# Patient Record
Sex: FEMALE | Race: WHITE | Employment: FULL TIME | URBAN - METROPOLITAN AREA
[De-identification: names, ages, dates, MRNs, and addresses within clinical notes are randomized per-mention and may not be internally consistent; named-entity substitution may affect disease eponyms.]

---

## 2017-12-05 ENCOUNTER — HOSPITAL ENCOUNTER (EMERGENCY)
Facility: HOSPITAL | Age: 42
Discharge: HOME | End: 2017-12-05
Admitting: EMERGENCY MEDICINE
Payer: COMMERCIAL

## 2017-12-05 ENCOUNTER — APPOINTMENT (EMERGENCY)
Dept: RADIOLOGY | Facility: HOSPITAL | Age: 42
End: 2017-12-05
Payer: COMMERCIAL

## 2017-12-05 VITALS
TEMPERATURE: 98.8 F | WEIGHT: 196 LBS | HEART RATE: 90 BPM | RESPIRATION RATE: 16 BRPM | HEIGHT: 68 IN | OXYGEN SATURATION: 96 % | BODY MASS INDEX: 29.7 KG/M2 | DIASTOLIC BLOOD PRESSURE: 88 MMHG | SYSTOLIC BLOOD PRESSURE: 136 MMHG

## 2017-12-05 DIAGNOSIS — S62.629A CLOSED AVULSION FRACTURE OF MIDDLE PHALANX OF FINGER, INITIAL ENCOUNTER: Primary | ICD-10-CM

## 2017-12-05 PROCEDURE — 73140 X-RAY EXAM OF FINGER(S): CPT

## 2017-12-05 PROCEDURE — 99283 EMERGENCY DEPT VISIT LOW MDM: CPT

## 2017-12-05 RX ORDER — NAPROXEN 500 MG/1
500 TABLET ORAL 2 TIMES DAILY WITH MEALS
Qty: 20 TABLET | Refills: 0 | Status: ON HOLD | OUTPATIENT
Start: 2017-12-05 | End: 2018-05-01 | Stop reason: ALTCHOICE

## 2017-12-05 RX ORDER — ESCITALOPRAM OXALATE 10 MG/1
10 TABLET ORAL DAILY
Status: ON HOLD | COMMUNITY
End: 2018-05-01 | Stop reason: ALTCHOICE

## 2017-12-05 NOTE — DISCHARGE INSTRUCTIONS
Finger Fracture   WHAT YOU NEED TO KNOW:   A finger fracture is a break in 1 or more of the bones in your finger  DISCHARGE INSTRUCTIONS:   Return to the emergency department if:   · Your cast or splint gets wet, damaged, or comes off  · Your splint or cast feels too tight  · You have severe pain  · Your injured finger is numb, cold, or pale  Contact your healthcare provider or hand specialist if:   · Your pain or swelling gets worse, even after treatment  · You have questions or concerns about your condition or care  Medicines:   · NSAIDs , such as ibuprofen, help decrease swelling, pain, and fever  This medicine is available with or without a doctor's order  NSAIDs can cause stomach bleeding or kidney problems in certain people  If you take blood thinner medicine, always ask your healthcare provider if NSAIDs are safe for you  Always read the medicine label and follow directions  · Acetaminophen  decreases pain and fever  It is available without a doctor's order  Ask how much to take and how often to take it  Follow directions  Acetaminophen can cause liver damage if not taken correctly  · Prescription pain medicine  may be given  Ask your healthcare provider how to take this medicine safely  Some prescription pain medicines contain acetaminophen  Do not take other medicines that contain acetaminophen without talking to your healthcare provider  Too much acetaminophen may cause liver damage  Prescription pain medicine may cause constipation  Ask your healthcare provider how to prevent or treat constipation  · Take your medicine as directed  Contact your healthcare provider if you think your medicine is not helping or if you have side effects  Tell him or her if you are allergic to any medicine  Keep a list of the medicines, vitamins, and herbs you take  Include the amounts, and when and why you take them  Bring the list or the pill bottles to follow-up visits   Carry your medicine list with you in case of an emergency  Self-care:   · Wear your splint as directed  Do not remove your splint until you follow up with your healthcare provider or hand specialist      · Apply ice  on your finger for 15 to 20 minutes every hour or as directed  Use an ice pack, or put crushed ice in a plastic bag  Cover it with a towel before you apply it to your skin  Ice helps prevent tissue damage and decreases swelling and pain  · Elevate  your finger above the level of your heart as often as you can  This will help decrease swelling and pain  Prop your hand on pillows or blankets to keep it elevated comfortably  · Go to physical therapy as directed  A physical therapist teaches you exercises to help improve movement and strength, and to decrease pain  Follow up with your healthcare provider or hand specialist within 2 days:  Write down your questions so you remember to ask them during your visits  © 2017 [address]  Information is for End User's use only and may not be sold, redistributed or otherwise used for commercial purposes  All illustrations and images included in CareNotes® are the copyrighted property of [employer]  or [address]  The above information is an  only  It is not intended as medical advice for individual conditions or treatments  Talk to your doctor, nurse or pharmacist before following any medical regimen to see if it is safe and effective for you

## 2017-12-05 NOTE — ED PROVIDER NOTES
History  Chief Complaint   Patient presents with    Finger Injury     pt reports injuring R 5th finger yesterday - c/o pain, bruise and swelling     79-year-old female presenting today with a right 5th pinky pain x1 day  Relays that pain began after she was lifting heavy bags and then through one, subsequently causing pain  Pain is 5/10 nonradiating  Notes bruising and swelling to the finger  Has been able to move the finger however with some pain  Has not taken anything for the pain  Denies numbness, paresthesias, weakness, wrist or hand pain  Prior to Admission Medications   Prescriptions Last Dose Informant Patient Reported? Taking?   escitalopram (LEXAPRO) 10 mg tablet   Yes Yes   Sig: Take 10 mg by mouth daily   hydrOXYzine HCL (ATARAX) 25 mg tablet   No No   Sig: Take 1 tablet by mouth 3 (three) times a day as needed for itching for up to 10 days      Facility-Administered Medications: None       Past Medical History:   Diagnosis Date    Psychiatric disorder     depression, anxiety       Past Surgical History:   Procedure Laterality Date    REDUCTION MAMMAPLASTY         No family history on file  I have reviewed and agree with the history as documented  Social History   Substance Use Topics    Smoking status: Current Every Day Smoker     Packs/day: 1 00     Types: Cigarettes    Smokeless tobacco: Never Used    Alcohol use Yes        Review of Systems   Constitutional: Negative  Negative for activity change  HENT: Negative  Eyes: Negative  Respiratory: Negative  Cardiovascular: Negative  Gastrointestinal: Negative  Genitourinary: Negative  Musculoskeletal: Positive for joint swelling  Negative for arthralgias, back pain, gait problem, myalgias and neck pain  Skin: Positive for color change  Negative for pallor, rash and wound  Neurological: Negative  Negative for tremors, weakness and numbness  All other systems reviewed and are negative        Physical Exam  ED Triage Vitals [12/05/17 1233]   Temperature Pulse Respirations Blood Pressure SpO2   98 8 °F (37 1 °C) 90 16 136/88 96 %      Temp Source Heart Rate Source Patient Position - Orthostatic VS BP Location FiO2 (%)   Oral Monitor Sitting Right arm --      Pain Score       6           Orthostatic Vital Signs  Vitals:    12/05/17 1233   BP: 136/88   Pulse: 90   Patient Position - Orthostatic VS: Sitting       Physical Exam   Constitutional: She is oriented to person, place, and time  She appears well-developed and well-nourished  HENT:   Head: Normocephalic and atraumatic  Eyes: EOM are normal  Pupils are equal, round, and reactive to light  Cardiovascular: Normal rate, regular rhythm, normal heart sounds and intact distal pulses  Pulmonary/Chest: Effort normal and breath sounds normal    spo2 is 96% indicating adequate oxygenation  Neurological: She is alert and oriented to person, place, and time  Skin: Skin is warm and dry  Capillary refill takes less than 2 seconds  Nursing note and vitals reviewed  ED Medications  Medications - No data to display    Diagnostic Studies  Results Reviewed     None                 XR finger fifth digit - pinkie RIGHT   Final Result by Lazaro Montano MD (12/05 1347)      Faint bony density dorsal to the PIP joint of the pinky finger may represent a fracture of uncertain age  There is adjacent soft tissue swelling  Workstation performed: [id number]                    Procedures  Procedures       Phone Contacts  ED Phone Contact    ED Course  ED Course                                MDM  Number of Diagnoses or Management Options  Closed avulsion fracture of middle phalanx of finger, initial encounter:   Diagnosis management comments: Discussed with patient results of the x-ray  Patient is placed in a finger splint assessed by me with good neurovascular exam before and after    Will have patient follow up with Orthopedics for re-evaluation for any potential underlying tendon/ligament injuries  Patient verbalizes understanding and agrees with the above assessment and plan  Amount and/or Complexity of Data Reviewed  Tests in the radiology section of CPT®: ordered and reviewed  Review and summarize past medical records: yes  Independent visualization of images, tracings, or specimens: yes      CritCare Time    Disposition  Final diagnoses:   Closed avulsion fracture of middle phalanx of finger, initial encounter     Time reflects when diagnosis was documented in both MDM as applicable and the Disposition within this note     Time User Action Codes Description Comment    12/5/2017  1:38 PM Paulo Viveros Add [T42 597I] Closed avulsion fracture of middle phalanx of finger, initial encounter       ED Disposition     ED Disposition Condition Comment    Discharge  Hemal Media discharge to home/self care      Condition at discharge: Good        Follow-up Information     Follow up With Specialties Details Why Contact Info Additional [address] Emergency Department Emergency Medicine Go to If symptoms worsen [address] [place] [address]  [phone] [facility], [place], [place], [address] , DO Orthopedic Surgery Schedule an appointment as soon as possible for a visit  [address] [place] [address]  [phone]           Discharge Medication List as of 12/5/2017  1:39 PM      START taking these medications    Details   naproxen (NAPROSYN) 500 mg tablet Take 1 tablet by mouth 2 (two) times a day with meals for 10 days, Starting Tue 12/5/2017, Until Fri 12/15/2017, Print         CONTINUE these medications which have NOT CHANGED    Details   escitalopram (LEXAPRO) 10 mg tablet Take 10 mg by mouth daily, Historical Med      hydrOXYzine HCL (ATARAX) 25 mg tablet Take 1 tablet by mouth 3 (three) times a day as needed for itching for up to 10 days, Starting 11/3/2016, Until Sun 11/13/16, Print           No discharge procedures on file      ED Provider  Electronically Signed by           Song Babin PA-C  12/05/17 5947

## 2018-01-18 ENCOUNTER — GENERIC CONVERSION - ENCOUNTER (OUTPATIENT)
Dept: OTHER | Facility: OTHER | Age: 43
End: 2018-01-18

## 2018-01-21 ENCOUNTER — APPOINTMENT (EMERGENCY)
Dept: RADIOLOGY | Facility: HOSPITAL | Age: 43
End: 2018-01-21
Payer: COMMERCIAL

## 2018-01-21 ENCOUNTER — HOSPITAL ENCOUNTER (EMERGENCY)
Facility: HOSPITAL | Age: 43
Discharge: HOME | End: 2018-01-21
Attending: EMERGENCY MEDICINE | Admitting: EMERGENCY MEDICINE
Payer: COMMERCIAL

## 2018-01-21 VITALS
SYSTOLIC BLOOD PRESSURE: 141 MMHG | WEIGHT: 196 LBS | RESPIRATION RATE: 20 BRPM | TEMPERATURE: 97.5 F | DIASTOLIC BLOOD PRESSURE: 102 MMHG | OXYGEN SATURATION: 97 % | HEART RATE: 83 BPM | BODY MASS INDEX: 29.8 KG/M2

## 2018-01-21 DIAGNOSIS — R07.89 ATYPICAL CHEST PAIN: Primary | ICD-10-CM

## 2018-01-21 LAB
ALBUMIN SERPL BCP-MCNC: 3.8 G/DL (ref 3.5–5)
ALP SERPL-CCNC: 58 U/L (ref 46–116)
ALT SERPL W P-5'-P-CCNC: 16 U/L (ref 12–78)
ANION GAP SERPL CALCULATED.3IONS-SCNC: 9 MMOL/L (ref 4–13)
AST SERPL W P-5'-P-CCNC: 14 U/L (ref 5–45)
BASOPHILS # BLD AUTO: 0 THOUSANDS/ΜL (ref 0–0.1)
BASOPHILS NFR BLD AUTO: 1 % (ref 0–1)
BILIRUB SERPL-MCNC: 0.3 MG/DL (ref 0.2–1)
BUN SERPL-MCNC: 17 MG/DL (ref 5–25)
CALCIUM SERPL-MCNC: 8.7 MG/DL (ref 8.3–10.1)
CHLORIDE SERPL-SCNC: 103 MMOL/L (ref 100–108)
CO2 SERPL-SCNC: 28 MMOL/L (ref 21–32)
CREAT SERPL-MCNC: 0.83 MG/DL (ref 0.6–1.3)
EOSINOPHIL # BLD AUTO: 0.1 THOUSAND/ΜL (ref 0–0.61)
EOSINOPHIL NFR BLD AUTO: 2 % (ref 0–6)
ERYTHROCYTE [DISTWIDTH] IN BLOOD BY AUTOMATED COUNT: 13.5 % (ref 11.6–15.1)
GFR SERPL CREATININE-BSD FRML MDRD: 88 ML/MIN/1.73SQ M
GLUCOSE SERPL-MCNC: 90 MG/DL (ref 65–140)
HCT VFR BLD AUTO: 36.7 % (ref 37–47)
HGB BLD-MCNC: 12.2 G/DL (ref 12–16)
LYMPHOCYTES # BLD AUTO: 2.5 THOUSANDS/ΜL (ref 0.6–4.47)
LYMPHOCYTES NFR BLD AUTO: 38 % (ref 14–44)
MCH RBC QN AUTO: 29.9 PG (ref 27–31)
MCHC RBC AUTO-ENTMCNC: 33.2 G/DL (ref 31.4–37.4)
MCV RBC AUTO: 90 FL (ref 82–98)
MONOCYTES # BLD AUTO: 0.7 THOUSAND/ΜL (ref 0.17–1.22)
MONOCYTES NFR BLD AUTO: 10 % (ref 4–12)
NEUTROPHILS # BLD AUTO: 3.3 THOUSANDS/ΜL (ref 1.85–7.62)
NEUTS SEG NFR BLD AUTO: 50 % (ref 43–75)
NRBC BLD AUTO-RTO: 0 /100 WBCS
PLATELET # BLD AUTO: 229 THOUSANDS/UL (ref 130–400)
PMV BLD AUTO: 8.2 FL (ref 8.9–12.7)
POTASSIUM SERPL-SCNC: 3.9 MMOL/L (ref 3.5–5.3)
PROT SERPL-MCNC: 7 G/DL (ref 6.4–8.2)
RBC # BLD AUTO: 4.07 MILLION/UL (ref 4.2–5.4)
SODIUM SERPL-SCNC: 140 MMOL/L (ref 136–145)
TROPONIN I SERPL-MCNC: <0.02 NG/ML
WBC # BLD AUTO: 6.7 THOUSAND/UL (ref 4.8–10.8)

## 2018-01-21 PROCEDURE — 85025 COMPLETE CBC W/AUTO DIFF WBC: CPT

## 2018-01-21 PROCEDURE — 99285 EMERGENCY DEPT VISIT HI MDM: CPT

## 2018-01-21 PROCEDURE — 36415 COLL VENOUS BLD VENIPUNCTURE: CPT

## 2018-01-21 PROCEDURE — 93005 ELECTROCARDIOGRAM TRACING: CPT

## 2018-01-21 PROCEDURE — 80053 COMPREHEN METABOLIC PANEL: CPT

## 2018-01-21 PROCEDURE — 84484 ASSAY OF TROPONIN QUANT: CPT

## 2018-01-21 PROCEDURE — 71045 X-RAY EXAM CHEST 1 VIEW: CPT

## 2018-01-21 RX ORDER — ASPIRIN 81 MG/1
324 TABLET, CHEWABLE ORAL ONCE
Status: COMPLETED | OUTPATIENT
Start: 2018-01-21 | End: 2018-01-21

## 2018-01-21 RX ADMIN — ASPIRIN 81 MG 324 MG: 81 TABLET ORAL at 23:20

## 2018-01-22 LAB
ATRIAL RATE: 80 BPM
P AXIS: 35 DEGREES
PR INTERVAL: 182 MS
QRS AXIS: 26 DEGREES
QRSD INTERVAL: 86 MS
QT INTERVAL: 392 MS
QTC INTERVAL: 452 MS
T WAVE AXIS: 35 DEGREES
VENTRICULAR RATE: 80 BPM

## 2018-01-22 NOTE — ED PROVIDER NOTES
History  Chief Complaint   Patient presents with    Chest Pain     started 1/2 hour ago  Pain started in left arm and then radiated across chest   Now feels tight    Shortness of Breath     started with chest pain, now relieved    Headache     sharp head pain preceeded chest pain, now relieved     38 yowf c/o sudden onset headache shortly followed by tightening in left arm which radiated into left chest   Lasted a few seconds and resolved  Got a little sob and felt warm with it  Did not break out in a sweat  No nausea or vomiting  No recent fever or cough  + smoker  No h/o HTN/chol/DM  No FH of heart problems  + FH cancer, copd  Prior to Admission Medications   Prescriptions Last Dose Informant Patient Reported? Taking?   escitalopram (LEXAPRO) 10 mg tablet 1/21/2018 at Unknown time  Yes Yes   Sig: Take 10 mg by mouth daily   hydrOXYzine HCL (ATARAX) 25 mg tablet   No No   Sig: Take 1 tablet by mouth 3 (three) times a day as needed for itching for up to 10 days   naproxen (NAPROSYN) 500 mg tablet   No No   Sig: Take 1 tablet by mouth 2 (two) times a day with meals for 10 days      Facility-Administered Medications: None       Past Medical History:   Diagnosis Date    Psychiatric disorder     depression, anxiety       Past Surgical History:   Procedure Laterality Date    REDUCTION MAMMAPLASTY         History reviewed  No pertinent family history  I have reviewed and agree with the history as documented  Social History   Substance Use Topics    Smoking status: Current Every Day Smoker     Packs/day: 1 00     Types: Cigarettes    Smokeless tobacco: Never Used    Alcohol use Yes      Comment: social        Review of Systems   Constitutional: Negative  Negative for fever  HENT: Negative  Eyes: Negative  Respiratory: Negative  Negative for cough and shortness of breath  Cardiovascular: Positive for chest pain  Gastrointestinal: Negative    Negative for abdominal pain, diarrhea, nausea and vomiting  Genitourinary: Negative  Negative for dysuria and flank pain  Musculoskeletal: Negative  Negative for back pain and myalgias  Skin: Negative  Negative for rash and wound  Neurological: Negative  Negative for dizziness and headaches  Hematological: Does not bruise/bleed easily  Psychiatric/Behavioral: Negative  All other systems reviewed and are negative  Physical Exam  ED Triage Vitals [01/21/18 2253]   Temperature Pulse Respirations Blood Pressure SpO2   97 5 °F (36 4 °C) 83 20 (!) 141/102 97 %      Temp Source Heart Rate Source Patient Position - Orthostatic VS BP Location FiO2 (%)   Tympanic Monitor Lying Left arm --      Pain Score       2           Orthostatic Vital Signs  Vitals:    01/21/18 2253   BP: (!) 141/102   Pulse: 83   Patient Position - Orthostatic VS: Lying       Physical Exam   Constitutional: She is oriented to person, place, and time  She appears well-developed and well-nourished  No distress  BP recheck 112/75   HENT:   Head: Normocephalic and atraumatic  Eyes: Conjunctivae are normal  Pupils are equal, round, and reactive to light  Neck: Normal range of motion  Neck supple  Cardiovascular: Normal rate, regular rhythm and normal heart sounds  No murmur heard  Pulmonary/Chest: Effort normal and breath sounds normal  No respiratory distress  Abdominal: Soft  Bowel sounds are normal  She exhibits no distension  There is no tenderness  Musculoskeletal: Normal range of motion  She exhibits no edema, tenderness or deformity  No calf tenderness   Neurological: She is alert and oriented to person, place, and time  No cranial nerve deficit  She exhibits normal muscle tone  Skin: Skin is warm and dry  No rash noted  She is not diaphoretic  No pallor  Psychiatric: She has a normal mood and affect  Her behavior is normal    Nursing note and vitals reviewed        ED Medications  Medications   aspirin chewable tablet 324 mg (324 mg Oral Given 1/21/18 2320)       Diagnostic Studies  Results Reviewed     Procedure Component Value Units Date/Time    Troponin I [[id number]]  (Normal) Collected:  01/21/18 2302    Lab Status:  Final result Specimen:  Blood from Arm, Right Updated:  01/21/18 2329     Troponin I <0 02 ng/mL     Narrative:         [employer] Chemistry analyzer 99% cutoff is > 0 04 ng/mL in network labs    o cTnI 99% cutoff is useful only when applied to patients in the clinical setting of myocardial ischemia  o cTnI 99% cutoff should be interpreted in the context of clinical history, ECG findings and possibly cardiac imaging to establish correct diagnosis  o cTnI 99% cutoff may be suggestive but clearly not indicative of a coronary event without the clinical setting of myocardial ischemia  Comprehensive metabolic panel [[id number]] Collected:  01/21/18 2302    Lab Status:  Final result Specimen:  Blood from Arm, Right Updated:  01/21/18 2324     Sodium 140 mmol/L      Potassium 3 9 mmol/L      Chloride 103 mmol/L      CO2 28 mmol/L      Anion Gap 9 mmol/L      BUN 17 mg/dL      Creatinine 0 83 mg/dL      Glucose 90 mg/dL      Calcium 8 7 mg/dL      AST 14 U/L      ALT 16 U/L      Alkaline Phosphatase 58 U/L      Total Protein 7 0 g/dL      Albumin 3 8 g/dL      Total Bilirubin 0 30 mg/dL      eGFR 88 ml/min/1 73sq m     Narrative:         National Kidney Disease Education Program recommendations are as follows:  GFR calculation is accurate only with a steady state creatinine  Chronic Kidney disease less than 60 ml/min/1 73 sq  meters  Kidney failure less than 15 ml/min/1 73 sq  meters      CBC and differential [[id number]]  (Abnormal) Collected:  01/21/18 2302    Lab Status:  Final result Specimen:  Blood from Arm, Right Updated:  01/21/18 2308     WBC 6 70 Thousand/uL      RBC 4 07 (L) Million/uL      Hemoglobin 12 2 g/dL      Hematocrit 36 7 (L) %      MCV 90 fL      MCH 29 9 pg      MCHC 33 2 g/dL      RDW 13 5 %      MPV 8 2 (L) fL Platelets 239 Thousands/uL      nRBC 0 /100 WBCs      Neutrophils Relative 50 %      Lymphocytes Relative 38 %      Monocytes Relative 10 %      Eosinophils Relative 2 %      Basophils Relative 1 %      Neutrophils Absolute 3 30 Thousands/µL      Lymphocytes Absolute 2 50 Thousands/µL      Monocytes Absolute 0 70 Thousand/µL      Eosinophils Absolute 0 10 Thousand/µL      Basophils Absolute 0 00 Thousands/µL                  X-ray chest 1 view portable    (Results Pending)              Procedures  ECG 12 Lead Documentation  Date/Time: 1/21/2018 10:59 PM  Performed by: Sabi Canela  Authorized by: Sabi Canela     Indications / Diagnosis:  Chest pain  ECG reviewed by me, the ED Provider: yes    Patient location:  ED  Previous ECG:     Previous ECG:  Unavailable  Interpretation:     Interpretation: normal    Rate:     ECG rate:  80    ECG rate assessment: normal    Rhythm:     Rhythm: sinus rhythm    Ectopy:     Ectopy: none    QRS:     QRS axis:  Normal  Conduction:     Conduction: normal    ST segments:     ST segments:  Normal  T waves:     T waves: normal             Phone Contacts  ED Phone Contact    ED Course  ED Course          HEART Risk Score    Flowsheet Row Most Recent Value   History  0 Filed at: 01/21/2018 2331   ECG  0 Filed at: 01/21/2018 2331   Age  0 Filed at: 01/21/2018 2331   Risk Factors  1 Filed at: 01/21/2018 2331   Troponin  0 Filed at: 01/21/2018 2331   Heart Score Risk Calculator   History  0 Filed at: 01/21/2018 2331   ECG  0 Filed at: 01/21/2018 2331   Age  0 Filed at: 01/21/2018 2331   Risk Factors  1 Filed at: 01/21/2018 2331   Troponin  0 Filed at: 01/21/2018 2331   HEART Score  1 Filed at: 01/21/2018 2331   HEART Score  1 Filed at: 01/21/2018 2331                            MDM  Number of Diagnoses or Management Options  Diagnosis management comments: Evaluation benign, history very atypical and heart score 1  Will discharge for outpt  Follow up and referral for outpt  Stress  CritCare Time    Disposition  Final diagnoses:   Atypical chest pain     Time reflects when diagnosis was documented in both MDM as applicable and the Disposition within this note     Time User Action Codes Description Comment    9/69/8382 34:20 PM Goldy Pang [M79 95] Atypical chest pain       ED Disposition     ED Disposition Condition Comment    Discharge  Ewelina Huddleston discharge to home/self care  Condition at discharge: Stable        Follow-up Information     Follow up With Specialties Details Why Contact William Alvarez,  Family Medicine Schedule an appointment as soon as possible for a visit  [place] [phone]          Patient's Medications   Discharge Prescriptions    No medications on file     No discharge procedures on file      ED Provider  Electronically Signed by           Mary Jane Beltran MD  72/66/73 7715

## 2018-01-22 NOTE — DISCHARGE INSTRUCTIONS
Chest Pain, Ambulatory Care - your tests are normal at this time  Rest at home  Make a follow up appointment with [facility] for possible referral for a screening stress test   Try to quit smoking  GENERAL INFORMATION:   Chest pain  can be caused by a range of conditions, from not serious to life-threatening  It may be caused by a heart attack or a blood clot in your lungs  Sometimes chest pain or pressure is caused by poor blood flow to your heart (angina)  Infection, inflammation, nerve irritation or a fracture in the bones or cartilage in your chest can cause pain or discomfort  Chest pain can also be a symptom of a digestive problem, such as acid reflux or a stomach ulcer  Sometimes you need further testing beyond what we can do in the ER  Common symptoms include the following:   · Fever or sweating     · Nausea or vomiting     · Shortness of breath     · Discomfort or pressure that spreads from your chest to your back, jaw, or arm     · A racing or slow heartbeat     · Feeling weak, tired, or faint  Seek immediate care for the following symptoms:   · Any of the following signs of a heart attack:      ¨ Squeezing, pressure, or pain in your chest that lasts longer than 5 minutes or returns    ¨ Discomfort or pain in your back, neck, jaw, stomach, or arm     ¨ Trouble breathing     ¨ Nausea or vomiting    ¨ Lightheadedness or a sudden cold sweat, especially with trouble breathing         · Chest discomfort that gets worse, even with medicine    · Coughing or vomiting blood    · Black or bloody bowel movements     · Vomiting that does not stop, or pain when you swallow  Treatment for chest pain  may include medicine to treat your symptoms while he determines the cause of your chest pain  You may also need any of the following:  · Antiplatelets , such as aspirin, help prevent blood clots  Take your antiplatelet medicine exactly as directed  These medicines make it more likely for you to bleed or bruise   If you are told to take aspirin, do not take acetaminophen or ibuprofen instead  · Prescription pain medicine  may be given  Ask how to take this medicine safely  Do not smoke: If you smoke, it is never too late to quit  Smoking increases your risk for a heart attack and other heart and lung conditions  Ask your healthcare provider for information about how to stop smoking if you need help  Follow up with your healthcare provider as directed: You may need more tests  You may be referred to a specialist, such as a cardiologist or gastroenterologist  Write down your questions so you remember to ask them during your visits  CARE AGREEMENT:   You have the right to help plan your care  Learn about your health condition and how it may be treated  Discuss treatment options with your caregivers to decide what care you want to receive  You always have the right to refuse treatment  The above information is an  only  It is not intended as medical advice for individual conditions or treatments  Talk to your doctor, nurse or pharmacist before following any medical regimen to see if it is safe and effective for you  © 2014 [address] is for End User's use only and may not be sold, redistributed or otherwise used for commercial purposes  All illustrations and images included in CareNotes® are the copyrighted property of [employer]  or Michael Jara

## 2018-01-24 VITALS
OXYGEN SATURATION: 96 % | WEIGHT: 193 LBS | TEMPERATURE: 96.6 F | BODY MASS INDEX: 29.25 KG/M2 | HEIGHT: 68 IN | DIASTOLIC BLOOD PRESSURE: 80 MMHG | SYSTOLIC BLOOD PRESSURE: 118 MMHG | HEART RATE: 92 BPM | RESPIRATION RATE: 18 BRPM

## 2018-05-01 ENCOUNTER — APPOINTMENT (EMERGENCY)
Dept: RADIOLOGY | Facility: HOSPITAL | Age: 43
End: 2018-05-01
Payer: COMMERCIAL

## 2018-05-01 ENCOUNTER — HOSPITAL ENCOUNTER (OUTPATIENT)
Facility: HOSPITAL | Age: 43
Setting detail: OBSERVATION
Discharge: HOME | End: 2018-05-02
Attending: EMERGENCY MEDICINE | Admitting: FAMILY MEDICINE
Payer: COMMERCIAL

## 2018-05-01 DIAGNOSIS — F17.200 TOBACCO DEPENDENCE: ICD-10-CM

## 2018-05-01 DIAGNOSIS — S82.852A TRIMALLEOLAR FRACTURE OF ANKLE, CLOSED, LEFT, INITIAL ENCOUNTER: Primary | ICD-10-CM

## 2018-05-01 DIAGNOSIS — S82.892A CLOSED FRACTURE OF LEFT ANKLE, INITIAL ENCOUNTER: ICD-10-CM

## 2018-05-01 DIAGNOSIS — T50.905A DRUG REACTION: ICD-10-CM

## 2018-05-01 PROBLEM — E66.9 OBESITY: Status: ACTIVE | Noted: 2018-01-18

## 2018-05-01 PROBLEM — F32.A ANXIETY AND DEPRESSION: Status: ACTIVE | Noted: 2018-01-18

## 2018-05-01 PROBLEM — F41.9 ANXIETY AND DEPRESSION: Status: ACTIVE | Noted: 2018-01-18

## 2018-05-01 PROCEDURE — 99233 SBSQ HOSP IP/OBS HIGH 50: CPT | Performed by: FAMILY MEDICINE

## 2018-05-01 PROCEDURE — 73610 X-RAY EXAM OF ANKLE: CPT

## 2018-05-01 PROCEDURE — 99284 EMERGENCY DEPT VISIT MOD MDM: CPT

## 2018-05-01 PROCEDURE — 87081 CULTURE SCREEN ONLY: CPT | Performed by: STUDENT IN AN ORGANIZED HEALTH CARE EDUCATION/TRAINING PROGRAM

## 2018-05-01 PROCEDURE — 96374 THER/PROPH/DIAG INJ IV PUSH: CPT

## 2018-05-01 RX ORDER — KETOROLAC TROMETHAMINE 30 MG/ML
30 INJECTION, SOLUTION INTRAMUSCULAR; INTRAVENOUS EVERY 6 HOURS PRN
Status: DISCONTINUED | OUTPATIENT
Start: 2018-05-01 | End: 2018-05-01

## 2018-05-01 RX ORDER — MORPHINE SULFATE 4 MG/ML
4 INJECTION, SOLUTION INTRAMUSCULAR; INTRAVENOUS ONCE
Status: COMPLETED | OUTPATIENT
Start: 2018-05-01 | End: 2018-05-01

## 2018-05-01 RX ORDER — ACETAMINOPHEN 325 MG/1
650 TABLET ORAL EVERY 6 HOURS PRN
Status: DISCONTINUED | OUTPATIENT
Start: 2018-05-01 | End: 2018-05-01

## 2018-05-01 RX ORDER — NICOTINE 21 MG/24HR
21 PATCH, TRANSDERMAL 24 HOURS TRANSDERMAL DAILY
Status: DISCONTINUED | OUTPATIENT
Start: 2018-05-02 | End: 2018-05-01

## 2018-05-01 RX ORDER — OXYCODONE HYDROCHLORIDE AND ACETAMINOPHEN 5; 325 MG/1; MG/1
1 TABLET ORAL EVERY 4 HOURS PRN
Status: DISCONTINUED | OUTPATIENT
Start: 2018-05-01 | End: 2018-05-01

## 2018-05-01 RX ORDER — NICOTINE 21 MG/24HR
21 PATCH, TRANSDERMAL 24 HOURS TRANSDERMAL DAILY
Status: DISCONTINUED | OUTPATIENT
Start: 2018-05-01 | End: 2018-05-02 | Stop reason: HOSPADM

## 2018-05-01 RX ORDER — OXYCODONE HYDROCHLORIDE AND ACETAMINOPHEN 5; 325 MG/1; MG/1
1 TABLET ORAL EVERY 6 HOURS PRN
Status: DISCONTINUED | OUTPATIENT
Start: 2018-05-01 | End: 2018-05-02 | Stop reason: HOSPADM

## 2018-05-01 RX ORDER — NICOTINE 21 MG/24HR
1 PATCH, TRANSDERMAL 24 HOURS TRANSDERMAL DAILY
Status: DISCONTINUED | OUTPATIENT
Start: 2018-05-02 | End: 2018-05-01 | Stop reason: SDUPTHER

## 2018-05-01 RX ORDER — ONDANSETRON 2 MG/ML
4 INJECTION INTRAMUSCULAR; INTRAVENOUS EVERY 6 HOURS PRN
Status: DISCONTINUED | OUTPATIENT
Start: 2018-05-01 | End: 2018-05-02 | Stop reason: HOSPADM

## 2018-05-01 RX ADMIN — MORPHINE SULFATE 4 MG: 4 INJECTION INTRAVENOUS at 17:09

## 2018-05-01 RX ADMIN — HYDROMORPHONE HYDROCHLORIDE 1 MG: 1 INJECTION, SOLUTION INTRAMUSCULAR; INTRAVENOUS; SUBCUTANEOUS at 21:39

## 2018-05-01 RX ADMIN — MORPHINE SULFATE 4 MG: 4 INJECTION INTRAVENOUS at 15:59

## 2018-05-01 RX ADMIN — ONDANSETRON 4 MG: 2 INJECTION INTRAMUSCULAR; INTRAVENOUS at 23:32

## 2018-05-01 RX ADMIN — OXYCODONE HYDROCHLORIDE AND ACETAMINOPHEN 1 TABLET: 5; 325 TABLET ORAL at 20:06

## 2018-05-01 RX ADMIN — NICOTINE 21 MG: 21 PATCH, EXTENDED RELEASE TRANSDERMAL at 20:05

## 2018-05-01 NOTE — ED PROVIDER NOTES
History  Chief Complaint   Patient presents with    Ankle Pain     pt presents to the ed with left ankle injury  pt states that tripped down stairs and "felt a crack"      43-year-old female states she fell down some stairs caught herself but felt a crack and a pop and pain in her left ankle  Does have quite a bit of swelling around the ankle joint  Nonambulatory at this point  Patient is wake and alert no other complaints  History provided by:  Patient and spouse   used: No        Prior to Admission Medications   Prescriptions Last Dose Informant Patient Reported? Taking?   escitalopram (LEXAPRO) 10 mg tablet   Yes No   Sig: Take 10 mg by mouth daily   hydrOXYzine HCL (ATARAX) 25 mg tablet   No No   Sig: Take 1 tablet by mouth 3 (three) times a day as needed for itching for up to 10 days   naproxen (NAPROSYN) 500 mg tablet   No No   Sig: Take 1 tablet by mouth 2 (two) times a day with meals for 10 days   risperiDONE (RISPERDAL) 2 mg tablet   Yes No   Sig: Take 1 tablet by mouth      Facility-Administered Medications: None       Past Medical History:   Diagnosis Date    Psychiatric disorder     depression, anxiety       Past Surgical History:   Procedure Laterality Date    REDUCTION MAMMAPLASTY         History reviewed  No pertinent family history  I have reviewed and agree with the history as documented  Social History   Substance Use Topics    Smoking status: Current Every Day Smoker     Packs/day: 1 00     Types: Cigarettes    Smokeless tobacco: Never Used    Alcohol use Yes      Comment: social        Review of Systems   Constitutional: Negative for activity change, chills, diaphoresis and fever  HENT: Negative for congestion, ear pain, nosebleeds, sore throat, trouble swallowing and voice change  Eyes: Negative for pain, discharge and redness  Respiratory: Negative for apnea, cough, choking, shortness of breath, wheezing and stridor      Cardiovascular: Negative for chest pain and palpitations  Gastrointestinal: Negative for abdominal distention, abdominal pain, constipation, diarrhea, nausea and vomiting  Endocrine: Negative for polydipsia  Genitourinary: Negative for difficulty urinating, dysuria, flank pain, frequency, hematuria and urgency  Musculoskeletal: Positive for arthralgias, gait problem and joint swelling  Negative for back pain, myalgias, neck pain and neck stiffness  Skin: Negative for pallor and rash  Neurological: Negative for dizziness, tremors, syncope, speech difficulty, weakness, numbness and headaches  Hematological: Negative for adenopathy  Psychiatric/Behavioral: Negative for confusion, hallucinations, self-injury and suicidal ideas  The patient is not nervous/anxious  Physical Exam  ED Triage Vitals   Temperature Pulse Respirations Blood Pressure SpO2   05/01/18 1520 05/01/18 1520 05/01/18 1520 05/01/18 1520 05/01/18 1520   98 6 °F (37 °C) 96 18 131/90 97 %      Temp Source Heart Rate Source Patient Position - Orthostatic VS BP Location FiO2 (%)   05/01/18 1520 05/01/18 1520 05/01/18 1520 05/01/18 1520 --   Tympanic Monitor Lying Right arm       Pain Score       05/01/18 1559       Worst Possible Pain           Orthostatic Vital Signs  Vitals:    05/01/18 1520   BP: 131/90   Pulse: 96   Patient Position - Orthostatic VS: Lying       Physical Exam   Constitutional: She is oriented to person, place, and time  Vital signs are normal  She appears well-developed and well-nourished  HENT:   Head: Normocephalic and atraumatic  Nose: Nose normal    Mouth/Throat: Oropharynx is clear and moist    Eyes: EOM are normal  Pupils are equal, round, and reactive to light  Neck: Normal range of motion  Cardiovascular: Normal rate, regular rhythm, normal heart sounds and intact distal pulses  Pulmonary/Chest: Effort normal and breath sounds normal    Abdominal: Soft   Bowel sounds are normal    Musculoskeletal: She exhibits edema, tenderness and deformity  Neurological: She is alert and oriented to person, place, and time  Skin: Skin is warm  Psychiatric: She has a normal mood and affect  Nursing note and vitals reviewed  ED Medications  Medications   morphine (PF) 4 mg/mL injection 4 mg (4 mg Intravenous Given 5/1/18 5514)       Diagnostic Studies  Results Reviewed     None                 XR ankle 3+ views LEFT   Final Result by Tiny Edward MD (05/01 2012)      Distal fibular fracture approximately 6 cm above the ankle joint space  Medial and posterior malleolar fractures  Widening of the medial ankle mortise  Workstation performed: [id number]                    Procedures  Procedures       Phone Contacts  ED Phone Contact    ED Course                               MDM  CritCare Time    Disposition  Final diagnoses:   Closed fracture of left ankle, initial encounter     Time reflects when diagnosis was documented in both MDM as applicable and the Disposition within this note     Time User Action Codes Description Comment    5/1/2018  4:51 PM Andreina Nicholas Add [[id number] [id number]] Closed fracture of left ankle, initial encounter       ED Disposition     ED Disposition Condition Comment    Admit  Case was discussed with Dr Franko Hill and the patient's admission status was agreed to be Admission Status: observation status to the service of Dr Franko Hill   Follow-up Information    None       Patient's Medications   Discharge Prescriptions    No medications on file     No discharge procedures on file      ED Provider  Electronically Signed by           Tex Rhodes DO  05/01/18 8852

## 2018-05-01 NOTE — H&P
H&P Exam - Philip Aztec 38 y o  female MRN: [id number]    Unit/Bed#: ED 13 Encounter: [id number]    Assessment:  Deana Barrientos is a 38year old female with PMH of anxiety and depression, and obesity who presents after a fall with a distal fibular, medial & posterior malleolar fractures  She is being admitted as observation status under the service of Dr Carter Schmid with an expected duration of stay less than 2 midnights and anticipated disposition at discharge to home/self care  Plan:    1  Closed Trimalleolar Fracture of the Distal LLE  · Xray L Ankle shows closed distal fibular fracture, medial & posterior malleolar fractures of the left ankle  · Orthopedic surgery consulted  Recommendations appreciated  · Admitted for observation & pain control until can be seen by ortho  · Pain control with toradol 30mg IV q6h PRN moderate to severe pain, dilaudid 1mg IV q4h PRN breakthrough pain  2  Anxiety & Depression  · Patient has two medications (lexapro 10 mg PO daily, risperdal 2 mg PO daily) listed in her chart, but states she is currently not taking any medication for her psychiatric conditions  · She is currently stable with no complaints  · Continue to monitor  · Pt noted to have has "a couple" beers earlier this afternoon  May be self-medicating  Ask CAGE questions  3  Obesity  · Counseled on weight loss  4  Nicotine Dependence  · Nicotine patch ordered  · Smoking cessation materials provided  5  Global  · Regular Diet  · Replete electrolytes as indicated  · VTE prophylaxis not currently indicated  · Heplock  History of Present Illness   Kassie Yip is a 72-year-old female with a past medical history of anxiety and depression, obesity  She presents today after falling down her basement stairs  Her  is also at the bedside  She states that the power was out in her basement, and she was trying to reach the [employer] in the dark guided by her  with a flashlight    She states that stairs are very old and narrow, and she simply slipped and fell  She admits to drinking a couple of beers, however denies being frankly intoxicated  She denies any dizziness, lightheadedness, syncope or near syncope preceding the event  She states that she merely slipped, tried to catch herself on the railing and twisted her ankle at which point her and her  both heard a loud crack  She then proceeded to fall down approximately 3 or 4 stairs before landing at the bottom of the flight  She denies any trauma to the head or any other part of her body  Her primary complaint is of left ankle pain which was 10/10 in intensity prior to receiving morphine in the ER  Her only other complaint is of some mild left-sided dull back strain, likely related to her twisting while trying to catch herself  Other than her ankle pain, she denies any other symptoms in her lower extremity, including numbness, tingling/paresthesias, or weakness  She otherwise feels well and denies CP, SOB, HA, N/V/D, palpitations, lightheadedness, dizziness, fevers, chills, abdominal pain, or /GI symptoms  She denies having any medical conditions except for intermittent anxiety and states that she takes no medications regularly  She does have to psychiatric medications listed in her medical record, Lexapro and Risperdal, but states that she does not take these  She is being admitted as observation status overnight so that she may be evaluated by the orthopedic surgery service in the morning  ED: Morphine 4 mg IV x2    Review of Systems   Constitutional: Negative for chills, fatigue and fever  HENT: Negative  Eyes: Negative  Respiratory: Negative for cough, choking, chest tightness, shortness of breath and wheezing  Cardiovascular: Negative for chest pain, palpitations and leg swelling  Gastrointestinal: Negative  Genitourinary: Negative      Musculoskeletal: Positive for arthralgias, back pain, gait problem and joint swelling  Negative for myalgias, neck pain and neck stiffness  Skin: Negative for color change, pallor, rash and wound  Neurological: Negative for dizziness, tremors, seizures, weakness, light-headedness, numbness and headaches  Psychiatric/Behavioral: Negative for agitation, behavioral problems, confusion and decreased concentration  The patient is not nervous/anxious  Historical Information   Past Medical History:   Diagnosis Date    Psychiatric disorder     depression, anxiety     Past Surgical History:   Procedure Laterality Date    REDUCTION MAMMAPLASTY       Social History   History   Alcohol Use    Yes     Comment: social     History   Drug Use No     History   Smoking Status    Current Every Day Smoker    Packs/day: 1 00    Types: Cigarettes   Smokeless Tobacco    Never Used     Family History: History reviewed  No pertinent family history  Meds/Allergies   PTA meds:   Prior to Admission Medications   Prescriptions Last Dose Informant Patient Reported?  Taking?   escitalopram (LEXAPRO) 10 mg tablet   Yes No   Sig: Take 10 mg by mouth daily   hydrOXYzine HCL (ATARAX) 25 mg tablet   No No   Sig: Take 1 tablet by mouth 3 (three) times a day as needed for itching for up to 10 days   naproxen (NAPROSYN) 500 mg tablet   No No   Sig: Take 1 tablet by mouth 2 (two) times a day with meals for 10 days   risperiDONE (RISPERDAL) 2 mg tablet   Yes No   Sig: Take 1 tablet by mouth      Facility-Administered Medications: None     Allergies   Allergen Reactions    Ultram [Tramadol] Rash       Objective   First Vitals:   Blood Pressure: 131/90 (05/01/18 1520)  Pulse: 96 (05/01/18 1520)  Temperature: 98 6 °F (37 °C) (05/01/18 1520)  Temp Source: Tympanic (05/01/18 1520)  Respirations: 18 (05/01/18 1520)  Weight - Scale: 88 9 kg (196 lb) (05/01/18 1521)  SpO2: 97 % (05/01/18 1520)    Current Vitals:   Blood Pressure: 131/90 (05/01/18 1520)  Pulse: 96 (05/01/18 1520)  Temperature: 98 6 °F (37 °C) (05/01/18 1520)  Temp Source: Tympanic (05/01/18 1520)  Respirations: 18 (05/01/18 1520)  Weight - Scale: 88 9 kg (196 lb) (05/01/18 1521)  SpO2: 97 % (05/01/18 1520)    No intake or output data in the 24 hours ending 05/01/18 1701    Invasive Devices     Peripheral Intravenous Line            Peripheral IV 05/01/18 Left Antecubital less than 1 day                Physical Exam   Constitutional: She is oriented to person, place, and time  She appears well-developed and well-nourished  No distress  HENT:   Head: Normocephalic and atraumatic  Eyes: Right eye exhibits no discharge  Left eye exhibits no discharge  No scleral icterus  Neck: Neck supple  No tracheal deviation present  No thyromegaly present  Cardiovascular: Normal rate, regular rhythm, normal heart sounds and intact distal pulses  Exam reveals no gallop and no friction rub  No murmur heard  Pulmonary/Chest: Effort normal and breath sounds normal  No respiratory distress  She has no wheezes  She exhibits no tenderness  Abdominal: Soft  Bowel sounds are normal  She exhibits no distension  There is no tenderness  Musculoskeletal: She exhibits edema, tenderness and deformity  Right ankle: Normal         Left ankle: She exhibits decreased range of motion, swelling and deformity  She exhibits normal pulse  Tenderness  Right lower leg: Normal         Left lower leg: She exhibits tenderness, swelling and deformity  Right foot: Normal         Left foot: There is tenderness and swelling  Musculoskeletal exam slightly limited as the patient has already been splinted and has the extremity wrapped in clean, dry, intact bandages  Neurological: She is alert and oriented to person, place, and time  No sensory deficit  She exhibits normal muscle tone  Skin: Skin is warm and dry  No rash noted  She is not diaphoretic  No erythema  Psychiatric: She has a normal mood and affect   Her behavior is normal        Lab Results: n/a    Imaging:     XR ankle 3+ views LEFT   Final Result      Distal fibular fracture approximately 6 cm above the ankle joint space  Medial and posterior malleolar fractures  Widening of the medial ankle mortise  Workstation performed: [id number]             EKG, Pathology, and Other Studies: N/A    Code Status: Full    Counseling / Coordination of Care: Total floor / unit time spent today 45 minutes       Ifeanyi Phelps DO  05/01/18  5:09 PM

## 2018-05-02 VITALS
WEIGHT: 208.56 LBS | OXYGEN SATURATION: 96 % | BODY MASS INDEX: 31.61 KG/M2 | DIASTOLIC BLOOD PRESSURE: 73 MMHG | RESPIRATION RATE: 18 BRPM | HEIGHT: 68 IN | SYSTOLIC BLOOD PRESSURE: 109 MMHG | HEART RATE: 80 BPM | TEMPERATURE: 98.2 F

## 2018-05-02 DIAGNOSIS — S82.91XD: Primary | ICD-10-CM

## 2018-05-02 PROCEDURE — 27816 TREATMENT OF ANKLE FRACTURE: CPT | Performed by: PHYSICIAN ASSISTANT

## 2018-05-02 PROCEDURE — 99244 OFF/OP CNSLTJ NEW/EST MOD 40: CPT | Performed by: PHYSICIAN ASSISTANT

## 2018-05-02 PROCEDURE — 99217 PR OBSERVATION CARE DISCHARGE MANAGEMENT: CPT | Performed by: FAMILY MEDICINE

## 2018-05-02 RX ORDER — HYDROXYZINE HYDROCHLORIDE 25 MG/1
25 TABLET, FILM COATED ORAL EVERY 6 HOURS PRN
Qty: 30 TABLET | Refills: 0 | Status: SHIPPED | OUTPATIENT
Start: 2018-05-02 | End: 2018-09-18 | Stop reason: HOSPADM

## 2018-05-02 RX ORDER — HYDROXYZINE HYDROCHLORIDE 25 MG/1
25 TABLET, FILM COATED ORAL EVERY 6 HOURS PRN
Status: DISCONTINUED | OUTPATIENT
Start: 2018-05-02 | End: 2018-05-02 | Stop reason: HOSPADM

## 2018-05-02 RX ORDER — NICOTINE 21 MG/24HR
1 PATCH, TRANSDERMAL 24 HOURS TRANSDERMAL DAILY
Qty: 28 PATCH | Refills: 0 | Status: SHIPPED | OUTPATIENT
Start: 2018-05-03 | End: 2018-09-18 | Stop reason: SDUPTHER

## 2018-05-02 RX ORDER — OXYCODONE AND ACETAMINOPHEN 10; 325 MG/1; MG/1
1 TABLET ORAL EVERY 6 HOURS PRN
Qty: 20 TABLET | Refills: 0 | Status: SHIPPED | OUTPATIENT
Start: 2018-05-02 | End: 2018-05-10 | Stop reason: ALTCHOICE

## 2018-05-02 RX ORDER — ONDANSETRON 4 MG/1
4 TABLET, FILM COATED ORAL EVERY 8 HOURS PRN
Qty: 20 TABLET | Refills: 0 | Status: SHIPPED | OUTPATIENT
Start: 2018-05-02 | End: 2018-09-18 | Stop reason: ALTCHOICE

## 2018-05-02 RX ADMIN — NICOTINE 21 MG: 21 PATCH, EXTENDED RELEASE TRANSDERMAL at 08:17

## 2018-05-02 RX ADMIN — HYDROMORPHONE HYDROCHLORIDE 1 MG: 1 INJECTION, SOLUTION INTRAMUSCULAR; INTRAVENOUS; SUBCUTANEOUS at 01:52

## 2018-05-02 RX ADMIN — HYDROXYZINE HYDROCHLORIDE 25 MG: 25 TABLET, FILM COATED ORAL at 06:48

## 2018-05-02 RX ADMIN — OXYCODONE HYDROCHLORIDE AND ACETAMINOPHEN 1 TABLET: 5; 325 TABLET ORAL at 05:46

## 2018-05-02 RX ADMIN — HYDROMORPHONE HYDROCHLORIDE 1 MG: 1 INJECTION, SOLUTION INTRAMUSCULAR; INTRAVENOUS; SUBCUTANEOUS at 08:16

## 2018-05-02 RX ADMIN — ONDANSETRON 4 MG: 2 INJECTION INTRAMUSCULAR; INTRAVENOUS at 08:24

## 2018-05-02 RX ADMIN — HYDROMORPHONE HYDROCHLORIDE 1 MG: 1 INJECTION, SOLUTION INTRAMUSCULAR; INTRAVENOUS; SUBCUTANEOUS at 13:17

## 2018-05-02 NOTE — SOCIAL WORK
DASH discussion completed  Discussed goals of making sure pt's  needs are met upon discharge, pt's preferences are taken into account, pt understands health condition, medications and symptoms to watch for after returning home and pt is aware of any follow up appointments recommended by hospital physician  PT LIVES INDEPENDENTLY, NO HHC NEEDS, PT USES [facility] FOR RX NEEDS, PT WAS GIVEN CRUTCHES BY THERAPY,  DCP IS TO HOME WHEN STABLE

## 2018-05-02 NOTE — DISCHARGE SUMMARY
[facility] 26 Discharge Summary - Medical Liberty Nazario 38 y o  female MRN: [id number]    [address] / Bed: [address] Encounter: [id number]    BRIEF OVERVIEW  Admitting Provider: Bharathi Sainz MD  Discharge Provider: Bharathi Sainz MD    Discharge To: Home / Self Care    Outpatient Follow-Up: Follow up appointment at [facility] on 5/10 at 2:15 PM, Follow up with orthopedic surgery (Dr Prabhakar John) in 1 week  Things to address at first follow up visit: Management of Fracture, surgical options vs conservative tx  Alcohol use  Follow up for history of symptomatic anxiety while not currently on meds  Labs and results pending at discharge: n/a    Admission Date: 5/1/2018     Discharge Date: 5/2/2018    Primary Discharge Diagnosis  Principal Problem:    Trimalleolar fracture of ankle, closed, left, initial encounter  Active Problems:    Anxiety and depression    Obesity  Resolved Problems:    * No resolved hospital problems  *        Consulting Providers     Orthopedic Surgery - Arnulfo GAUTAM / Vazquez Staley MD        [address] [place]    Procedures Performed/Pertinent Test results    XR Left Ankle 3 View:    Distal fibular fracture approximately 6 cm above the ankle joint space  Medial and posterior malleolar fractures      Widening of the medial ankle mortise  HPI  As per admission H&P:    Keya Martinez is a 42-year-old female with a past medical history of anxiety and depression, obesity  She presents today after falling down her basement stairs  Her  is also at the bedside       She states that the power was out in her basement, and she was trying to reach the [facility] in the dark guided by her  with a flashlight  She states that stairs are very old and narrow, and she simply slipped and fell        She admits to drinking a couple of beers, however denies being frankly intoxicated    She denies any dizziness, lightheadedness, syncope or near syncope preceding the event  She states that she merely slipped, tried to catch herself on the railing and twisted her ankle at which point her and her  both heard a loud crack  She then proceeded to fall down approximately 3 or 4 stairs before landing at the bottom of the flight  She denies any trauma to the head or any other part of her body       Her primary complaint is of left ankle pain which was 10/10 in intensity prior to receiving morphine in the ER  Her only other complaint is of some mild left-sided dull back strain, likely related to her twisting while trying to catch herself  Other than her ankle pain, she denies any other symptoms in her lower extremity, including numbness, tingling/paresthesias, or weakness       She otherwise feels well and denies CP, SOB, HA, N/V/D, palpitations, lightheadedness, dizziness, fevers, chills, abdominal pain, or /GI symptoms      She denies having any medical conditions except for intermittent anxiety and states that she takes no medications regularly  She does have to psychiatric medications listed in her medical record, Lexapro and Risperdal, but states that she does not take these      She is being admitted as observation status overnight so that she may be evaluated by the orthopedic surgery service in the morning       ED: Morphine 4 mg IV x2    Hospital Course    1  Closed Trimalleolar Fracture of the Distal LLE  · Xray L Ankle shows closed distal fibular fracture, medial & posterior malleolar fractures of the left ankle  · Orthopedic surgery consulted  Recommend not pursuing surgery at this time, and outpatient followup within the next week to explore management options  · Pain control was originally with morphine as ordered by the ED, which caused the patient significant nausea  Toradol 30 mg IV q6h was ordered, however patient reported still having pain with this medication   This was eventually changed to percocet 5-325 mg q6h PRN moderate to severe pain, with dilaudid 1mg IV q4h PRN breakthrough pain  Patient is being discharged in stable condition  She is still experiencing pain, which is controlled on her medication regimen  She is being instructed to follow up at the orthopedics office for further management of her injury  Options may include surgery or more conservative treatment  She will be prescribed one week's worth of percocet tabs to manage her pain  All patient questions and concerns were addressed  The patient agrees with her treatment plan  Return precautions reviewed  Follow up as above       2  Anxiety & Depression  · Patient has two medications (lexapro 10 mg PO daily, risperdal 2 mg PO daily) listed in her chart, but states she is currently not taking any medication for her psychiatric conditions  · She is currently stable with no complaints  · Monitored for symptoms   · Pt noted to have has "a couple" beers prior to her fall  She may be self-medicating  CAGE questions were asked and patient denied all        3  Obesity  · Counseled on weight loss      4  Nicotine Dependence  · Nicotine patch ordered  · Smoking cessation materials provided  Physical Exam at Discharge  Please refer to AM progress note  Medications   Prior to Admission medications    Not on File       Allergies  Allergies   Allergen Reactions    Ultram [Tramadol] Rash       Diet restrictions: none  Activity restrictions: as tolerated  Code Status: Level 1 - Full Code    Discharge Condition: stable      Discharge  Statement   I spent 30 minutes discharging the patient  This time was spent on the day of discharge  I had direct contact with the patient on the day of discharge  Additional documentation is required if more than 30 minutes were spent on discharge       Valentin Ludwig DO  05/02/18  12:57 PM

## 2018-05-02 NOTE — PROGRESS NOTES
Progress Note - Joel Rowe 38 y o  female MRN: [id number]    Unit/Bed#: [address] [place] [address] Encounter: [id number]      Assessment & Plan:    1  Closed Trimalleolar Fracture of the Distal LLE  · Xray L Ankle shows closed distal fibular fracture, medial & posterior malleolar fractures of the left ankle  · Orthopedic surgery consulted  Recommendations appreciated  · Admitted for observation & pain control until can be seen by ortho  · Pain control with percocet 5-325 mg q6h PRN moderate to severe pain, dilaudid 1mg IV q4h PRN breakthrough pain      2  Anxiety & Depression  · Patient has two medications (lexapro 10 mg PO daily, risperdal 2 mg PO daily) listed in her chart, but states she is currently not taking any medication for her psychiatric conditions  · She is currently stable with no complaints  · Continue to monitor  · Pt noted to have has "a couple" beers earlier in the afternoon prior to the fall  May be self-medicating  Ask CAGE questions       3  Obesity  · Counseled on weight loss      4  Nicotine Dependence  · Nicotine patch ordered  · Smoking cessation materials provided      5  Global  · Regular Diet  · Replete electrolytes as indicated  · VTE prophylaxis not currently indicated  · Heplock  Subjective:   Patient was seen and examined at the bedside  She feels well today and states that her pain was more or less adequately controlled overnight  She initially stated her pain was well controlled on morphine which was received in the ED, but that made her feel nauseated  She was originally placed on toradol with dilaudid for breakthrough pain  She declined to take the dilaudid at first, and felt that the toradol was not sufficient for pain control, so overnight she was placed on percocet instead  She states the percocet does not adequately control the pain either, so she has been taking dilaudid for breakthrough pain regularly since approx 2130 last night   Other than her LLE pain, she feels well  We are only waiting on ortho to come and evaluate her  She denies CP, SOB, HA, N/V/D, palpitations, lightheadedness, dizziness, fevers, chills, abdominal pain, or /GI symptoms  Objective:     Vitals: Blood pressure 110/66, pulse 78, temperature 98 1 °F (36 7 °C), temperature source Tympanic, resp  rate 20, height 5' 8" (1 727 m), weight 94 6 kg (208 lb 8 9 oz), SpO2 96 %, not currently breastfeeding  ,Body mass index is 31 71 kg/m²  Intake/Output Summary (Last 24 hours) at 05/02/18 0615  Last data filed at 05/01/18 2345   Gross per 24 hour   Intake              750 ml   Output                0 ml   Net              750 ml       Physical Exam: General appearance: alert and oriented, in no acute distress  Lungs: clear to auscultation bilaterally  Heart: regular rate and rhythm, S1, S2 normal, no murmur, click, rub or gallop  Abdomen: soft, non-tender; bowel sounds normal; no masses,  no organomegaly  Extremities: RLE grossly normal  LLE splinted and wrapped in clean, dry, intact bandages  Sensory & motor innervation intact in LLE digits  Slight swelling visible  Pulses: 2+ and symmetric  Neurologic: Grossly normal     Invasive Devices     Peripheral Intravenous Line            Peripheral IV 05/01/18 Left Antecubital less than 1 day                Lab, Imaging and other studies: I have personally reviewed pertinent reports      VTE Pharmacologic Prophylaxis: Enoxaparin (Lovenox) - given 1 dose on admission, held for now pending ortho eval    VTE Mechanical Prophylaxis: sequential compression device     Lillian Chavez DO  05/02/18  8:44 AM

## 2018-05-02 NOTE — NURSING NOTE
Patient left the floor accompanied by significant other and nurse  AVS discharge paperwork reviewed with patient, prescriptions called in to pharmacy  Medicated for pain prior to d/c

## 2018-05-02 NOTE — DISCHARGE INSTRUCTIONS
Ankle Fracture   WHAT YOU NEED TO KNOW:   An ankle fracture is a break in 1 or more of the bones in your ankle  DISCHARGE INSTRUCTIONS:   Call 911 for any of the following:   · You feel lightheaded, short of breath, and have chest pain  · You cough up blood  Seek care immediately if:   · Your leg feels warm, tender, and painful  It may look swollen and red  · Blood soaks through your bandage  · You have severe pain in your ankle  · Your cast feels too tight  · Your foot or toes are cold or numb  · Your foot or toenails turn blue or gray  Contact your healthcare provider if:   · Your splint feels too tight  · Your swelling has increased or returned  · You have a fever  · Your pain does not go away, even after treatment  · You have questions or concerns about your condition or care  Medicines: You may need any of the following:  · Acetaminophen  decreases pain and fever  It is available without a doctor's order  Ask how much to take and how often to take it  Follow directions  Acetaminophen can cause liver damage if not taken correctly  · NSAIDs , such as ibuprofen, help decrease swelling, pain, and fever  This medicine is available with or without a doctor's order  NSAIDs can cause stomach bleeding or kidney problems in certain people  If you take blood thinner medicine, always ask your healthcare provider if NSAIDs are safe for you  Always read the medicine label and follow directions  · Prescription pain medicine  may be given  Ask your healthcare provider how to take this medicine safely  · Take your medicine as directed  Contact your healthcare provider if you think your medicine is not helping or if you have side effects  Tell him or her if you are allergic to any medicine  Keep a list of the medicines, vitamins, and herbs you take  Include the amounts, and when and why you take them  Bring the list or the pill bottles to follow-up visits   Carry your medicine list with you in case of an emergency  Follow up with your healthcare provider in 1 to 2 days: Your fracture may need to be reduced (bones pushed back into place) or you may need surgery  Write down your questions so you remember to ask them during your visits  Support devices: You will be given a brace, cast, or splint to limit your movement and protect your ankle  You may need to use crutches to protect your ankle and decrease your pain as you move around  Do not remove your device and do not put weight on your injured ankle  Splint and cast care:  Cover the splint or cast before you bathe so it does not get wet  Tape 2 plastic trash bags to your skin above the cast  Try to keep your ankle out of the water as much as possible  Rest:  Rest your ankle so that it can heal  Return to normal activities as directed  Ice:  Apply ice on your ankle for 15 to 20 minutes every hour or as directed  Use an ice pack, or put crushed ice in a plastic bag  Cover it with a towel  Ice helps prevent tissue damage and decreases swelling and pain  Elevate:  Elevate your ankle above the level of your heart as often as you can  This will help decrease swelling and pain  Prop your ankle on pillows or blankets to keep it elevated comfortably  © 2017 [address] [employer]  Information is for End User's use only and may not be sold, redistributed or otherwise used for commercial purposes  All illustrations and images included in CareNotes® are the copyrighted property of [employer]  or [employer]  The above information is an  only  It is not intended as medical advice for individual conditions or treatments  Talk to your doctor, nurse or pharmacist before following any medical regimen to see if it is safe and effective for you

## 2018-05-02 NOTE — CASE MANAGEMENT
Initial Clinical Review    Admission: Date/Time/Statement: 5/1/18 1652    Orders Placed This Encounter   Procedures    Place in Observation (expected length of stay for this patient is less than two midnights)     Standing Status:   Standing     Number of Occurrences:   1     Order Specific Question:   Admitting Physician     Answer:   Blanca Flaherty     Order Specific Question:   Level of Care     Answer:   Med Surg [16]         ED: Date/Time/Mode of Arrival:   ED Arrival Information     Expected Arrival Acuity Means of Arrival Escorted By Service Admission Type    - 5/1/2018 15:09 Less Urgent Walk-In Friend General Medicine Urgent    Arrival Complaint    injured ankle          Chief Complaint:   Chief Complaint   Patient presents with    Ankle Pain     pt presents to the ed with left ankle injury  pt states that tripped down stairs and "felt a crack"        History of Illness: Julia Botello is a 61-year-old female with a past medical history of anxiety and depression, obesity  She presents today after falling down her basement stairs  Her  is also at the bedside  She states that the power was out in her basement, and she was trying to reach the [employer] in the dark guided by her  with a flashlight  She states that stairs are very old and narrow, and she simply slipped and fell     She admits to drinking a couple of beers, however denies being frankly intoxicated  She denies any dizziness, lightheadedness, syncope or near syncope preceding the event  She states that she merely slipped, tried to catch herself on the railing and twisted her ankle at which point her and her  both heard a loud crack  She then proceeded to fall down approximately 3 or 4 stairs before landing at the bottom of the flight  She denies any trauma to the head or any other part of her body  Her primary complaint is of left ankle pain which was 10/10 in intensity prior to receiving morphine in the ER    Her only other complaint is of some mild left-sided dull back strain, likely related to her twisting while trying to catch herself  Other than her ankle pain, she denies any other symptoms in her lower extremity, including numbness, tingling/paresthesias, or weakness  She otherwise feels well and denies CP, SOB, HA, N/V/D, palpitations, lightheadedness, dizziness, fevers, chills, abdominal pain, or /GI symptoms  She denies having any medical conditions except for intermittent anxiety and states that she takes no medications regularly  She does have to psychiatric medications listed in her medical record, Lexapro and Risperdal, but states that she does not take these  She is being admitted as observation status overnight so that she may be evaluated by the orthopedic surgery service in the morning       ED: Morphine 4 mg IV x2    ED Vital Signs:   ED Triage Vitals   Temperature Pulse Respirations Blood Pressure SpO2   05/01/18 1520 05/01/18 1520 05/01/18 1520 05/01/18 1520 05/01/18 1520   98 6 °F (37 °C) 96 18 131/90 97 %      Temp Source Heart Rate Source Patient Position - Orthostatic VS BP Location FiO2 (%)   05/01/18 1520 05/01/18 1520 05/01/18 1520 05/01/18 1520 --   Tympanic Monitor Lying Right arm       Pain Score       05/01/18 1559       Worst Possible Pain        Wt Readings from Last 1 Encounters:   05/01/18 94 6 kg (208 lb 8 9 oz)       Vital Signs (abnormal): Abnormal Labs/Diagnostic Test Results:   XRAY ANKLE LEFT 3+VIEW  Distal fibular fracture approximately 6 cm above the ankle joint space  Medial and posterior malleolar fractures    Widening of the medial ankle mortise  ED Treatment:   Medication Administration from 05/01/2018 1509 to 05/01/2018 1751       Date/Time Order Dose Route Action Action by Comments     05/01/2018 1559 morphine (PF) 4 mg/mL injection 4 mg 4 mg Intravenous Given Pedro Luis Costello RN      05/01/2018 1709 morphine (PF) 4 mg/mL injection 4 mg 4 mg Intravenous Given Rosanna Peralta Jean Claude Marion RN           Past Medical/Surgical History: Active Ambulatory Problems     Diagnosis Date Noted    No Active Ambulatory Problems     Resolved Ambulatory Problems     Diagnosis Date Noted    No Resolved Ambulatory Problems     Past Medical History:   Diagnosis Date    Psychiatric disorder        Admitting Diagnosis: Ankle injury [V48 896T]  Closed fracture of left ankle, initial encounter [H88 176J]    Age/Sex: 38 y o  female      ATTENDING Assessment:  Anoop Lombardi is a 38year old female with PMH of anxiety and depression, and obesity who presents after a fall with a distal fibular, medial & posterior malleolar fractures  She is being admitted as observation status under the service of Dr Solomon Buchanan with an expected duration of stay less than 2 midnights and anticipated disposition at discharge to home/self care  Plan:  1  Closed Trimalleolar Fracture of the Distal LLE  · Xray L Ankle shows closed distal fibular fracture, medial & posterior malleolar fractures of the left ankle  · Orthopedic surgery consulted  Recommendations appreciated  · Admitted for observation & pain control until can be seen by ortho  · Pain control with toradol 30mg IV q6h PRN moderate to severe pain, dilaudid 1mg IV q4h PRN breakthrough pain  2  Anxiety & Depression  · Patient has two medications (lexapro 10 mg PO daily, risperdal 2 mg PO daily) listed in her chart, but states she is currently not taking any medication for her psychiatric conditions  · She is currently stable with no complaints  · Continue to monitor  · Pt noted to have has "a couple" beers earlier this afternoon  May be self-medicating  Ask [facility] questions  3  Obesity  · Counseled on weight loss  4  Nicotine Dependence  · Nicotine patch ordered  · Smoking cessation materials provided  5  Global  · Regular Diet  · Replete electrolytes as indicated  · VTE prophylaxis not currently indicated    · Heplock         Admission Orders:  OBSERVATION  CONSULT ORTHOPEDIC SURGEON  NPO  Scheduled Meds:   Current Facility-Administered Medications:  HYDROmorphone 1 mg Intravenous Q4H PRN Chetan Basurto MD   hydrOXYzine HCL 25 mg Oral Q6H PRN Chetan Basurto MD   nicotine 21 mg Transdermal Daily Chetan Basurto MD   ondansetron 4 mg Intravenous Q6H PRN Chetan Basurto MD   oxyCODONE-acetaminophen 1 tablet Oral Q6H PRN Chetan Basurto MD     Continuous Infusions:    PRN Meds:   HYDROmorphone    hydrOXYzine HCL    ondansetron    oxyCODONE-acetaminophen

## 2018-05-03 LAB — MRSA NOSE QL CULT: NORMAL

## 2018-05-08 DIAGNOSIS — S82.91XD: ICD-10-CM

## 2018-05-08 NOTE — TELEPHONE ENCOUNTER
Dr Gregory Espinoza team    Pt is in a lot of pain and out of percocet, has appt on 5/10 with dr Yasmeen Newton and 5/11 with ortho  She said the pain is severe  Called and left VM for patient  She needs to be seen tomorrow in office before we can continue to prescribe percocet  She needs to be evaluated for acute causes of pain separate than her splinted fracture  Will also see Ortho in 2 days per above  This medication is under regulation by the state as well  Pt has noted alcohol use as well to be cautious of     Lorne Cali

## 2018-05-09 RX ORDER — OXYCODONE AND ACETAMINOPHEN 10; 325 MG/1; MG/1
1 TABLET ORAL EVERY 6 HOURS PRN
Qty: 20 TABLET | Refills: 0 | OUTPATIENT
Start: 2018-05-09

## 2018-05-10 ENCOUNTER — TELEPHONE (OUTPATIENT)
Dept: FAMILY MEDICINE CLINIC | Facility: CLINIC | Age: 43
End: 2018-05-10

## 2018-05-10 ENCOUNTER — OFFICE VISIT (OUTPATIENT)
Dept: FAMILY MEDICINE CLINIC | Facility: CLINIC | Age: 43
End: 2018-05-10
Payer: COMMERCIAL

## 2018-05-10 VITALS
SYSTOLIC BLOOD PRESSURE: 96 MMHG | HEART RATE: 87 BPM | OXYGEN SATURATION: 96 % | DIASTOLIC BLOOD PRESSURE: 68 MMHG | TEMPERATURE: 98.2 F

## 2018-05-10 DIAGNOSIS — S82.852D CLOSED TRIMALLEOLAR FRACTURE OF LEFT ANKLE WITH ROUTINE HEALING, SUBSEQUENT ENCOUNTER: Primary | ICD-10-CM

## 2018-05-10 DIAGNOSIS — M79.89 MASS OF SOFT TISSUE OF RIGHT LOWER EXTREMITY: Chronic | ICD-10-CM

## 2018-05-10 PROCEDURE — 99213 OFFICE O/P EST LOW 20 MIN: CPT | Performed by: FAMILY MEDICINE

## 2018-05-10 PROCEDURE — 1111F DSCHRG MED/CURRENT MED MERGE: CPT | Performed by: FAMILY MEDICINE

## 2018-05-10 RX ORDER — KETOROLAC TROMETHAMINE 30 MG/ML
30 INJECTION, SOLUTION INTRAMUSCULAR; INTRAVENOUS ONCE
Status: COMPLETED | OUTPATIENT
Start: 2018-05-10 | End: 2018-05-10

## 2018-05-10 RX ORDER — KETOROLAC TROMETHAMINE 10 MG/1
10 TABLET, FILM COATED ORAL EVERY 6 HOURS PRN
Qty: 20 TABLET | Refills: 0 | Status: SHIPPED | OUTPATIENT
Start: 2018-05-10 | End: 2018-05-14 | Stop reason: HOSPADM

## 2018-05-10 RX ORDER — KETOROLAC TROMETHAMINE 30 MG/ML
INJECTION, SOLUTION INTRAMUSCULAR; INTRAVENOUS
Status: CANCELLED | OUTPATIENT
Start: 2018-05-10

## 2018-05-10 RX ADMIN — KETOROLAC TROMETHAMINE 30 MG: 30 INJECTION, SOLUTION INTRAMUSCULAR; INTRAVENOUS at 14:53

## 2018-05-10 NOTE — TELEPHONE ENCOUNTER
The patient is being evaluated by Dr Sarah Mitchell tomorrow  I am not sure what interventions he wants to take for this patient's fracture  The patient's time off work would be dependent on his evaluation  She can either request a note from him, or I can take a look at his note and possibly speak with him or his PA about it after his evaluation       Amber Maldonado DO  05/10/18  5:10 PM

## 2018-05-10 NOTE — PROGRESS NOTES
Subjective:     Patient ID: Ellen Rahman is a 38 y o  female  HPI  Coleman Tolentino is a 47-year-old female who presents after a short hospital stay for trimalleolar fracture  She was seen personally by me under our service during her stay and is here to follow-up  She was also seen by Wilmer GAUTAM for Dr Alexandre Medina during her time at [address] [place] [address]  Overall she feels well today but complains of persistent left lower extremity pain  The pain varies in intensity from 8/10 to 10/10, and is associated with a tingling sensation in the digits of the left foot  She also complains of muscle aches in the calf, but denies any residual swelling, warmth, throbbing sensations, open wounds, or other local symptoms  Symptoms have been persistent for the past week but mostly controlled on her original percocet prescription which was prescribed on discharge  She ran out of the percocet yesterday, and called the office for refills  She was told that because she has an appointment with us today as well as an appointment with Dr Alexandre Medina tomorrow, she will need to be seen and assessed prior to receiving any more pain medications  Her only other complaint is of an incidentally found soft tissue mass on her right calf  There is no associated tenderness, erythema, or skin changes  The mass is mobile and measures approximately 2-3 inches in diameter and circular in shape  Denies fever, chills chest pain, shortness of breath, dizziness, lightheadedness, N/V/D, abdominal pain or any other /GI complaints  The following portions of the patient's history were reviewed and updated as appropriate: allergies, current medications, past family history, past medical history, past social history, past surgical history and problem list     Review of Systems   Constitutional: Negative for chills, fatigue and fever  HENT: Negative  Respiratory: Negative for cough, shortness of breath and wheezing      Cardiovascular: Negative for chest pain, palpitations and leg swelling  Gastrointestinal: Negative  Genitourinary: Negative  Musculoskeletal: Positive for arthralgias and myalgias  Negative for joint swelling  Skin: Negative for color change, pallor, rash and wound  Neurological: Negative for dizziness, weakness, light-headedness and headaches  Hematological: Negative  Psychiatric/Behavioral: Negative  Objective:  BP 96/68   Pulse 87   Temp 98 2 °F (36 8 °C)   SpO2 96%     Physical Exam   Constitutional: She is oriented to person, place, and time  She appears well-developed and well-nourished  No distress  HENT:   Head: Normocephalic and atraumatic  Eyes: Right eye exhibits no discharge  Left eye exhibits no discharge  No scleral icterus  Neck: Neck supple  Pulmonary/Chest: Effort normal    Musculoskeletal: She exhibits tenderness and deformity  She exhibits no edema  Legs:  Neurological: She is alert and oriented to person, place, and time  She has normal strength  She displays no atrophy and normal reflexes  No sensory deficit  She exhibits normal muscle tone  Gait abnormal    LLE - No sensory deficits noted  Motor function of the digits is completely intact  Skin: Skin is warm and dry  No rash noted  She is not diaphoretic  No erythema  No pallor  Psychiatric: She has a normal mood and affect  Her behavior is normal        Assessment/Plan:    1  Closed Trimalleolar Fracture of LLE - Subsequent Encounter  · Patient states she is still in significant pain but ran out of percocet  · Patient received toradol 30 mg IM in the office  · Prescribed toradol 10 mg PO q6h x20 tabs total   · Patient will follow up with Dr Dmitri Marinelli tomorrow at her scheduled office visit  · Further management as per orthopedics  2  Subcutaneous Soft Tissue Mass of the RLE  · Mass appears benign and causes no discomfort  · Likely lipoma vs cyst   · Referral for general surgery provided   Recommendations rashmi Thacker DO  05/10/18  6:07 PM    Some portions of this record may have been generated with voice recognition software  There may be translation, syntax, or grammatical errors  Occasional wrong word or "sound-a-like" substitutions may have occurred due to the inherent limitations of the voice recognition software  Read the chart carefully and recognize, using context, where substations may have occurred   If you have any questions, please contact the dictating provider for clarification or correction, as needed

## 2018-05-10 NOTE — TELEPHONE ENCOUNTER
Pt would like a note stating she broke her ankle and is unable to work at this time  It's for her public assistance

## 2018-05-11 ENCOUNTER — TELEPHONE (OUTPATIENT)
Dept: OBGYN CLINIC | Facility: CLINIC | Age: 43
End: 2018-05-11

## 2018-05-11 ENCOUNTER — APPOINTMENT (OUTPATIENT)
Dept: RADIOLOGY | Facility: CLINIC | Age: 43
End: 2018-05-11
Payer: COMMERCIAL

## 2018-05-11 ENCOUNTER — OFFICE VISIT (OUTPATIENT)
Dept: OBGYN CLINIC | Facility: CLINIC | Age: 43
End: 2018-05-11
Payer: COMMERCIAL

## 2018-05-11 ENCOUNTER — TRANSCRIBE ORDERS (OUTPATIENT)
Dept: ADMINISTRATIVE | Facility: HOSPITAL | Age: 43
End: 2018-05-11

## 2018-05-11 ENCOUNTER — LAB (OUTPATIENT)
Dept: LAB | Facility: HOSPITAL | Age: 43
End: 2018-05-11
Payer: COMMERCIAL

## 2018-05-11 VITALS — DIASTOLIC BLOOD PRESSURE: 81 MMHG | SYSTOLIC BLOOD PRESSURE: 124 MMHG | HEART RATE: 92 BPM

## 2018-05-11 DIAGNOSIS — S82.892A CLOSED FRACTURE OF LEFT ANKLE, INITIAL ENCOUNTER: ICD-10-CM

## 2018-05-11 DIAGNOSIS — S82.892A CLOSED FRACTURE OF LEFT ANKLE, INITIAL ENCOUNTER: Primary | ICD-10-CM

## 2018-05-11 PROCEDURE — 93005 ELECTROCARDIOGRAM TRACING: CPT

## 2018-05-11 PROCEDURE — 99205 OFFICE O/P NEW HI 60 MIN: CPT | Performed by: ORTHOPAEDIC SURGERY

## 2018-05-11 PROCEDURE — 73610 X-RAY EXAM OF ANKLE: CPT

## 2018-05-11 NOTE — PROGRESS NOTES
Assessment/Plan:  1  Closed fracture of left ankle, initial encounter  XR ankle 3+ vw left     Devendra Olmstead has a fracture that requires surgical fixation  Dr Livier Baez will perform this on Monday  She was placed in a new splint today and will remain nonweightbearing until the time of her surgery  We advised strict elevation over the weekend  We discussed the procedure and risks at length, including but not limited to, infection, bleeding, wound issues, nerve injury, blood clot, stiffness, failure of procedure, and need for additional surgery  Subjective:   Edmundo Mccarty is a 38 y o  female who presents today for evaluation of her left ankle  10 days ago she had a fall down her basement steps injuring this ankle  X-rays in the emergency department showed medial  and posterior malleolus fractures as well as distal fibular shaft fracture  We are able to view these images today  She was splinted at that time and has been nonweightbearing  She notes moderate pain about the lower leg and ankle, which is worse with attempts at movement and better with immobilization  She notes swelling but denies any paresthesias of the lower extremity  She denies any calf pain or cramping  Review of Systems   Constitutional: Negative for chills, fever and unexpected weight change  HENT: Negative for hearing loss, nosebleeds and sore throat  Eyes: Negative for pain, redness and visual disturbance  Respiratory: Negative for cough, shortness of breath and wheezing  Cardiovascular: Negative for chest pain, palpitations and leg swelling  Gastrointestinal: Negative for abdominal pain, nausea and vomiting  Endocrine: Negative for polydipsia and polyuria  Genitourinary: Negative for dysuria and hematuria  Musculoskeletal:        See HPI   Skin: Negative for rash and wound  Neurological: Negative for dizziness, numbness and headaches  Psychiatric/Behavioral: Negative for decreased concentration and suicidal ideas  The patient is not nervous/anxious  Past Medical History:   Diagnosis Date    Psychiatric disorder     depression, anxiety       Past Surgical History:   Procedure Laterality Date    REDUCTION MAMMAPLASTY         No family history on file  Social History     Occupational History    Not on file  Social History Main Topics    Smoking status: Current Every Day Smoker     Packs/day: 1 00     Types: Cigarettes    Smokeless tobacco: Never Used    Alcohol use Yes      Comment: social    Drug use: No    Sexual activity: Yes     Partners: Male         Current Outpatient Prescriptions:     hydrOXYzine HCL (ATARAX) 25 mg tablet, Take 1 tablet (25 mg total) by mouth every 6 (six) hours as needed for itching (rash), Disp: 30 tablet, Rfl: 0    ketorolac (TORADOL) 10 mg tablet, Take 1 tablet (10 mg total) by mouth every 6 (six) hours as needed for moderate pain, Disp: 20 tablet, Rfl: 0    nicotine (NICODERM CQ) 21 mg/24 hr TD 24 hr patch, Place 1 patch on the skin daily, Disp: 28 patch, Rfl: 0    ondansetron (ZOFRAN) 4 mg tablet, Take 1 tablet (4 mg total) by mouth every 8 (eight) hours as needed for nausea or vomiting, Disp: 20 tablet, Rfl: 0  No current facility-administered medications for this visit  Allergies   Allergen Reactions    Ultram [Tramadol] Rash       Objective:  Vitals:    05/11/18 0950   BP: 124/81   Pulse: 92       Ortho Exam    Physical Exam   Constitutional: She is oriented to person, place, and time  She appears well-developed and well-nourished  No distress  HENT:   Head: Normocephalic and atraumatic  Eyes: Conjunctivae and EOM are normal  No scleral icterus  Neck: No JVD present  Cardiovascular: Normal rate, normal heart sounds and intact distal pulses  Pulmonary/Chest: Effort normal and breath sounds normal  No respiratory distress  Abdominal: She exhibits no distension  Neurological: She is alert and oriented to person, place, and time   Coordination normal  Skin: Skin is warm  Psychiatric: She has a normal mood and affect  Left ankle:  Diffuse swelling ankle and foot  Mild ecchymosis into foot  No erythema  Skin intact  Range of motion strength testing of ankle deferred  Patient able to move toes freely  No calf tenderness  2+ DP pulse  Sensation intact saphenous, sural, deep and superficial peroneal nerve distributions  I have personally reviewed pertinent films in PACS and my interpretation is as follows:  X-rays left ankle:  Medial and posterior malleolus fractures  Distal fibular shaft fracture  Widening of mortise  Stable compared to x-rays 10 days ago

## 2018-05-12 LAB
APTT PPP: 28 SEC (ref 24–33)
BASOPHILS # BLD AUTO: 0.1 X10E3/UL (ref 0–0.2)
BASOPHILS NFR BLD AUTO: 1 %
BUN SERPL-MCNC: 15 MG/DL (ref 6–24)
BUN/CREAT SERPL: 19 (ref 9–23)
CALCIUM SERPL-MCNC: 9.3 MG/DL (ref 8.7–10.2)
CHLORIDE SERPL-SCNC: 102 MMOL/L (ref 96–106)
CO2 SERPL-SCNC: 21 MMOL/L (ref 18–29)
CREAT SERPL-MCNC: 0.81 MG/DL (ref 0.57–1)
EOSINOPHIL # BLD AUTO: 0.3 X10E3/UL (ref 0–0.4)
EOSINOPHIL NFR BLD AUTO: 3 %
ERYTHROCYTE [DISTWIDTH] IN BLOOD BY AUTOMATED COUNT: 13.6 % (ref 12.3–15.4)
GLUCOSE SERPL-MCNC: 87 MG/DL (ref 65–99)
HCT VFR BLD AUTO: 36.4 % (ref 34–46.6)
HGB BLD-MCNC: 12 G/DL (ref 11.1–15.9)
IMM GRANULOCYTES # BLD: 0 X10E3/UL (ref 0–0.1)
IMM GRANULOCYTES NFR BLD: 0 %
INR PPP: 1 (ref 0.8–1.2)
LYMPHOCYTES # BLD AUTO: 2 X10E3/UL (ref 0.7–3.1)
LYMPHOCYTES NFR BLD AUTO: 23 %
MCH RBC QN AUTO: 29.4 PG (ref 26.6–33)
MCHC RBC AUTO-ENTMCNC: 33 G/DL (ref 31.5–35.7)
MCV RBC AUTO: 89 FL (ref 79–97)
MONOCYTES # BLD AUTO: 0.5 X10E3/UL (ref 0.1–0.9)
MONOCYTES NFR BLD AUTO: 6 %
NEUTROPHILS # BLD AUTO: 5.9 X10E3/UL (ref 1.4–7)
NEUTROPHILS NFR BLD AUTO: 67 %
PLATELET # BLD AUTO: 328 X10E3/UL (ref 150–379)
POTASSIUM SERPL-SCNC: 4.3 MMOL/L (ref 3.5–5.2)
PROTHROMBIN TIME: 10.1 SEC (ref 9.1–12)
RBC # BLD AUTO: 4.08 X10E6/UL (ref 3.77–5.28)
SL AMB EGFR AFRICAN AMERICAN: 105 ML/MIN/1.73
SL AMB EGFR NON AFRICAN AMERICAN: 91 ML/MIN/1.73
SODIUM SERPL-SCNC: 139 MMOL/L (ref 134–144)
WBC # BLD AUTO: 8.7 X10E3/UL (ref 3.4–10.8)

## 2018-05-13 ENCOUNTER — ANESTHESIA EVENT (OUTPATIENT)
Dept: PERIOP | Facility: HOSPITAL | Age: 43
End: 2018-05-13
Payer: COMMERCIAL

## 2018-05-13 NOTE — ANESTHESIA PREPROCEDURE EVALUATION
Review of Systems/Medical History  Patient summary reviewed  Chart reviewed      Cardiovascular   Pulmonary  Smoker cigarette smoker  ,        GI/Hepatic            Endo/Other    Obesity  morbid obesity   GYN       Hematology   Musculoskeletal       Neurology   Psychology           Physical Exam    Airway    Mallampati score: II  TM Distance: >3 FB  Neck ROM: full     Dental       Cardiovascular  Rhythm: regular, Rate: normal,     Pulmonary  Breath sounds clear to auscultation,     Other Findings        Anesthesia Plan  ASA Score- 2     Anesthesia Type- general and regional with ASA Monitors  Additional Monitors:   Airway Plan:         Plan Factors-  Patient did not smoke on day of surgery  Induction- intravenous  Postoperative Plan- Plan for postoperative opioid use  Planned trial extubation    Informed Consent- Anesthetic plan and risks discussed with patient

## 2018-05-14 ENCOUNTER — HOSPITAL ENCOUNTER (OUTPATIENT)
Dept: RADIOLOGY | Facility: HOSPITAL | Age: 43
Setting detail: OUTPATIENT SURGERY
Discharge: HOME | End: 2018-05-14
Payer: COMMERCIAL

## 2018-05-14 ENCOUNTER — ANESTHESIA (OUTPATIENT)
Dept: PERIOP | Facility: HOSPITAL | Age: 43
End: 2018-05-14
Payer: COMMERCIAL

## 2018-05-14 ENCOUNTER — APPOINTMENT (OUTPATIENT)
Dept: RADIOLOGY | Facility: HOSPITAL | Age: 43
End: 2018-05-14
Payer: COMMERCIAL

## 2018-05-14 ENCOUNTER — HOSPITAL ENCOUNTER (OUTPATIENT)
Facility: HOSPITAL | Age: 43
Setting detail: OUTPATIENT SURGERY
Discharge: HOME | End: 2018-05-14
Attending: ORTHOPAEDIC SURGERY | Admitting: ORTHOPAEDIC SURGERY
Payer: COMMERCIAL

## 2018-05-14 VITALS
DIASTOLIC BLOOD PRESSURE: 86 MMHG | SYSTOLIC BLOOD PRESSURE: 131 MMHG | OXYGEN SATURATION: 95 % | HEART RATE: 95 BPM | TEMPERATURE: 97.8 F | RESPIRATION RATE: 18 BRPM

## 2018-05-14 DIAGNOSIS — Z98.890 STATUS POST ORIF OF FRACTURE OF ANKLE: Primary | ICD-10-CM

## 2018-05-14 DIAGNOSIS — Z87.81 STATUS POST ORIF OF FRACTURE OF ANKLE: Primary | ICD-10-CM

## 2018-05-14 DIAGNOSIS — R52 PAIN: ICD-10-CM

## 2018-05-14 LAB — EXT PREGNANCY TEST URINE: NEGATIVE

## 2018-05-14 PROCEDURE — C1713 ANCHOR/SCREW BN/BN,TIS/BN: HCPCS | Performed by: ORTHOPAEDIC SURGERY

## 2018-05-14 PROCEDURE — 27822 TREATMENT OF ANKLE FRACTURE: CPT | Performed by: PHYSICIAN ASSISTANT

## 2018-05-14 PROCEDURE — 73600 X-RAY EXAM OF ANKLE: CPT

## 2018-05-14 PROCEDURE — 73610 X-RAY EXAM OF ANKLE: CPT

## 2018-05-14 PROCEDURE — 81025 URINE PREGNANCY TEST: CPT | Performed by: ANESTHESIOLOGY

## 2018-05-14 PROCEDURE — 27822 TREATMENT OF ANKLE FRACTURE: CPT | Performed by: ORTHOPAEDIC SURGERY

## 2018-05-14 DEVICE — 4.0MM CANNULATED SCREW SHORT THREAD/44MM: Type: IMPLANTABLE DEVICE | Site: ANKLE | Status: FUNCTIONAL

## 2018-05-14 DEVICE — 2.7MM LOCKING SCREW SLF-TPNG WITH T8 STARDRIVE RECESS 14MM: Type: IMPLANTABLE DEVICE | Site: ANKLE | Status: FUNCTIONAL

## 2018-05-14 DEVICE — 2.7MM/3.5MM LCP LATERAL DISTAL FIBULA PLATE 9H/LEFT/151MM
Type: IMPLANTABLE DEVICE | Site: ANKLE | Status: FUNCTIONAL
Brand: LCP

## 2018-05-14 DEVICE — 3.5MM CORTEX SCREW SELF-TAPPING 14MM: Type: IMPLANTABLE DEVICE | Site: ANKLE | Status: FUNCTIONAL

## 2018-05-14 DEVICE — 2.7MM LOCKING SCREW SLF-TPNG WITH T8 STARDRIVE RECESS 12MM: Type: IMPLANTABLE DEVICE | Site: ANKLE | Status: FUNCTIONAL

## 2018-05-14 DEVICE — 3.5MM CORTEX SCREW SELF-TAPPING 16MM: Type: IMPLANTABLE DEVICE | Site: ANKLE | Status: FUNCTIONAL

## 2018-05-14 RX ORDER — PROPOFOL 10 MG/ML
INJECTION, EMULSION INTRAVENOUS AS NEEDED
Status: DISCONTINUED | OUTPATIENT
Start: 2018-05-14 | End: 2018-05-14 | Stop reason: SURG

## 2018-05-14 RX ORDER — OXYCODONE HYDROCHLORIDE 5 MG/1
TABLET ORAL
Qty: 30 TABLET | Refills: 0 | Status: SHIPPED | OUTPATIENT
Start: 2018-05-14 | End: 2018-05-17 | Stop reason: SDUPTHER

## 2018-05-14 RX ORDER — ONDANSETRON 2 MG/ML
4 INJECTION INTRAMUSCULAR; INTRAVENOUS ONCE AS NEEDED
Status: DISCONTINUED | OUTPATIENT
Start: 2018-05-14 | End: 2018-05-14 | Stop reason: HOSPADM

## 2018-05-14 RX ORDER — SODIUM CHLORIDE, SODIUM LACTATE, POTASSIUM CHLORIDE, CALCIUM CHLORIDE 600; 310; 30; 20 MG/100ML; MG/100ML; MG/100ML; MG/100ML
75 INJECTION, SOLUTION INTRAVENOUS CONTINUOUS
Status: DISCONTINUED | OUTPATIENT
Start: 2018-05-14 | End: 2018-05-14 | Stop reason: HOSPADM

## 2018-05-14 RX ORDER — OXYCODONE HYDROCHLORIDE AND ACETAMINOPHEN 5; 325 MG/1; MG/1
1 TABLET ORAL EVERY 4 HOURS PRN
Status: DISCONTINUED | OUTPATIENT
Start: 2018-05-14 | End: 2018-05-14 | Stop reason: HOSPADM

## 2018-05-14 RX ORDER — BUPIVACAINE HYDROCHLORIDE 5 MG/ML
INJECTION, SOLUTION EPIDURAL; INTRACAUDAL AS NEEDED
Status: DISCONTINUED | OUTPATIENT
Start: 2018-05-14 | End: 2018-05-14 | Stop reason: HOSPADM

## 2018-05-14 RX ORDER — HYDROMORPHONE HYDROCHLORIDE 2 MG/ML
INJECTION, SOLUTION INTRAMUSCULAR; INTRAVENOUS; SUBCUTANEOUS AS NEEDED
Status: DISCONTINUED | OUTPATIENT
Start: 2018-05-14 | End: 2018-05-14 | Stop reason: SURG

## 2018-05-14 RX ORDER — FENTANYL CITRATE/PF 50 MCG/ML
25 SYRINGE (ML) INJECTION
Status: DISCONTINUED | OUTPATIENT
Start: 2018-05-14 | End: 2018-05-14 | Stop reason: HOSPADM

## 2018-05-14 RX ORDER — ASPIRIN 325 MG
325 TABLET, DELAYED RELEASE (ENTERIC COATED) ORAL 2 TIMES DAILY
Qty: 56 TABLET | Refills: 0 | Status: SHIPPED | OUTPATIENT
Start: 2018-05-14 | End: 2018-09-18 | Stop reason: ALTCHOICE

## 2018-05-14 RX ORDER — MAGNESIUM HYDROXIDE 1200 MG/15ML
LIQUID ORAL AS NEEDED
Status: DISCONTINUED | OUTPATIENT
Start: 2018-05-14 | End: 2018-05-14 | Stop reason: HOSPADM

## 2018-05-14 RX ORDER — DEXAMETHASONE SODIUM PHOSPHATE 4 MG/ML
INJECTION, SOLUTION INTRA-ARTICULAR; INTRALESIONAL; INTRAMUSCULAR; INTRAVENOUS; SOFT TISSUE AS NEEDED
Status: DISCONTINUED | OUTPATIENT
Start: 2018-05-14 | End: 2018-05-14 | Stop reason: SURG

## 2018-05-14 RX ORDER — FENTANYL CITRATE 50 UG/ML
INJECTION, SOLUTION INTRAMUSCULAR; INTRAVENOUS AS NEEDED
Status: DISCONTINUED | OUTPATIENT
Start: 2018-05-14 | End: 2018-05-14 | Stop reason: SURG

## 2018-05-14 RX ORDER — METOCLOPRAMIDE HYDROCHLORIDE 5 MG/ML
INJECTION INTRAMUSCULAR; INTRAVENOUS AS NEEDED
Status: DISCONTINUED | OUTPATIENT
Start: 2018-05-14 | End: 2018-05-14 | Stop reason: SURG

## 2018-05-14 RX ORDER — ONDANSETRON 2 MG/ML
INJECTION INTRAMUSCULAR; INTRAVENOUS AS NEEDED
Status: DISCONTINUED | OUTPATIENT
Start: 2018-05-14 | End: 2018-05-14 | Stop reason: SURG

## 2018-05-14 RX ORDER — EPHEDRINE SULFATE 50 MG/ML
INJECTION, SOLUTION INTRAVENOUS AS NEEDED
Status: DISCONTINUED | OUTPATIENT
Start: 2018-05-14 | End: 2018-05-14 | Stop reason: SURG

## 2018-05-14 RX ADMIN — FENTANYL CITRATE 50 MCG: 50 INJECTION, SOLUTION INTRAMUSCULAR; INTRAVENOUS at 14:21

## 2018-05-14 RX ADMIN — EPHEDRINE SULFATE 10 MG: 50 INJECTION, SOLUTION INTRAMUSCULAR; INTRAVENOUS; SUBCUTANEOUS at 11:56

## 2018-05-14 RX ADMIN — PROPOFOL 200 MG: 10 INJECTION, EMULSION INTRAVENOUS at 11:48

## 2018-05-14 RX ADMIN — SODIUM CHLORIDE, SODIUM LACTATE, POTASSIUM CHLORIDE, AND CALCIUM CHLORIDE 75 ML/HR: .6; .31; .03; .02 INJECTION, SOLUTION INTRAVENOUS at 15:10

## 2018-05-14 RX ADMIN — SODIUM CHLORIDE, SODIUM LACTATE, POTASSIUM CHLORIDE, AND CALCIUM CHLORIDE: .6; .31; .03; .02 INJECTION, SOLUTION INTRAVENOUS at 12:24

## 2018-05-14 RX ADMIN — FENTANYL CITRATE 50 MCG: 50 INJECTION, SOLUTION INTRAMUSCULAR; INTRAVENOUS at 11:44

## 2018-05-14 RX ADMIN — OXYCODONE HYDROCHLORIDE AND ACETAMINOPHEN 1 TABLET: 5; 325 TABLET ORAL at 15:51

## 2018-05-14 RX ADMIN — HYDROMORPHONE HYDROCHLORIDE 0.5 MG: 2 INJECTION, SOLUTION INTRAMUSCULAR; INTRAVENOUS; SUBCUTANEOUS at 12:08

## 2018-05-14 RX ADMIN — FENTANYL CITRATE 25 MCG: 50 INJECTION, SOLUTION INTRAMUSCULAR; INTRAVENOUS at 14:48

## 2018-05-14 RX ADMIN — HYDROMORPHONE HYDROCHLORIDE 0.5 MG: 2 INJECTION, SOLUTION INTRAMUSCULAR; INTRAVENOUS; SUBCUTANEOUS at 12:13

## 2018-05-14 RX ADMIN — METOCLOPRAMIDE HYDROCHLORIDE 10 MG: 5 INJECTION INTRAMUSCULAR; INTRAVENOUS at 11:49

## 2018-05-14 RX ADMIN — CEFAZOLIN SODIUM 2000 MG: 1 SOLUTION INTRAVENOUS at 11:46

## 2018-05-14 RX ADMIN — SODIUM CHLORIDE, SODIUM LACTATE, POTASSIUM CHLORIDE, AND CALCIUM CHLORIDE: .6; .31; .03; .02 INJECTION, SOLUTION INTRAVENOUS at 11:41

## 2018-05-14 RX ADMIN — FENTANYL CITRATE 25 MCG: 50 INJECTION, SOLUTION INTRAMUSCULAR; INTRAVENOUS at 14:06

## 2018-05-14 RX ADMIN — FENTANYL CITRATE 50 MCG: 50 INJECTION, SOLUTION INTRAMUSCULAR; INTRAVENOUS at 11:48

## 2018-05-14 RX ADMIN — FENTANYL CITRATE 25 MCG: 50 INJECTION, SOLUTION INTRAMUSCULAR; INTRAVENOUS at 14:01

## 2018-05-14 RX ADMIN — ONDANSETRON 4 MG: 2 INJECTION INTRAMUSCULAR; INTRAVENOUS at 13:40

## 2018-05-14 RX ADMIN — DEXAMETHASONE SODIUM PHOSPHATE 8 MG: 4 INJECTION, SOLUTION INTRA-ARTICULAR; INTRALESIONAL; INTRAMUSCULAR; INTRAVENOUS; SOFT TISSUE at 11:55

## 2018-05-14 RX ADMIN — HYDROMORPHONE HYDROCHLORIDE 0.5 MG: 2 INJECTION, SOLUTION INTRAMUSCULAR; INTRAVENOUS; SUBCUTANEOUS at 11:48

## 2018-05-14 RX ADMIN — HYDROMORPHONE HYDROCHLORIDE 0.5 MG: 2 INJECTION, SOLUTION INTRAMUSCULAR; INTRAVENOUS; SUBCUTANEOUS at 11:56

## 2018-05-14 RX ADMIN — FENTANYL CITRATE 25 MCG: 50 INJECTION, SOLUTION INTRAMUSCULAR; INTRAVENOUS at 14:51

## 2018-05-14 NOTE — H&P (VIEW-ONLY)
Assessment/Plan:  1  Closed fracture of left ankle, initial encounter  XR ankle 3+ vw left     Roxann Thorne has a fracture that requires surgical fixation  Dr Azael Yap will perform this on Monday  She was placed in a new splint today and will remain nonweightbearing until the time of her surgery  We advised strict elevation over the weekend  We discussed the procedure and risks at length, including but not limited to, infection, bleeding, wound issues, nerve injury, blood clot, stiffness, failure of procedure, and need for additional surgery  Subjective:   Benito Engel is a 38 y o  female who presents today for evaluation of her left ankle  10 days ago she had a fall down her basement steps injuring this ankle  X-rays in the emergency department showed medial  and posterior malleolus fractures as well as distal fibular shaft fracture  We are able to view these images today  She was splinted at that time and has been nonweightbearing  She notes moderate pain about the lower leg and ankle, which is worse with attempts at movement and better with immobilization  She notes swelling but denies any paresthesias of the lower extremity  She denies any calf pain or cramping  Review of Systems   Constitutional: Negative for chills, fever and unexpected weight change  HENT: Negative for hearing loss, nosebleeds and sore throat  Eyes: Negative for pain, redness and visual disturbance  Respiratory: Negative for cough, shortness of breath and wheezing  Cardiovascular: Negative for chest pain, palpitations and leg swelling  Gastrointestinal: Negative for abdominal pain, nausea and vomiting  Endocrine: Negative for polydipsia and polyuria  Genitourinary: Negative for dysuria and hematuria  Musculoskeletal:        See HPI   Skin: Negative for rash and wound  Neurological: Negative for dizziness, numbness and headaches  Psychiatric/Behavioral: Negative for decreased concentration and suicidal ideas  The patient is not nervous/anxious  Past Medical History:   Diagnosis Date    Psychiatric disorder     depression, anxiety       Past Surgical History:   Procedure Laterality Date    REDUCTION MAMMAPLASTY         No family history on file  Social History     Occupational History    Not on file  Social History Main Topics    Smoking status: Current Every Day Smoker     Packs/day: 1 00     Types: Cigarettes    Smokeless tobacco: Never Used    Alcohol use Yes      Comment: social    Drug use: No    Sexual activity: Yes     Partners: Male         Current Outpatient Prescriptions:     hydrOXYzine HCL (ATARAX) 25 mg tablet, Take 1 tablet (25 mg total) by mouth every 6 (six) hours as needed for itching (rash), Disp: 30 tablet, Rfl: 0    ketorolac (TORADOL) 10 mg tablet, Take 1 tablet (10 mg total) by mouth every 6 (six) hours as needed for moderate pain, Disp: 20 tablet, Rfl: 0    nicotine (NICODERM CQ) 21 mg/24 hr TD 24 hr patch, Place 1 patch on the skin daily, Disp: 28 patch, Rfl: 0    ondansetron (ZOFRAN) 4 mg tablet, Take 1 tablet (4 mg total) by mouth every 8 (eight) hours as needed for nausea or vomiting, Disp: 20 tablet, Rfl: 0  No current facility-administered medications for this visit  Allergies   Allergen Reactions    Ultram [Tramadol] Rash       Objective:  Vitals:    05/11/18 0950   BP: 124/81   Pulse: 92       Ortho Exam    Physical Exam   Constitutional: She is oriented to person, place, and time  She appears well-developed and well-nourished  No distress  HENT:   Head: Normocephalic and atraumatic  Eyes: Conjunctivae and EOM are normal  No scleral icterus  Neck: No JVD present  Cardiovascular: Normal rate, normal heart sounds and intact distal pulses  Pulmonary/Chest: Effort normal and breath sounds normal  No respiratory distress  Abdominal: She exhibits no distension  Neurological: She is alert and oriented to person, place, and time   Coordination normal  Skin: Skin is warm  Psychiatric: She has a normal mood and affect  Left ankle:  Diffuse swelling ankle and foot  Mild ecchymosis into foot  No erythema  Skin intact  Range of motion strength testing of ankle deferred  Patient able to move toes freely  No calf tenderness  2+ DP pulse  Sensation intact saphenous, sural, deep and superficial peroneal nerve distributions  I have personally reviewed pertinent films in PACS and my interpretation is as follows:  X-rays left ankle:  Medial and posterior malleolus fractures  Distal fibular shaft fracture  Widening of mortise  Stable compared to x-rays 10 days ago

## 2018-05-14 NOTE — OP NOTE
OPERATIVE REPORT  PATIENT NAME: Belinda Thompson    :  1975  MRN: [id number]  Pt Location: [facility] OR ROOM 01    SURGERY DATE: 2018    Surgeon(s) and Role:     * Rocky Hale,  - Primary     * Odilia Morales PA-C - Assisting, no qualified resident was available an assistant was needed for soft tissue retraction to complete the case    Preop Diagnosis:  Closed trimalleolar fracture of left ankle    Post-Op Diagnosis Codes:     * Closed trimalleolar fracture of left ankle    Procedure(s) (LRB):  OPEN REDUCTION W/ INTERNAL FIXATION (ORIF) ANKLE (Left)    Specimen(s):  * No specimens in log *    Estimated Blood Loss:   Minimal    Drains:  None     Anesthesia Type:   General    Intravenous fluids:  1500 cc    Antibiotics:  Ancef 2 g    Urine output:  None    Tourniquet time:  120 minutes at 250 mm of mercury    Implant:  Synthes left 9 hole distal fibular locking plate with 6 3 5 mm cortical screws with 2 7 mm locking screws distally, 44 mm 4 0 short thread partially-threaded cannulated screw x2,     Operative Indications:  Closed fracture of left ankle, initial encounter [S82 892A]  Patient is a 51-year-old female that presents to the outpatient orthopedic practice approximately 10 days after sustaining a trip and fall down stairs at her home  She went to the emergency department was found to have a trimalleolar fracture of her left ankle with a medial malleolus fracture, posterior malleolus fracture, and a fibular diaphyseal fracture  Patient had significant swelling around the ankle and the patient was seen evaluated in the Orthopedic office 10 days after her injury at which point swelling has significantly reduced and overall the ankle was without significant deformity or fracture blister  It was recommended that due to the unstable nature of her injury she was recommended to undergo ORIF of her left ankle    The risks and benefits of performing this procedure were discussed at length in the office as well as the day of surgery  Consents were signed by the patient placed into the patient's chart  Operative Findings: There was a short oblique coronal distal diaphyseal fibular fracture with comminution, a transverse medial malleolus fracture and a minimally displaced small posterior malleolus fracture that was less than 5% of the articular surface  The comminution in the distal fibular shaft fracture was bridged using a distal fibular 9 hole plate  With 6 cortices above and below the fracture site with distal fixation using locking screws  The fracture was anatomically reduced visually and a small piece of devitalized comminution was removed as it was devoid of soft tissue  The transverse medial malleolus fracture was reduced anatomically and to partially short-threaded 44 mm 4 0 cannulated screws were placed into the medial malleolus up into the tibia to fix the medial malleolus  This remained anatomically reduced after placement of the screws  An intraoperative stress examination by external rotation stress test with fluoroscopy demonstrated that there were no syndesmotic injury present and a syndesmotic screw was not needed  The posterior malleolus fracture reduced anatomically after fixation of the medial malleolus and distal fibula were complete  Patient tolerated the procedure well  Patient was neurovascular intact in PACU  Complications:   None    Procedure and Technique:  Patient was seen identified in the preoperative holding area and the left lower extremity was identified marked by the orthopedic staff  Patient was taken back to the operating room where they were positioned supine on the operating room table  General anesthesia was administered by the anesthesia staff   2 g of IV Ancef were administered as a form perioperative antibiotics  A tourniquet was applied to the left thigh  The left lower extremity was prepped and draped in standard sterile fashion   A final time-out was performed and all members of the operating room staff were in agreement of the correct procedure on the correct patient  The fracture level in the distal fibula was palpated and the borders of the distal fibula were delineated using a marking pen  Along axial incision over the long axis of the distal fibula was drawn using marking pen  The leg was exsanguinated and the tourniquet was inflated to 250 mm of mercury  Using a 10 blade scalpel the skin was incised over the length of the delineated incision  The subcutaneous tissues were dissected bluntly down to the level the fibula  In the proximal portion of the incision the superficial peroneal nerve was visualized and mobilized to the anterior portion of the wound ensuring that remained without harm for the remainder of the case  The borders of the fibula were defined and the borders of the fracture site were delineated and mobilized using an elevator and sharp dissection  There was some fibrous overgrowth at an early temps of healing that was debrided from the fracture site and the edges of the fracture were roughened up using a curette to provide punctate bleeding in the bone  On 1st inspection this was a short oblique fracture in the coronal plane with significant comminution up the posterior aspect of the fibula and a small piece of bone that was approximately 2 mm in length that was devoid of soft tissue and needed to be removed from the fracture bed  The fracture edges were mobilized and reduced using reduction clamps and the reduction was confirmed on fluoroscopy demonstrating anatomic reduction with the fibula out to length  Next a distal fibular left-sided plate was sized over the distal fibula and the length needed to be approximately 9 holes to encompass the comminuted fracture site to allow for appropriate bridging and 3 holes of fixation proximal to the fracture site  This size plate would also provide fixation for the whole bone and avoid any stress risers  With the fracture well reduced and the plate held over the fracture with the clamps, the plate was provisionally fixed in place with K-wires and its position was confirmed to be appropriate using fluoroscopy  This position did provide a slightly posterior position proximally in the fibular shaft however it was the best fit distally over the lateral malleolus  A 3 5 mm compression screw was drilled measured and inserted just proximal and distal to the fracture site and comminution to provide fixation of the plate down to the bone  Fracture maintained anatomic reduction both visually as well as on fluoroscopy  The 2 7 mm locking screws were then addressed distally  They were drilled measured and inserted in sequence to fixate the distal portion of the plate to the bone  The remainder of the proximal screws were then drilled measured and filled with 3 5 mm cortical screws  One more 3 5 mm cortical screw was then added distal to fracture site  All of the 3 5 mm cortical screws demonstrated excellent fixation and purchase  X-rays confirmed that the fibular remained reduced with posterior comminution and out to appropriate length  The medial malleolus was addressed next  A curvilinear incision over the medial malleolus was delineated using a marking pen  Using a 15 blade the skin was incised and the subcutaneous tissues were bluntly dissected to avoid damage to any underlying neurovascular structures  The transverse fracture in the medial malleolus was visualized and debrided of periosteum and interposing soft tissue in the fracture site  The edges of the fracture were gently debrided using a curette to provide appropriate bleeding bone bed for healing  Using a pointed reduction clamp the medial malleolus was anatomically reduced  Two K-wires were then passed through the distal tip of the medial malleolus up into the distal tibia across the medial malleolus fracture site    The position was deemed to be appropriate via fluoroscopy  These wires did not penetrate the intra-articular space  They were measured and found to be that a 44 mm screw would be appropriate  The distal cortex of the medial malleolus was opened using a small drill and 2 44 mm 4 0 short thread partially-threaded cannulated screws were inserted into the medial mouth providing compression across the fracture site  Both of the screws demonstrated excellent purchase  The position was deemed to be appropriate using fluoroscopy on the AP and lateral views  A stress examination using external rotation of the foot and ankle to test the syndesmosis was performed next under direct visualization using fluoroscopy a a external rotation stress examination was performed and there is no evidence of syndesmosis or medial clear space widening  This was a negative exam and no syndesmotic screw was indicated  With the medial and lateral injuries fixed with internal fixation the posterior malleolus was evaluated and demonstrated near anatomic reduction posteriorly and involved approximately less than 5% of the joint space  The medial and lateral surgical sites were irrigated with normal saline solution and closure began  The medial surgical site was closed with undyed 2-0 Vicryl to reapproximate the subcutaneous tissues and the skin edges were reapproximated using staples after the area is infiltrated with 0 5% Marcaine plain  The lateral surgical site had the deep subcutaneous tissues reapproximated with undyed 0 Vicryl and the superficial subcutaneous tissues reapproximated using a undyed 2-0 Vicryl, the surgical site was infiltrated with 0 5% Marcaine plain and the skin edges were reapproximated using staples  The medial and lateral surgical sites were dressed with Xeroform, gauze, and ABDs and overwrapped with sterile Webril in preparation for application of a posterior U splint    The left lower extremity was well-padded using Webril and a posterior U splint was applied using Ortho Glass prefabricated splint  The tourniquet was then released after 120 minutes  The splint cured and the patient was awakened from general anesthesia without complication  The patient was taken back to PACU in stable condition  The patient's left foot was neurovascular intact on examination without pain to passive stretch of the toes     I was present for the entire procedure    Patient Disposition:  PACU     SIGNATURE: Jeramy Reyes DO  DATE: May 14, 2018  TIME: 2:33 PM

## 2018-05-14 NOTE — DISCHARGE INSTRUCTIONS
After your Ankle Surgery   DRESSING:   Keep the dressing dry  If you decide to shower, use a plastic bag over the entire dressing  Unless otherwise instructed, do not take the dressing off  Your first dressing chance will be done in the office  ELEVATION:   Keep your foot elevated as much as possible over the first 5 days to reduce swelling and pain  After that, make sure not to let the foot dangle (i e  if you are sitting in a chair, put your foot up on another chair)     WALKING:   Use crutches to walk  Do not put weight on your foot  MEDICATIONS:   Take your prescription pain medication as directed if you are experiencing pain  Unless otherwise instructed, take one 325 mg aspirin twice daily for the first 4 weeks following surgery  This is to help decrease the risk of blood clots  PAIN:   In most cases, a local anesthetic is used which will make your foot numb for up to 12 hours after surgery  When you get home after surgery, you should take one or two pain pills even if the foot is numb  Then, once you feel the local anesthetic beginning to wear off, take another one or two  FEVER:   It is common to have a slight fever (up to 101 degrees) after surgery for a day or two  Often this is coming from the lungs and means you need to take a deep breath or two every few minutes  If your fever reaches 101 5 degrees or you get shaking chills, call the office  DRAINAGE:   Very often, blood will soak through the dressing and you will see the stain on the ace bandage  This is normal and nothing to worry about  If the drainage continues, however, and the blood begins to drip or completely soak the dressing, call the office  POST -OP OFFICE VISIT:   You must call the office to make your first post-operative office visit  This should be 7 - 14 days after surgery  THE ABOVE INSTRUCTIONS ARE GENERAL GUIDELINES ONLY  IF QUESTIONS REMAIN PLEASE CALL THE OFFICE [phone]

## 2018-05-14 NOTE — INTERVAL H&P NOTE
I also saw the patient on the date of the documented H&P  I agree with the examination by Dr Alexandre Medina and his physician assistant  The risks and benefits of undergoing surgery were also discussed with me  Consents were signed today in preoperative holding and all questions were addressed  There has been no interval change since her last evaluation with me last week  Proceed to OR for ORIF left ankle  H&P reviewed  After examining the patient I find no changes in the patients condition since the H&P had been written  Talia JACK    Division of Adult Reconstruction  Department of [facility]

## 2018-05-14 NOTE — PERIOPERATIVE NURSING NOTE
Received from pacu, awake and alert  oob to br to void, uses crutches without difficulty, non-weight bearing on left foot

## 2018-05-17 ENCOUNTER — TELEPHONE (OUTPATIENT)
Dept: OBGYN CLINIC | Facility: CLINIC | Age: 43
End: 2018-05-17

## 2018-05-17 DIAGNOSIS — Z87.81 STATUS POST ORIF OF FRACTURE OF ANKLE: ICD-10-CM

## 2018-05-17 DIAGNOSIS — Z98.890 STATUS POST ORIF OF FRACTURE OF ANKLE: ICD-10-CM

## 2018-05-17 RX ORDER — OXYCODONE HYDROCHLORIDE 5 MG/1
TABLET ORAL
Qty: 40 TABLET | Refills: 0 | Status: SHIPPED | OUTPATIENT
Start: 2018-05-17 | End: 2018-09-18 | Stop reason: ALTCHOICE

## 2018-05-17 NOTE — TELEPHONE ENCOUNTER
Patient requesting refill of pain medication  Appt for tomorrow was r/s for Tuesday for a wound check

## 2018-05-18 LAB
ATRIAL RATE: 73 BPM
P AXIS: 48 DEGREES
PR INTERVAL: 200 MS
QRS AXIS: 45 DEGREES
QRSD INTERVAL: 86 MS
QT INTERVAL: 428 MS
QTC INTERVAL: 471 MS
T WAVE AXIS: 52 DEGREES
VENTRICULAR RATE: 73 BPM

## 2018-05-18 PROCEDURE — 93010 ELECTROCARDIOGRAM REPORT: CPT | Performed by: INTERNAL MEDICINE

## 2018-05-22 ENCOUNTER — OFFICE VISIT (OUTPATIENT)
Dept: OBGYN CLINIC | Facility: CLINIC | Age: 43
End: 2018-05-22

## 2018-05-22 VITALS
SYSTOLIC BLOOD PRESSURE: 123 MMHG | HEIGHT: 68 IN | DIASTOLIC BLOOD PRESSURE: 86 MMHG | BODY MASS INDEX: 30.34 KG/M2 | HEART RATE: 92 BPM | WEIGHT: 200.2 LBS

## 2018-05-22 DIAGNOSIS — S82.852A TRIMALLEOLAR FRACTURE OF ANKLE, CLOSED, LEFT, INITIAL ENCOUNTER: ICD-10-CM

## 2018-05-22 DIAGNOSIS — Z98.890 STATUS POST ORIF OF FRACTURE OF ANKLE: Primary | ICD-10-CM

## 2018-05-22 DIAGNOSIS — Z87.81 STATUS POST ORIF OF FRACTURE OF ANKLE: Primary | ICD-10-CM

## 2018-05-22 PROCEDURE — 99024 POSTOP FOLLOW-UP VISIT: CPT | Performed by: ORTHOPAEDIC SURGERY

## 2018-05-22 NOTE — PROGRESS NOTES
Assessment/Plan:  1  Status post ORIF of fracture of ankle     2  Trimalleolar fracture of ankle, closed, left, initial encounter       Patient is here 1 week status post ORIF of her left ankle for a trimalleolar fracture  She states the pain has been decreasing however still present  She has a area of decreased sensation over the anterior portion of her foot however does still feel touch and contact indicated that sensation is present but decreased  Discussed that this is likely from mobilizing her superficial peroneal nerve during the procedure however I reassured her that the nerve itself was only mobilized and not injured during the procedure  I advised her that these things may take time to resolve and this may take upwards of 6-7 weeks  We Her wounds are healing nicely with staples and there is no sign of erythema or drainage  The patient was educated on the benefits of continuing her smoking cessation efforts  She was replaced in a well-padded posterior U splint after her wounds were dressed with Xeroform gauze and ABD  She was having some stomach irritation from 2 aspirin a day however she denies any GI bleeding therefore I have advised her to decrease this does down to 325 mg once a day by mouth after breakfast   She demonstrates understanding of this  She will return 1 week for her 1st official postoperative check with x-rays  We will get x-rays of her left ankle out of the splint on her arrival   I encouraged her to remain nonweightbearing with persistent elevation of her left foot and ankle  All of her questions were addressed  The patient may call the office at anytime if any questions or concerns should arise  Subjective:  1 week status post ORIF left ankle, wound check    Patient ID: Marie Montoya is a 38 y o  female  HPI  Patient is here for wound check 1 week status post ORIF of her left ankle    Overall she states the pain is continuing to decrease and she has been compliant with splint wear, care, and nonweightbearing left lower extremity with crutches  She denies paresthesias however she does have an area of decreased sensation over her left foot in the dorsal aspect  She states that she feels her foot however it just feels blunted compared to the other areas of her foot  Review of Systems   Constitutional: Positive for activity change  HENT: Negative  Eyes: Negative  Respiratory: Negative  Cardiovascular: Negative  Gastrointestinal: Negative  Endocrine: Negative  Musculoskeletal: Positive for arthralgias  Skin: Negative  Neurological: Negative  Psychiatric/Behavioral: Negative  Past Medical History:   Diagnosis Date    Psychiatric disorder     depression, anxiety       Past Surgical History:   Procedure Laterality Date    NM OPEN TREATMENT BIMALLEOLAR ANKLE FRACTURE Left 5/14/2018    Procedure: OPEN REDUCTION W/ INTERNAL FIXATION (ORIF) ANKLE;  Surgeon: Oksana Rick DO;  Location: [facility];  Service: Orthopedics    REDUCTION MAMMAPLASTY         Family History   Problem Relation Age of Onset    No Known Problems Father        Social History     Occupational History    Not on file       Social History Main Topics    Smoking status: Current Every Day Smoker     Packs/day: 1 00     Types: Cigarettes    Smokeless tobacco: Never Used    Alcohol use Yes      Comment: social    Drug use: No    Sexual activity: Yes     Partners: Male         Current Outpatient Prescriptions:     aspirin (ECOTRIN) 325 mg EC tablet, Take 1 tablet (325 mg total) by mouth 2 (two) times a day, Disp: 56 tablet, Rfl: 0    hydrOXYzine HCL (ATARAX) 25 mg tablet, Take 1 tablet (25 mg total) by mouth every 6 (six) hours as needed for itching (rash), Disp: 30 tablet, Rfl: 0    nicotine (NICODERM CQ) 21 mg/24 hr TD 24 hr patch, Place 1 patch on the skin daily, Disp: 28 patch, Rfl: 0    ondansetron (ZOFRAN) 4 mg tablet, Take 1 tablet (4 mg total) by mouth every 8 (eight) hours as needed for nausea or vomiting, Disp: 20 tablet, Rfl: 0    oxyCODONE (ROXICODONE) 5 mg immediate release tablet, Earliest Fill Date: 5/17/18 1-2 tab by mouth every four to six hours as needed for pain, Disp: 40 tablet, Rfl: 0    Allergies   Allergen Reactions    Toradol [Ketorolac Tromethamine]      GI UPSET    Latex Rash    Ultram [Tramadol] Rash       Objective:  Vitals:    05/22/18 1141   BP: 123/86   Pulse: 92       Body mass index is 30 44 kg/m²  Left Ankle Exam   Swelling: mild    Tenderness   The patient is experiencing tenderness in the lateral malleolus and medial malleolus  Other   Erythema: absent  Scars: present  Sensation: normal  Pulse: present    Comments:  Mild swelling in nonspecific pattern around the ankle at surgical site  No fracture blisters present  Incisions clean dry and intact with staples without drainage  No pain to passive stretch of the toes  Calf without pain on compression  Area of the superficial peroneal nerve with sensation however slightly decreased subjectively to other areas of peripheral nerve sensation in the foot  Right Knee Exam     Other   Other tests: no effusion present          Observations   Left Ankle/Foot   Positive for adhesive scar  Right Knee   Negative for effusion  Physical Exam   Constitutional: She is oriented to person, place, and time  She appears well-developed  HENT:   Head: Atraumatic  Eyes: EOM are normal    Neck: Neck supple  Cardiovascular: Normal rate  Pulmonary/Chest: Effort normal    Musculoskeletal:        Right knee: She exhibits no effusion  See orthopedic exam   Neurological: She is alert and oriented to person, place, and time  Skin: Skin is warm and dry  Psychiatric: She has a normal mood and affect  Nursing note and vitals reviewed  Nikki JACK    Division of Adult Reconstruction  Department of [facility]

## 2018-05-29 ENCOUNTER — OFFICE VISIT (OUTPATIENT)
Dept: OBGYN CLINIC | Facility: CLINIC | Age: 43
End: 2018-05-29

## 2018-05-29 ENCOUNTER — APPOINTMENT (OUTPATIENT)
Dept: RADIOLOGY | Facility: CLINIC | Age: 43
End: 2018-05-29
Payer: COMMERCIAL

## 2018-05-29 VITALS
HEIGHT: 68 IN | HEART RATE: 101 BPM | BODY MASS INDEX: 30.28 KG/M2 | WEIGHT: 199.8 LBS | DIASTOLIC BLOOD PRESSURE: 81 MMHG | SYSTOLIC BLOOD PRESSURE: 155 MMHG

## 2018-05-29 DIAGNOSIS — S82.892D CLOSED FRACTURE OF LEFT ANKLE WITH ROUTINE HEALING, SUBSEQUENT ENCOUNTER: Primary | ICD-10-CM

## 2018-05-29 DIAGNOSIS — S82.892D CLOSED FRACTURE OF LEFT ANKLE WITH ROUTINE HEALING, SUBSEQUENT ENCOUNTER: ICD-10-CM

## 2018-05-29 DIAGNOSIS — Z98.890 STATUS POST ORIF OF FRACTURE OF ANKLE: ICD-10-CM

## 2018-05-29 DIAGNOSIS — Z87.81 STATUS POST ORIF OF FRACTURE OF ANKLE: ICD-10-CM

## 2018-05-29 PROCEDURE — 99024 POSTOP FOLLOW-UP VISIT: CPT | Performed by: ORTHOPAEDIC SURGERY

## 2018-05-29 PROCEDURE — 73610 X-RAY EXAM OF ANKLE: CPT

## 2018-05-29 NOTE — PROGRESS NOTES
Assessment/Plan:  1  Closed fracture of left ankle with routine healing, subsequent encounter  XR ankle 3+ vw left   2  Status post ORIF of fracture of ankle       Patient is here 2 weeks status post ORIF of her left ankle  Her pain continues to decline  He she has been tolerating the immobilization and nonweightbearing well  She admits that she is not as compliant as she should be with her aspirin once a day  I have asked her to continue her aspirin therapy for the 2 more weeks  It was discussed with her the risks of not taking her prophylaxis and she demonstrates understanding of this  The sensation over the dorsum of the foot is improving  Staples were removed today as her incisions were healing well  One week from today she may come out of her Cam boot and do active range-of-motion exercises as demonstrated here in the office today  She will remain nonweightbearing in a Cam boot for the next 4 weeks  We will see her back in 4 weeks time with repeat x-rays of her left ankle  Questions addressed  The patient may call the office at anytime if any questions or concerns should arise  Subjective:  2 weeks status post ORIF of her left ankle    Patient ID: Anila Ryan is a 38 y o  female  HPI  Patient is here 2 weeks status post ORIF of her left ankle  Overall he has been compliant with her weight-bearing instructions as well as the splint wear  She would miss that she is not completely compliant with her once a day aspirin use for DVT prophylaxis  Her pain continues to decline and sensation over the dorsum of the foot continues to improve  Review of Systems   Constitutional: Positive for activity change  HENT: Negative  Eyes: Negative  Respiratory: Negative  Cardiovascular: Negative  Gastrointestinal: Negative  Endocrine: Negative  Musculoskeletal: Positive for arthralgias  Skin: Negative  Neurological: Negative  Psychiatric/Behavioral: Negative            Past Medical History:   Diagnosis Date    Psychiatric disorder     depression, anxiety       Past Surgical History:   Procedure Laterality Date    [place] OPEN TREATMENT BIMALLEOLAR ANKLE FRACTURE Left 5/14/2018    Procedure: OPEN REDUCTION W/ INTERNAL FIXATION (ORIF) ANKLE;  Surgeon: Maricruz Camarillo DO;  Location: [facility];  Service: Orthopedics    REDUCTION MAMMAPLASTY         Family History   Problem Relation Age of Onset    No Known Problems Father        Social History     Occupational History    Not on file  Social History Main Topics    Smoking status: Current Every Day Smoker     Packs/day: 1 00     Types: Cigarettes    Smokeless tobacco: Never Used    Alcohol use Yes      Comment: social    Drug use: No    Sexual activity: Yes     Partners: Male         Current Outpatient Prescriptions:     aspirin (ECOTRIN) 325 mg EC tablet, Take 1 tablet (325 mg total) by mouth 2 (two) times a day, Disp: 56 tablet, Rfl: 0    hydrOXYzine HCL (ATARAX) 25 mg tablet, Take 1 tablet (25 mg total) by mouth every 6 (six) hours as needed for itching (rash), Disp: 30 tablet, Rfl: 0    nicotine (NICODERM CQ) 21 mg/24 hr TD 24 hr patch, Place 1 patch on the skin daily, Disp: 28 patch, Rfl: 0    ondansetron (ZOFRAN) 4 mg tablet, Take 1 tablet (4 mg total) by mouth every 8 (eight) hours as needed for nausea or vomiting, Disp: 20 tablet, Rfl: 0    oxyCODONE (ROXICODONE) 5 mg immediate release tablet, Earliest Fill Date: 5/17/18 1-2 tab by mouth every four to six hours as needed for pain, Disp: 40 tablet, Rfl: 0    Allergies   Allergen Reactions    Toradol [Ketorolac Tromethamine]      GI UPSET    Latex Rash    Ultram [Tramadol] Rash       Objective:  Vitals:    05/29/18 1426   BP: 155/81   Pulse: 101       Body mass index is 30 38 kg/m²  Right Ankle Exam   Swelling: mild    Tenderness   The patient is experiencing tenderness in the medial malleolus and lateral malleolus        Other   Erythema: absent  Scars: present  Sensation: normal  Pulse: present     Comments:  Medial and lateral ankle incision is healing well with staples in place  No erythema or increased tenderness palpation  Minimal swelling around the ankle  Sensation in the superficial peroneal nerve present but mildly dysesthetic  Neurovascularly intact otherwise, compartments soft      Right Knee Exam     Other   Other tests: no effusion present          Observations     Right Knee   Negative for effusion  Right Ankle/Foot   Positive for adhesive scar  Physical Exam   Constitutional: She is oriented to person, place, and time  She appears well-developed  HENT:   Head: Atraumatic  Eyes: EOM are normal    Neck: Neck supple  Cardiovascular: Normal rate  Pulmonary/Chest: Effort normal    Musculoskeletal:        Right knee: She exhibits no effusion  See orthopedic exam   Neurological: She is alert and oriented to person, place, and time  Skin: Skin is warm and dry  Psychiatric: She has a normal mood and affect  Nursing note and vitals reviewed  I have personally reviewed pertinent films in PACS  X-rays demonstrate stable fixation of her left ankle with plate screw construct laterally bridging the area of comminution  The medial malleolus remains anatomically fixed with 2 cannulated screws  Posterior malleoli fracture without significant displacement no change in position of the hardware or sign of failure ankle mortise concentric and reduced    Hilaria JACK    Division of Adult Reconstruction  Department of [facility]

## 2018-06-15 ENCOUNTER — TELEPHONE (OUTPATIENT)
Dept: OBGYN CLINIC | Facility: HOSPITAL | Age: 43
End: 2018-06-15

## 2018-06-15 NOTE — TELEPHONE ENCOUNTER
Caller: patient  Call back number: [phone]  Fax number: [phone] attention court admin  Patient's doctor: Dr Pura Laird    Patient called stating she has a court date on 6/21  She states she is still having issues getting around  Patient's surgery was 5/14  She is asking for a note that she is under your care and what for  She needs this for the court date to be rescheduled

## 2018-06-18 NOTE — TELEPHONE ENCOUNTER
Please provide her with a note stating that she is still limited because she is non-weight bearing and prolonged sitting will likely cause increased swelling and pain  Thank you!

## 2018-06-18 NOTE — TELEPHONE ENCOUNTER
Note written and faxed to the number provided  lmom letting the patient know this was faxed and we did get conformation that it went through

## 2018-06-26 ENCOUNTER — OFFICE VISIT (OUTPATIENT)
Dept: OBGYN CLINIC | Facility: CLINIC | Age: 43
End: 2018-06-26

## 2018-06-26 ENCOUNTER — APPOINTMENT (OUTPATIENT)
Dept: RADIOLOGY | Facility: CLINIC | Age: 43
End: 2018-06-26
Payer: COMMERCIAL

## 2018-06-26 VITALS — DIASTOLIC BLOOD PRESSURE: 98 MMHG | HEART RATE: 99 BPM | SYSTOLIC BLOOD PRESSURE: 143 MMHG

## 2018-06-26 DIAGNOSIS — Z87.81 STATUS POST ORIF OF FRACTURE OF ANKLE: ICD-10-CM

## 2018-06-26 DIAGNOSIS — Z98.890 STATUS POST ORIF OF FRACTURE OF ANKLE: ICD-10-CM

## 2018-06-26 DIAGNOSIS — S82.892D CLOSED FRACTURE OF LEFT ANKLE WITH ROUTINE HEALING, SUBSEQUENT ENCOUNTER: Primary | ICD-10-CM

## 2018-06-26 DIAGNOSIS — S82.892D CLOSED FRACTURE OF LEFT ANKLE WITH ROUTINE HEALING, SUBSEQUENT ENCOUNTER: ICD-10-CM

## 2018-06-26 PROCEDURE — 99024 POSTOP FOLLOW-UP VISIT: CPT | Performed by: ORTHOPAEDIC SURGERY

## 2018-06-26 PROCEDURE — 73610 X-RAY EXAM OF ANKLE: CPT

## 2018-06-26 NOTE — PROGRESS NOTES
Assessment/Plan:  1  Closed fracture of left ankle with routine healing, subsequent encounter  XR ankle 3+ vw left    Ambulatory referral to Physical Therapy   2  Status post ORIF of fracture of ankle  Ambulatory referral to Physical Therapy     Patient is here 6 weeks status post ORIF of her left ankle fracture  Overall her pain continues to decline and she is continuing to feel better  She has been compliant with postoperative protocol thus far  X-rays today demonstrate that her medial malleolus is healing with hardware that is intact without signs of failure  Her lateral high fibular fracture also shows signs of healing and hardware is intact  At this point I feel that she can start at a slow progressive return to weight-bearing starting in 2 weeks which would be 8 weeks postoperatively  She will start with 20% weight-bearing on left lower extremity with Cam boot and crutch assistance  She will progress as tolerated with physical therapy to eventually being weight-bearing as tolerated with a Cam boot  I instructed her to go slow and let pain be her guide  She will also be given a prescription for physical therapy today to work on active and passive range of motion of the ankle in addition to her slow progressive return to weight-bearing as indicated above  Like to see her back in 6 weeks time with repeat x-rays of her left ankle at that time  The benefits of smoking cessation were again discussed  The patient may call the office at anytime if any questions or concerns should arise  Subjective:  6 weeks status post ORIF left ankle    Patient ID: Hari Vega is a 38 y o  female  HPI  Patient is here 6 weeks status post ORIF of her left ankle  She states her pain continues to decline and she has been compliant with her exercise routine  She has been compliant with nonweightbearing as well  She denies any increase in paresthesias in her left foot or ankle   She states that it does become swollen at times if it is in a gravity dependent position too long  Review of Systems   Constitutional: Positive for activity change  HENT: Negative  Eyes: Negative  Respiratory: Negative  Cardiovascular: Negative  Gastrointestinal: Negative  Endocrine: Negative  Musculoskeletal: Positive for arthralgias and gait problem  Skin: Negative  Psychiatric/Behavioral: Negative  Past Medical History:   Diagnosis Date    Psychiatric disorder     depression, anxiety       Past Surgical History:   Procedure Laterality Date    [place] OPEN TREATMENT BIMALLEOLAR ANKLE FRACTURE Left 5/14/2018    Procedure: OPEN REDUCTION W/ INTERNAL FIXATION (ORIF) ANKLE;  Surgeon: Lizeth Prabhakar DO;  Location: [facility];  Service: Orthopedics    REDUCTION MAMMAPLASTY         Family History   Problem Relation Age of Onset    No Known Problems Father        Social History     Occupational History    Not on file       Social History Main Topics    Smoking status: Current Every Day Smoker     Packs/day: 1 00     Types: Cigarettes    Smokeless tobacco: Never Used    Alcohol use Yes      Comment: social    Drug use: No    Sexual activity: Yes     Partners: Male         Current Outpatient Prescriptions:     aspirin (ECOTRIN) 325 mg EC tablet, Take 1 tablet (325 mg total) by mouth 2 (two) times a day, Disp: 56 tablet, Rfl: 0    hydrOXYzine HCL (ATARAX) 25 mg tablet, Take 1 tablet (25 mg total) by mouth every 6 (six) hours as needed for itching (rash), Disp: 30 tablet, Rfl: 0    nicotine (NICODERM CQ) 21 mg/24 hr TD 24 hr patch, Place 1 patch on the skin daily, Disp: 28 patch, Rfl: 0    ondansetron (ZOFRAN) 4 mg tablet, Take 1 tablet (4 mg total) by mouth every 8 (eight) hours as needed for nausea or vomiting, Disp: 20 tablet, Rfl: 0    oxyCODONE (ROXICODONE) 5 mg immediate release tablet, Earliest Fill Date: 5/17/18 1-2 tab by mouth every four to six hours as needed for pain, Disp: 40 tablet, Rfl: 0    Allergies Allergen Reactions    Toradol [Ketorolac Tromethamine]      GI UPSET    Latex Rash    Ultram [Tramadol] Rash       Objective:  Vitals:    06/26/18 1354   BP: 143/98   Pulse: 99       There is no height or weight on file to calculate BMI  Right Ankle Exam   Swelling: mild    Tenderness   The patient is experiencing tenderness in the lateral malleolus and medial malleolus  Range of Motion   Dorsiflexion: abnormal   Plantar flexion: abnormal   Other   Erythema: absent  Scars: present  Sensation: normal (With only mild paresthesia over the medial ankle arch)  Pulse: present     Comments:  Medial lateral ankle incision is healing well  There is an area that has shown 1 mm worth of delayed closure distally in the lateral ankle incision however is benign in appearance and there is a scab present  There is no drainage or surrounding erythema  Medial ankle incision is healed well  There is some mild swelling around the distal medial lateral malleolus however this is benign in appearance  Gentle passive range of motion of the ankle was tolerated however range of motion was markedly reduced  Compartments soft and neurovascular intact with only mild subjective paresthesia over the medial foot  Right Knee Exam     Other   Other tests: no effusion present          Observations     Right Knee   Negative for effusion  Right Ankle/Foot   Positive for adhesive scar  Physical Exam   Constitutional: She is oriented to person, place, and time  She appears well-developed  HENT:   Head: Atraumatic  Eyes: EOM are normal    Neck: Neck supple  Cardiovascular: Normal rate  Pulmonary/Chest: Effort normal    Musculoskeletal:        Right knee: She exhibits no effusion  See orthopedic exam   Neurological: She is alert and oriented to person, place, and time  Skin: Skin is warm and dry  Psychiatric: She has a normal mood and affect  Nursing note and vitals reviewed        I have personally reviewed pertinent films in PACS  X-rays of the left ankle obtained here in the office today demonstrate stable fixation of a medial malleolus fracture and a high fibular fracture with plate screw construct  The medial malleolus fracture line is hardly visible at this point  There is evidence of a high fibular fracture that is slowly showing signs of interval healing  There is no sign of hardware failure medially or laterally ankle mortise remains reduced  Tim JACK    Division of Adult Reconstruction  Department of [facility]

## 2018-07-13 ENCOUNTER — APPOINTMENT (OUTPATIENT)
Dept: PHYSICAL THERAPY | Facility: CLINIC | Age: 43
End: 2018-07-13
Payer: COMMERCIAL

## 2018-07-16 ENCOUNTER — APPOINTMENT (OUTPATIENT)
Dept: PHYSICAL THERAPY | Facility: CLINIC | Age: 43
End: 2018-07-16
Payer: COMMERCIAL

## 2018-07-18 ENCOUNTER — EVALUATION (OUTPATIENT)
Dept: PHYSICAL THERAPY | Facility: CLINIC | Age: 43
End: 2018-07-18
Payer: COMMERCIAL

## 2018-07-18 DIAGNOSIS — Z87.81 STATUS POST ORIF OF FRACTURE OF ANKLE: Primary | ICD-10-CM

## 2018-07-18 DIAGNOSIS — Z98.890 STATUS POST ORIF OF FRACTURE OF ANKLE: Primary | ICD-10-CM

## 2018-07-18 DIAGNOSIS — S82.892D CLOSED FRACTURE OF LEFT ANKLE WITH ROUTINE HEALING, SUBSEQUENT ENCOUNTER: ICD-10-CM

## 2018-07-18 PROCEDURE — G8979 MOBILITY GOAL STATUS: HCPCS | Performed by: PHYSICAL THERAPIST

## 2018-07-18 PROCEDURE — G8978 MOBILITY CURRENT STATUS: HCPCS | Performed by: PHYSICAL THERAPIST

## 2018-07-18 PROCEDURE — 97161 PT EVAL LOW COMPLEX 20 MIN: CPT | Performed by: PHYSICAL THERAPIST

## 2018-07-18 NOTE — PROGRESS NOTES
PT Evaluation     Today's date: 2018  Patient name: Lea Sandoval  : 1975  MRN: [id number]  Referring provider: Valeria Valdez DO  Dx:   Encounter Diagnosis     ICD-10-CM    1  Status post ORIF of fracture of ankle Z96 7     Z87 81    2  Closed fracture of left ankle with routine healing, subsequent encounter [id number]        Start Time: 1400  Stop Time: 1445  Total time in clinic (min): 45 minutes    Assessment  Impairments: abnormal gait, abnormal or restricted ROM, abnormal movement, activity intolerance, impaired balance, impaired physical strength, lacks appropriate home exercise program, pain with function, poor posture  and poor body mechanics    Assessment details: Lea Sandoval is a 38 y o  female who presents with pain, decreased strength, decreased ROM, decreased joint mobility, joint effusion and ambulatory dysfunction  Due to these impairments, patient has difficulty performing ADL's, recreational activities, ambulation, stair negotiation, lifting/carrying, transfers  Patient's clinical presentation is consistent with their referring diagnosis of closed L ankle fx s/p ORIF  Patient has been educated in home exercise program and plan of care   Patient would benefit from skilled physical therapy services to address their aforementioned functional limitations and progress towards prior level of function and independence with home exercise program      Understanding of Dx/Px/POC: good   Prognosis: good    Goals  Short Term Goals to be accomplished in 3 weeks:  STG1: Pt will be I with HEP  STG2: Pt will demo 50% inc in ankle AROM  STG3: Pt will demo 1/2 MMT strength in ankle   STG4: Pt will amb community distance with LRAD with appropriate WB prec per MD     Long Term Goals to be accomplished in 6 weeks:   LTG1: Pt will demo ankle strength WNL to return to PLOF  LTG2: Pt will demo ankle AROM WNL to minimize gait deviations  LTG3: Pt will return to household ADLs and household duties pain free as per PLOF      Plan  Patient would benefit from: PT eval and skilled physical therapy  Planned modality interventions: cryotherapy and thermotherapy: hydrocollator packs  Planned therapy interventions: manual therapy, neuromuscular re-education, self care, therapeutic exercise, therapeutic activities and home exercise program  Frequency: 2x week (2-3x)  Duration in weeks: 6  Treatment plan discussed with: patient  Plan details:  HEP development, stretching, strengthening, A/AA/PROM, joint mobilizations, posture education, STM/MI as needed to reduce muscle tension, muscle reeducation, PLOC discussed and agreed upon with patient  Subjective Evaluation    History of Present Illness  Mechanism of injury: L ankle fx 18 falling down basement stairs  Admitted to hospital following day  Delayed surgery due to swelling  Underwent ORIF 18  Pt finds her pain is at it's worst post shower with TTWB for balance and at night  Sleep disturbed (+) pain, pt experiences random shooting pains per description  Pt often elevating foot, not using ice  Pt takes advil occasionally  Pt does not work  Pt reports in her typical day, she had previously been walking for exercise several miles  Pt is using scooter for community shopping  Stair negotiation impaired, hopping with UE support, 2 stairs max  Pt does no drive  Pt describes general weakness and discomfort thorughout her body related to [facility] use as well as history of LBP with sciatica  Quality of life: good    Pain  Current pain ratin  At best pain ratin  At worst pain ratin    Patient Goals  Patient goals for therapy: decreased pain, increased motion, increased strength, independence with ADLs/IADLs and return to sport/leisure activities          Objective     Observations     Additional Observation Details  Incisions healing well on lateral and medial malleoli, nil bruising   Min Swelling throughout foot andankle    Neurological Testing Additional Neurological Details  Dec LT foot and ankle    Active Range of Motion   Left Ankle/Foot   Dorsiflexion (ke): -2 degrees   Plantar flexion: 20 degrees   Inversion: 5 degrees   Eversion: 5 degrees   Great toe extension: 30 degrees     Right Ankle/Foot   Normal active range of motion    Passive Range of Motion   Left Ankle/Foot    Dorsiflexion (ke): 0 degrees   Plantar flexion: 40 degrees   Inversion: 15 degrees   Eversion: 15 degrees     Right Ankle/Foot  Normal passive range of motion    Strength/Myotome Testing     Left Ankle/Foot   Dorsiflexion: 3+  Plantar flexion: 4-  Inversion: 3+  Eversion: 4-    Right Ankle/Foot   Normal strength    General Comments     Ankle/Foot Comments   Function:   Gait with B AC off loading L foot entirely at start of treatment  Avoiding stair management  Edu to WBAT gait with 3 pt gait, able to accomplish with fatigue, SOB and mild achiness  Unable to squat or kneel  Precautions: Standard  WBAT in Cam boot until MD clearance      Daily Treatment Diary     Manual  07/18                                                                                 Exercise Diary  1            Ankle AROM 4 way 10x ea            Gastroc str w SOS 5 x 10s            Great toe self str 10x                                                                                                                                                                                                               HEP Edu/initate            Time 15'                Modalities

## 2018-07-23 ENCOUNTER — OFFICE VISIT (OUTPATIENT)
Dept: PHYSICAL THERAPY | Facility: CLINIC | Age: 43
End: 2018-07-23
Payer: COMMERCIAL

## 2018-07-23 DIAGNOSIS — S82.892D CLOSED FRACTURE OF LEFT ANKLE WITH ROUTINE HEALING, SUBSEQUENT ENCOUNTER: ICD-10-CM

## 2018-07-23 DIAGNOSIS — Z98.890 STATUS POST ORIF OF FRACTURE OF ANKLE: Primary | ICD-10-CM

## 2018-07-23 DIAGNOSIS — Z87.81 STATUS POST ORIF OF FRACTURE OF ANKLE: Primary | ICD-10-CM

## 2018-07-23 PROCEDURE — 97110 THERAPEUTIC EXERCISES: CPT | Performed by: PHYSICAL THERAPIST

## 2018-07-23 PROCEDURE — 97140 MANUAL THERAPY 1/> REGIONS: CPT | Performed by: PHYSICAL THERAPIST

## 2018-07-23 NOTE — PROGRESS NOTES
Daily Note     Today's date: 2018  Patient name: Sailaja Carpenter  : 1975  MRN: [id number]  Referring provider: Rosina Gan DO  Dx:   Encounter Diagnosis     ICD-10-CM    1  Status post ORIF of fracture of ankle Z96 7     Z87 81    2  Closed fracture of left ankle with routine healing, subsequent encounter S87 [id number]        Start Time: 0200  Stop Time: 0245  Total time in clinic (min): 45 minutes    Subjective: "Pt acknowledges soreness with inc gait with dec UE support  Pt requesting to DC aspirin due to inc mobility, pt encouraged to defer to MD orders  Objective: See treatment diary below  Progress there ex to inc ROm and strength  Precautions: Standard  WBAT in Cam boot until MD clearance  Daily Treatment Diary     Manual             PROM  15'                                                                   Exercise Diary  1 2           Ankle AROM 4 way 10x ea [id number] ea           Gastroc str w SOS 5 x 10s 5x10s           Great toe self str 10x Manual           NuStep -- L3 1Lap           Rocker board -- Seated DF/PF 30x           Fitter board -- Seated 20x CW/CCW                                                                                                                                                                       HEP Edu/initate Rev/updated           Time 15' 30'               Modalities                                                           Assessment: Tolerated treatment well  Patient demo ongoing gait deviations and apprehension with inc WB in CAm however generally progressing toward ROM goals  HEP progressed well  Plan: Continue per plan of care

## 2018-07-25 ENCOUNTER — APPOINTMENT (OUTPATIENT)
Dept: PHYSICAL THERAPY | Facility: CLINIC | Age: 43
End: 2018-07-25
Payer: COMMERCIAL

## 2018-08-02 ENCOUNTER — APPOINTMENT (OUTPATIENT)
Dept: PHYSICAL THERAPY | Facility: CLINIC | Age: 43
End: 2018-08-02
Payer: COMMERCIAL

## 2018-08-06 ENCOUNTER — OFFICE VISIT (OUTPATIENT)
Dept: PHYSICAL THERAPY | Facility: CLINIC | Age: 43
End: 2018-08-06
Payer: COMMERCIAL

## 2018-08-06 DIAGNOSIS — Z98.890 STATUS POST ORIF OF FRACTURE OF ANKLE: Primary | ICD-10-CM

## 2018-08-06 DIAGNOSIS — Z87.81 STATUS POST ORIF OF FRACTURE OF ANKLE: Primary | ICD-10-CM

## 2018-08-06 DIAGNOSIS — S82.892D CLOSED FRACTURE OF LEFT ANKLE WITH ROUTINE HEALING, SUBSEQUENT ENCOUNTER: ICD-10-CM

## 2018-08-06 PROCEDURE — 97110 THERAPEUTIC EXERCISES: CPT

## 2018-08-06 PROCEDURE — 97140 MANUAL THERAPY 1/> REGIONS: CPT

## 2018-08-06 NOTE — PROGRESS NOTES
Daily Note     Today's date: 2018  Patient name: Brittney Bullock  : 1975  MRN: [id number]  Referring provider: Jessica Hanna DO  Dx:   Encounter Diagnosis     ICD-10-CM    1  Status post ORIF of fracture of ankle Z96 7     Z87 81    2  Closed fracture of left ankle with routine healing, subsequent encounter S82 [id number]        Start Time: 1100          Subjective: Pt reports 6/10 pain in L ankle  She reports her pain is mainly "achiness"  Objective: See treatment diary below    Precautions: Standard  WBAT in Cam boot until MD clearance  Daily Treatment Diary     Manual            PROM  15' 15'                                                                  Exercise Diary  1 2 3          Ankle AROM 4 way 10x ea [id number] ea Green  2x10          Gastroc str w SOS 5 x 10s 5x10s 5x10s            Great toe self str 10x Manual Manual          NuStep -- L3 1Lap L3  1 lap          Rocker board -- Seated DF/PF 30x Seated DF/PF 30x          Fitter board -- Seated 20x CW/CCW Seated 20x CW/CCW                                                                                                                                                                        HEP Edu/initate Rev/updated Rev          Time 13' 30' 30'              Modalities                                                               Assessment: Tolerated treatment well  Patient exhibited good technique with therapeutic exercises and would benefit from continued PT  Pt demonstrated improved ROM this session and experienced minimal pain  She experiences edema in lat side of ankle  Plan: Continue per plan of care  Progress treatment as tolerated

## 2018-08-07 ENCOUNTER — OFFICE VISIT (OUTPATIENT)
Dept: OBGYN CLINIC | Facility: CLINIC | Age: 43
End: 2018-08-07

## 2018-08-07 ENCOUNTER — APPOINTMENT (OUTPATIENT)
Dept: RADIOLOGY | Facility: CLINIC | Age: 43
End: 2018-08-07
Payer: COMMERCIAL

## 2018-08-07 VITALS — HEART RATE: 94 BPM | DIASTOLIC BLOOD PRESSURE: 95 MMHG | SYSTOLIC BLOOD PRESSURE: 153 MMHG

## 2018-08-07 DIAGNOSIS — T81.30XA EXTRUSION OF SUTURE, INITIAL ENCOUNTER: ICD-10-CM

## 2018-08-07 DIAGNOSIS — Z98.890 STATUS POST ORIF OF FRACTURE OF ANKLE: ICD-10-CM

## 2018-08-07 DIAGNOSIS — S82.892D CLOSED FRACTURE OF LEFT ANKLE WITH ROUTINE HEALING, SUBSEQUENT ENCOUNTER: Primary | ICD-10-CM

## 2018-08-07 DIAGNOSIS — S82.892D CLOSED FRACTURE OF LEFT ANKLE WITH ROUTINE HEALING, SUBSEQUENT ENCOUNTER: ICD-10-CM

## 2018-08-07 DIAGNOSIS — Z87.81 STATUS POST ORIF OF FRACTURE OF ANKLE: ICD-10-CM

## 2018-08-07 PROCEDURE — 99024 POSTOP FOLLOW-UP VISIT: CPT | Performed by: ORTHOPAEDIC SURGERY

## 2018-08-07 PROCEDURE — 73610 X-RAY EXAM OF ANKLE: CPT

## 2018-08-07 RX ORDER — CEPHALEXIN 500 MG/1
500 CAPSULE ORAL EVERY 6 HOURS SCHEDULED
Qty: 14 CAPSULE | Refills: 0 | Status: SHIPPED | OUTPATIENT
Start: 2018-08-07 | End: 2018-08-14

## 2018-08-07 NOTE — PROGRESS NOTES
Assessment/Plan:  1  Closed fracture of left ankle with routine healing, subsequent encounter  CANCELED: XR ankle 3+ vw right   2  Status post ORIF of fracture of ankle     3  Extrusion of suture, initial encounter  cephalexin (KEFLEX) 500 mg capsule     Patient is here for follow-up regarding her open reduction internal fixation of her left ankle trimalleolar fracture  She has been progressing her weight-bearing and she is currently weight-bearing as tolerated in a Cam boot walker without assistive devices  He does have an area over the lateral portion of her ankle at the distal aspect of her incision that was slightly uncomfortable did have some clear drainage and this was found to be in aseptic suture that was spitting from the into the wound  This was removed here in the office today  Steri-Strips were applied  She was given a prophylactic antibiotic dose to  at the pharmacy to control the micro environment as a small area continues to close now that the sutures removed  She may continue weight-bearing as tolerated and working with physical therapy in the Cam boot to ultimately weaning out to weightbear as tolerated in normal sneaker  I would like to see her back in approximately 4 weeks time  We do not need to get x-rays at that visit however this is more of a clinical follow-up of her progress with PT as well as to ensure the area of the spitting suture has completely closed without issue  The patient may call the office at anytime if any questions or concerns should arise  Subjective:  11 week Follow-up status post ORIF left ankle     Patient ID: King Lalita is a 41 y o  female  HPI  Patient is here for follow-up regarding the above listed complaint  Overall she is doing well but did have some discomfort over the lateral aspect of her ankle and stated there was in the region of the distal incision that was draining some small amounts of clear fluid yesterday    On examination today does appear to be a spitting suture in the distal aspect of her wound  She has been weight-bearing as tolerated in a Cam boot states she is achy but it does appear to be getting better  She has no pain with gentle passive or active range of motion of her ankle  Range of motion is improving  She has been participating with physical therapy  Review of Systems   Constitutional: Positive for activity change  HENT: Negative  Eyes: Negative  Respiratory: Negative  Cardiovascular: Negative  Gastrointestinal: Negative  Endocrine: Negative  Musculoskeletal: Positive for arthralgias  Skin: Negative  Neurological: Negative  Psychiatric/Behavioral: Negative  Past Medical History:   Diagnosis Date    Psychiatric disorder     depression, anxiety       Past Surgical History:   Procedure Laterality Date    [place] OPEN TREATMENT BIMALLEOLAR ANKLE FRACTURE Left 5/14/2018    Procedure: OPEN REDUCTION W/ INTERNAL FIXATION (ORIF) ANKLE;  Surgeon: Hafsa Bal DO;  Location: [facility];  Service: Orthopedics    REDUCTION MAMMAPLASTY         Family History   Problem Relation Age of Onset    No Known Problems Father        Social History     Occupational History    Not on file       Social History Main Topics    Smoking status: Current Every Day Smoker     Packs/day: 1 00     Types: Cigarettes    Smokeless tobacco: Never Used    Alcohol use Yes      Comment: social    Drug use: No    Sexual activity: Yes     Partners: Male         Current Outpatient Prescriptions:     ibuprofen (MOTRIN) 100 mg/5 mL suspension, Take 200 mg by mouth every 4 (four) hours as needed for mild pain, Disp: , Rfl:     aspirin (ECOTRIN) 325 mg EC tablet, Take 1 tablet (325 mg total) by mouth 2 (two) times a day, Disp: 56 tablet, Rfl: 0    cephalexin (KEFLEX) 500 mg capsule, Take 1 capsule (500 mg total) by mouth every 6 (six) hours for 7 days, Disp: 14 capsule, Rfl: 0    hydrOXYzine HCL (ATARAX) 25 mg tablet, Take 1 tablet (25 mg total) by mouth every 6 (six) hours as needed for itching (rash), Disp: 30 tablet, Rfl: 0    nicotine (NICODERM CQ) 21 mg/24 hr TD 24 hr patch, Place 1 patch on the skin daily, Disp: 28 patch, Rfl: 0    ondansetron (ZOFRAN) 4 mg tablet, Take 1 tablet (4 mg total) by mouth every 8 (eight) hours as needed for nausea or vomiting, Disp: 20 tablet, Rfl: 0    oxyCODONE (ROXICODONE) 5 mg immediate release tablet, Earliest Fill Date: 5/17/18 1-2 tab by mouth every four to six hours as needed for pain, Disp: 40 tablet, Rfl: 0    Allergies   Allergen Reactions    Toradol [Ketorolac Tromethamine]      GI UPSET    Latex Rash    Ultram [Tramadol] Rash       Objective:  Vitals:    08/07/18 1423   BP: 153/95   Pulse: 94       There is no height or weight on file to calculate BMI  Right Ankle Exam   Swelling: none    Tenderness   The patient is experiencing tenderness in the medial malleolus and lateral malleolus  Range of Motion   Dorsiflexion: abnormal   Plantar flexion: abnormal     Muscle Strength   Dorsiflexion:  4/5  Plantar flexion:  5/5    Tests   Anterior drawer: negative  Other   Erythema: absent  Scars: present (Medial lateral ankle incisions healing well  There is a spitting Vicryl suture at the distal and of the lateral ankle incision  There is no erythema in this area  No foul odor  No drainage )  Sensation: normal  Pulse: present     Comments:  Neurovascular intact in the foot and ankle  Range of motion in dorsiflexion and plantar flexion improving of the ankle  Spitting Vicryl suture was removed without issue  Right Knee Exam     Other   Other tests: no effusion present          Observations     Right Knee   Negative for effusion  Right Ankle/Foot   Positive for adhesive scar (Medial lateral ankle incisions healing well  There is a spitting Vicryl suture at the distal and of the lateral ankle incision  There is no erythema in this area  No foul odor  No drainage  )  Strength/Myotome Testing     Right Ankle/Foot   Dorsiflexion: 4  Plantar flexion: 5      Physical Exam   Constitutional: She is oriented to person, place, and time  She appears well-developed  HENT:   Head: Atraumatic  Eyes: EOM are normal    Neck: Neck supple  Cardiovascular: Normal rate  Pulmonary/Chest: Effort normal    Musculoskeletal:        Right knee: She exhibits no effusion  See orthopedic exam   Neurological: She is alert and oriented to person, place, and time  Skin: Skin is warm and dry  Psychiatric: She has a normal mood and affect  Nursing note and vitals reviewed  I have personally reviewed pertinent films in PACS  X-rays of the left ankle obtained here in the office today demonstrate stable fixation a of her ankle fracture with plate screw construct laterally and medial screw construct  There has been no interval displacement  Medial malleolus fracture is no longer visualized  Proximal comminuted fibular fracture is still visualized however does show signs of continued healing  No sign of hardware failure  Marie JACK    Division of Adult Reconstruction  Department of [facility]

## 2018-08-09 ENCOUNTER — APPOINTMENT (OUTPATIENT)
Dept: PHYSICAL THERAPY | Facility: CLINIC | Age: 43
End: 2018-08-09
Payer: COMMERCIAL

## 2018-08-13 ENCOUNTER — OFFICE VISIT (OUTPATIENT)
Dept: PHYSICAL THERAPY | Facility: CLINIC | Age: 43
End: 2018-08-13
Payer: COMMERCIAL

## 2018-08-13 DIAGNOSIS — S82.892D CLOSED FRACTURE OF LEFT ANKLE WITH ROUTINE HEALING, SUBSEQUENT ENCOUNTER: ICD-10-CM

## 2018-08-13 DIAGNOSIS — Z87.81 STATUS POST ORIF OF FRACTURE OF ANKLE: Primary | ICD-10-CM

## 2018-08-13 DIAGNOSIS — Z98.890 STATUS POST ORIF OF FRACTURE OF ANKLE: Primary | ICD-10-CM

## 2018-08-13 PROCEDURE — 97110 THERAPEUTIC EXERCISES: CPT

## 2018-08-13 PROCEDURE — G8980 MOBILITY D/C STATUS: HCPCS | Performed by: PHYSICAL THERAPIST

## 2018-08-13 PROCEDURE — 97140 MANUAL THERAPY 1/> REGIONS: CPT

## 2018-08-13 PROCEDURE — G8979 MOBILITY GOAL STATUS: HCPCS | Performed by: PHYSICAL THERAPIST

## 2018-08-13 NOTE — PROGRESS NOTES
Daily Note     Today's date: 2018  Patient name: Patrick Wolfe  : 1975  MRN: [id number]  Referring provider: Maryla Gottron, DO  Dx:   Encounter Diagnosis     ICD-10-CM    1  Status post ORIF of fracture of ankle Z96 7     Z87 81    2  Closed fracture of left ankle with routine healing, subsequent encounter S82 [id number]        Start Time: 1445  Stop Time: 1530  Total time in clinic (min): 45 minutes    Subjective: Pt does not report any pain or discomfort prior to session  Objective: See treatment diary below    Precautions: Standard  WBAT in Cam boot until MD clearance  Daily Treatment Diary     Manual           PROM  15' 15' 15'                                                                 Exercise Diary  1 2 3 4         Ankle AROM 4 way 10x ea [id number] ea Green  2x10 Green  2x20         Gastroc str w SOS 5 x 10s 5x10s 5x10s   10s  10x         Great toe self str 10x Manual Manual Manual         NuStep -- L3 1Lap L3  1 lap L3  10'         Rocker board -- Seated DF/PF 30x Seated DF/PF 30x Seated  DF/PF  30x         Fitter board -- Seated 20x CW/CCC Seated 20x CW/CCC Seated 20x  CW/CC                                                                                                                                                                       HEP Edu/initate Rev/updated Rev Rev         Time 13' 30' 30' 30'             Modalities                                                           Assessment: Tolerated treatment well  Patient exhibited good technique with therapeutic exercises and would benefit from continued PT  Demonstrated improved ROM in L ankle during PROM with no pain  Will begin wt bearing exercises next session with sneaker but continue to wear Cam wt bearing as tolerated  Plan: Continue per plan of care  Progress treatment as tolerated

## 2018-08-14 ENCOUNTER — TELEPHONE (OUTPATIENT)
Dept: OBGYN CLINIC | Facility: HOSPITAL | Age: 43
End: 2018-08-14

## 2018-08-14 NOTE — TELEPHONE ENCOUNTER
Spoke with patient  She took a few days of the keflex before it began to bother her stomach  She does not have erythema, drainage, or warmth of the ankle  She does have mild soreness as expected  Therefore, she does not need another antibiotic prescription as it was simply for prophylaxis for a suture that protruded through the skin  She can simply monitor for signs of infection and continue with the treatment plan as outlined by Dr Cora Luciano  We will see her back as planned or sooner if she develops any signs of infection

## 2018-08-14 NOTE — TELEPHONE ENCOUNTER
Patient sees Dr Aundrea Johnson   [phone]    Patient is calling because the antibiotic Keflex 500mg is bothering her stomach  Patient would like to know if there is something that she could take that is not as strong  Patient started this med on 8/7/18  She only took it a few days because of the stomach issues she was having  Please send to [facility] in [place]  Please let the patient know when this is taken care of

## 2018-08-16 ENCOUNTER — APPOINTMENT (OUTPATIENT)
Dept: PHYSICAL THERAPY | Facility: CLINIC | Age: 43
End: 2018-08-16
Payer: COMMERCIAL

## 2018-08-16 ENCOUNTER — TELEPHONE (OUTPATIENT)
Dept: OBGYN CLINIC | Facility: CLINIC | Age: 43
End: 2018-08-16

## 2018-08-16 NOTE — TELEPHONE ENCOUNTER
Patient called states she is going away this weekend and would like to know if she is allowed to go swimming? Please advise

## 2018-08-16 NOTE — TELEPHONE ENCOUNTER
We would like to make sure that the wound has completely closed before allowing her to go swimming  She can come in for a quick wound check (no Xray) tomorrow morning and we can make a decision then

## 2018-08-17 ENCOUNTER — OFFICE VISIT (OUTPATIENT)
Dept: OBGYN CLINIC | Facility: CLINIC | Age: 43
End: 2018-08-17
Payer: COMMERCIAL

## 2018-08-17 VITALS — DIASTOLIC BLOOD PRESSURE: 88 MMHG | SYSTOLIC BLOOD PRESSURE: 121 MMHG | HEART RATE: 81 BPM

## 2018-08-17 DIAGNOSIS — Z87.81 STATUS POST ORIF OF FRACTURE OF ANKLE: Primary | ICD-10-CM

## 2018-08-17 DIAGNOSIS — Z98.890 STATUS POST ORIF OF FRACTURE OF ANKLE: Primary | ICD-10-CM

## 2018-08-17 PROCEDURE — 99213 OFFICE O/P EST LOW 20 MIN: CPT | Performed by: ORTHOPAEDIC SURGERY

## 2018-08-17 NOTE — PROGRESS NOTES
Assessment/Plan:  1  Status post ORIF of fracture of ankle       Patient is here approximately 13 weeks status post ORIF of her left ankle trimalleolar fracture  She has been healing well and she is here for a wound check after a Vicryl suture was removing the distal aspect of her wound on her last visit  This area has healed nicely and is scabbed over  There is no sign of localized infection or skin breakdown  At this point I feel that she is safe to resume her activities of enjoyment and proceeding into a swimming pool  She is going to physical therapy and weaning out of her boot and weight-bearing as tolerated fashion she is doing well with physical therapy and I expect her to be in normal shoe wear within 1-2 weeks in a weight-bearing as tolerated fashion  I will see her back in approximately 1 month time to follow-up on her weight-bearing progression  No new x-rays are needed that time unless there is an increase in her ankle pain  The patient may call the office at anytime if any questions or concerns should arise  Subjective:  13 weeks status post ORIF left ankle trimalleolar fracture    Patient ID: Joel Rowe is a 41 y o  female  HPI  She is here for wound check regarding a a Vicryl suture that was removed from the distal aspect of her lateral wound  She has completed her antibiotic course to her tolerance and been compliant with wound care and her area is been dry and healed over without any drainage  She denies any paresthesias into the left foot or ankle  She has been participating with physical therapy and weight-bearing as tolerated in Cam boot  Review of Systems   Constitutional: Positive for activity change  HENT: Negative  Eyes: Negative  Respiratory: Negative  Cardiovascular: Negative  Gastrointestinal: Negative  Endocrine: Negative  Musculoskeletal: Negative  Skin: Negative  Neurological: Negative  Psychiatric/Behavioral: Negative            Past Medical History:   Diagnosis Date    Psychiatric disorder     depression, anxiety       Past Surgical History:   Procedure Laterality Date    [place] OPEN TREATMENT BIMALLEOLAR ANKLE FRACTURE Left 5/14/2018    Procedure: OPEN REDUCTION W/ INTERNAL FIXATION (ORIF) ANKLE;  Surgeon: Stephanie Andres DO;  Location: [facility];  Service: Orthopedics    REDUCTION MAMMAPLASTY         Family History   Problem Relation Age of Onset    No Known Problems Father        Social History     Occupational History    Not on file  Social History Main Topics    Smoking status: Current Every Day Smoker     Packs/day: 1 00     Types: Cigarettes    Smokeless tobacco: Never Used    Alcohol use Yes      Comment: social    Drug use: No    Sexual activity: Yes     Partners: Male         Current Outpatient Prescriptions:     aspirin (ECOTRIN) 325 mg EC tablet, Take 1 tablet (325 mg total) by mouth 2 (two) times a day, Disp: 56 tablet, Rfl: 0    hydrOXYzine HCL (ATARAX) 25 mg tablet, Take 1 tablet (25 mg total) by mouth every 6 (six) hours as needed for itching (rash), Disp: 30 tablet, Rfl: 0    ibuprofen (MOTRIN) 100 mg/5 mL suspension, Take 200 mg by mouth every 4 (four) hours as needed for mild pain, Disp: , Rfl:     nicotine (NICODERM CQ) 21 mg/24 hr TD 24 hr patch, Place 1 patch on the skin daily, Disp: 28 patch, Rfl: 0    ondansetron (ZOFRAN) 4 mg tablet, Take 1 tablet (4 mg total) by mouth every 8 (eight) hours as needed for nausea or vomiting, Disp: 20 tablet, Rfl: 0    oxyCODONE (ROXICODONE) 5 mg immediate release tablet, Earliest Fill Date: 5/17/18 1-2 tab by mouth every four to six hours as needed for pain, Disp: 40 tablet, Rfl: 0    Allergies   Allergen Reactions    Toradol [Ketorolac Tromethamine]      GI UPSET    Latex Rash    Ultram [Tramadol] Rash       Objective:  Vitals:    08/17/18 1004   BP: 121/88   Pulse: 81       There is no height or weight on file to calculate BMI      Left Ankle Exam   Swelling: none    Tenderness   The patient is experiencing no tenderness  Tests   Anterior drawer: negative  Varus tilt: negative    Other   Erythema: absent  Scars: present  Sensation: normal  Pulse: present    Comments: The distal area of her lateral incision where a Vicryl suture was removed is healed over with a scab  It is benign in appearance  There is no drainage  There is no erythema  She tolerates active and passive range of motion of the ankle well  Her range of motion continues to improve  The remainder of her incisions are well healed  Neurovascularly intact left foot and ankle      Right Knee Exam     Other   Other tests: no effusion present          Observations   Left Ankle/Foot   Positive for adhesive scar  Right Knee   Negative for effusion  Physical Exam   Constitutional: She is oriented to person, place, and time  She appears well-developed  HENT:   Head: Atraumatic  Eyes: EOM are normal    Neck: Neck supple  Cardiovascular: Normal rate  Pulmonary/Chest: Effort normal    Musculoskeletal:        Right knee: She exhibits no effusion  See orthopedic exam   Neurological: She is alert and oriented to person, place, and time  Skin: Skin is warm and dry  Psychiatric: She has a normal mood and affect  Nursing note and vitals reviewed  Talia JACK    Division of Adult Reconstruction  Department of [facility]

## 2018-08-20 ENCOUNTER — APPOINTMENT (OUTPATIENT)
Dept: PHYSICAL THERAPY | Facility: CLINIC | Age: 43
End: 2018-08-20
Payer: COMMERCIAL

## 2018-08-23 ENCOUNTER — APPOINTMENT (OUTPATIENT)
Dept: PHYSICAL THERAPY | Facility: CLINIC | Age: 43
End: 2018-08-23
Payer: COMMERCIAL

## 2018-08-27 ENCOUNTER — APPOINTMENT (OUTPATIENT)
Dept: PHYSICAL THERAPY | Facility: CLINIC | Age: 43
End: 2018-08-27
Payer: COMMERCIAL

## 2018-08-28 ENCOUNTER — OFFICE VISIT (OUTPATIENT)
Dept: OBGYN CLINIC | Facility: CLINIC | Age: 43
End: 2018-08-28
Payer: COMMERCIAL

## 2018-08-28 VITALS
SYSTOLIC BLOOD PRESSURE: 133 MMHG | HEART RATE: 98 BPM | DIASTOLIC BLOOD PRESSURE: 86 MMHG | WEIGHT: 203.4 LBS | HEIGHT: 68 IN | BODY MASS INDEX: 30.83 KG/M2

## 2018-08-28 DIAGNOSIS — Z98.890 STATUS POST ORIF OF FRACTURE OF ANKLE: ICD-10-CM

## 2018-08-28 DIAGNOSIS — Z87.81 STATUS POST ORIF OF FRACTURE OF ANKLE: ICD-10-CM

## 2018-08-28 DIAGNOSIS — T81.89XD DELAYED SURGICAL WOUND HEALING, SUBSEQUENT ENCOUNTER: Primary | ICD-10-CM

## 2018-08-28 PROBLEM — T81.89XA DELAYED SURGICAL WOUND HEALING: Status: ACTIVE | Noted: 2018-08-28

## 2018-08-28 PROCEDURE — 99213 OFFICE O/P EST LOW 20 MIN: CPT | Performed by: ORTHOPAEDIC SURGERY

## 2018-08-28 NOTE — PROGRESS NOTES
Assessment/Plan:  1  Delayed surgical wound healing, subsequent encounter  Ambulatory referral to Wound Care   2  Status post ORIF of fracture of ankle  Ambulatory referral to Wound Care     Patient presents just over 12 months status post ORIF of her left ankle by malleolar fracture  She had a small area over the distal portion of her lateral incision that had benign-appearing suture abscess due to a Vicryl suture that was removed and she was provided local wound care in the office in addition to oral antibiotic to help this area to heal   It did present with a period of healing and resolution and scabbing over however she states that this area over the distal part of the ankle has still some clear drainage on occasion  For this delaying wound healing from a benign suture abscess I have asked her to seek consultation with wound care for evaluation and treatment of this area  It appears benign in nature and does not appear to have a localized infection with it  She does also report some ankle pain that is not localized in the ankle joint today but more associated with weight-bearing  I did reassure her that her hardware demonstrates a stable construct and her fractures are continuing to heal and I feel that she may be just overdoing it as she reports significant amounts of activity over the weekend  We discussed a slow progressive return to weight-bearing and associated activities  I would like to see her back in approximately 3 weeks time after she has been evaluated by wound care to follow-up on progress of her wound healing  The patient or wound care office may call me at any time with any questions or concerns if they should arise  Subjective:   Greater than 12 weeks status post ORIF left ankle by malleolar fracture  Follow-up healing suture abscess    Patient ID: Edmundo Mccarty is a 41 y o  female      HPI  Patient is here for above could stated complaints and follow-up regarding her distal wound healing  She had a benign-appearing suture abscess over the lateral aspect of her distal incision which appeared to be healing in a benign fashion however today she presents with a history of clear drainage over the last few days but today it appears dry  This is an small area and represents a pinhole  She states she does have some lateral ankle pain however she also reports that she has been overdoing it with her activity as she is eager to get back to life weight-bearing as tolerated in normal sneakers  Review of Systems   Constitutional: Positive for activity change  HENT: Negative  Eyes: Negative  Respiratory: Negative  Cardiovascular: Negative  Gastrointestinal: Negative  Endocrine: Negative  Musculoskeletal: Positive for arthralgias and gait problem  Skin: Negative  Psychiatric/Behavioral: Negative  Past Medical History:   Diagnosis Date    Psychiatric disorder     depression, anxiety       Past Surgical History:   Procedure Laterality Date    [place] OPEN TREATMENT BIMALLEOLAR ANKLE FRACTURE Left 5/14/2018    Procedure: OPEN REDUCTION W/ INTERNAL FIXATION (ORIF) ANKLE;  Surgeon: Hafsa Bal DO;  Location: [facility];  Service: Orthopedics    REDUCTION MAMMAPLASTY         Family History   Problem Relation Age of Onset    No Known Problems Father        Social History     Occupational History    Not on file       Social History Main Topics    Smoking status: Current Every Day Smoker     Packs/day: 1 00     Types: Cigarettes    Smokeless tobacco: Never Used    Alcohol use Yes      Comment: social    Drug use: No    Sexual activity: Yes     Partners: Male         Current Outpatient Prescriptions:     aspirin (ECOTRIN) 325 mg EC tablet, Take 1 tablet (325 mg total) by mouth 2 (two) times a day, Disp: 56 tablet, Rfl: 0    hydrOXYzine HCL (ATARAX) 25 mg tablet, Take 1 tablet (25 mg total) by mouth every 6 (six) hours as needed for itching (rash), Disp: 30 tablet, Rfl: 0    ibuprofen (MOTRIN) 100 mg/5 mL suspension, Take 200 mg by mouth every 4 (four) hours as needed for mild pain, Disp: , Rfl:     nicotine (NICODERM CQ) 21 mg/24 hr TD 24 hr patch, Place 1 patch on the skin daily, Disp: 28 patch, Rfl: 0    ondansetron (ZOFRAN) 4 mg tablet, Take 1 tablet (4 mg total) by mouth every 8 (eight) hours as needed for nausea or vomiting, Disp: 20 tablet, Rfl: 0    oxyCODONE (ROXICODONE) 5 mg immediate release tablet, Earliest Fill Date: 5/17/18 1-2 tab by mouth every four to six hours as needed for pain, Disp: 40 tablet, Rfl: 0    Allergies   Allergen Reactions    Toradol [Ketorolac Tromethamine]      GI UPSET    Latex Rash    Ultram [Tramadol] Rash       Objective:  Vitals:    08/28/18 1736   BP: 133/86   Pulse: 98       Body mass index is 30 93 kg/m²  Left Ankle Exam   Swelling: none    Tenderness   The patient is experiencing tenderness in the lateral malleolus  Other   Erythema: absent  Scars: present  Sensation: normal  Pulse: present    Comments:  Medial ankle scar well healed  Lateral ankle scar proximally well-healed there is a small pinhole size area of delayed wound drainage with a small scab present  No drainage expressed today  No erythema or increased warmth in this area  This area appears benign in nature  The patient had no pain on active and passive range of motion of the tibiotalar joint  Foot neurovascular intact      Right Knee Exam     Other   Other tests: no effusion present          Observations   Left Ankle/Foot   Positive for adhesive scar  Right Knee   Negative for effusion  Physical Exam   Constitutional: She is oriented to person, place, and time  She appears well-developed  HENT:   Head: Atraumatic  Eyes: EOM are normal    Neck: Neck supple  Cardiovascular: Normal rate  Pulmonary/Chest: Effort normal    Musculoskeletal:        Right knee: She exhibits no effusion     See orthopedic exam   Neurological: She is alert and oriented to person, place, and time  Skin: Skin is warm and dry  Psychiatric: She has a normal mood and affect  Nursing note and vitals reviewed  Yaneli JACK    Division of Adult Reconstruction  Department of [facility]

## 2018-08-30 ENCOUNTER — APPOINTMENT (OUTPATIENT)
Dept: PHYSICAL THERAPY | Facility: CLINIC | Age: 43
End: 2018-08-30
Payer: COMMERCIAL

## 2018-08-30 ENCOUNTER — HOSPITAL ENCOUNTER (EMERGENCY)
Facility: HOSPITAL | Age: 43
Discharge: HOME | End: 2018-08-30
Attending: EMERGENCY MEDICINE | Admitting: EMERGENCY MEDICINE
Payer: COMMERCIAL

## 2018-08-30 VITALS
SYSTOLIC BLOOD PRESSURE: 122 MMHG | RESPIRATION RATE: 16 BRPM | OXYGEN SATURATION: 97 % | BODY MASS INDEX: 30.77 KG/M2 | TEMPERATURE: 99.4 F | WEIGHT: 203 LBS | HEART RATE: 100 BPM | HEIGHT: 68 IN | DIASTOLIC BLOOD PRESSURE: 85 MMHG

## 2018-08-30 DIAGNOSIS — L03.116 CELLULITIS OF FOOT WITHOUT TOES, LEFT: ICD-10-CM

## 2018-08-30 DIAGNOSIS — Z51.89 VISIT FOR WOUND CHECK: Primary | ICD-10-CM

## 2018-08-30 PROCEDURE — 99282 EMERGENCY DEPT VISIT SF MDM: CPT

## 2018-08-30 RX ORDER — NAPROXEN 500 MG/1
500 TABLET ORAL 2 TIMES DAILY WITH MEALS
Qty: 20 TABLET | Refills: 0 | Status: SHIPPED | OUTPATIENT
Start: 2018-08-30 | End: 2018-09-18 | Stop reason: HOSPADM

## 2018-08-30 RX ORDER — CEPHALEXIN 500 MG/1
500 CAPSULE ORAL 2 TIMES DAILY
Qty: 20 CAPSULE | Refills: 0 | Status: SHIPPED | OUTPATIENT
Start: 2018-08-30 | End: 2018-08-30

## 2018-08-30 RX ORDER — CEPHALEXIN 500 MG/1
500 CAPSULE ORAL 2 TIMES DAILY
Qty: 20 CAPSULE | Refills: 0 | Status: SHIPPED | OUTPATIENT
Start: 2018-08-30 | End: 2018-09-09

## 2018-08-30 RX ORDER — NAPROXEN 500 MG/1
500 TABLET ORAL 2 TIMES DAILY WITH MEALS
Qty: 20 TABLET | Refills: 0 | Status: SHIPPED | OUTPATIENT
Start: 2018-08-30 | End: 2018-08-30

## 2018-08-30 NOTE — DISCHARGE INSTRUCTIONS
Cellulitis, Ambulatory Care   GENERAL INFORMATION:   Cellulitis  is a skin infection caused by bacteria  Common symptoms include the following:   · Fever    · A red, warm, swollen area on your skin    · Pain when the area is touched    · Bumps or blisters (abscess) that may drain pus    · Bumpy, raised skin that feels like an orange peel  Seek immediate care for the following symptoms:   · An increase in pain, redness, warmth, and size    · Red streaks coming from the infected area    · A thin, gray-brown discharge coming from your infected skin area    · A crackling under your skin when you touch it    · Purple dots or bumps on your skin, or bleeding under your skin    · New swelling and pain in your legs    · Sudden trouble breathing or chest pain  Treatment for cellulitis  may include medicines to treat the bacterial infection or decrease pain  The infection may need to be cleaned out  Damaged, dead, or infected tissue may need to be cut away to help your wound heal   Manage your symptoms:   · Elevate your wound above the level of your heart  as often as you can  This will help decrease swelling and pain  Prop your wound on pillows or blankets to keep it elevated comfortably  · Clean your wound as directed  You may need to wash the wound with soap and water  Look for signs of infection  · Wear pressure stockings as directed  The stockings are tight and put pressure on your legs  This improves blood flow and decreases swelling  Prevent cellulitis:   · Wash your hands often  Use soap and water  Wash your hands after you use the bathroom, change a child's diapers, or sneeze  Wash your hands before you prepare or eat food  Use lotion to prevent dry, cracked skin  · Do not share personal items, such as towels, clothing, and razors  · Clean exercise equipment  with germ-killing  before and after you use it    Follow up with your healthcare provider as directed:  Write down your questions so you remember to ask them during your visits  CARE AGREEMENT:   You have the right to help plan your care  Learn about your health condition and how it may be treated  Discuss treatment options with your caregivers to decide what care you want to receive  You always have the right to refuse treatment  The above information is an  only  It is not intended as medical advice for individual conditions or treatments  Talk to your doctor, nurse or pharmacist before following any medical regimen to see if it is safe and effective for you  © 2014 [address] is for End User's use only and may not be sold, redistributed or otherwise used for commercial purposes  All illustrations and images included in CareNotes® are the copyrighted property of [employer]  or Michael Jara

## 2018-08-30 NOTE — ED PROVIDER NOTES
History  Chief Complaint   Patient presents with    Wound Check     patient had surgery on left ankle on 5/14, and in July notices a small open area distal incision, stalin Veliz on Tuesday was refered to wound care but has not yet got appointment c/o pain area is slightly red     40 y/o female presenting for a wound check to the left ankle  Relays she had hardware placement to the left ankle 5/14  Noticed some wound irritation and opening to the most distal aspect of the incision on the lateral ankle and seen by Orthopedics on 8/28  On no antibiotics, relays she was placed on antibiotics before in the past for the same wound however did not take these  Relays that she notes small amount of swelling however has not had any further drainage over the past few days  Initially had some clear drainage  Notes small amount of redness surrounding the wound  Was supposed to follow up with wound care however has not  Denies fevers, drainage, nausea vomiting, difficulty ambulating  Prior to Admission Medications   Prescriptions Last Dose Informant Patient Reported?  Taking?   aspirin (ECOTRIN) 325 mg EC tablet   No No   Sig: Take 1 tablet (325 mg total) by mouth 2 (two) times a day   hydrOXYzine HCL (ATARAX) 25 mg tablet   No No   Sig: Take 1 tablet (25 mg total) by mouth every 6 (six) hours as needed for itching (rash)   ibuprofen (MOTRIN) 100 mg/5 mL suspension  Self Yes No   Sig: Take 200 mg by mouth every 4 (four) hours as needed for mild pain   nicotine (NICODERM CQ) 21 mg/24 hr TD 24 hr patch   No No   Sig: Place 1 patch on the skin daily   ondansetron (ZOFRAN) 4 mg tablet   No No   Sig: Take 1 tablet (4 mg total) by mouth every 8 (eight) hours as needed for nausea or vomiting   oxyCODONE (ROXICODONE) 5 mg immediate release tablet   No No   Sig: Earliest Fill Date: 5/17/18 1-2 tab by mouth every four to six hours as needed for pain      Facility-Administered Medications: None       Past Medical History: Diagnosis Date    Psychiatric disorder     depression, anxiety       Past Surgical History:   Procedure Laterality Date    [place] OPEN TREATMENT BIMALLEOLAR ANKLE FRACTURE Left 5/14/2018    Procedure: OPEN REDUCTION W/ INTERNAL FIXATION (ORIF) ANKLE;  Surgeon: Oksana Rick DO;  Location: [facility];  Service: Orthopedics    REDUCTION MAMMAPLASTY         Family History   Problem Relation Age of Onset    No Known Problems Father      I have reviewed and agree with the history as documented  Social History   Substance Use Topics    Smoking status: Current Every Day Smoker     Packs/day: 1 00     Types: Cigarettes    Smokeless tobacco: Never Used    Alcohol use Yes      Comment: social        Review of Systems   Constitutional: Negative  HENT: Negative  Eyes: Negative  Respiratory: Negative  Cardiovascular: Negative  Gastrointestinal: Negative  Genitourinary: Negative  Musculoskeletal: Negative  Skin: Positive for wound  Negative for color change, pallor and rash  Neurological: Negative  All other systems reviewed and are negative  Physical Exam  Physical Exam   Constitutional: She is oriented to person, place, and time  She appears well-developed and well-nourished  HENT:   Head: Normocephalic and atraumatic  Eyes: Conjunctivae are normal    Cardiovascular: Normal rate  Pulmonary/Chest: Effort normal    sPO2 is 97% indicating adequate oxygenation  Musculoskeletal:        Feet:    Neurological: She is alert and oriented to person, place, and time  Skin: Skin is warm and dry  Capillary refill takes less than 2 seconds  There is erythema  Nursing note and vitals reviewed        Vital Signs  ED Triage Vitals [08/30/18 1206]   Temperature Pulse Respirations Blood Pressure SpO2   99 4 °F (37 4 °C) 100 16 (!) 148/101 97 %      Temp Source Heart Rate Source Patient Position - Orthostatic VS BP Location FiO2 (%)   Tympanic Monitor Lying Left arm --      Pain Score       8 Vitals:    08/30/18 1206 08/30/18 1257   BP: (!) 148/101 122/85   Pulse: 100    Patient Position - Orthostatic VS: Lying Sitting       Visual Acuity      ED Medications  Medications - No data to display    Diagnostic Studies  Results Reviewed     None                 No orders to display              Procedures  Procedures       Phone Contacts  ED Phone Contact    ED Course                               MDM  Number of Diagnoses or Management Options  Cellulitis of foot without toes, left:   Visit for wound check:   Diagnosis management comments: Will treat for cellulitis  She has not followed up with wound care  Encouraged proper adherence to medication  No abscess collection  Area was demarcated  Informed return for any worsening symptoms otherwise may follow up with wound care and her primary care doctor  Patient verbalizes understanding and agrees with the above assessment and plan  Amount and/or Complexity of Data Reviewed  Review and summarize past medical records: yes  Independent visualization of images, tracings, or specimens: yes      CritCare Time    Disposition  Final diagnoses:   Visit for wound check   Cellulitis of foot without toes, left     Time reflects when diagnosis was documented in both MDM as applicable and the Disposition within this note     Time User Action Codes Description Comment    8/30/2018 12:38 PM Latoya Pang [[id number]] Visit for wound check     8/30/2018 12:38 PM Latoya Pang [[id number]] Cellulitis of foot without toes, left       ED Disposition     ED Disposition Condition Comment    Discharge  Joel Rowe discharge to home/self care      Condition at discharge: Good        Follow-up Information     Follow up With Specialties Details Why [facility] Emergency Department Emergency Medicine Go to If symptoms worsen such as spreading redness, pus like drainage  [address] [facility], [place], [place], [place] [address], DO Family Medicine Schedule an appointment as soon as possible for a visit As needed [address]   [address] Schedule an appointment as soon as possible for a visit  [address] [facility], [place], [place], [address]          Discharge Medication List as of 8/30/2018 12:39 PM      START taking these medications    Details   cephalexin (KEFLEX) 500 mg capsule Take 1 capsule (500 mg total) by mouth 2 (two) times a day for 10 days, Starting Thu 8/30/2018, Until Sun 9/9/2018, Print      naproxen (NAPROSYN) 500 mg tablet Take 1 tablet (500 mg total) by mouth 2 (two) times a day with meals for 10 days, Starting Thu 8/30/2018, Until Sun 9/9/2018, Print         CONTINUE these medications which have NOT CHANGED    Details   aspirin (ECOTRIN) 325 mg EC tablet Take 1 tablet (325 mg total) by mouth 2 (two) times a day, Starting Mon 5/14/2018, Print      hydrOXYzine HCL (ATARAX) 25 mg tablet Take 1 tablet (25 mg total) by mouth every 6 (six) hours as needed for itching (rash), Starting Wed 5/2/2018, Normal      ibuprofen (MOTRIN) 100 mg/5 mL suspension Take 200 mg by mouth every 4 (four) hours as needed for mild pain, Historical Med      nicotine (NICODERM CQ) 21 mg/24 hr TD 24 hr patch Place 1 patch on the skin daily, Starting Thu 5/3/2018, Normal      ondansetron (ZOFRAN) 4 mg tablet Take 1 tablet (4 mg total) by mouth every 8 (eight) hours as needed for nausea or vomiting, Starting Wed 5/2/2018, Normal      oxyCODONE (ROXICODONE) 5 mg immediate release tablet Earliest Fill Date: 5/17/18 1-2 tab by mouth every four to six hours as needed for pain, Print           No discharge procedures on file      ED Provider  Electronically Signed by           Adriana Park PA-C  08/30/18 6645

## 2018-08-31 ENCOUNTER — VBI (OUTPATIENT)
Dept: FAMILY MEDICINE CLINIC | Facility: CLINIC | Age: 43
End: 2018-08-31

## 2018-08-31 NOTE — TELEPHONE ENCOUNTER
Pt was seen in [address] on 8/30/18  CC: Wound check  DX: Wound check: cellulitis of foot without toes left  Left message, informed pt of  on call, office hours and phone number

## 2018-09-12 ENCOUNTER — APPOINTMENT (OUTPATIENT)
Dept: WOUND CARE | Facility: HOSPITAL | Age: 43
End: 2018-09-12
Payer: COMMERCIAL

## 2018-09-12 PROCEDURE — 99213 OFFICE O/P EST LOW 20 MIN: CPT

## 2018-09-14 ENCOUNTER — APPOINTMENT (OUTPATIENT)
Dept: WOUND CARE | Facility: HOSPITAL | Age: 43
End: 2018-09-14
Payer: COMMERCIAL

## 2018-09-14 DIAGNOSIS — S91.002A OPEN WOUND OF LEFT ANKLE, INITIAL ENCOUNTER: Primary | ICD-10-CM

## 2018-09-14 PROCEDURE — 87070 CULTURE OTHR SPECIMN AEROBIC: CPT

## 2018-09-14 PROCEDURE — 87205 SMEAR GRAM STAIN: CPT

## 2018-09-17 LAB
BACTERIA WND AEROBE CULT: NO GROWTH
GRAM STN SPEC: NORMAL
GRAM STN SPEC: NORMAL

## 2018-09-18 ENCOUNTER — OFFICE VISIT (OUTPATIENT)
Dept: OBGYN CLINIC | Facility: CLINIC | Age: 43
End: 2018-09-18

## 2018-09-18 ENCOUNTER — ANNUAL EXAM (OUTPATIENT)
Dept: FAMILY MEDICINE CLINIC | Facility: CLINIC | Age: 43
End: 2018-09-18
Payer: COMMERCIAL

## 2018-09-18 VITALS
WEIGHT: 199 LBS | BODY MASS INDEX: 30.16 KG/M2 | DIASTOLIC BLOOD PRESSURE: 76 MMHG | HEIGHT: 68 IN | HEART RATE: 82 BPM | TEMPERATURE: 99.1 F | OXYGEN SATURATION: 98 % | RESPIRATION RATE: 19 BRPM | SYSTOLIC BLOOD PRESSURE: 118 MMHG

## 2018-09-18 VITALS
HEIGHT: 67 IN | WEIGHT: 199.2 LBS | DIASTOLIC BLOOD PRESSURE: 82 MMHG | SYSTOLIC BLOOD PRESSURE: 123 MMHG | BODY MASS INDEX: 31.27 KG/M2 | HEART RATE: 82 BPM

## 2018-09-18 DIAGNOSIS — T81.89XD DELAYED SURGICAL WOUND HEALING, SUBSEQUENT ENCOUNTER: Primary | ICD-10-CM

## 2018-09-18 DIAGNOSIS — N90.89 CLITORAL IRRITATION: ICD-10-CM

## 2018-09-18 DIAGNOSIS — Z01.419 ENCOUNTER FOR ANNUAL ROUTINE GYNECOLOGICAL EXAMINATION: Primary | ICD-10-CM

## 2018-09-18 DIAGNOSIS — N89.8 VAGINAL DISCHARGE: ICD-10-CM

## 2018-09-18 DIAGNOSIS — Z98.890 STATUS POST ORIF OF FRACTURE OF ANKLE: ICD-10-CM

## 2018-09-18 DIAGNOSIS — N92.0 MENORRHAGIA WITH REGULAR CYCLE: ICD-10-CM

## 2018-09-18 DIAGNOSIS — E66.9 OBESITY, UNSPECIFIED CLASSIFICATION, UNSPECIFIED OBESITY TYPE, UNSPECIFIED WHETHER SERIOUS COMORBIDITY PRESENT: ICD-10-CM

## 2018-09-18 DIAGNOSIS — Z12.4 CERVICAL CANCER SCREENING: ICD-10-CM

## 2018-09-18 DIAGNOSIS — Z71.6 ENCOUNTER FOR SMOKING CESSATION COUNSELING: ICD-10-CM

## 2018-09-18 DIAGNOSIS — Z87.81 STATUS POST ORIF OF FRACTURE OF ANKLE: ICD-10-CM

## 2018-09-18 DIAGNOSIS — Z12.39 BREAST CANCER SCREENING: ICD-10-CM

## 2018-09-18 PROCEDURE — 99396 PREV VISIT EST AGE 40-64: CPT | Performed by: OBSTETRICS & GYNECOLOGY

## 2018-09-18 PROCEDURE — 99213 OFFICE O/P EST LOW 20 MIN: CPT | Performed by: ORTHOPAEDIC SURGERY

## 2018-09-18 RX ORDER — ACETAMINOPHEN 325 MG/1
650 TABLET ORAL EVERY 6 HOURS PRN
COMMUNITY
End: 2018-11-22

## 2018-09-18 RX ORDER — IBUPROFEN 200 MG
TABLET ORAL EVERY 6 HOURS PRN
COMMUNITY
End: 2019-01-03 | Stop reason: ALTCHOICE

## 2018-09-18 RX ORDER — NICOTINE 21 MG/24HR
1 PATCH, TRANSDERMAL 24 HOURS TRANSDERMAL EVERY 24 HOURS
Qty: 28 PATCH | Refills: 3 | Status: SHIPPED | OUTPATIENT
Start: 2018-09-18 | End: 2018-10-30 | Stop reason: ALTCHOICE

## 2018-09-18 NOTE — PROGRESS NOTES
Assessment/Plan:  1  Delayed surgical wound healing, subsequent encounter     2  Status post ORIF of fracture of ankle  Ambulatory referral to Physical Therapy     Patient is here for follow-up regarding her left ankle and delayed surgical wound healing  She states that since her last visit with me she has had intermittent periods of clear drainage however her ankle and wound is completely benign today  There is a small pinhole over the lateral aspect of the ankle in the distal part of the incision that is without erythema drainage and appears dry and nearly completely healed  She states that she has been working with wound care and there continue to follow her  At this point she does not have any local or systemic signs of infection  She states that she has cut down her smoking but she is still smoking and recently initiated a patch to help her with her smoking cessation efforts  Her wound appears to be nearly healed and I will defer the remainder of the care to the wound care services  I have reviewed her wound care culture and it does demonstrate gram-positive cocci however this appears to be a culture taken from her wound and contaminant from skin must be considered  At this point I renewed her prescription for physical therapy and I would like to see her back in 6 weeks time  I advised her that if anything changes regarding her clinical picture of her ankle she is return to the office immediately for my evaluation  Subjective: 15 weeks status post ORIF left ankle, delayed wound healing    Patient ID: Jocelin Lucero is a 41 y o  female  HPI  Patient is here for follow-up regarding the above stated complaints  She states that since her last evaluation with me she has had intermittent periods of drainage from her distal ankle incision over the lateral ankle  She states is clear in nature  She states that she has been to wound care   At this point she states the wound has been dry for the last 2-3 days and without drainage  She has completed her antibiotics as administered by the emergency department prior to her evaluation by wound care  She denies any fever chills  She states her complaint is that she still gets weak and has some pain at the end of a long day of activity indicating that her and/or its is decreased  Review of Systems   Constitutional: Positive for activity change  HENT: Negative  Eyes: Negative  Respiratory: Negative  Cardiovascular: Negative  Gastrointestinal: Negative  Endocrine: Negative  Musculoskeletal: Positive for arthralgias  Skin: Negative  Neurological: Negative  Psychiatric/Behavioral: Negative  Past Medical History:   Diagnosis Date    Psychiatric disorder     depression, anxiety       Past Surgical History:   Procedure Laterality Date    [place] OPEN TREATMENT BIMALLEOLAR ANKLE FRACTURE Left 5/14/2018    Procedure: OPEN REDUCTION W/ INTERNAL FIXATION (ORIF) ANKLE;  Surgeon: Anya Hargrove DO;  [facility]: [facility];  Service: Orthopedics    REDUCTION MAMMAPLASTY         Family History   Problem Relation Age of Onset    No Known Problems Father        Social History     Occupational History    Not on file       Social History Main Topics    Smoking status: Current Every Day Smoker     Packs/day: 1 00     Types: Cigarettes    Smokeless tobacco: Never Used    Alcohol use Yes      Comment: social    Drug use: No    Sexual activity: Yes     Partners: Male         Current Outpatient Prescriptions:     acetaminophen (TYLENOL) 325 mg tablet, Take 650 mg by mouth every 6 (six) hours as needed for mild pain, Disp: , Rfl:     ibuprofen (MOTRIN) 200 mg tablet, Take by mouth every 6 (six) hours as needed for mild pain, Disp: , Rfl:     nicotine (NICODERM CQ) 21 mg/24 hr TD 24 hr patch, Place 1 patch on the skin every 24 hours, Disp: 28 patch, Rfl: 3    Allergies   Allergen Reactions    Toradol [Ketorolac Tromethamine]      GI UPSET    Latex Rash    Ultram [Tramadol] Rash       Objective:  Vitals:    09/18/18 1416   BP: 123/82   Pulse: 82       Body mass index is 31 2 kg/m²  Left Ankle Exam   Swelling: none    Tenderness   The patient is experiencing no tenderness  Other   Erythema: absent  Scars: present  Sensation: normal  Pulse: present    Comments:  Medial ankle incision well-healed, lateral ankle incision well healed proximally with a pinhole in the distal incision that is appeared to be healed over  There is no erythema in this area no drainage in this area in no increased warmth  There is is no pain in her ankle on passive or active range of motion  Ankle stable on examination  Sensation of Saphenous oral superficial peroneal and deep peroneal nerves intact to light touch  No fluctuance appreciated around lateral distal incision and no fluid could be expressed from the small pinhole  This area appears benign      Right Knee Exam     Other   Other tests: no effusion present          Observations   Left Ankle/Foot   Positive for adhesive scar  Right Knee   Negative for effusion  Physical Exam   Constitutional: She is oriented to person, place, and time  She appears well-developed  HENT:   Head: Atraumatic  Eyes: EOM are normal    Neck: Neck supple  Cardiovascular: Normal rate  Pulmonary/Chest: Effort normal    Musculoskeletal:        Right knee: She exhibits no effusion  See orthopedic exam   Neurological: She is alert and oriented to person, place, and time  Skin: Skin is warm and dry  Psychiatric: She has a normal mood and affect  Nursing note and vitals reviewed  Janie JACK    Division of Adult Reconstruction  Department of [facility]

## 2018-09-18 NOTE — PROGRESS NOTES
Walt Thompson is a 41 y o  female who presents for annual well woman exam      GYN:  · Denies vaginal discharge, labial erythema or lesions, dyspareunia  · Menarche at 8  · Menses are very heavy and painful since having kids, they are every 28-30 days, lasting 7 days  Pt states that she goes through a tampon or pad every 2 hours which is becoming more and more difficult to deal with  Associated with significant cramping  · Contraception: None  · Patient is sexually active with 1 partner  · Last pap smear was 6 years ago, Results were normal  · Denies gynecologic surgeries  · Pt also reports sharp pain in the clitoral region occasionally on sex, mostly when clitoris is stimulated  States she is unable to achieve an orgasm due to this and is worried  OB:  ·  female  · Pregnancies were  at term    :  · Denies dysuria, urinary frequency or urgency, hematuria, flank pain, incontinence  Breast:  · Denies breast mass, skin changes, dimpling, reddening, nipple retraction, breast discharge  · Never had a mammogram  · Patient does have a family history of breast in her mother at age 64  General:  · Diet: Eats fruits and vegetables,dairy, meat, drinking adequate amount  · Exercise: Walking every day for 10-20 min  · Work: No  · ETOH use: Socially  · Tobacco use: Smokes 1/2 pack/day  Would like to quit     · Recreational drug use: None        Review of Systems  Constitutional: negative for chills, fatigue, fevers, malaise, night sweats, sweats and weight loss  Respiratory: negative for asthma, cough, dyspnea on exertion, emphysema, pleurisy/chest pain and wheezing  Cardiovascular: negative for chest pain, dyspnea, exertional chest pressure/discomfort, fatigue, irregular heart beat, lower extremity edema and near-syncope  Gastrointestinal: negative for abdominal pain, constipation, diarrhea, dysphagia, jaundice, melena, nausea and vomiting  Genitourinary:negative except for decreased stream, dysuria, frequency, hematuria, hesitancy and urinary incontinence  Integument/breast: negative for breast lump, breast tenderness, nipple discharge, pruritus, rash, skin color change and skin lesion(s)  Neurological: negative for coordination problems, dizziness, gait problems, headaches, tremors and weakness      Objective      /76   Pulse 82   Temp 99 1 °F (37 3 °C)   Resp 19   Ht 5' 8" (1 727 m)   Wt 90 3 kg (199 lb)   LMP 08/23/2018   SpO2 98%   BMI 30 26 kg/m²     General:   alert and oriented, in no acute distress, alert, appears stated age and cooperative   Heart: regular rate and rhythm, S1, S2 normal, no murmur, click, rub or gallop   Lungs: clear to auscultation bilaterally   Abdomen: soft, non-tender, without masses or organomegaly   Vulva: normal   Vagina: normal mucosa   Cervix: no bleeding following Pap, no cervical motion tenderness and no lesions   Uterus: normal size   Adnexa: normal adnexa             Assessment/Plan    1) Routine gyn annual examination: Pap smear performed in office today  Pt given script to get mammogram done  Discussed close monitoring given family hx of breast cancer in pt's mother  Pt counseled on diet and exercise  Smoking cessation counseling given  2) Menorrhagia with regular cycle: Pt told to come back for follow up visit to discuss menorrhagia in detail  At this time we will wait for pap smear results and follow up at upcoming visit  3) Clitoral Pain on [place]: Unclear etiology  Per Dr Bello Brochure, will discuss this in more detail at upcoming visit

## 2018-09-19 ENCOUNTER — APPOINTMENT (OUTPATIENT)
Dept: WOUND CARE | Facility: HOSPITAL | Age: 43
End: 2018-09-19
Payer: COMMERCIAL

## 2018-09-19 PROCEDURE — 99212 OFFICE O/P EST SF 10 MIN: CPT

## 2018-09-21 LAB
BACTERIA GENITAL AEROBE CULT: NORMAL
CYTOLOGIST CVX/VAG CYTO: NORMAL
DX ICD CODE: NORMAL
Lab: NORMAL
OTHER STN SPEC: NORMAL
OTHER STN SPEC: NORMAL
PATH REPORT.FINAL DX SPEC: NORMAL
SL AMB NOTE:: NORMAL
SL AMB SPECIMEN ADEQUACY: NORMAL
SL AMB TEST METHODOLOGY: NORMAL

## 2018-10-03 ENCOUNTER — APPOINTMENT (OUTPATIENT)
Dept: WOUND CARE | Facility: HOSPITAL | Age: 43
End: 2018-10-03
Payer: COMMERCIAL

## 2018-10-03 LAB
ALBUMIN SERPL-MCNC: 4.7 G/DL (ref 3.5–5.5)
ALBUMIN/GLOB SERPL: 1.9 {RATIO} (ref 1.2–2.2)
ALP SERPL-CCNC: 69 IU/L (ref 39–117)
ALT SERPL-CCNC: 8 IU/L (ref 0–32)
AST SERPL-CCNC: 14 IU/L (ref 0–40)
BASOPHILS # BLD AUTO: 0 X10E3/UL (ref 0–0.2)
BASOPHILS NFR BLD AUTO: 0 %
BILIRUB SERPL-MCNC: 0.5 MG/DL (ref 0–1.2)
BUN SERPL-MCNC: 10 MG/DL (ref 6–24)
BUN/CREAT SERPL: 11 (ref 9–23)
CALCIUM SERPL-MCNC: 9.5 MG/DL (ref 8.7–10.2)
CHLORIDE SERPL-SCNC: 101 MMOL/L (ref 96–106)
CHOLEST SERPL-MCNC: 184 MG/DL (ref 100–199)
CO2 SERPL-SCNC: 23 MMOL/L (ref 20–29)
CREAT SERPL-MCNC: 0.89 MG/DL (ref 0.57–1)
EOSINOPHIL # BLD AUTO: 0.1 X10E3/UL (ref 0–0.4)
EOSINOPHIL NFR BLD AUTO: 3 %
ERYTHROCYTE [DISTWIDTH] IN BLOOD BY AUTOMATED COUNT: 14.2 % (ref 12.3–15.4)
GLOBULIN SER-MCNC: 2.5 G/DL (ref 1.5–4.5)
GLUCOSE SERPL-MCNC: 93 MG/DL (ref 65–99)
HCT VFR BLD AUTO: 36.3 % (ref 34–46.6)
HDLC SERPL-MCNC: 60 MG/DL
HGB BLD-MCNC: 12.4 G/DL (ref 11.1–15.9)
IMM GRANULOCYTES # BLD: 0 X10E3/UL (ref 0–0.1)
IMM GRANULOCYTES NFR BLD: 0 %
LDLC SERPL CALC-MCNC: 108 MG/DL (ref 0–99)
LDLC/HDLC SERPL: 1.8 RATIO (ref 0–3.2)
LYMPHOCYTES # BLD AUTO: 1.7 X10E3/UL (ref 0.7–3.1)
LYMPHOCYTES NFR BLD AUTO: 30 %
MCH RBC QN AUTO: 29.4 PG (ref 26.6–33)
MCHC RBC AUTO-ENTMCNC: 34.2 G/DL (ref 31.5–35.7)
MCV RBC AUTO: 86 FL (ref 79–97)
MONOCYTES # BLD AUTO: 0.4 X10E3/UL (ref 0.1–0.9)
MONOCYTES NFR BLD AUTO: 7 %
NEUTROPHILS # BLD AUTO: 3.3 X10E3/UL (ref 1.4–7)
NEUTROPHILS NFR BLD AUTO: 60 %
PLATELET # BLD AUTO: 259 X10E3/UL (ref 150–379)
POTASSIUM SERPL-SCNC: 4.1 MMOL/L (ref 3.5–5.2)
PROT SERPL-MCNC: 7.2 G/DL (ref 6–8.5)
RBC # BLD AUTO: 4.22 X10E6/UL (ref 3.77–5.28)
SL AMB EGFR AFRICAN AMERICAN: 93 ML/MIN/1.73
SL AMB EGFR NON AFRICAN AMERICAN: 81 ML/MIN/1.73
SL AMB VLDL CHOLESTEROL CALC: 16 MG/DL (ref 5–40)
SODIUM SERPL-SCNC: 139 MMOL/L (ref 134–144)
TRIGL SERPL-MCNC: 82 MG/DL (ref 0–149)
TSH SERPL DL<=0.005 MIU/L-ACNC: 1.97 UIU/ML (ref 0.45–4.5)
WBC # BLD AUTO: 5.5 X10E3/UL (ref 3.4–10.8)

## 2018-10-03 PROCEDURE — 99212 OFFICE O/P EST SF 10 MIN: CPT

## 2018-10-05 ENCOUNTER — OFFICE VISIT (OUTPATIENT)
Dept: FAMILY MEDICINE CLINIC | Facility: CLINIC | Age: 43
End: 2018-10-05
Payer: COMMERCIAL

## 2018-10-05 VITALS
RESPIRATION RATE: 18 BRPM | HEART RATE: 84 BPM | DIASTOLIC BLOOD PRESSURE: 80 MMHG | BODY MASS INDEX: 30.38 KG/M2 | TEMPERATURE: 97.8 F | SYSTOLIC BLOOD PRESSURE: 122 MMHG | OXYGEN SATURATION: 98 % | WEIGHT: 194 LBS

## 2018-10-05 DIAGNOSIS — D17.23 LIPOMA OF RIGHT LOWER EXTREMITY: Primary | ICD-10-CM

## 2018-10-05 PROCEDURE — 99213 OFFICE O/P EST LOW 20 MIN: CPT | Performed by: FAMILY MEDICINE

## 2018-10-05 NOTE — PATIENT INSTRUCTIONS
The patient's lab work in Pap results were reviewed with the patient  All her lab results and Pap were within normal limits  The patient has most likely a lipoma in the posterior right calf  She was referred to Dr Cedric Lu for evaluation of the lump/lipoma in her right calf

## 2018-10-05 NOTE — PROGRESS NOTES
Assessment/Plan:    No problem-specific Assessment & Plan notes found for this encounter  Diagnoses and all orders for this visit:    Lipoma of right lower extremity  -     Ambulatory referral to General Surgery; Future        Subjective:      Patient ID: Kai Torres is a 41 y o  female  This is a 61-year-old white female who was seen several weeks ago for well physical exam   At that time she had a Pap and blood work done  Patient is requesting results from these tests  The patient was given a referral for a lump in her right calf in May 2018  She did not keep the appointment with the surgeon and is requesting a new referral   She denies any other new complaints  The following portions of the patient's history were reviewed and updated as appropriate: allergies, current medications, past family history, past medical history, past social history, past surgical history and problem list     Review of Systems   HENT: Negative  Eyes: Negative  Respiratory: Negative  Cardiovascular: Negative  Gastrointestinal: Negative  Endocrine: Negative  Musculoskeletal: Negative  Skin:        Palpable lump in the posterior right calf  Allergic/Immunologic: Negative  Neurological: Negative  Hematological: Negative  Psychiatric/Behavioral: Negative  All other systems reviewed and are negative  Objective:      /80   Pulse 84   Temp 97 8 °F (36 6 °C)   Resp 18   Wt 88 kg (194 lb)   SpO2 98%   BMI 30 38 kg/m²          Physical Exam   Constitutional: She is oriented to person, place, and time  The patient is mildly obese with a BMI of 30 38   Cardiovascular: Normal rate and regular rhythm  Pulmonary/Chest: Breath sounds normal    Musculoskeletal: Normal range of motion  Neurological: She is alert and oriented to person, place, and time     Skin:   Patient has a feeding movable approximately 1 cm lump in her posterior right calf   Psychiatric: She has a normal mood and affect  Her behavior is normal  Judgment and thought content normal    Vitals reviewed

## 2018-10-08 ENCOUNTER — OFFICE VISIT (OUTPATIENT)
Dept: SURGERY | Facility: CLINIC | Age: 43
End: 2018-10-08
Payer: COMMERCIAL

## 2018-10-08 VITALS
HEIGHT: 67 IN | SYSTOLIC BLOOD PRESSURE: 122 MMHG | HEART RATE: 85 BPM | TEMPERATURE: 98.3 F | DIASTOLIC BLOOD PRESSURE: 87 MMHG | WEIGHT: 195 LBS | BODY MASS INDEX: 30.61 KG/M2

## 2018-10-08 DIAGNOSIS — D17.24 LIPOMA OF LEFT LOWER EXTREMITY: ICD-10-CM

## 2018-10-08 DIAGNOSIS — D17.23 LIPOMA OF RIGHT LOWER EXTREMITY: ICD-10-CM

## 2018-10-08 DIAGNOSIS — Z01.818 PRE-OPERATIVE CLEARANCE: Primary | ICD-10-CM

## 2018-10-08 PROCEDURE — 99205 OFFICE O/P NEW HI 60 MIN: CPT | Performed by: SURGERY

## 2018-10-08 RX ORDER — SODIUM CHLORIDE, SODIUM LACTATE, POTASSIUM CHLORIDE, CALCIUM CHLORIDE 600; 310; 30; 20 MG/100ML; MG/100ML; MG/100ML; MG/100ML
125 INJECTION, SOLUTION INTRAVENOUS CONTINUOUS
Status: CANCELLED | OUTPATIENT
Start: 2018-10-08

## 2018-10-08 NOTE — PROGRESS NOTES
[address] [place] [address] [facility] History and Physical Note:    Admitting Physician: Prince Marc Hamilton MD    Chief Complaint:  Subcutaneous lesion of left upper leg and right calf    HPI:  47-year-old female with history of lipomas in the past with 1 in the left upper leg which does not really bother her at this point, but that she feels upon deep palpation from time to time  The other is in her right calf and is a bit more sizable and gives her some dull to sharp pains whenever she ambulates for prolonged periods of time  The second 1 has been there for several years but has started to grow in the past year and has started to become uncomfortable  Patient denies any history of night sweats or weight loss that is unintentional     PMH:  Past Medical History:   Diagnosis Date    Psychiatric disorder     depression, anxiety       PSH:  Past Surgical History:   Procedure Laterality Date    NE OPEN TREATMENT BIMALLEOLAR ANKLE FRACTURE Left 5/14/2018    Procedure: OPEN REDUCTION W/ INTERNAL FIXATION (ORIF) ANKLE;  Surgeon: Chetan Madrigal DO;  Location: [address] [place] [address];  Service: Orthopedics    REDUCTION MAMMAPLASTY          Home Meds:  Current Outpatient Prescriptions on File Prior to Visit   Medication Sig Dispense Refill    nicotine (NICODERM CQ) 21 mg/24 hr TD 24 hr patch Place 1 patch on the skin every 24 hours 28 patch 3    acetaminophen (TYLENOL) 325 mg tablet Take 650 mg by mouth every 6 (six) hours as needed for mild pain      ibuprofen (MOTRIN) 200 mg tablet Take by mouth every 6 (six) hours as needed for mild pain       No current facility-administered medications on file prior to visit  Allergies:   Allergies   Allergen Reactions    Toradol [Ketorolac Tromethamine]      GI UPSET    Latex Rash    Ultram [Tramadol] Rash       Social Hx:  Social History     Social History    Marital status: Single     Spouse name: N/A    Number of children: N/A    Years of education: N/A     Occupational History    Not on file  Social History Main Topics    Smoking status: Current Every Day Smoker     Packs/day: 1 00     Types: Cigarettes    Smokeless tobacco: Never Used    Alcohol use Yes      Comment: social    Drug use: No    Sexual activity: Yes     Partners: Male     Other Topics Concern    Not on file     Social History Narrative    No narrative on file        Family Hx:    Family History   Problem Relation Age of Onset    No Known Problems Father      A  Bleeding diatheses:  Denies  B  Thrombophilias:  Denies    Review of Systems:  1  Constitutional: No fevers or chills  2  Eyes: No blurry vision  3  ENT: No rhinorrhea, sore throat  4  CV: No chest pain, No PENA  5  Respiratory: No shortness of breath  6  Gastrointestinal:  No abdominal issue  7  Genitourinary: No hematuria, no dysuria  8  MSK:  Otherwise as per HPI  9  Integumentary: No rashes  10  Neurological: No weakness or numbness  11  Psychiatric: No depression, no suicidal ideation  12  Endocrine: No polyuria  13  Hematologic/Lymphatic: No easy bruising, no self-reported lymphadenopathy  14  Immunologic: No recent infections    Physical Examination:  1  Constitutional:   Vitals:    10/08/18 1108   BP: 122/87   Pulse: 85   Temp: 98 3 °F (36 8 °C)     2  GEN: NAD  3  Eyes: PERRL  4  ENT: MMM  5  Neck: Supple  6  CV: RRR  7  Pulm: CTAB  8  Chest: No deformities  9  Abdomen/GI:  Soft, nondistended  10  MSK: FROM, left upper leg with a 1 x 1 cm small, well-circumscribed lesion that is mobile  Right lower leg posteriorly in the calf with a larger 4 cm x 4 cm, well-circumscribed lesion that is mobile  No punctum is seen for either of these lesions  11  Skin: Warm and dry  12  Neurologic: CN II-XI intact  13  Psychiatric: Appropriate    Pertinent labs reviewed    Pertinent images and available reads personally reviewed    Pertinent notes reviewed    Assessment and Plan:   We will plan for an excision of the subcutaneous lesions of the left upper leg and the right calf  Patient does not have any cardiac issues and therefore does not need any particular cardiac clearance  Patient has stopped smoking, this is day 1  She is on a nicotine patch  She is nervous about having the lesions removed this upcoming Friday, therefore she requested 2 weeks time to prepare mentally and emotionally, which I think is fine  We went over all the risks, benefits, and alternatives  I did express to her that there is about a 1% chance of these returning has a liposarcoma and in particular the 1 in the calf which has grown in the past year, if suspicious, I would place clips just in case the pathology would return as such  I did reassure her once again that there was only a 1% chance that this would occur, but that we will work with her should that happen  Portions of the record may have been created with voice recognition software  Occasional wrong word or "sound a like" substitutions may have occurred due to the inherent limitations of voice recognition software  Read the chart carefully and recognize, using context, where substitutions have occurred  [facility]  [phone]

## 2018-10-12 ENCOUNTER — APPOINTMENT (OUTPATIENT)
Dept: WOUND CARE | Facility: HOSPITAL | Age: 43
End: 2018-10-12
Payer: COMMERCIAL

## 2018-10-12 ENCOUNTER — HOSPITAL ENCOUNTER (OUTPATIENT)
Dept: RADIOLOGY | Facility: HOSPITAL | Age: 43
Discharge: HOME | End: 2018-10-12
Attending: SPECIALIST
Payer: COMMERCIAL

## 2018-10-12 ENCOUNTER — TRANSCRIBE ORDERS (OUTPATIENT)
Dept: ADMINISTRATIVE | Facility: HOSPITAL | Age: 43
End: 2018-10-12

## 2018-10-12 DIAGNOSIS — T81.31XA DEHISCENCE OF OPERATIVE WOUND, INITIAL ENCOUNTER: Primary | ICD-10-CM

## 2018-10-12 PROCEDURE — 73610 X-RAY EXAM OF ANKLE: CPT

## 2018-10-12 PROCEDURE — 99213 OFFICE O/P EST LOW 20 MIN: CPT

## 2018-10-13 ENCOUNTER — HOSPITAL ENCOUNTER (OUTPATIENT)
Dept: RADIOLOGY | Facility: HOSPITAL | Age: 43
Discharge: HOME | End: 2018-10-13
Payer: COMMERCIAL

## 2018-10-13 DIAGNOSIS — Z12.39 BREAST CANCER SCREENING: ICD-10-CM

## 2018-10-13 PROCEDURE — 77067 SCR MAMMO BI INCL CAD: CPT

## 2018-10-19 ENCOUNTER — TELEPHONE (OUTPATIENT)
Dept: SURGERY | Facility: CLINIC | Age: 43
End: 2018-10-19

## 2018-10-19 NOTE — TELEPHONE ENCOUNTER
Received a call from [facility] at [place] stating patient did not show for appointment this morning  Grzegorz Zambrano has made several attempts to contact patient and wanted to advise our office  I told her to let me know if patient calls her back  Patient needs PAT done before 10/23/18 for surgery on 10/24/18  I will follow-up with patient on 10/22/18 as well

## 2018-10-22 NOTE — TELEPHONE ENCOUNTER
Called home number no answer  Spoke to Silas who stated she will try patient again today        I will make Dr Martha Barber aware  Karrie Nissen

## 2018-10-24 ENCOUNTER — APPOINTMENT (OUTPATIENT)
Dept: WOUND CARE | Facility: HOSPITAL | Age: 43
End: 2018-10-24
Payer: COMMERCIAL

## 2018-10-24 PROCEDURE — 97597 DBRDMT OPN WND 1ST 20 CM/<: CPT

## 2018-10-30 ENCOUNTER — OFFICE VISIT (OUTPATIENT)
Dept: FAMILY MEDICINE CLINIC | Facility: CLINIC | Age: 43
End: 2018-10-30
Payer: COMMERCIAL

## 2018-10-30 ENCOUNTER — OFFICE VISIT (OUTPATIENT)
Dept: OBGYN CLINIC | Facility: CLINIC | Age: 43
End: 2018-10-30
Payer: COMMERCIAL

## 2018-10-30 VITALS
DIASTOLIC BLOOD PRESSURE: 82 MMHG | BODY MASS INDEX: 29.19 KG/M2 | SYSTOLIC BLOOD PRESSURE: 114 MMHG | HEIGHT: 67 IN | HEART RATE: 88 BPM | WEIGHT: 186 LBS

## 2018-10-30 VITALS
WEIGHT: 185 LBS | OXYGEN SATURATION: 97 % | HEART RATE: 88 BPM | DIASTOLIC BLOOD PRESSURE: 80 MMHG | TEMPERATURE: 98.4 F | BODY MASS INDEX: 29.41 KG/M2 | SYSTOLIC BLOOD PRESSURE: 130 MMHG | RESPIRATION RATE: 18 BRPM

## 2018-10-30 DIAGNOSIS — T81.89XD DELAYED SURGICAL WOUND HEALING, SUBSEQUENT ENCOUNTER: ICD-10-CM

## 2018-10-30 DIAGNOSIS — Z87.81 STATUS POST ORIF OF FRACTURE OF ANKLE: Primary | ICD-10-CM

## 2018-10-30 DIAGNOSIS — Z72.0 TOBACCO ABUSE DISORDER: ICD-10-CM

## 2018-10-30 DIAGNOSIS — Z23 NEED FOR TDAP VACCINATION: Primary | ICD-10-CM

## 2018-10-30 DIAGNOSIS — Z98.890 STATUS POST ORIF OF FRACTURE OF ANKLE: Primary | ICD-10-CM

## 2018-10-30 DIAGNOSIS — J40 BRONCHITIS: ICD-10-CM

## 2018-10-30 DIAGNOSIS — S82.852A TRIMALLEOLAR FRACTURE OF ANKLE, CLOSED, LEFT, INITIAL ENCOUNTER: ICD-10-CM

## 2018-10-30 PROCEDURE — 90715 TDAP VACCINE 7 YRS/> IM: CPT | Performed by: FAMILY MEDICINE

## 2018-10-30 PROCEDURE — 90471 IMMUNIZATION ADMIN: CPT | Performed by: FAMILY MEDICINE

## 2018-10-30 PROCEDURE — 99406 BEHAV CHNG SMOKING 3-10 MIN: CPT | Performed by: FAMILY MEDICINE

## 2018-10-30 PROCEDURE — 99213 OFFICE O/P EST LOW 20 MIN: CPT | Performed by: ORTHOPAEDIC SURGERY

## 2018-10-30 PROCEDURE — 99214 OFFICE O/P EST MOD 30 MIN: CPT | Performed by: FAMILY MEDICINE

## 2018-10-30 RX ORDER — ALBUTEROL SULFATE 90 UG/1
2 AEROSOL, METERED RESPIRATORY (INHALATION) EVERY 6 HOURS PRN
Qty: 8.5 G | Refills: 0 | Status: SHIPPED | OUTPATIENT
Start: 2018-10-30 | End: 2019-01-22

## 2018-10-30 RX ORDER — ASENAPINE MALEATE 5 MG/1
TABLET SUBLINGUAL
Refills: 0 | COMMUNITY
Start: 2018-10-24 | End: 2018-11-11

## 2018-10-30 RX ORDER — CLONAZEPAM 0.5 MG/1
0.5 TABLET ORAL 2 TIMES DAILY PRN
Refills: 0 | COMMUNITY
Start: 2018-10-20 | End: 2022-03-24

## 2018-10-30 NOTE — PROGRESS NOTES
I have identified this patient to be Marda Merlin,  -1975  Concerns: Patient c/o congested nose, sinuses and throat and malaise for 3 days  No fever, Chest pain, shortness of breath  No pus phlegm  No foreign travel or unusual exposures    Smokes 1/2 to 1 ppd  Patient Active Problem List   Diagnosis    Anxiety and depression    Obesity    Trimalleolar fracture of ankle, closed, left, initial encounter    Mass of soft tissue of right lower extremity    Closed fracture of left ankle    Status post ORIF of fracture of ankle    Delayed surgical wound healing    Lipoma of right lower extremity    Lipoma of left lower extremity         Exam:   Alert, No acute distress  /80   Pulse 88   Temp 98 4 °F (36 9 °C)   Resp 18   Wt 83 9 kg (185 lb)   SpO2 97%   BMI 29 41 kg/m²   Nose: congested  Throat: congested  Tympanic membranes: mild fluid  Neck supple, no concerning nodes  Lungs clear to auscultation  Cor: Regular rate   Skin: warm and dry    Impression:  Viral URI/bronchitis  Rest, fluids  Healthy plant based whole foods diet, avoid excess sugars & processed food  Natural support reviewed  Report high fever, severe malaise or any other concerning symptoms  Follow up as needed    Discussed smoking cessation at length  Start daily medication Headspace amna to develop better stress management skills  Will set a quit date later  1  Need for Tdap vaccination    - TDAP VACCINE GREATER THAN OR EQUAL TO 6YO IM    2  Bronchitis  Supportive care  - albuterol (PROAIR HFA) 90 mcg/act inhaler; Inhale 2 puffs every 6 (six) hours as needed for wheezing  Dispense: 8 5 g; Refill: 0    3  Tobacco abuse disorder  Cut down  Better stress mgmt  - Spirometry pre and post bronchodilator;  Future    F/u 1 month

## 2018-10-30 NOTE — PATIENT INSTRUCTIONS
NATURAL SUPPORT FOR  VIRAL RESPIRATORY INFECTIONS    Viral respiratory infections are common and usually run a benign, self-limited course  They often settle in the chest as bronchitis lasting about 3 weeks or in the sinuses as a sinusitis, lasting ~ 10 days or less  In non-smoking, basically health adults and children these are usually viral and do not respond to antibiotics  What you can do to help your body heal:   The most important self-care is to get plenty of rest: dont work if you are tired  Do your best to avoid excess stress and responsibilities  Eat a prudent diet with plenty of fresh vegetables  Garlic and onions have some anti-viral properties, though it is not clear that they help treat systemic viruses  Avoid eating lots of sugar or refined carbohydrates (starches) such as white flour products  Drink adequate but not excess  fluids (e g  water, herbal teas containing horehound, slippery elm or marshmallow such as Throat-Coat by [employer])  Ensure adequate Vitamin D3 nutrition  See Vitamin D handout for details  If you maintain a body-mind integration practice such as Yoga, Vicente Chi or Meditation, now is a good time to practice it as your energy level allows  Re-balancing emotional energy can sometimes help jeff off or shorten an infection  Flush out mucous with  nasal irrigation cup 3 - 5X/day  Possible supplements for adults (unless specifically indicated for children), (efficacy unproven for most - see handouts for more complete information):  Vitamin C:  500 mg two to four times a day  (for children age 3 to 15, give half this dose)  If diarrhea or stomach upset is a problem, try an enteric coated formulation or lower the dose  Added bioflavanoids may also be helpful  Garlic: Supplement with 776 mg of allicin may reduce colds  Zinc lozenges: Zinc acetate or gluconate Follow the directions on the bottle    Do not use longer than 3 to 5 days, since excess zinc can be toxic and interfere with other nutrient absorption  There have been a few reports of loss of sense of smell with zinc nasal formulations, but whether this was actually the cause is unknown  Zinc at the RDA can be used for several months to reduce colds (mixed results in studies)  [place] (Pelargonium sidoides) ([address]) has significant scientific support for reducing cough  One brand is EPs 7630® solution ([facility]): Adults: Thirty drops (4 5mL total over three doses) three times daily for 7-14 days  Children can take 20 drops 3 times daily for 7 days for colds  Avoid if allergic, h/o angioedema, blood disorders or on blood thinners (Um[place] contains coumarins)  Caution if asthma, liver or heart problems  Buckwheat honey can reduce cough  Trey Robins for children but do not use under 1 yr  old  Probiotics: Lactobacillus acidophilus alone or in combinatin with Bifidobacterium animalis taken by healthy children during winter appeared to reduce colds  [place] is a combo of Echinacea, propolis and Vit  C decreases number of colds, duration and days missed from school  [place] (Andrographis paniculata) 200 mg po twice a day  Umcka Coldcare (Pelargonium sidoides - geranium) extract 30 drops three times daily, alcohol root extract for 10 days     Frequently asked questions:   Q:  My phlegm is green-yellow sometimes  Doesnt this indicate I have a bacterial infection that would respond to an antibiotic? A:  No it doesnt necessarily  Yellow-green phlegm is simply a sign of inflammation, which can occur with both bacterial and viral infections  Q:  Wont an antibiotic at least help a little? Why cant I take an antibiotic to help prevent the illness from turning into a bacterial infection? A:  Antibiotics have zero effect on viruses    Prescribing antibiotics just in case to prevent the illness from turning into a bacterial infection is not routinely warranted and exposes the patient to possible side effects, increased expense and drives increased bacterial resistance to antibiotics  The Center for Disease Control strongly recommends against doing so  Q: My cold moved into my chest and now I am coughing a lot  Doesnt this mean I now have developed bronchitis & need antibiotics? A:  Viral respiratory infections often will lead to this pattern of later chest congestion & cough, and do not need antibiotics in the vast majority of healthy adults  Sometimes the cough will drag on for a few weeks (post-viral cough)  If you have a persistent fever, shortness of breath, you smoke or have underlying lung disease, have a suppressed immune system, have pus-laden phlegm or have been exposed to severe infectious agents or feel unusually sick, be sure to get re-evaluated by your physician  Q: Which over-the counter (OTC) medications are important to take for a cold? A: No cold remedy changes the natural course of viral respiratory infections, and there is only scant evidence that they even help suppress symptoms such as cough  Q: How do I treat cough? Are expectorants helpful? A:  There is no evidence that expectorants are effective, e g  Guaifenesin - (some brands are Mucinex, Robitussin Chest Congestion), so dont use them in general  Cough is a natural mechanism by which the lungs clean house, and therefore there is little reason to interfere with this process  If the cough is not doing its job of producing phlegm and is just secondary to irritation and preventing you from rest, then it may make sense to treat it  Children, especially those under 10years old, in general should avoid OTC cold preparations because of infrequent but possibly severe side effects  Overall, there is little effective medication to help cough   Some medications that might work (be sure to follow directions & precautions on bottle, get ok from [address] first if pregnant or breastfeeding):  Dextromethorphan might suppress cough in adults (various brands, e g  Delsym)  Ineffective in children, so should not be used in them  Various natural cough remedies with combinations of Menthol, Wild cherry, Slippery elm, Horehound, Dark Honey, especially buckwheat, etc may also soothe coughs  [place]  as listed above  See separate handout for details  Q  When is it necessary to see a doctor if one has a cold or bronchitis? A: Generally you can take care of this problem fine by yourself  However, if the symptoms are unusually severe, you believe you have the flu (usually much more severe than a cold), you have wheezing or paroxysms of cough attacks, you smoke, you have an underlying illness that makes you more susceptible to infections & their complications, or your symptoms take a turn for the worse (such as a sudden new fever several days into the illness) or they persist beyond 3 weeks, then be sure to see your doctor      Revised 2/12/16 Dylan Tirado MD, Tyrell Stark, [facility] Certified in [facility], [facility]

## 2018-10-30 NOTE — PROGRESS NOTES
Assessment/Plan:  1  Status post ORIF of fracture of ankle     2  Trimalleolar fracture of ankle, closed, left, initial encounter     3  Delayed surgical wound healing, subsequent encounter       The patient is now approximately 5 months status post ORIF of a trimalleolar fracture of her left ankle  She has been compliant with wound care instructions as she is attempting to get the pinhole over the distal aspect of her lateral incision to heal   There is fibrous tissue here and it appears benign in nature without any localized signs of infection  She has had scheduling issues with physical therapy and at this point I do not feel that she needs to continue to pursue this because her range of motion is painless and she is ambulating better and normal shoe wear  I will defer the remainder of her wound care strategy to the wound care team at [facility]   I will see her back in 2 months time to follow-up on wound healing  Subjective:   Patient is here 5 months status post ORIF of her left ankle trimalleolar fracture  Patient ID: Aj Bui is a 41 y o  female  HPI  Patient is here for follow-up regarding the above stated complaints  She states that she does not have significant pain in her left ankle  She states that she has had some scheduling issues with physical therapy and has not been able to be on a regular schedule with them however she does state that she does not have pain with ambulation in normal shoe wear and her gait is improving  She has been compliant with wound care strategies and goes weekly for evaluation and treatment  She denies paresthesias  Review of Systems   Constitutional: Positive for activity change  HENT: Negative  Eyes: Negative  Respiratory: Negative  Cardiovascular: Negative  Gastrointestinal: Negative  Endocrine: Negative  Musculoskeletal: Negative for arthralgias and joint swelling  Skin: Negative  Neurological: Negative  Psychiatric/Behavioral: Negative  Past Medical History:   Diagnosis Date    Psychiatric disorder     depression, anxiety       Past Surgical History:   Procedure Laterality Date    [place] OPEN TREATMENT BIMALLEOLAR ANKLE FRACTURE Left 5/14/2018    Procedure: OPEN REDUCTION W/ INTERNAL FIXATION (ORIF) ANKLE;  Surgeon: Ruy Cole DO;  Location: [facility];  Service: Orthopedics    REDUCTION MAMMAPLASTY         Family History   Problem Relation Age of Onset    No Known Problems Father        Social History     Occupational History    Not on file  Social History Main Topics    Smoking status: Current Every Day Smoker     Packs/day: 0 50     Types: Cigarettes    Smokeless tobacco: Never Used    Alcohol use Yes      Comment: social    Drug use: No    Sexual activity: Yes     Partners: Male         Current Outpatient Prescriptions:     acetaminophen (TYLENOL) 325 mg tablet, Take 650 mg by mouth every 6 (six) hours as needed for mild pain, Disp: , Rfl:     clonazePAM (KlonoPIN) 0 5 mg tablet, Take 0 5 mg by mouth daily at bedtime, Disp: , Rfl: 0    ibuprofen (MOTRIN) 200 mg tablet, Take by mouth every 6 (six) hours as needed for mild pain, Disp: , Rfl:     SAPHRIS SL tablet, take 1 tablet under the tongue at bedtime, Disp: , Rfl: 0    nicotine (NICODERM CQ) 21 mg/24 hr TD 24 hr patch, Place 1 patch on the skin every 24 hours (Patient not taking: Reported on 10/30/2018 ), Disp: 28 patch, Rfl: 3    Allergies   Allergen Reactions    Toradol [Ketorolac Tromethamine]      GI UPSET    Latex Rash    Ultram [Tramadol] Rash       Objective:  Vitals:    10/30/18 1220   BP: 114/82   Pulse: 88       Body mass index is 29 57 kg/m²  Left Ankle Exam   Swelling: none    Tenderness   The patient is experiencing no tenderness       Range of Motion   Dorsiflexion: 10   Plantar flexion: 30     Muscle Strength   Dorsiflexion:  5/5   Plantar flexion:  5/5   Anterior tibial:  5/5   Posterior tibial: 5/5  Gastrocsoleus:  5/5  Peroneal muscle:  5/5    Other   Erythema: absent  Scars: present  Sensation: normal  Pulse: present    Comments:  Medial ankle surgical scar well healed  Lateral surgical scar well healed with small area at distal extent representing a pinhole of delayed closure  No erythema or purulent drainage  Area of delayed closure Less than 1 mm in diameter  Foot neurovascularly intact ankle stable on exam      Right Knee Exam     Other   Other tests: no effusion present          Observations   Left Ankle/Foot   Positive for adhesive scar  Right Knee   Negative for effusion  Strength/Myotome Testing     Left Ankle/Foot   Dorsiflexion: 5  Plantar flexion: 5      Physical Exam   Constitutional: She is oriented to person, place, and time  She appears well-developed  HENT:   Head: Atraumatic  Eyes: EOM are normal    Neck: Neck supple  Cardiovascular: Normal rate  Pulmonary/Chest: Effort normal    Musculoskeletal:        Right knee: She exhibits no effusion  See orthopedic exam   Neurological: She is alert and oriented to person, place, and time  Skin: Skin is warm and dry  Psychiatric: She has a normal mood and affect  Nursing note and vitals reviewed  X-rays of the left ankle obtained on 10/12/2018 were reviewed in PACS  These demonstrate stable fixation of a healed trimalleolar left ankle fracture  There is a butterfly fragment in the posterior aspect of the fibula that is without union however major fracture component appears to be healed with bridging mature callus and cortices  No sign of hardware failure  Ankle mortise is symmetric    Ion JACK    Division of Adult Reconstruction  Department of [facility]

## 2018-10-31 ENCOUNTER — APPOINTMENT (OUTPATIENT)
Dept: WOUND CARE | Facility: HOSPITAL | Age: 43
End: 2018-10-31
Payer: COMMERCIAL

## 2018-10-31 PROCEDURE — 99213 OFFICE O/P EST LOW 20 MIN: CPT

## 2018-11-09 ENCOUNTER — APPOINTMENT (OUTPATIENT)
Dept: WOUND CARE | Facility: HOSPITAL | Age: 43
End: 2018-11-09
Payer: COMMERCIAL

## 2018-11-09 PROCEDURE — 97597 DBRDMT OPN WND 1ST 20 CM/<: CPT

## 2018-11-11 ENCOUNTER — HOSPITAL ENCOUNTER (EMERGENCY)
Facility: HOSPITAL | Age: 43
Discharge: HOME | End: 2018-11-11
Attending: EMERGENCY MEDICINE | Admitting: EMERGENCY MEDICINE
Payer: COMMERCIAL

## 2018-11-11 VITALS
DIASTOLIC BLOOD PRESSURE: 74 MMHG | WEIGHT: 185 LBS | TEMPERATURE: 99.6 F | HEART RATE: 79 BPM | RESPIRATION RATE: 18 BRPM | OXYGEN SATURATION: 99 % | BODY MASS INDEX: 29.41 KG/M2 | SYSTOLIC BLOOD PRESSURE: 115 MMHG

## 2018-11-11 DIAGNOSIS — L08.9 WOUND INFECTION: Primary | ICD-10-CM

## 2018-11-11 DIAGNOSIS — T14.8XXA WOUND INFECTION: Primary | ICD-10-CM

## 2018-11-11 LAB
BASOPHILS # BLD AUTO: 0.04 THOUSANDS/ΜL (ref 0–0.1)
BASOPHILS NFR BLD AUTO: 1 % (ref 0–1)
EOSINOPHIL # BLD AUTO: 0.12 THOUSAND/ΜL (ref 0–0.61)
EOSINOPHIL NFR BLD AUTO: 2 % (ref 0–6)
ERYTHROCYTE [DISTWIDTH] IN BLOOD BY AUTOMATED COUNT: 13.2 % (ref 11.6–15.1)
HCT VFR BLD AUTO: 35.6 % (ref 34.8–46.1)
HGB BLD-MCNC: 11.5 G/DL (ref 11.5–15.4)
IMM GRANULOCYTES # BLD AUTO: 0 THOUSAND/UL (ref 0–0.2)
IMM GRANULOCYTES NFR BLD AUTO: 0 % (ref 0–2)
LYMPHOCYTES # BLD AUTO: 1.8 THOUSANDS/ΜL (ref 0.6–4.47)
LYMPHOCYTES NFR BLD AUTO: 35 % (ref 14–44)
MCH RBC QN AUTO: 28.4 PG (ref 26.8–34.3)
MCHC RBC AUTO-ENTMCNC: 32.3 G/DL (ref 31.4–37.4)
MCV RBC AUTO: 88 FL (ref 82–98)
MONOCYTES # BLD AUTO: 0.49 THOUSAND/ΜL (ref 0.17–1.22)
MONOCYTES NFR BLD AUTO: 10 % (ref 4–12)
NEUTROPHILS # BLD AUTO: 2.72 THOUSANDS/ΜL (ref 1.85–7.62)
NEUTS SEG NFR BLD AUTO: 52 % (ref 43–75)
NRBC BLD AUTO-RTO: 0 /100 WBCS
PLATELET # BLD AUTO: 228 THOUSANDS/UL (ref 149–390)
PMV BLD AUTO: 10.6 FL (ref 8.9–12.7)
RBC # BLD AUTO: 4.05 MILLION/UL (ref 3.81–5.12)
WBC # BLD AUTO: 5.17 THOUSAND/UL (ref 4.31–10.16)

## 2018-11-11 PROCEDURE — 36415 COLL VENOUS BLD VENIPUNCTURE: CPT | Performed by: EMERGENCY MEDICINE

## 2018-11-11 PROCEDURE — 99283 EMERGENCY DEPT VISIT LOW MDM: CPT

## 2018-11-11 PROCEDURE — 85025 COMPLETE CBC W/AUTO DIFF WBC: CPT | Performed by: EMERGENCY MEDICINE

## 2018-11-11 RX ORDER — HYDROCODONE BITARTRATE AND ACETAMINOPHEN 5; 325 MG/1; MG/1
1 TABLET ORAL EVERY 8 HOURS PRN
Qty: 10 TABLET | Refills: 0 | Status: SHIPPED | OUTPATIENT
Start: 2018-11-11 | End: 2018-11-14

## 2018-11-11 RX ORDER — CLINDAMYCIN HYDROCHLORIDE 150 MG/1
300 CAPSULE ORAL ONCE
Status: COMPLETED | OUTPATIENT
Start: 2018-11-11 | End: 2018-11-11

## 2018-11-11 RX ORDER — HYDROCODONE BITARTRATE AND ACETAMINOPHEN 5; 325 MG/1; MG/1
1 TABLET ORAL ONCE
Status: COMPLETED | OUTPATIENT
Start: 2018-11-11 | End: 2018-11-11

## 2018-11-11 RX ORDER — CLINDAMYCIN HYDROCHLORIDE 300 MG/1
300 CAPSULE ORAL 3 TIMES DAILY
Qty: 21 CAPSULE | Refills: 0 | Status: SHIPPED | OUTPATIENT
Start: 2018-11-11 | End: 2018-11-18

## 2018-11-11 RX ADMIN — CLINDAMYCIN HYDROCHLORIDE 300 MG: 150 CAPSULE ORAL at 11:50

## 2018-11-11 RX ADMIN — HYDROCODONE BITARTRATE AND ACETAMINOPHEN 1 TABLET: 5; 325 TABLET ORAL at 11:51

## 2018-11-11 NOTE — DISCHARGE INSTRUCTIONS
Acute Wounds   WHAT YOU NEED TO KNOW:   An acute wound is an injury that causes a break in the skin  As your wound begins to heal, it is normal to have some swelling, pain, and redness  Your body's immune system is working to keep your wound from getting infected  Your wound may develop a scab  The scab protects your wound as it heals  DISCHARGE INSTRUCTIONS:   Call 911 for the following:   · You suddenly have trouble breathing or have chest pain  Return to the emergency department if:   · Blood soaks through your bandage  · You have pus or a foul odor coming from the wound  · Your stitches come apart or your wound reopens  Contact your healthcare provider if:   · You continue to have pain even after you have taken pain medicine  · You have muscle, joint, or body aches, sweating, or a fever  · You have increased swelling, redness, or bleeding in your wound  · Your skin is itchy, swollen, or you have a rash  · You have questions or concerns about your condition or care  Medicines: You may need any of the following:  · Antibiotics  may be given to prevent or treat an infection  · Prescription pain medicine  may be given  Ask your how to take this medicine safely  · NSAIDs , such as ibuprofen, help decrease swelling, pain, and fever  NSAIDs can cause stomach bleeding or kidney problems in certain people  If you take blood thinner medicine, always ask if NSAIDs are safe for you  Always read the medicine label and follow directions  Do not give these medicines to children under 10months of age without direction from your child's healthcare provider  · Take your medicine as directed  Contact your healthcare provider if you think your medicine is not helping or if you have side effects  Tell him or her if you are allergic to any medicine  Keep a list of the medicines, vitamins, and herbs you take  Include the amounts, and when and why you take them   Bring the list or the pill bottles to follow-up visits  Carry your medicine list with you in case of an emergency  Care for your wound as directed:  Acute wounds can be in different locations and caused by different injuries  Follow your healthcare provider's instructions on caring for your type of wound  The following care items are for most wounds:  · Keep your wound covered with a clean and dry bandage  Change your bandage if it becomes wet or dirty  This will decrease the risk for infection in your wound  Follow your healthcare provider's instructions for changing your dressing  · Do not soak in a tub or swim  until your healthcare provider says it is okay  Your wound may open if you get it too wet  Dirt from the water can also get into your wound and cause an infection  · Keep pets away from your wound  Pets carry germs that can cause a wound infection  · Do not pick or scratch scabs  Let scabs fall off on their own  You may damage new skin that is forming under the scab  You may have a worse scar after the damage  · Eat healthy foods and drink liquids as directed  Healthy foods give your body the nutrients it needs to heal your wound  Liquids prevent dehydration that can decrease the blood supply to your wound  Healthy foods include fruits, vegetables, grains (breads and cereals), dairy, and protein foods  Protein foods include meat, fish, nuts, and soy products  Protein, calories, vitamin C, and zinc help wounds heal  Ask your healthcare provider for more information about the foods you should eat to improve healing  Follow up with your healthcare provider as directed:  Write down your questions so you remember to ask them during your visits  © 2017 [employer] Information is for End User's use only and may not be sold, redistributed or otherwise used for commercial purposes   All illustrations and images included in CareNotes® are the copyrighted property of [employer] M [employer] Inc  or [employer]  The above information is an  only  It is not intended as medical advice for individual conditions or treatments  Talk to your doctor, nurse or pharmacist before following any medical regimen to see if it is safe and effective for you

## 2018-11-11 NOTE — ED PROVIDER NOTES
History  Chief Complaint   Patient presents with    Wound Infection     States she has been going to wound center for 6 weeks for a left ankle wound  Noted a change in drainage 2 days ago at wound center  42 y/o female presents with drainage from a chronic wound on her left ankle that has now since 2 days changed color and is foul smelling and greenish yellow  History of trimalleolar fracture s/p ORIF with delayed healing  Follows up with wound care center  Denies fevers,vomiting or other symptoms  Has more pain than normal          History provided by:  Patient   used: No        Prior to Admission Medications   Prescriptions Last Dose Informant Patient Reported? Taking?   acetaminophen (TYLENOL) 325 mg tablet More than a month at Unknown time Self Yes No   Sig: Take 650 mg by mouth every 6 (six) hours as needed for mild pain   albuterol (PROAIR HFA) 90 mcg/act inhaler Past Month at Unknown time  No Yes   Sig: Inhale 2 puffs every 6 (six) hours as needed for wheezing   clonazePAM (KlonoPIN) 0 5 mg tablet 11/10/2018 at Unknown time  Yes Yes   Sig: Take 0 5 mg by mouth daily at bedtime   ibuprofen (MOTRIN) 200 mg tablet More than a month at Unknown time Self Yes No   Sig: Take by mouth every 6 (six) hours as needed for mild pain      Facility-Administered Medications: None       Past Medical History:   Diagnosis Date    History of wound infection     left ankle    Psychiatric disorder     depression, anxiety       Past Surgical History:   Procedure Laterality Date    MS OPEN TREATMENT BIMALLEOLAR ANKLE FRACTURE Left 5/14/2018    Procedure: OPEN REDUCTION W/ INTERNAL FIXATION (ORIF) ANKLE;  Surgeon: My Rice DO;  [facility];  Service: Orthopedics    REDUCTION MAMMAPLASTY         Family History   Problem Relation Age of Onset    No Known Problems Father      I have reviewed and agree with the history as documented      Social History   Substance Use Topics    Smoking status: Current Every Day Smoker     Packs/day: 0 50     Types: Cigarettes    Smokeless tobacco: Never Used    Alcohol use Yes      Comment: social        Review of Systems   All other systems reviewed and are negative  Physical Exam  Physical Exam   Constitutional: She is oriented to person, place, and time  She appears well-developed and well-nourished  HENT:   Head: Normocephalic and atraumatic  Eyes: Pupils are equal, round, and reactive to light  EOM are normal    Neck: Normal range of motion  Neck supple  Cardiovascular: Normal rate and regular rhythm  Pulmonary/Chest: Effort normal and breath sounds normal    Abdominal: Soft  Bowel sounds are normal    Musculoskeletal: Normal range of motion  Left ankle: small opening, with minimal purulent drainage, no erythema, edema no fluctuance, abscess noted  Tenderness along the lateral malleolus   Neurological: She is alert and oriented to person, place, and time  Skin: Skin is warm and dry  Psychiatric: She has a normal mood and affect  Nursing note and vitals reviewed        Vital Signs  ED Triage Vitals [11/11/18 1111]   Temperature Pulse Respirations Blood Pressure SpO2   99 6 °F (37 6 °C) 79 18 115/74 99 %      Temp Source Heart Rate Source Patient Position - Orthostatic VS BP Location FiO2 (%)   Tympanic Monitor Lying Right arm --      Pain Score       7           Vitals:    11/11/18 1111   BP: 115/74   Pulse: 79   Patient Position - Orthostatic VS: Lying       Visual Acuity      ED Medications  Medications   clindamycin (CLEOCIN) capsule 300 mg (300 mg Oral Given 11/11/18 1150)   HYDROcodone-acetaminophen (NORCO) 5-325 mg per tablet 1 tablet (1 tablet Oral Given 11/11/18 1151)       Diagnostic Studies  Results Reviewed     Procedure Component Value Units Date/Time    CBC and differential [[id number]] Collected:  11/11/18 1142    Lab Status:  Final result Specimen:  Blood from Hand, Left Updated:  11/11/18 1149     WBC 5 17 Thousand/uL RBC 4 05 Million/uL      Hemoglobin 11 5 g/dL      Hematocrit 35 6 %      MCV 88 fL      MCH 28 4 pg      MCHC 32 3 g/dL      RDW 13 2 %      MPV 10 6 fL      Platelets 833 Thousands/uL      nRBC 0 /100 WBCs      Neutrophils Relative 52 %      Immat GRANS % 0 %      Lymphocytes Relative 35 %      Monocytes Relative 10 %      Eosinophils Relative 2 %      Basophils Relative 1 %      Neutrophils Absolute 2 72 Thousands/µL      Immature Grans Absolute 0 00 Thousand/uL      Lymphocytes Absolute 1 80 Thousands/µL      Monocytes Absolute 0 49 Thousand/µL      Eosinophils Absolute 0 12 Thousand/µL      Basophils Absolute 0 04 Thousands/µL                  No orders to display              Procedures  Procedures       Phone Contacts  ED Phone Contact    ED Course                               MDM  Number of Diagnoses or Management Options  Diagnosis management comments: Patient evaluated with labs and I reviewed the results and discussed with the patient  Given PO antibiotics, pain medication  Discharged with follow up with wound care, appropriate instructions and medications and patient verbalized understanding and had no further questions at the time of discharge  Patient well appearing and had stable vital signs at discharge  Amount and/or Complexity of Data Reviewed  Clinical lab tests: ordered and reviewed  Tests in the medicine section of CPT®: ordered and reviewed    Patient Progress  Patient progress: stable    CritCare Time    Disposition  Final diagnoses:   Wound infection     Time reflects when diagnosis was documented in both MDM as applicable and the Disposition within this note     Time User Action Codes Description Comment    11/11/2018 11:55 AM Ruslan Rodrigues Add [T14  8XXA,  L08 9] Wound infection       ED Disposition     ED Disposition Condition Comment    Discharge  Nuria Gambino discharge to home/self care      Condition at discharge: Stable        Follow-up Information     Follow up With Specialties Details Why Contact Info Additional Information    [address] Schedule an appointment as soon as possible for a visit  [address] [address] [phone] [facility], [place], [place], [address] [facility] Emergency Department Emergency Medicine  If symptoms worsen; continue with the regular wound care,  [address] [facility] ED, [place], [place], [address]          Patient's Medications   Discharge Prescriptions    CLINDAMYCIN (CLEOCIN) 300 MG CAPSULE    Take 1 capsule (300 mg total) by mouth 3 (three) times a day for 7 days       Start Date: 11/11/2018End Date: 11/18/2018       Order Dose: 300 mg       Quantity: 21 capsule    Refills: 0    HYDROCODONE-ACETAMINOPHEN (NORCO) 5-325 MG PER TABLET    Take 1 tablet by mouth every 8 (eight) hours as needed for pain for up to 3 days Max Daily Amount: 3 tablets       Start Date: 11/11/2018End Date: 11/14/2018       Order Dose: 1 tablet       Quantity: 10 tablet    Refills: 0     No discharge procedures on file      ED Provider  Electronically Signed by           Jesusita Ordoñez DO  11/11/18 2308

## 2018-11-14 ENCOUNTER — VBI (OUTPATIENT)
Dept: FAMILY MEDICINE CLINIC | Facility: CLINIC | Age: 43
End: 2018-11-14

## 2018-11-14 NOTE — TELEPHONE ENCOUNTER
Pt was seen in [address] on 11/11/18  CC: Wound Infection  DX: Wound infection   Left message, informed of dr on call, office hours and phone number

## 2018-11-18 ENCOUNTER — HOSPITAL ENCOUNTER (EMERGENCY)
Facility: HOSPITAL | Age: 43
Discharge: HOME | End: 2018-11-18
Attending: EMERGENCY MEDICINE | Admitting: EMERGENCY MEDICINE
Payer: COMMERCIAL

## 2018-11-18 VITALS
SYSTOLIC BLOOD PRESSURE: 112 MMHG | HEART RATE: 91 BPM | RESPIRATION RATE: 18 BRPM | TEMPERATURE: 98.1 F | DIASTOLIC BLOOD PRESSURE: 73 MMHG | OXYGEN SATURATION: 98 %

## 2018-11-18 DIAGNOSIS — N76.0 VAGINITIS: Primary | ICD-10-CM

## 2018-11-18 PROCEDURE — 99283 EMERGENCY DEPT VISIT LOW MDM: CPT

## 2018-11-18 RX ORDER — CLOTRIMAZOLE 1 %
1 CREAM WITH APPLICATOR VAGINAL
Qty: 45 G | Refills: 0 | Status: SHIPPED | OUTPATIENT
Start: 2018-11-18 | End: 2018-11-22

## 2018-11-18 RX ORDER — FLUCONAZOLE 150 MG/1
150 TABLET ORAL ONCE
Status: COMPLETED | OUTPATIENT
Start: 2018-11-18 | End: 2018-11-18

## 2018-11-18 RX ADMIN — FLUCONAZOLE 150 MG: 150 TABLET ORAL at 09:00

## 2018-11-18 NOTE — ED PROVIDER NOTES
History  Chief Complaint   Patient presents with    Vaginal Itching     Pt on antibiotics since last friday  Now has vaginal itching, discharge, and irritation     Patient presents for vaginal irritation and itching, no discharge or foul odor  Patient was started on abx last Friday for wound on left ankle  Has had yeast infeciton in the past and this feels similar  History provided by:  Patient   used: No    Vaginal Itching       Prior to Admission Medications   Prescriptions Last Dose Informant Patient Reported? Taking?   acetaminophen (TYLENOL) 325 mg tablet  Self Yes No   Sig: Take 650 mg by mouth every 6 (six) hours as needed for mild pain   albuterol (PROAIR HFA) 90 mcg/act inhaler   No No   Sig: Inhale 2 puffs every 6 (six) hours as needed for wheezing   clindamycin (CLEOCIN) 300 MG capsule   No No   Sig: Take 1 capsule (300 mg total) by mouth 3 (three) times a day for 7 days   clonazePAM (KlonoPIN) 0 5 mg tablet   Yes No   Sig: Take 0 5 mg by mouth daily at bedtime   ibuprofen (MOTRIN) 200 mg tablet  Self Yes No   Sig: Take by mouth every 6 (six) hours as needed for mild pain      Facility-Administered Medications: None       Past Medical History:   Diagnosis Date    History of wound infection     left ankle    Psychiatric disorder     depression, anxiety       Past Surgical History:   Procedure Laterality Date    NM OPEN TREATMENT BIMALLEOLAR ANKLE FRACTURE Left 5/14/2018    Procedure: OPEN REDUCTION W/ INTERNAL FIXATION (ORIF) ANKLE;  Surgeon: Marielena Wells DO;  [facility];  Service: Orthopedics    REDUCTION MAMMAPLASTY         Family History   Problem Relation Age of Onset    No Known Problems Father      I have reviewed and agree with the history as documented      Social History   Substance Use Topics    Smoking status: Current Every Day Smoker     Packs/day: 0 50     Types: Cigarettes    Smokeless tobacco: Never Used    Alcohol use Yes      Comment: social        Review of Systems   Genitourinary: Positive for vaginal pain  All other systems reviewed and are negative  Physical Exam  Physical Exam   Constitutional: No distress  Cardiovascular: Normal rate and regular rhythm  Pulmonary/Chest: Effort normal and breath sounds normal  No respiratory distress  Abdominal: Soft  There is no tenderness  Genitourinary:   Genitourinary Comments: deferred    Skin: Capillary refill takes less than 2 seconds  She is not diaphoretic  Nursing note and vitals reviewed        Vital Signs  ED Triage Vitals [11/18/18 0810]   Temperature Pulse Respirations Blood Pressure SpO2   98 1 °F (36 7 °C) 91 18 112/73 98 %      Temp src Heart Rate Source Patient Position - Orthostatic VS BP Location FiO2 (%)   -- Monitor Sitting Right arm --      Pain Score       No Pain           Vitals:    11/18/18 0810   BP: 112/73   Pulse: 91   Patient Position - Orthostatic VS: Sitting       Visual Acuity      ED Medications  Medications   fluconazole (DIFLUCAN) tablet 150 mg (150 mg Oral Given 11/18/18 0900)       Diagnostic Studies  Results Reviewed     None                 No orders to display              Procedures  Procedures       Phone Contacts  ED Phone Contact    ED Course                               MDM  Number of Diagnoses or Management Options  Vaginitis:   Diagnosis management comments: Pulse ox 98% on RA indicating adequate oxygenation       Amount and/or Complexity of Data Reviewed  Decide to obtain previous medical records or to obtain history from someone other than the patient: yes  Review and summarize past medical records: yes    Patient Progress  Patient progress: stable    CritCare Time    Disposition  Final diagnoses:   Vaginitis     Time reflects when diagnosis was documented in both MDM as applicable and the Disposition within this note     Time User Action Codes Description Comment    11/18/2018  8:33 AM Devi Olguin Add [N76 0] Vaginitis       ED Disposition     ED Disposition Condition Comment    Discharge  Jossy Mcneil discharge to home/self care  Condition at discharge: stable        Follow-up Information     Follow up With Specialties Details Why [address], [address], Internal Medicine In 1 week  [address]   [phone]            Discharge Medication List as of 11/18/2018  8:36 AM      START taking these medications    Details   clotrimazole (GYNE-LOTRIMIN) 1 % vaginal cream Insert 1 applicator into the vagina daily at bedtime for 7 days, Starting Sun 11/18/2018, Until Sun 11/25/2018, Normal         CONTINUE these medications which have NOT CHANGED    Details   acetaminophen (TYLENOL) 325 mg tablet Take 650 mg by mouth every 6 (six) hours as needed for mild pain, Historical Med      albuterol (PROAIR HFA) 90 mcg/act inhaler Inhale 2 puffs every 6 (six) hours as needed for wheezing, Starting Tue 10/30/2018, Normal      clindamycin (CLEOCIN) 300 MG capsule Take 1 capsule (300 mg total) by mouth 3 (three) times a day for 7 days, Starting Sun 11/11/2018, Until Sun 11/18/2018, Print      clonazePAM (KlonoPIN) 0 5 mg tablet Take 0 5 mg by mouth daily at bedtime, Starting Sat 10/20/2018, Historical Med      ibuprofen (MOTRIN) 200 mg tablet Take by mouth every 6 (six) hours as needed for mild pain, Historical Med           No discharge procedures on file      ED Provider  Electronically Signed by           Mario Cedeno,   11/18/18 9152

## 2018-11-18 NOTE — DISCHARGE INSTRUCTIONS
Vaginitis   WHAT YOU NEED TO KNOW:   What is vaginitis? Vaginitis is an inflammation or infection of the vagina  What causes vaginitis? Vaginitis is usually caused by bacteria, a virus, or a fungus  The chemicals in bubble baths, soaps, and perfumes can also cause vaginitis  A foreign body inside the vagina can also cause vaginitis  The infection may spread through sexual activity  It can also pass to a baby during birth  What increases my risk for vaginitis? · Diabetes mellitus that is not controlled    · Antibiotics, such as those used to treat fungal vaginitis     · Weakened immune system    · High estrogen levels, such as during pregnancy or from birth control pills    · Smoking    · New or multiple sex partners     · Sexual abuse    · Incorrect care of vagina, such as not wiping from front to back    · Use of spermicide or douche  What are the signs and symptoms of vaginitis? · Tenderness, itching, redness, and swelling     · Foul-smelling odor     · Thick, curd-like discharge     · Thin, gray-white discharge    · Small skin tears or chafing    · Painful sexual intercourse    · Pain when you urinate  How is vaginitis diagnosed? Your healthcare provider will ask about your signs and symptoms and examine you  A sample of discharge from your vagina will be tested for infection  How is vaginitis treated? · Antifungals  are used to treat a fungal infection  They may be given as a cream, gel, or tablet you insert into your vagina  · Antibiotics  are used to fight an infection caused by bacteria  What are the risks of vaginitis? Your infection may return  Left untreated, the bacteria or virus can spread  This can damage organs, such as your fallopian tubes  The infection can spread to your sexual partner if you do not have safe sex  The infection may lead to pregnancy complications, such as  birth or pelvic inflammatory disease  How can I manage my vaginitis?    · Wash your vagina  with mild soap and warm water each day  Gently dry the area after washing  · Do not douche  or insert other irritating products into your vagina  · Do not wear  tight-fitting clothes or undergarments  These can make your symptoms worse  · Do not have sex until your symptoms go away  When you have sex, always use a condom  Condoms can help protect you from contact with fluids from your partner that may be causing your vaginitis  How can I prevent vaginitis? · Wipe from front to back  after you urinate  · Do not use irritating products such as bubble baths or perfumed soaps  When should I contact my healthcare provider? · You have a fever  · You have abdominal pain  · Your symptoms get worse, even after treatment  · Your symptoms return  · You have questions or concerns about your condition or care  When should I seek immediate care? · You have unusual vaginal bleeding  · You have severe abdominal pain  CARE AGREEMENT:   You have the right to help plan your care  Learn about your health condition and how it may be treated  Discuss treatment options with your caregivers to decide what care you want to receive  You always have the right to refuse treatment  The above information is an  only  It is not intended as medical advice for individual conditions or treatments  Talk to your doctor, nurse or pharmacist before following any medical regimen to see if it is safe and effective for you  © 2017 [employer] Information is for End User's use only and may not be sold, redistributed or otherwise used for commercial purposes  All illustrations and images included in CareNotes® are the copyrighted property of A [employer] A M [employer]  or [employer]

## 2018-11-21 ENCOUNTER — VBI (OUTPATIENT)
Dept: FAMILY MEDICINE CLINIC | Facility: CLINIC | Age: 43
End: 2018-11-21

## 2018-11-21 ENCOUNTER — APPOINTMENT (OUTPATIENT)
Dept: WOUND CARE | Facility: HOSPITAL | Age: 43
End: 2018-11-21
Payer: COMMERCIAL

## 2018-11-21 PROCEDURE — 99212 OFFICE O/P EST SF 10 MIN: CPT

## 2018-11-22 ENCOUNTER — HOSPITAL ENCOUNTER (EMERGENCY)
Facility: HOSPITAL | Age: 43
Discharge: HOME | End: 2018-11-22
Attending: EMERGENCY MEDICINE
Payer: COMMERCIAL

## 2018-11-22 VITALS
SYSTOLIC BLOOD PRESSURE: 129 MMHG | BODY MASS INDEX: 29.19 KG/M2 | HEART RATE: 92 BPM | TEMPERATURE: 97.6 F | HEIGHT: 67 IN | RESPIRATION RATE: 18 BRPM | DIASTOLIC BLOOD PRESSURE: 78 MMHG | OXYGEN SATURATION: 100 % | WEIGHT: 186 LBS

## 2018-11-22 DIAGNOSIS — M54.30 SCIATICA: Primary | ICD-10-CM

## 2018-11-22 PROCEDURE — 99283 EMERGENCY DEPT VISIT LOW MDM: CPT

## 2018-11-22 RX ORDER — PREDNISONE 20 MG/1
40 TABLET ORAL DAILY
Qty: 8 TABLET | Refills: 0 | Status: SHIPPED | OUTPATIENT
Start: 2018-11-22 | End: 2018-11-26

## 2018-11-22 RX ORDER — ARIPIPRAZOLE 5 MG/1
10 TABLET ORAL DAILY
COMMUNITY
End: 2019-01-31 | Stop reason: SDUPTHER

## 2018-11-22 RX ORDER — PREDNISONE 20 MG/1
60 TABLET ORAL ONCE
Status: COMPLETED | OUTPATIENT
Start: 2018-11-22 | End: 2018-11-22

## 2018-11-22 RX ORDER — NAPROXEN 500 MG/1
500 TABLET ORAL 2 TIMES DAILY WITH MEALS
Qty: 20 TABLET | Refills: 0 | Status: SHIPPED | OUTPATIENT
Start: 2018-11-22 | End: 2019-01-03 | Stop reason: ALTCHOICE

## 2018-11-22 RX ORDER — CYCLOBENZAPRINE HCL 10 MG
10 TABLET ORAL ONCE
Status: COMPLETED | OUTPATIENT
Start: 2018-11-22 | End: 2018-11-22

## 2018-11-22 RX ORDER — CYCLOBENZAPRINE HCL 10 MG
10 TABLET ORAL 3 TIMES DAILY PRN
Qty: 9 TABLET | Refills: 0 | Status: SHIPPED | OUTPATIENT
Start: 2018-11-22 | End: 2019-01-31 | Stop reason: ALTCHOICE

## 2018-11-22 RX ADMIN — PREDNISONE 60 MG: 20 TABLET ORAL at 10:12

## 2018-11-22 RX ADMIN — CYCLOBENZAPRINE HYDROCHLORIDE 10 MG: 10 TABLET, FILM COATED ORAL at 10:12

## 2018-11-22 NOTE — DISCHARGE INSTRUCTIONS
Sciatica   WHAT YOU NEED TO KNOW:   Sciatica is a condition that causes pain along your sciatic nerve  The sciatic nerve runs from your spine through both sides of your buttocks  It then runs down the back of your thigh, into your lower leg and foot  Your sciatic nerve may be compressed, inflamed, irritated, or stretched  DISCHARGE INSTRUCTIONS:   Medicines:   · NSAIDs:  These medicines decrease swelling and pain  NSAIDs are available without a doctor's order  Ask your healthcare provider which medicine is right for you  Ask how much to take and when to take it  Take as directed  NSAIDs can cause stomach bleeding or kidney problems if not taken correctly  · Acetaminophen: This medicine decreases pain  Acetaminophen is available without a doctor's order  Ask how much to take and when to take it  Follow directions  Acetaminophen can cause liver damage if not taken correctly  · Muscle relaxers  help decrease pain and muscle spasms  · Take your medicine as directed  Contact your healthcare provider if you think your medicine is not helping or if you have side effects  Tell him of her if you are allergic to any medicine  Keep a list of the medicines, vitamins, and herbs you take  Include the amounts, and when and why you take them  Bring the list or the pill bottles to follow-up visits  Carry your medicine list with you in case of an emergency  Follow up with your healthcare provider as directed:  Write down your questions so you remember to ask them during your visits  Manage your symptoms:   · Activity:  Decrease your activity  Do not lift heavy objects or twist your back for at least 6 weeks  Slowly return to your usual activity  · Ice:  Ice helps decrease swelling and pain  Ice may also help prevent tissue damage  Use an ice pack, or put crushed ice in a plastic bag  Cover it with a towel and place it on your low back or leg for 15 to 20 minutes every hour or as directed      · Heat:  Heat helps decrease pain and muscle spasms  Apply heat on the area for 20 to 30 minutes every 2 hours for as many days as directed  · Physical therapy:  You may need to see physical therapist to teach you exercises to help improve movement and strength, and to decrease pain  An occupational therapist teaches you skills to help with your daily activities  · Use assistive devices if directed: You may need to wear back support, such as a back brace  You may need crutches, a cane, or a walker to decrease stress on your lower back and leg muscles  Ask your healthcare provider for more information about assistive devices and how to use them correctly  Self-care:   · Avoid pressure on your back and legs:  Do not  lift heavy objects, or stand or sit for long periods of time  · Lift objects safely:  Keep your back straight and bend your knees when you  an object  Do not bend or twist your back when you lift  · Maintain a healthy weight:  Ask your healthcare provider how much you should weigh  Ask him to help you create a weight loss plan if you are overweight  · Exercise:  Ask your healthcare provider about the best stretching, warmup, and exercise plan for you  Contact your healthcare provider if:   · You have pain in your lower back at night or when resting  · You have pain in your lower back with numbness below the knee  · You have weakness in one leg only  · You have questions or concerns about your condition or care  Return to the emergency department if:   · You have trouble holding back your urine or bowel movements  · You have weakness in both legs  · You have numbness in your groin or buttocks  © 2017 [employer] Information is for End User's use only and may not be sold, redistributed or otherwise used for commercial purposes  All illustrations and images included in CareNotes® are the copyrighted property of [employer]  or Michael J[employer]    The above information is an  only  It is not intended as medical advice for individual conditions or treatments  Talk to your doctor, nurse or pharmacist before following any medical regimen to see if it is safe and effective for you

## 2018-11-22 NOTE — ED PROVIDER NOTES
History  Chief Complaint   Patient presents with    Back Pain     patient c/o right lower back pain radiating down right leg  73-year-old female presenting today with right-sided lower back pain that radiates into the posterior right thigh since last evening  Patient has not had any injuries or falls  Has not been performing heavy lifting  States that this feels very similar to her prior sciatica attacks  Has been taking Motrin with minimal relief  Pain is 6/10  Denies numbness, paresthesias, saddle anesthesia, changes in urination, abdominal or groin pain  Prior to Admission Medications   Prescriptions Last Dose Informant Patient Reported? Taking? ARIPiprazole (ABILIFY) 5 mg tablet   Yes Yes   Sig: Take 5 mg by mouth daily   albuterol (PROAIR HFA) 90 mcg/act inhaler   No No   Sig: Inhale 2 puffs every 6 (six) hours as needed for wheezing   clonazePAM (KlonoPIN) 0 5 mg tablet   Yes Yes   Sig: Take 0 5 mg by mouth daily at bedtime   ibuprofen (MOTRIN) 200 mg tablet 11/22/2018 at 0600 Self Yes Yes   Sig: Take by mouth every 6 (six) hours as needed for mild pain      Facility-Administered Medications: None       Past Medical History:   Diagnosis Date    History of wound infection     left ankle    Psychiatric disorder     depression, anxiety       Past Surgical History:   Procedure Laterality Date    [place] OPEN TREATMENT BIMALLEOLAR ANKLE FRACTURE Left 5/14/2018    Procedure: OPEN REDUCTION W/ INTERNAL FIXATION (ORIF) ANKLE;  Surgeon: Lenny Kimbrough DO;  [facility]: [facility];  Service: Orthopedics    REDUCTION MAMMAPLASTY         Family History   Problem Relation Age of Onset    No Known Problems Father      I have reviewed and agree with the history as documented      Social History   Substance Use Topics    Smoking status: Current Every Day Smoker     Packs/day: 0 50     Types: Cigarettes    Smokeless tobacco: Never Used    Alcohol use Yes      Comment: social        Review of Systems Constitutional: Negative  Negative for chills, fever and unexpected weight change  Denies IV drug use     HENT: Negative  Eyes: Negative  Respiratory: Negative  Negative for cough, chest tightness, shortness of breath and wheezing  Cardiovascular: Negative  Negative for chest pain and palpitations  Gastrointestinal: Negative  Negative for abdominal pain, constipation, diarrhea, nausea and vomiting  Genitourinary: Negative  Negative for difficulty urinating, dysuria, flank pain, frequency, hematuria and urgency  Denies numbness, tingling in the groin  Musculoskeletal: Positive for back pain  Negative for arthralgias, gait problem, joint swelling, myalgias, neck pain and neck stiffness  Skin: Negative  Negative for color change  Neurological: Negative  Negative for dizziness, tremors, weakness, light-headedness, numbness and headaches  All other systems reviewed and are negative  Physical Exam  Physical Exam   Constitutional: She is oriented to person, place, and time  She appears well-developed and well-nourished  HENT:   Head: Normocephalic and atraumatic  Nose: Nose normal    Eyes: Conjunctivae are normal    Cardiovascular: Normal rate, regular rhythm, normal heart sounds and intact distal pulses  Exam reveals no gallop and no friction rub  No murmur heard  Pulmonary/Chest: Effort normal and breath sounds normal  No respiratory distress  She has no wheezes  She has no rales  She exhibits no tenderness  S PO2 is 100% indicating adequate oxygenation  Abdominal: Soft  Bowel sounds are normal    Musculoskeletal:        Arms:  Neurological: She is alert and oriented to person, place, and time  Skin: Skin is warm and dry  Capillary refill takes less than 2 seconds  Nursing note and vitals reviewed        Vital Signs  ED Triage Vitals [11/22/18 0934]   Temperature Pulse Respirations Blood Pressure SpO2   97 6 °F (36 4 °C) 92 18 129/78 100 %      Temp Source Heart Rate Source Patient Position - Orthostatic VS BP Location FiO2 (%)   Tympanic Monitor Sitting Right arm --      Pain Score       Worst Possible Pain           Vitals:    11/22/18 0934   BP: 129/78   Pulse: 92   Patient Position - Orthostatic VS: Sitting       Visual Acuity      ED Medications  Medications   predniSONE tablet 60 mg (not administered)   cyclobenzaprine (FLEXERIL) tablet 10 mg (not administered)       Diagnostic Studies  Results Reviewed     None                 No orders to display              Procedures  Procedures       Phone Contacts  ED Phone Contact    ED Course                               MDM  Number of Diagnoses or Management Options  Sciatica:   Diagnosis management comments: Discussed with patient multiple treatment options for sciatica  She is requesting narcotics at this time  I discussed with patient hospital policy  Agrees to anti-inflammatories and muscle relaxants  Informed not to drive or operate heavy machinery while taking  Patient is informed to return to the emergency department for worsening of symptoms and was given proper education regarding their diagnosis and symptoms  Otherwise the patient is informed to follow up with their primary care doctor for re-evaluation  The patient verbalizes understanding and agrees with above assessment and plan  All questions were answered  Please Note: Fluency Direct voice recognition software may have been used in the creation of this document  Wrong words or sound a like substitutions may have occurred due to the inherent limitations of the voice software             Amount and/or Complexity of Data Reviewed  Review and summarize past medical records: yes  Independent visualization of images, tracings, or specimens: yes      CritCare Time    Disposition  Final diagnoses:   Sciatica     Time reflects when diagnosis was documented in both MDM as applicable and the Disposition within this note     Time User Action Codes Description Comment    11/22/2018  9:49 AM Pauline Kawasaki Add [M54 30] Sciatica       ED Disposition     ED Disposition Condition Comment    Discharge  Quinn Grew discharge to home/self care  Condition at discharge: Good        Follow-up Information     Follow up With Specialties Details Why Contact Info Additional [address] Emergency Department Emergency Medicine Go to If symptoms worsen [address] [facility] ED, Kenya Manley, Peter suh, Camille [address], DO Family Medicine, Internal Medicine Schedule an appointment as soon as possible for a visit As needed One [address]  [phone]             Patient's Medications   Discharge Prescriptions    CYCLOBENZAPRINE (FLEXERIL) 10 MG TABLET    Take 1 tablet (10 mg total) by mouth 3 (three) times a day as needed for muscle spasms for up to 3 days       Start Date: 11/22/2018End Date: 11/25/2018       Order Dose: 10 mg       Quantity: 9 tablet    Refills: 0    NAPROXEN (NAPROSYN) 500 MG TABLET    Take 1 tablet (500 mg total) by mouth 2 (two) times a day with meals for 10 days       Start Date: 11/22/2018End Date: 12/2/2018       Order Dose: 500 mg       Quantity: 20 tablet    Refills: 0    PREDNISONE 20 MG TABLET    Take 2 tablets (40 mg total) by mouth daily for 4 days       Start Date: 11/22/2018End Date: 11/26/2018       Order Dose: 40 mg       Quantity: 8 tablet    Refills: 0     No discharge procedures on file      ED Provider  Electronically Signed by           Nahomy Patterson PA-C  11/22/18 0723

## 2018-11-28 ENCOUNTER — VBI (OUTPATIENT)
Dept: FAMILY MEDICINE CLINIC | Facility: CLINIC | Age: 43
End: 2018-11-28

## 2018-11-28 NOTE — TELEPHONE ENCOUNTER
Pt was seen In [facility] on 11/22/18  Back Pain  DX: Sciatica  Left message, informed of dr on call, office hours and phone number

## 2018-12-04 ENCOUNTER — OFFICE VISIT (OUTPATIENT)
Dept: OBGYN CLINIC | Facility: CLINIC | Age: 43
End: 2018-12-04
Payer: COMMERCIAL

## 2018-12-04 VITALS
WEIGHT: 185.6 LBS | SYSTOLIC BLOOD PRESSURE: 126 MMHG | DIASTOLIC BLOOD PRESSURE: 88 MMHG | HEIGHT: 67 IN | HEART RATE: 84 BPM | BODY MASS INDEX: 29.13 KG/M2

## 2018-12-04 DIAGNOSIS — S82.852A TRIMALLEOLAR FRACTURE OF ANKLE, CLOSED, LEFT, INITIAL ENCOUNTER: ICD-10-CM

## 2018-12-04 DIAGNOSIS — Z87.81 STATUS POST ORIF OF FRACTURE OF ANKLE: Primary | ICD-10-CM

## 2018-12-04 DIAGNOSIS — Z98.890 STATUS POST ORIF OF FRACTURE OF ANKLE: Primary | ICD-10-CM

## 2018-12-04 DIAGNOSIS — T81.89XD DELAYED SURGICAL WOUND HEALING, SUBSEQUENT ENCOUNTER: ICD-10-CM

## 2018-12-04 PROCEDURE — 99213 OFFICE O/P EST LOW 20 MIN: CPT | Performed by: ORTHOPAEDIC SURGERY

## 2018-12-04 RX ORDER — SERTRALINE HYDROCHLORIDE 25 MG/1
50 TABLET, FILM COATED ORAL DAILY
COMMUNITY
End: 2019-01-31 | Stop reason: ALTCHOICE

## 2018-12-04 NOTE — PROGRESS NOTES
Assessment/Plan:  1  Status post ORIF of fracture of ankle     2  Trimalleolar fracture of ankle, closed, left, initial encounter     3  Delayed surgical wound healing, subsequent encounter       Balbina Wang is here for follow-up regarding delayed wound healing of her lateral distal ankle incision from her ORIF of her left ankle that took place in May of 2018  She has been compliant with wound care  She states that there is some mild discomfort around the area of the delayed wound healing however her ankle range of motion does not significantly bother her  She states that she has been compliant with wound care instructions  Despite my a constant recommendations to discontinue her smoking and tobacco use she is still smoking half a pack a day of cigarettes  We discussed the relationship of wound healing and soft tissue health in relation to use of tobacco and cigarettes  At this point I do not appreciate any acute active infection around the area of delayed wound healing and the wound perimeter appears to be healthy  There is no hardware visualized in the area of delayed healing  At this point she should continue her wound care efforts with the wound care clinic at [facility] and return to my office in 6 weeks time  At that next appointment we will get an x-ray of her left ankle to determine fracture healing  We will do this as the hardware may need to be removed due to her delayed wound healing and at that point the area of delayed closure could also be addressed surgically  She is aware that another soft tissue insult and the presence of current heavy tobacco use could result in the same issue as she is dealing with now  I would like to see her back in 6 weeks time with new x-rays of her left ankle upon arrival     Subjective: Follow-up left ankle delayed surgical wound healing    Patient ID: Shiva Dodson is a 41 y o  female      HPI  Patient is here for follow-up regarding the above-stated complaint  She did undergo ORIF of her left ankle in May of 2018  She has been compliant with wound care and their recommendations  She admits to still smoking approximately half a pack a day of cigarettes  She states that she does have some pain around the area of delayed closure but weight-bearing and active range of motion of the ankle are not painful  Review of Systems   Constitutional: Positive for activity change  HENT: Negative  Eyes: Negative  Respiratory: Negative  Cardiovascular: Negative  Gastrointestinal: Negative  Endocrine: Negative  Musculoskeletal: Positive for arthralgias  Skin: Negative  Neurological: Negative  Psychiatric/Behavioral: Negative  Past Medical History:   Diagnosis Date    History of wound infection     left ankle    Psychiatric disorder     depression, anxiety       Past Surgical History:   Procedure Laterality Date    [place] OPEN TREATMENT BIMALLEOLAR ANKLE FRACTURE Left 5/14/2018    Procedure: OPEN REDUCTION W/ INTERNAL FIXATION (ORIF) ANKLE;  Surgeon: Irasema Martinez DO;  [facility]: [facility];  Service: Orthopedics    REDUCTION MAMMAPLASTY         Family History   Problem Relation Age of Onset    No Known Problems Father        Social History     Occupational History    Not on file       Social History Main Topics    Smoking status: Current Every Day Smoker     Packs/day: 0 50     Types: Cigarettes    Smokeless tobacco: Never Used    Alcohol use Yes      Comment: social    Drug use: No    Sexual activity: Yes     Partners: Male         Current Outpatient Prescriptions:     albuterol (PROAIR HFA) 90 mcg/act inhaler, Inhale 2 puffs every 6 (six) hours as needed for wheezing, Disp: 8 5 g, Rfl: 0    ARIPiprazole (ABILIFY) 5 mg tablet, Take 10 mg by mouth daily  , Disp: , Rfl:     clonazePAM (KlonoPIN) 0 5 mg tablet, Take 0 5 mg by mouth daily at bedtime, Disp: , Rfl: 0    ibuprofen (MOTRIN) 200 mg tablet, Take by mouth every 6 (six) hours as needed for mild pain, Disp: , Rfl:     sertraline (ZOLOFT) 25 mg tablet, Take 50 mg by mouth daily, Disp: , Rfl:     cyclobenzaprine (FLEXERIL) 10 mg tablet, Take 1 tablet (10 mg total) by mouth 3 (three) times a day as needed for muscle spasms for up to 3 days, Disp: 9 tablet, Rfl: 0    naproxen (NAPROSYN) 500 mg tablet, Take 1 tablet (500 mg total) by mouth 2 (two) times a day with meals for 10 days, Disp: 20 tablet, Rfl: 0    Allergies   Allergen Reactions    Toradol [Ketorolac Tromethamine]      GI UPSET    Latex Rash    Ultram [Tramadol] Rash       Objective:  Vitals:    12/04/18 1146   BP: 126/88   Pulse: 84       Body mass index is 29 51 kg/m²  Left Ankle Exam   Swelling: none    Tenderness   The patient is experiencing no tenderness  Range of Motion   Dorsiflexion: 10   Plantar flexion: 30     Muscle Strength   Dorsiflexion:  5/5   Plantar flexion:  5/5   Anterior tibial:  5/5   Posterior tibial:  5/5  Gastrocsoleus:  5/5  Peroneal muscle:  5/5    Other   Erythema: absent  Scars: present  Sensation: normal  Pulse: present    Comments:  Medial ankle surgical scar well healed  Lateral surgical scar well healed with small area at distal extent representing a pinhole of delayed closure  Fibrous tissue and wound bed No erythema or purulent drainage  Area of delayed closure Less than 1 mm in diameter  Foot neurovascularly intact ankle stable on exam   No pain with active or passive range of motion of the ankle mortise itself      Right Knee Exam     Other   Other tests: no effusion present          Observations   Left Ankle/Foot   Positive for adhesive scar  Right Knee   Negative for effusion  Strength/Myotome Testing     Left Ankle/Foot   Dorsiflexion: 5  Plantar flexion: 5      Physical Exam   Constitutional: She is oriented to person, place, and time  She appears well-developed  HENT:   Head: Atraumatic  Eyes: EOM are normal    Neck: Neck supple     Cardiovascular: Normal rate  Pulmonary/Chest: Effort normal    Musculoskeletal:        Right knee: She exhibits no effusion  See orthopedic exam   Neurological: She is alert and oriented to person, place, and time  Skin: Skin is warm and dry  Psychiatric: She has a normal mood and affect  Nursing note and vitals reviewed  Mey JACK    Division of Adult Reconstruction  Department of [facility]

## 2018-12-05 ENCOUNTER — OFFICE VISIT (OUTPATIENT)
Dept: FAMILY MEDICINE CLINIC | Facility: CLINIC | Age: 43
End: 2018-12-05
Payer: COMMERCIAL

## 2018-12-05 VITALS
OXYGEN SATURATION: 98 % | BODY MASS INDEX: 28.72 KG/M2 | DIASTOLIC BLOOD PRESSURE: 84 MMHG | HEART RATE: 89 BPM | RESPIRATION RATE: 18 BRPM | TEMPERATURE: 96.9 F | HEIGHT: 67 IN | SYSTOLIC BLOOD PRESSURE: 130 MMHG | WEIGHT: 183 LBS

## 2018-12-05 DIAGNOSIS — M54.2 NECK PAIN: ICD-10-CM

## 2018-12-05 DIAGNOSIS — M79.641 BILATERAL HAND PAIN: ICD-10-CM

## 2018-12-05 DIAGNOSIS — Z01.419 WELL WOMAN EXAM: Primary | ICD-10-CM

## 2018-12-05 DIAGNOSIS — M79.642 BILATERAL HAND PAIN: ICD-10-CM

## 2018-12-05 PROCEDURE — 99214 OFFICE O/P EST MOD 30 MIN: CPT | Performed by: FAMILY MEDICINE

## 2018-12-05 NOTE — PROGRESS NOTES
Assessment/Plan:     Problem List Items Addressed This Visit     None      Visit Diagnoses     Well woman health check   - hx of irregular menses, excessive cramping, occasionally passes clots    Relevant Orders    Ambulatory referral to Gynecology    Bilateral hand pain        Relevant Orders    Nerve conduction test    Ambulatory referral to Physical Therapy    · Instructed patient to take anti-inflammatory as needed for acute pain, provided bilateral cock-up wrist splints to be worn overnight  Spoke to patient about further nonsurgical interventions to be considered in the future including steroid injections  Neck pain        Relevant Orders    Ambulatory referral to Physical Therapy          Subjective:      Patient ID: Jossy Mcneil is a 41 y o  female  HPI    The following portions of the patient's history were reviewed and updated as appropriate: current medications, past medical history, past social history, past surgical history and problem list     46-year-old female past medical history of anxiety patient, tobacco abuse history, s/p left trimalleolar fracture with associated postsurgical wound, follows with surgeon and wound care presents today complaining of pain in bilateral hands  Onset of pain about 15 years ago, exacerbated within the past 3-4 years  Pain is excruciating at night and oftentimes wakes her up from sleep  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 and states she used her hands regularly for work  States pain pain is in first 3 digits bilaterally and radiates up arm  Associated numbness and tingling  Patient has not attempted nonsurgical treatments including overnight splinting, activity modification, steroid injections, anti-inflammatory medications  Smokes half pack per day, cut down from 1 pack per day, smoking since 17years old     Patient is aware smoking may contribute to delayed wound healing and will continue to try to cut down through her own efforts  Review of Systems   Constitutional: Negative for chills and fever  Respiratory: Negative for shortness of breath  Cardiovascular: Negative for chest pain  Gastrointestinal: Positive for abdominal pain ( occasionally)  Genitourinary: Positive for menstrual problem (Severe cramping, occasionally passes clots  Irregular periods) and pelvic pain  Musculoskeletal: Negative for myalgias  LLE, attributes to wound   Skin: Negative for rash  Objective:      LMP 11/21/2018 (Exact Date)          Physical Exam   Constitutional: She is oriented to person, place, and time  She appears well-developed and well-nourished  No distress  HENT:   Head: Normocephalic and atraumatic  Right Ear: External ear normal    Left Ear: External ear normal    Nose: Nose normal    Mouth/Throat: Oropharynx is clear and moist    Eyes: Pupils are equal, round, and reactive to light  Conjunctivae and EOM are normal    Neck: Normal range of motion  Neck supple  No thyromegaly present  Cardiovascular: Normal rate, regular rhythm, normal heart sounds and intact distal pulses  No murmur heard  Pulmonary/Chest: Effort normal and breath sounds normal    Abdominal: Soft  Bowel sounds are normal  There is no tenderness  Musculoskeletal: Normal range of motion  She exhibits edema  +Phalens, +Tinels bilaterally, - Spurling, full ROM neck and bilateral upper extremities  Neck tender points present  Lymphadenopathy:     She has no cervical adenopathy  Neurological: She is alert and oriented to person, place, and time  Skin: Skin is warm  No rash noted  No erythema  LLE wound miniml swelling, warm to touch   Psychiatric: She has a normal mood and affect   Her behavior is normal

## 2018-12-07 ENCOUNTER — TELEPHONE (OUTPATIENT)
Dept: FAMILY MEDICINE CLINIC | Facility: CLINIC | Age: 43
End: 2018-12-07

## 2018-12-07 ENCOUNTER — APPOINTMENT (OUTPATIENT)
Dept: WOUND CARE | Facility: HOSPITAL | Age: 43
End: 2018-12-07
Payer: COMMERCIAL

## 2018-12-07 PROCEDURE — 99212 OFFICE O/P EST SF 10 MIN: CPT

## 2018-12-07 NOTE — TELEPHONE ENCOUNTER
Patient need DME for right arm brace  She returned the universal she was given yesterday  Did not fit well    Thank you  ren

## 2018-12-14 ENCOUNTER — HOSPITAL ENCOUNTER (EMERGENCY)
Facility: HOSPITAL | Age: 43
Discharge: HOME | End: 2018-12-14
Attending: EMERGENCY MEDICINE | Admitting: EMERGENCY MEDICINE
Payer: COMMERCIAL

## 2018-12-14 ENCOUNTER — APPOINTMENT (EMERGENCY)
Dept: RADIOLOGY | Facility: HOSPITAL | Age: 43
End: 2018-12-14
Payer: COMMERCIAL

## 2018-12-14 VITALS
BODY MASS INDEX: 29.09 KG/M2 | RESPIRATION RATE: 18 BRPM | HEART RATE: 97 BPM | TEMPERATURE: 98.1 F | DIASTOLIC BLOOD PRESSURE: 90 MMHG | WEIGHT: 182.98 LBS | OXYGEN SATURATION: 97 % | SYSTOLIC BLOOD PRESSURE: 136 MMHG

## 2018-12-14 DIAGNOSIS — M25.579 ANKLE PAIN: Primary | ICD-10-CM

## 2018-12-14 PROCEDURE — 99283 EMERGENCY DEPT VISIT LOW MDM: CPT

## 2018-12-14 PROCEDURE — 73610 X-RAY EXAM OF ANKLE: CPT

## 2018-12-14 RX ORDER — HYDROCODONE BITARTRATE AND ACETAMINOPHEN 5; 325 MG/1; MG/1
2 TABLET ORAL ONCE
Status: COMPLETED | OUTPATIENT
Start: 2018-12-14 | End: 2018-12-14

## 2018-12-14 RX ADMIN — HYDROCODONE BITARTRATE AND ACETAMINOPHEN 2 TABLET: 5; 325 TABLET ORAL at 14:51

## 2018-12-14 NOTE — ED NOTES
Area is cleaned and dressing in applied to L ankle as per pt request       Chele Jimenez RN  12/14/18 4711

## 2018-12-14 NOTE — DISCHARGE INSTRUCTIONS
Arthralgia   WHAT YOU NEED TO KNOW:   Arthralgia is pain in one or more joints, with no inflammation  It may be short-term and get better within 6 to 8 weeks  Arthralgia can be an early sign of arthritis  Arthralgia may be caused by a medical condition, such as a hormone disorder or a tumor  It may also be caused by an infection or injury  DISCHARGE INSTRUCTIONS:   Medicines: The following medicines may  be ordered for you:  · Acetaminophen  decreases pain  Ask how much to take and how often to take it  Follow directions  Acetaminophen can cause liver damage if not taken correctly  · NSAIDs  decrease pain and prevent swelling  Ask your healthcare provider which medicine is right for you  Ask how much to take and when to take it  Take as directed  NSAIDs can cause stomach bleeding and kidney problems if not taken correctly  · Pain relief cream  decreases pain  Use this cream as directed  · Take your medicine as directed  Contact your healthcare provider if you think your medicine is not helping or if you have side effects  Tell him of her if you are allergic to any medicine  Keep a list of the medicines, vitamins, and herbs you take  Include the amounts, and when and why you take them  Bring the list or the pill bottles to follow-up visits  Carry your medicine list with you in case of an emergency  Follow up with your healthcare provider or specialist as directed:  Write down your questions so you remember to ask them during your visits  Self-care:   · Apply heat  to help decrease pain  Use a heating pad or heat wrap  Apply heat for 20 to 30 minutes every 2 hours for as many days as directed  · Rest  as much as possible  Avoid activities that cause joint pain  · Apply ice  to help decrease swelling and pain  Ice may also help prevent tissue damage  Use an ice pack, or put crushed ice in a plastic bag   Cover it with a towel and place it on your painful joint for 15 to 20 minutes every hour or as directed  · Support  the joint with a brace or elastic wrap as directed  · Elevate  your joint above the level of your heart as often as you can to help decrease swelling and pain  Prop your painful joint on pillows or blankets to keep it elevated comfortably  · Lose weight  if you are overweight  Extra weight can put pressure on your joints and cause more pain  Ask your healthcare provider how much you should weigh  Ask him to help you create a weight loss plan  · Exercise  regularly to help improve joint movement and to decrease pain  Ask about the best exercise plan for you  Low-impact exercises can help take the pressure off your joints  Examples are walking, swimming, and water aerobics  Physical therapy:  A physical therapist teaches you exercises to help improve movement and strength, and to decrease pain  Ask your healthcare provider if physical therapy is right for you  Contact your healthcare provider or specialist if:   · You have a fever  · You continue to have joint pain that cannot be relieved with heat, ice, or medicine  · You have pain and inflammation around your joint  · You have questions or concerns about your condition or care  Return to the emergency department if:   · You have sudden, severe pain when you move your joint  · You have a fever and shaking chills  · You cannot move your joint  · You lose feeling on the side of your body where you have the painful joint  © 2017 [employer]  Information is for End User's use only and may not be sold, redistributed or otherwise used for commercial purposes  All illustrations and images included in CareNotes® are the copyrighted property of [employer]  or [employer]  The above information is an  only  It is not intended as medical advice for individual conditions or treatments   Talk to your doctor, nurse or pharmacist before following any medical regimen to see if it is safe and effective for you

## 2018-12-14 NOTE — ED NOTES
Pt states that her fiance will come to pick her up  She doesn't drive        Savanna Hobbs RN  12/14/18 2029

## 2018-12-14 NOTE — ED PROVIDER NOTES
History  Chief Complaint   Patient presents with    Wound Check     pt presents to the ed with old post op wound to the left ankle, pt states she changes her bandage daily due to drainage  pt is requesting vicodin      Patient had an ORIF of a trimalleolar fracture of the left ankle in May of this year  She has had healing problems with a chronic wound, chronic pain in the ankle since  She has been seen by her surgeon, her physician and wound care clinic as well  Patient states she was here visiting someone in the hospital today so she decided to come down to get some pain medication for her leg and to check it out to make sure there is not infection in there  She has had no fever  Patient points to a tiny area overlying the lateral malleolus that is not healed  No erythema and no drainage            Prior to Admission Medications   Prescriptions Last Dose Informant Patient Reported? Taking?    ARIPiprazole (ABILIFY) 5 mg tablet   Yes No   Sig: Take 10 mg by mouth daily     albuterol (PROAIR HFA) 90 mcg/act inhaler   No No   Sig: Inhale 2 puffs every 6 (six) hours as needed for wheezing   clonazePAM (KlonoPIN) 0 5 mg tablet   Yes No   Sig: Take 0 5 mg by mouth 2 (two) times a day as needed     cyclobenzaprine (FLEXERIL) 10 mg tablet   No No   Sig: Take 1 tablet (10 mg total) by mouth 3 (three) times a day as needed for muscle spasms for up to 3 days   ibuprofen (MOTRIN) 200 mg tablet  Self Yes No   Sig: Take by mouth every 6 (six) hours as needed for mild pain   naproxen (NAPROSYN) 500 mg tablet   No No   Sig: Take 1 tablet (500 mg total) by mouth 2 (two) times a day with meals for 10 days   sertraline (ZOLOFT) 25 mg tablet  Self Yes No   Sig: Take 50 mg by mouth daily      Facility-Administered Medications: None       Past Medical History:   Diagnosis Date    History of wound infection     left ankle    Psychiatric disorder     depression, anxiety       Past Surgical History:   Procedure Laterality Date    [place] OPEN TREATMENT BIMALLEOLAR ANKLE FRACTURE Left 5/14/2018    Procedure: OPEN REDUCTION W/ INTERNAL FIXATION (ORIF) ANKLE;  Surgeon: Nidia Flores DO;  [facility];  Service: Orthopedics    REDUCTION MAMMAPLASTY         Family History   Problem Relation Age of Onset    No Known Problems Father      I have reviewed and agree with the history as documented  Social History   Substance Use Topics    Smoking status: Current Every Day Smoker     Packs/day: 0 50     Types: Cigarettes    Smokeless tobacco: Never Used    Alcohol use Yes      Comment: social        Review of Systems   Constitutional: Negative for chills and fever  HENT: Negative for congestion and sore throat  Respiratory: Negative for cough  Cardiovascular: Positive for leg swelling  Negative for chest pain and palpitations  Gastrointestinal: Negative for abdominal pain  Genitourinary: Negative for dysuria  Musculoskeletal: Positive for arthralgias and joint swelling  Negative for neck pain  Skin: Positive for wound  Neurological: Negative for light-headedness and headaches  Hematological: Does not bruise/bleed easily  Psychiatric/Behavioral: The patient is nervous/anxious  All other systems reviewed and are negative  Physical Exam  Physical Exam   Constitutional: She is oriented to person, place, and time  She appears well-developed and well-nourished  HENT:   Head: Normocephalic  Eyes: Conjunctivae are normal    Neck: Normal range of motion  Cardiovascular: Normal rate, regular rhythm and normal heart sounds  Pulmonary/Chest: Effort normal    Abdominal: Soft  There is no tenderness  Musculoskeletal: Normal range of motion  She exhibits edema and tenderness  Was a small sinus opening in the area overlying the lateral incision  He has mild non pitting edema with some tenderness in the area  There is no streaks   Neurological: She is alert and oriented to person, place, and time     Skin: Skin is warm and dry  Psychiatric: She has a normal mood and affect  Her behavior is normal    Nursing note and vitals reviewed  Vital Signs  ED Triage Vitals   Temperature Pulse Respirations Blood Pressure SpO2   12/14/18 1351 12/14/18 1351 12/14/18 1351 12/14/18 1351 12/14/18 1351   98 1 °F (36 7 °C) 97 18 136/90 97 %      Temp Source Heart Rate Source Patient Position - Orthostatic VS BP Location FiO2 (%)   12/14/18 1351 12/14/18 1351 -- -- --   Tympanic Monitor         Pain Score       12/14/18 1451       Worst Possible Pain           Vitals:    12/14/18 1351 12/14/18 1400   BP: 136/90 136/90   Pulse: 97        Visual Acuity      ED Medications  Medications   HYDROcodone-acetaminophen (NORCO) 5-325 mg per tablet 2 tablet (2 tablets Oral Given 12/14/18 1451)       Diagnostic Studies  Results Reviewed     None                 XR ankle 3+ views LEFT   Final Result by Geni Tinoco MD (12/14 1511)      No acute osseous abnormality  Postoperative changes  No areas of osseous destruction  Workstation performed: [id number]                    Procedures  Procedures       Phone Contacts  ED Phone Contact    ED Course                               MDM  Number of Diagnoses or Management Options  Diagnosis management comments: I told the patient I will medicate her here for pain since she is not driving however I cannot provide a scheduled narcotic for chronic illness or problems  Will x-ray for possible osteo    CritCare Time    Disposition  Final diagnoses: Ankle pain     Time reflects when diagnosis was documented in both MDM as applicable and the Disposition within this note     Time User Action Codes Description Comment    12/14/2018  3:45 PM Pierce Grey Add [M27 929] Ankle pain       ED Disposition     ED Disposition Condition Comment    Discharge  Pete Bess discharge to home/self care      Condition at discharge: Stable        Follow-up Information     Follow up With Specialties Details Why Contact William Joe,  Orthopedic Surgery Schedule an appointment as soon as possible for a visit in 1 day  [address] [place] [address]  [phone]            Discharge Medication List as of 12/14/2018  3:45 PM      CONTINUE these medications which have NOT CHANGED    Details   albuterol (PROAIR HFA) 90 mcg/act inhaler Inhale 2 puffs every 6 (six) hours as needed for wheezing, Starting Tue 10/30/2018, Normal      ARIPiprazole (ABILIFY) 5 mg tablet Take 10 mg by mouth daily  , Historical Med      clonazePAM (KlonoPIN) 0 5 mg tablet Take 0 5 mg by mouth 2 (two) times a day as needed  , Starting Sat 10/20/2018, Historical Med      cyclobenzaprine (FLEXERIL) 10 mg tablet Take 1 tablet (10 mg total) by mouth 3 (three) times a day as needed for muscle spasms for up to 3 days, Starting Thu 11/22/2018, Until Sun 11/25/2018, Print      ibuprofen (MOTRIN) 200 mg tablet Take by mouth every 6 (six) hours as needed for mild pain, Historical Med      naproxen (NAPROSYN) 500 mg tablet Take 1 tablet (500 mg total) by mouth 2 (two) times a day with meals for 10 days, Starting Thu 11/22/2018, Until Sun 12/2/2018, Print      sertraline (ZOLOFT) 25 mg tablet Take 50 mg by mouth daily, Historical Med           No discharge procedures on file      ED Provider  Electronically Signed by           Melvin Li MD  12/19/18 0916

## 2018-12-17 DIAGNOSIS — M25.531 RIGHT WRIST PAIN: Primary | ICD-10-CM

## 2018-12-17 NOTE — TELEPHONE ENCOUNTER
Printed script for deluxe right wrist brace  It is in the front office basket for pickup at patient's earliest convenience      Thank you,    Jayashree Magaña,   12/17/18  3:29 PM

## 2018-12-21 ENCOUNTER — APPOINTMENT (OUTPATIENT)
Dept: WOUND CARE | Facility: HOSPITAL | Age: 43
End: 2018-12-21
Payer: COMMERCIAL

## 2018-12-21 PROCEDURE — 99212 OFFICE O/P EST SF 10 MIN: CPT

## 2019-01-02 ENCOUNTER — APPOINTMENT (OUTPATIENT)
Dept: WOUND CARE | Facility: HOSPITAL | Age: 44
End: 2019-01-02
Payer: COMMERCIAL

## 2019-01-02 ENCOUNTER — LAB REQUISITION (OUTPATIENT)
Dept: LAB | Facility: HOSPITAL | Age: 44
End: 2019-01-02
Payer: COMMERCIAL

## 2019-01-02 ENCOUNTER — TELEPHONE (OUTPATIENT)
Dept: OBGYN CLINIC | Facility: CLINIC | Age: 44
End: 2019-01-02

## 2019-01-02 ENCOUNTER — OFFICE VISIT (OUTPATIENT)
Dept: OBGYN CLINIC | Facility: CLINIC | Age: 44
End: 2019-01-02
Payer: COMMERCIAL

## 2019-01-02 VITALS
HEART RATE: 94 BPM | DIASTOLIC BLOOD PRESSURE: 83 MMHG | HEIGHT: 67 IN | SYSTOLIC BLOOD PRESSURE: 118 MMHG | BODY MASS INDEX: 29.79 KG/M2 | WEIGHT: 189.8 LBS

## 2019-01-02 DIAGNOSIS — Z98.890 STATUS POST ORIF OF FRACTURE OF ANKLE: ICD-10-CM

## 2019-01-02 DIAGNOSIS — Z87.81 STATUS POST ORIF OF FRACTURE OF ANKLE: ICD-10-CM

## 2019-01-02 DIAGNOSIS — Z72.0 TOBACCO ABUSE DISORDER: ICD-10-CM

## 2019-01-02 DIAGNOSIS — T81.31XA DISRUPTION OF EXTERNAL OPERATION (SURGICAL) WOUND, NOT ELSEWHERE CLASSIFIED, INITIAL ENCOUNTER: ICD-10-CM

## 2019-01-02 DIAGNOSIS — T81.89XD DELAYED SURGICAL WOUND HEALING, SUBSEQUENT ENCOUNTER: Primary | ICD-10-CM

## 2019-01-02 PROCEDURE — 87147 CULTURE TYPE IMMUNOLOGIC: CPT | Performed by: SPECIALIST

## 2019-01-02 PROCEDURE — 87070 CULTURE OTHR SPECIMN AEROBIC: CPT | Performed by: SPECIALIST

## 2019-01-02 PROCEDURE — 99213 OFFICE O/P EST LOW 20 MIN: CPT | Performed by: ORTHOPAEDIC SURGERY

## 2019-01-02 PROCEDURE — 87205 SMEAR GRAM STAIN: CPT | Performed by: SPECIALIST

## 2019-01-02 PROCEDURE — 97597 DBRDMT OPN WND 1ST 20 CM/<: CPT

## 2019-01-02 PROCEDURE — 87186 SC STD MICRODIL/AGAR DIL: CPT | Performed by: SPECIALIST

## 2019-01-02 NOTE — TELEPHONE ENCOUNTER
Patient ref by Dr Edgar Watts for Status post ORIF of fracture of ankle  Tobacco abuse disorder  Patient scheduled on 01/03/19 and new patient paperwork given  Patient states xray was done and is in epic and she states never seen a pain specialist before

## 2019-01-02 NOTE — PROGRESS NOTES
Assessment/Plan:  1  Delayed surgical wound healing, subsequent encounter     2  Status post ORIF of fracture of ankle  Ambulatory referral to Pain Management    CANCELED: XR ankle 3+ vw left   3  Tobacco abuse disorder  Ambulatory referral to Pain Management     Patient is here for follow-up regarding a delayed wound healing over the lateral aspect of her left ankle after her ORIF of her left ankle in May of 2018  She has been under the care of wound care  She states that most days she does ambulate without pain but states her ankle does get painful at the end of the day  On examination today the small sinus tract at the inferior aspect of her wound is aseptic in nature without signs of localized infection and appears to be surrounded by pink healthy tissue  No hardware visualized  She is here with questions regarding having her screw and plate removed however I have advised against this at this point as she is still smoking and will only exposed herself to further wound healing problems creating a large incision over the lateral ankle again to remove her hardware  I stressed to her the importance of smoking cessation as it will help heal the small sinus tract with the assistance of wound care  She may continue weight-bearing as tolerated in the shoe  Last x-rays obtained on 12/14/2018 demonstrates no reactive bone changes or signs of osteomyelitis and a soft tissues appear normal   The fracture is well healed and the hardware stable  She should continue seeing the wound care professionals in attempts to get this small area of non healing to close  Advised her to seek smoking cessation aids with her PCP as it is imperative that she makes an attempt to stop smoking thus decreasing the associated risks therein  Patient does have a request today for a pain management consultation due to her pain at the end of the day that is likely posttraumatic in nature    A referral was made for pain management at her request here today  I will see her back in approximately 1 months time to follow-up on her smoking cessation efforts and wound healing    Subjective:  Delayed surgical wound healing status post ORIF left ankle May 2018    Patient ID: Fani Miles is a 41 y o  female  HPI  Patient is here for follow-up regarding her delayed wound healing status post ORIF of her left ankle in May of 2018  She has been under the care of wound care to help a small pinhole sinus continue to heal at the distal aspect of her lateral incision  She states that she still has significant pain in her ankle at the end of long days of activity and walking  She denies consistent activity related weight-bearing pain in the ankle and states some days are actually pain free  She ambulated into the office today without any pain and states that today is a good day  She states that she is still smoking and has not made efforts the stop  She is keeping up with her wound care management of the ankle  Review of Systems   Constitutional: Positive for activity change  HENT: Negative  Eyes: Negative  Respiratory: Negative  Cardiovascular: Negative  Gastrointestinal: Negative  Endocrine: Negative  Musculoskeletal: Positive for arthralgias  Skin: Negative  Neurological: Negative  Psychiatric/Behavioral: Negative  Past Medical History:   Diagnosis Date    History of wound infection     left ankle    Psychiatric disorder     depression, anxiety       Past Surgical History:   Procedure Laterality Date    [place] OPEN TREATMENT BIMALLEOLAR ANKLE FRACTURE Left 5/14/2018    Procedure: OPEN REDUCTION W/ INTERNAL FIXATION (ORIF) ANKLE;  Surgeon: Nidia Flores DO;  [facility]: [facility];  Service: Orthopedics    REDUCTION MAMMAPLASTY         Family History   Problem Relation Age of Onset    No Known Problems Father        Social History     Occupational History    Not on file       Social History Main Topics    Smoking status: Current Every Day Smoker     Packs/day: 0 50     Types: Cigarettes    Smokeless tobacco: Never Used    Alcohol use Yes      Comment: social    Drug use: No    Sexual activity: Yes     Partners: Male         Current Outpatient Prescriptions:     albuterol (PROAIR HFA) 90 mcg/act inhaler, Inhale 2 puffs every 6 (six) hours as needed for wheezing, Disp: 8 5 g, Rfl: 0    ARIPiprazole (ABILIFY) 5 mg tablet, Take 10 mg by mouth daily  , Disp: , Rfl:     clonazePAM (KlonoPIN) 0 5 mg tablet, Take 0 5 mg by mouth 2 (two) times a day as needed  , Disp: , Rfl: 0    cyclobenzaprine (FLEXERIL) 10 mg tablet, Take 1 tablet (10 mg total) by mouth 3 (three) times a day as needed for muscle spasms for up to 3 days, Disp: 9 tablet, Rfl: 0    Elastic Bandages & Supports (WRIST BRACE DELUXE/RIGHT S/M) MISC, by Does not apply route continuous, Disp: 1 each, Rfl: 0    ibuprofen (MOTRIN) 200 mg tablet, Take by mouth every 6 (six) hours as needed for mild pain, Disp: , Rfl:     naproxen (NAPROSYN) 500 mg tablet, Take 1 tablet (500 mg total) by mouth 2 (two) times a day with meals for 10 days, Disp: 20 tablet, Rfl: 0    sertraline (ZOLOFT) 25 mg tablet, Take 50 mg by mouth daily, Disp: , Rfl:     Allergies   Allergen Reactions    Toradol [Ketorolac Tromethamine]      GI UPSET    Latex Rash    Ultram [Tramadol] Rash       Objective:  Vitals:    01/02/19 0900   BP: 118/83   Pulse: 94       Body mass index is 30 18 kg/m²  Left Ankle Exam   Swelling: none    Tenderness   The patient is experiencing no tenderness       Range of Motion   Dorsiflexion: 10   Plantar flexion: 30     Muscle Strength   Dorsiflexion:  5/5   Plantar flexion:  5/5   Anterior tibial:  5/5   Posterior tibial:  5/5  Gastrocsoleus:  5/5  Peroneal muscle:  5/5    Other   Erythema: absent  Scars: present  Sensation: normal  Pulse: present    Comments:  Medial ankle surgical scar well healed  Lateral surgical scar well healed with small area at distal extent representing a pinhole of delayed closure  Fibrous tissue and wound bed No erythema or purulent drainage  Area of delayed closure Less than 1 mm in diameter  No sign of localized infection  Foot neurovascularly intact ankle stable on exam   No pain with active or passive range of motion of the ankle mortise itself      Right Knee Exam     Other   Other tests: no effusion present          Observations   Left Ankle/Foot   Positive for adhesive scar  Right Knee   Negative for effusion  Strength/Myotome Testing     Left Ankle/Foot   Dorsiflexion: 5  Plantar flexion: 5      Physical Exam   Constitutional: She is oriented to person, place, and time  She appears well-developed  HENT:   Head: Atraumatic  Eyes: EOM are normal    Neck: Neck supple  Cardiovascular: Normal rate  Pulmonary/Chest: Effort normal    Musculoskeletal:        Right knee: She exhibits no effusion  See orthopedic exam   Neurological: She is alert and oriented to person, place, and time  Skin: Skin is warm and dry  Psychiatric: She has a normal mood and affect  Nursing note and vitals reviewed  I have personally reviewed pertinent films in PACS  X-rays of the left ankle from 12/14/18 during an emergency department visit were reviewed by me  Fracture is well healed and the hardware is stable without signs of failure  There is no lytic or blastic lesion appreciated  No reactive bone formation present  Ankle mortise is stable groin  Poornima JACK    Division of Adult Reconstruction  Department of [facility]

## 2019-01-03 ENCOUNTER — OFFICE VISIT (OUTPATIENT)
Dept: PAIN MEDICINE | Facility: CLINIC | Age: 44
End: 2019-01-03
Payer: COMMERCIAL

## 2019-01-03 VITALS
HEART RATE: 82 BPM | RESPIRATION RATE: 18 BRPM | BODY MASS INDEX: 29.88 KG/M2 | TEMPERATURE: 98.1 F | HEIGHT: 67 IN | WEIGHT: 190.4 LBS | DIASTOLIC BLOOD PRESSURE: 70 MMHG | SYSTOLIC BLOOD PRESSURE: 102 MMHG

## 2019-01-03 DIAGNOSIS — Z87.81 STATUS POST ORIF OF FRACTURE OF ANKLE: ICD-10-CM

## 2019-01-03 DIAGNOSIS — G89.29 CHRONIC PAIN OF LEFT ANKLE: ICD-10-CM

## 2019-01-03 DIAGNOSIS — G89.4 CHRONIC PAIN SYNDROME: Primary | ICD-10-CM

## 2019-01-03 DIAGNOSIS — M25.572 CHRONIC PAIN OF LEFT ANKLE: ICD-10-CM

## 2019-01-03 DIAGNOSIS — Z98.890 STATUS POST ORIF OF FRACTURE OF ANKLE: ICD-10-CM

## 2019-01-03 DIAGNOSIS — T81.89XS DELAYED SURGICAL WOUND HEALING, SEQUELA: ICD-10-CM

## 2019-01-03 PROCEDURE — 99244 OFF/OP CNSLTJ NEW/EST MOD 40: CPT | Performed by: ANESTHESIOLOGY

## 2019-01-03 RX ORDER — MELOXICAM 7.5 MG/1
7.5 TABLET ORAL DAILY PRN
Qty: 30 TABLET | Refills: 0 | Status: SHIPPED | OUTPATIENT
Start: 2019-01-03 | End: 2019-02-08 | Stop reason: HOSPADM

## 2019-01-03 RX ORDER — GABAPENTIN 300 MG/1
300 CAPSULE ORAL 3 TIMES DAILY
Qty: 90 CAPSULE | Refills: 0 | Status: SHIPPED | OUTPATIENT
Start: 2019-01-03 | End: 2019-02-28 | Stop reason: SDUPTHER

## 2019-01-03 RX ORDER — SULFAMETHOXAZOLE AND TRIMETHOPRIM 800; 160 MG/1; MG/1
TABLET ORAL
Refills: 0 | COMMUNITY
Start: 2019-01-02 | End: 2019-01-31 | Stop reason: ALTCHOICE

## 2019-01-03 NOTE — LETTER
January 3, 2019     Ruby Dangelo DO  [id number]    Patient: Hortencia Dowd   YOB: 1975   Date of Visit: 1/3/2019       Dear Dr Martín Lake: Thank you for referring Ly Lauren to me for evaluation  Below are my notes for this consultation  If you have questions, please do not hesitate to call me  I look forward to following your patient along with you  Sincerely,        Irina Rizvi MD        CC: DO Irina Cassidy MD  1/3/2019  9:52 AM  Sign at close encounter  Assessment:  1  Chronic pain syndrome    2  Chronic pain of left ankle    3  Status post ORIF of fracture of ankle    4  Delayed surgical wound healing, sequela        Plan:  New Medications Ordered This Visit   Medications    sulfamethoxazole-trimethoprim (BACTRIM DS) 800-160 mg per tablet     Sig: take 1 tablet by mouth twice a day for 10 days     Refill:  0    gabapentin (NEURONTIN) 300 mg capsule     Sig: Take 1 capsule (300 mg total) by mouth 3 (three) times a day     Dispense:  90 capsule     Refill:  0    meloxicam (MOBIC) 7 5 mg tablet     Sig: Take 1 tablet (7 5 mg total) by mouth daily as needed (pain (with food)) for up to 30 days     Dispense:  30 tablet     Refill:  0     My impressions and treatment recommendations were discussed in detail with the patient, who verbalized understanding and had no further questions  The patient is complaining of chronic left foot pain after undergoing open reduction and internal fixation of the left ankle  She was requesting opioid medications, including oxycodone/acetaminophen and hydrocodone/acetaminophen at today's visit  I discussed with the patient that she is chronically maintained on clonazepam and is not currently an opioid candidate due to the FDA black box warning regarding opioid medications and benzodiazepines      As such, I felt a reasonable to offer the patient meloxicam 7 5 mg once daily as needed for pain, with food, as well as gabapentin on a titration schedule to a goal dosage of gabapentin 300 mg 3 times daily  Side effects were reviewed with the patient  I encouraged the patient to continue following up with the [address] due to her nonhealing wound as well as Dr Fady Rosa since she does continue to report that she would like the hardware removed  The patient verbalized understanding  Follow-up is planned in 4 weeks time or sooner as warranted  Discharge instructions were provided  I personally saw and examined the patient and I agree with the above discussed plan of care  History of Present Illness:    Duy Ward is a 41 y o  female who presents to [facility] for initial evaluation of the above stated pain complaints  The patient has a past medical and chronic pain history as outlined in the assessment section  She was referred by Dr Urban Precise  At today's office visit, the patient's pain score is 10/10 on the verbal numerical pain rating scale  The patient states that her pain is primarily in her left ankle at the site where she had her left ankle open reduction internal fixation performed 8 months ago  She also states that her pain radiates up her leg to some degree  She describes her pain is severe in 10/10 on the verbal numerical pain rating scale  Her pain is constant in nature and worse in the afternoon, evening, and night  She describes her pain as shooting, numbness, sharp, dull/aching, and throbbing    She reports weakness in her lower extremities  Lying down, sitting, and relaxation decreases her pain while standing, walking, and exercise increases her pain  She reports no significant relief from left ankle surgery  She does report moderate relief with physical therapy    She is currently using Tylenol over-the-counter as well as Aleve over-the-counter as needed for pain relief and she does get some pain relief with these medications  The patient is currently unemployed  Review of Systems:    Review of Systems   Constitutional: Negative for fever and unexpected weight change  HENT: Negative for trouble swallowing  Eyes: Negative for visual disturbance  Respiratory: Positive for shortness of breath and wheezing  Cardiovascular: Negative for chest pain and palpitations  Gastrointestinal: Negative for constipation, diarrhea, nausea and vomiting  Endocrine: Negative for cold intolerance, heat intolerance and polydipsia  Genitourinary: Negative for difficulty urinating and frequency  Musculoskeletal: Positive for gait problem (difficulty walking) and joint swelling (joint stiffness)  Negative for arthralgias and myalgias  Skin: Negative for rash  Neurological: Positive for weakness (muscle weakness)  Negative for dizziness, seizures, syncope and headaches  Hematological: Does not bruise/bleed easily  Psychiatric/Behavioral: Negative for dysphoric mood  All other systems reviewed and are negative          Patient Active Problem List   Diagnosis    Anxiety and depression    Obesity    Trimalleolar fracture of ankle, closed, left, initial encounter    Mass of soft tissue of right lower extremity    Closed fracture of left ankle    Status post ORIF of fracture of ankle    Delayed surgical wound healing    Lipoma of right lower extremity    Lipoma of left lower extremity    Tobacco abuse disorder    Chronic pain of left ankle    Chronic pain syndrome       Past Medical History:   Diagnosis Date    History of wound infection     left ankle    Psychiatric disorder     depression, anxiety       Past Surgical History:   Procedure Laterality Date    NE OPEN TREATMENT BIMALLEOLAR ANKLE FRACTURE Left 5/14/2018    Procedure: OPEN REDUCTION W/ INTERNAL FIXATION (ORIF) ANKLE;  Surgeon: Savannah Bal DO;  Location: [facility];  Service: Orthopedics    REDUCTION MAMMAPLASTY         Family History   Problem Relation Age of Onset    No Known Problems Father        Social History     Occupational History    Not on file       Social History Main Topics    Smoking status: Current Every Day Smoker     Packs/day: 0 50     Types: Cigarettes    Smokeless tobacco: Never Used    Alcohol use Yes      Comment: social    Drug use: No    Sexual activity: Yes     Partners: Male         Current Outpatient Prescriptions:     albuterol (PROAIR HFA) 90 mcg/act inhaler, Inhale 2 puffs every 6 (six) hours as needed for wheezing, Disp: 8 5 g, Rfl: 0    ARIPiprazole (ABILIFY) 5 mg tablet, Take 10 mg by mouth daily  , Disp: , Rfl:     clonazePAM (KlonoPIN) 0 5 mg tablet, Take 0 5 mg by mouth 2 (two) times a day as needed  , Disp: , Rfl: 0    Elastic Bandages & Supports (WRIST BRACE DELUXE/RIGHT S/M) MISC, by Does not apply route continuous, Disp: 1 each, Rfl: 0    sertraline (ZOLOFT) 25 mg tablet, Take 50 mg by mouth daily, Disp: , Rfl:     sulfamethoxazole-trimethoprim (BACTRIM DS) 800-160 mg per tablet, take 1 tablet by mouth twice a day for 10 days, Disp: , Rfl: 0    cyclobenzaprine (FLEXERIL) 10 mg tablet, Take 1 tablet (10 mg total) by mouth 3 (three) times a day as needed for muscle spasms for up to 3 days, Disp: 9 tablet, Rfl: 0    gabapentin (NEURONTIN) 300 mg capsule, Take 1 capsule (300 mg total) by mouth 3 (three) times a day, Disp: 90 capsule, Rfl: 0    meloxicam (MOBIC) 7 5 mg tablet, Take 1 tablet (7 5 mg total) by mouth daily as needed (pain (with food)) for up to 30 days, Disp: 30 tablet, Rfl: 0    Allergies   Allergen Reactions    Toradol [Ketorolac Tromethamine]      GI UPSET    Latex Rash    Ultram [Tramadol] Rash       Physical Exam:    /70 (BP Location: Right arm, Patient Position: Sitting, Cuff Size: Standard)   Pulse 82   Temp 98 1 °F (36 7 °C) (Oral)   Resp 18   Ht 5' 6 5" (1 689 m)   Wt 86 4 kg (190 lb 6 4 oz)   BMI 30 27 kg/m²      Constitutional: obese  Eyes: anicteric  HEENT: grossly intact  Neck: supple, symmetric, trachea midline and no masses   Pulmonary:even and unlabored  Cardiovascular:No edema or pitting edema present  Skin:Normal without rashes or lesions and well hydrated  Psychiatric:Mood and affect appropriate  Neurologic:Cranial Nerves II-XII grossly intact  Musculoskeletal:antalgic, bandages covering the left ankle wound  Sensation is intact in all major dermatomes of the lower extremities bilaterally  Muscle strength is 5/5 in all major muscle groups of the bilateral lower extremities  Imaging  No orders to display     Show images for XR ankle 3+ views LEFT   Order Report      Order Details   Order Questions     Question Answer Comment   Exam reason osteo    Note:  Enter reason for exam   Pregnant? No           Reason For Exam     osteo   Study Result     LEFT ANKLE     INDICATION:   osteo      COMPARISON:  10/12/2018     VIEWS:  XR ANKLE 3+ VW LEFT   Images: 3     FINDINGS:     There is a plate with multiple screws present in the distal fibula, there is a healed distal fibular fracture  There are 2 screws through the medial malleolus      Inferior and posterior calcaneal spurs are present      No lytic or blastic lesions seen  No areas of osseous destruction      Soft tissues are unremarkable      IMPRESSION:     No acute osseous abnormality      Postoperative changes    No areas of osseous destruction      Workstation performed: [id number]      Imaging     XR ankle 3+ views LEFT (Order #[id number]) on 12/14/2018 - Imaging Information   Result History     XR ankle 3+ views LEFT (Order #[id number]) on 12/14/2018 - Order Result History Report

## 2019-01-03 NOTE — PROGRESS NOTES
Assessment:  1  Chronic pain syndrome    2  Chronic pain of left ankle    3  Status post ORIF of fracture of ankle    4  Delayed surgical wound healing, sequela        Plan:  New Medications Ordered This Visit   Medications    sulfamethoxazole-trimethoprim (BACTRIM DS) 800-160 mg per tablet     Sig: take 1 tablet by mouth twice a day for 10 days     Refill:  0    gabapentin (NEURONTIN) 300 mg capsule     Sig: Take 1 capsule (300 mg total) by mouth 3 (three) times a day     Dispense:  90 capsule     Refill:  0    meloxicam (MOBIC) 7 5 mg tablet     Sig: Take 1 tablet (7 5 mg total) by mouth daily as needed (pain (with food)) for up to 30 days     Dispense:  30 tablet     Refill:  0     My impressions and treatment recommendations were discussed in detail with the patient, who verbalized understanding and had no further questions  The patient is complaining of chronic left foot pain after undergoing open reduction and internal fixation of the left ankle  She was requesting opioid medications, including oxycodone/acetaminophen and hydrocodone/acetaminophen at today's visit  I discussed with the patient that she is chronically maintained on clonazepam and is not currently an opioid candidate due to the FDA black box warning regarding opioid medications and benzodiazepines  As such, I felt a reasonable to offer the patient meloxicam 7 5 mg once daily as needed for pain, with food, as well as gabapentin on a titration schedule to a goal dosage of gabapentin 300 mg 3 times daily  Side effects were reviewed with the patient  I encouraged the patient to continue following up with the [address] [facility] due to her nonhealing wound as well as Dr Sunny Farris since she does continue to report that she would like the hardware removed  The patient verbalized understanding  Follow-up is planned in 4 weeks time or sooner as warranted  Discharge instructions were provided   I personally saw and examined the patient and I agree with the above discussed plan of care  History of Present Illness:    Costa Laughlin is a 41 y o  female who presents to [facility] for initial evaluation of the above stated pain complaints  The patient has a past medical and chronic pain history as outlined in the assessment section  She was referred by Dr Keon Olson  At today's office visit, the patient's pain score is 10/10 on the verbal numerical pain rating scale  The patient states that her pain is primarily in her left ankle at the site where she had her left ankle open reduction internal fixation performed 8 months ago  She also states that her pain radiates up her leg to some degree  She describes her pain is severe in 10/10 on the verbal numerical pain rating scale  Her pain is constant in nature and worse in the afternoon, evening, and night  She describes her pain as shooting, numbness, sharp, dull/aching, and throbbing    She reports weakness in her lower extremities  Lying down, sitting, and relaxation decreases her pain while standing, walking, and exercise increases her pain  She reports no significant relief from left ankle surgery  She does report moderate relief with physical therapy  She is currently using Tylenol over-the-counter as well as Aleve over-the-counter as needed for pain relief and she does get some pain relief with these medications  The patient is currently unemployed  Review of Systems:    Review of Systems   Constitutional: Negative for fever and unexpected weight change  HENT: Negative for trouble swallowing  Eyes: Negative for visual disturbance  Respiratory: Positive for shortness of breath and wheezing  Cardiovascular: Negative for chest pain and palpitations  Gastrointestinal: Negative for constipation, diarrhea, nausea and vomiting  Endocrine: Negative for cold intolerance, heat intolerance and polydipsia     Genitourinary: Negative for difficulty urinating and frequency  Musculoskeletal: Positive for gait problem (difficulty walking) and joint swelling (joint stiffness)  Negative for arthralgias and myalgias  Skin: Negative for rash  Neurological: Positive for weakness (muscle weakness)  Negative for dizziness, seizures, syncope and headaches  Hematological: Does not bruise/bleed easily  Psychiatric/Behavioral: Negative for dysphoric mood  All other systems reviewed and are negative  Patient Active Problem List   Diagnosis    Anxiety and depression    Obesity    Trimalleolar fracture of ankle, closed, left, initial encounter    Mass of soft tissue of right lower extremity    Closed fracture of left ankle    Status post ORIF of fracture of ankle    Delayed surgical wound healing    Lipoma of right lower extremity    Lipoma of left lower extremity    Tobacco abuse disorder    Chronic pain of left ankle    Chronic pain syndrome       Past Medical History:   Diagnosis Date    History of wound infection     left ankle    Psychiatric disorder     depression, anxiety       Past Surgical History:   Procedure Laterality Date    [place] OPEN TREATMENT BIMALLEOLAR ANKLE FRACTURE Left 5/14/2018    Procedure: OPEN REDUCTION W/ INTERNAL FIXATION (ORIF) ANKLE;  Surgeon: Savannah Bal DO;  Location: [facility] OR;  Service: Orthopedics    REDUCTION MAMMAPLASTY         Family History   Problem Relation Age of Onset    No Known Problems Father        Social History     Occupational History    Not on file       Social History Main Topics    Smoking status: Current Every Day Smoker     Packs/day: 0 50     Types: Cigarettes    Smokeless tobacco: Never Used    Alcohol use Yes      Comment: social    Drug use: No    Sexual activity: Yes     Partners: Male         Current Outpatient Prescriptions:     albuterol (PROAIR HFA) 90 mcg/act inhaler, Inhale 2 puffs every 6 (six) hours as needed for wheezing, Disp: 8 5 g, Rfl: 0    ARIPiprazole (ABILIFY) 5 mg tablet, Take 10 mg by mouth daily  , Disp: , Rfl:     clonazePAM (KlonoPIN) 0 5 mg tablet, Take 0 5 mg by mouth 2 (two) times a day as needed  , Disp: , Rfl: 0    Elastic Bandages & Supports (WRIST BRACE DELUXE/RIGHT S/M) MISC, by Does not apply route continuous, Disp: 1 each, Rfl: 0    sertraline (ZOLOFT) 25 mg tablet, Take 50 mg by mouth daily, Disp: , Rfl:     sulfamethoxazole-trimethoprim (BACTRIM DS) 800-160 mg per tablet, take 1 tablet by mouth twice a day for 10 days, Disp: , Rfl: 0    cyclobenzaprine (FLEXERIL) 10 mg tablet, Take 1 tablet (10 mg total) by mouth 3 (three) times a day as needed for muscle spasms for up to 3 days, Disp: 9 tablet, Rfl: 0    gabapentin (NEURONTIN) 300 mg capsule, Take 1 capsule (300 mg total) by mouth 3 (three) times a day, Disp: 90 capsule, Rfl: 0    meloxicam (MOBIC) 7 5 mg tablet, Take 1 tablet (7 5 mg total) by mouth daily as needed (pain (with food)) for up to 30 days, Disp: 30 tablet, Rfl: 0    Allergies   Allergen Reactions    Toradol [Ketorolac Tromethamine]      GI UPSET    Latex Rash    Ultram [Tramadol] Rash       Physical Exam:    /70 (BP Location: Right arm, Patient Position: Sitting, Cuff Size: Standard)   Pulse 82   Temp 98 1 °F (36 7 °C) (Oral)   Resp 18   Ht 5' 6 5" (1 689 m)   Wt 86 4 kg (190 lb 6 4 oz)   BMI 30 27 kg/m²     Constitutional: obese  Eyes: anicteric  HEENT: grossly intact  Neck: supple, symmetric, trachea midline and no masses   Pulmonary:even and unlabored  Cardiovascular:No edema or pitting edema present  Skin:Normal without rashes or lesions and well hydrated  Psychiatric:Mood and affect appropriate  Neurologic:Cranial Nerves II-XII grossly intact  Musculoskeletal:antalgic, bandages covering the left ankle wound  Sensation is intact in all major dermatomes of the lower extremities bilaterally  Muscle strength is 5/5 in all major muscle groups of the bilateral lower extremities      Imaging  No orders to display Show images for XR ankle 3+ views LEFT   Order Report      Order Details   Order Questions     Question Answer Comment   Exam reason osteo    Note:  Enter reason for exam   Pregnant? No           Reason For Exam     osteo   Study Result     LEFT ANKLE     INDICATION:   osteo      COMPARISON:  10/12/2018     VIEWS:  XR ANKLE 3+ VW LEFT   Images: 3     FINDINGS:     There is a plate with multiple screws present in the distal fibula, there is a healed distal fibular fracture  There are 2 screws through the medial malleolus      Inferior and posterior calcaneal spurs are present      No lytic or blastic lesions seen  No areas of osseous destruction      Soft tissues are unremarkable      IMPRESSION:     No acute osseous abnormality      Postoperative changes    No areas of osseous destruction      Workstation performed: [id number]      Imaging     XR ankle 3+ views LEFT (Order #[id number]) on 12/14/2018 - Imaging Information   Result History     XR ankle 3+ views LEFT (Order #[id number]) on 12/14/2018 - Order Result History Report

## 2019-01-04 LAB
BACTERIA WND AEROBE CULT: ABNORMAL
BACTERIA WND AEROBE CULT: ABNORMAL
GRAM STN SPEC: ABNORMAL

## 2019-01-08 DIAGNOSIS — J40 BRONCHITIS: ICD-10-CM

## 2019-01-22 ENCOUNTER — OFFICE VISIT (OUTPATIENT)
Dept: FAMILY MEDICINE CLINIC | Facility: CLINIC | Age: 44
End: 2019-01-22
Payer: COMMERCIAL

## 2019-01-22 VITALS
SYSTOLIC BLOOD PRESSURE: 110 MMHG | WEIGHT: 192.3 LBS | TEMPERATURE: 97.6 F | RESPIRATION RATE: 18 BRPM | BODY MASS INDEX: 30.57 KG/M2 | HEART RATE: 86 BPM | OXYGEN SATURATION: 99 % | DIASTOLIC BLOOD PRESSURE: 78 MMHG

## 2019-01-22 DIAGNOSIS — J01.40 ACUTE NON-RECURRENT PANSINUSITIS: Primary | ICD-10-CM

## 2019-01-22 DIAGNOSIS — J40 BRONCHITIS: ICD-10-CM

## 2019-01-22 DIAGNOSIS — R06.2 WHEEZING: ICD-10-CM

## 2019-01-22 PROCEDURE — 99213 OFFICE O/P EST LOW 20 MIN: CPT | Performed by: NURSE PRACTITIONER

## 2019-01-22 RX ORDER — ALBUTEROL SULFATE 90 UG/1
2 AEROSOL, METERED RESPIRATORY (INHALATION) EVERY 6 HOURS PRN
Qty: 1 INHALER | Refills: 0 | Status: SHIPPED | OUTPATIENT
Start: 2019-01-22 | End: 2021-10-21 | Stop reason: ALTCHOICE

## 2019-01-22 RX ORDER — FLUTICASONE PROPIONATE 50 MCG
2 SPRAY, SUSPENSION (ML) NASAL DAILY
Qty: 16 G | Refills: 0 | Status: SHIPPED | OUTPATIENT
Start: 2019-01-22 | End: 2019-03-22 | Stop reason: HOSPADM

## 2019-01-22 RX ORDER — ARIPIPRAZOLE 20 MG/1
15 TABLET ORAL DAILY
Refills: 0 | COMMUNITY
Start: 2019-01-06 | End: 2019-09-19 | Stop reason: ALTCHOICE

## 2019-01-22 RX ORDER — AMOXICILLIN AND CLAVULANATE POTASSIUM 875; 125 MG/1; MG/1
1 TABLET, FILM COATED ORAL EVERY 12 HOURS SCHEDULED
Qty: 14 TABLET | Refills: 0 | Status: SHIPPED | OUTPATIENT
Start: 2019-01-22 | End: 2019-01-29

## 2019-01-22 RX ORDER — BUPROPION HYDROCHLORIDE 150 MG/1
150 TABLET ORAL EVERY MORNING
Refills: 0 | COMMUNITY
Start: 2019-01-14 | End: 2019-03-22 | Stop reason: HOSPADM

## 2019-01-22 NOTE — PROGRESS NOTES
Assessment/Plan:  1  Take medication as prescribed  2  Follow-up condition changes or worsens       Diagnoses and all orders for this visit:    Acute non-recurrent pansinusitis  -     fluticasone (FLONASE) 50 mcg/act nasal spray; 2 sprays into each nostril daily  -     amoxicillin-clavulanate (AUGMENTIN) 875-125 mg per tablet; Take 1 tablet by mouth every 12 (twelve) hours for 7 days    Wheezing    Bronchitis  -     albuterol (VENTOLIN HFA) 90 mcg/act inhaler; Inhale 2 puffs every 6 (six) hours as needed for wheezing          Subjective:      Patient ID: Melisa Celis is a 41 y o  female  51-year-old female presents with cough for 3 weeks  Reports some wheezing  Reports she is due to be evaluated for asthma in the near future  Has used albuterol in the past however does not have any at this time  Denies fever  Reports phlegmy cough  Denies CP  Some sinus congestion/pressure  The following portions of the patient's history were reviewed and updated as appropriate: allergies and current medications  Review of Systems   Constitutional: Negative  HENT: Positive for congestion, sinus pain and sinus pressure  Respiratory: Positive for cough and wheezing  Cardiovascular: Negative  Objective:      /78 (BP Location: Left arm, Patient Position: Sitting, Cuff Size: Adult)   Pulse 86   Temp 97 6 °F (36 4 °C) (Tympanic)   Resp 18   Wt 87 2 kg (192 lb 4 8 oz)   SpO2 99%   BMI 30 57 kg/m²          Physical Exam   Constitutional: She appears well-developed and well-nourished  HENT:   Head: Normocephalic and atraumatic  Right Ear: External ear normal    Left Ear: External ear normal    Nose: Right sinus exhibits maxillary sinus tenderness and frontal sinus tenderness  Left sinus exhibits maxillary sinus tenderness and frontal sinus tenderness  Mouth/Throat: Oropharynx is clear and moist    Cardiovascular: Normal rate, regular rhythm and normal heart sounds  Pulmonary/Chest: She has wheezes

## 2019-01-23 ENCOUNTER — APPOINTMENT (OUTPATIENT)
Dept: WOUND CARE | Facility: HOSPITAL | Age: 44
End: 2019-01-23
Payer: COMMERCIAL

## 2019-01-23 PROCEDURE — 99213 OFFICE O/P EST LOW 20 MIN: CPT

## 2019-01-28 RX ORDER — ALBUTEROL SULFATE 90 UG/1
2 AEROSOL, METERED RESPIRATORY (INHALATION) EVERY 6 HOURS PRN
Qty: 8.5 G | Refills: 0 | OUTPATIENT
Start: 2019-01-28

## 2019-01-29 ENCOUNTER — APPOINTMENT (OUTPATIENT)
Dept: LAB | Facility: HOSPITAL | Age: 44
End: 2019-01-29
Attending: ORTHOPAEDIC SURGERY
Payer: COMMERCIAL

## 2019-01-29 ENCOUNTER — OFFICE VISIT (OUTPATIENT)
Dept: OBGYN CLINIC | Facility: CLINIC | Age: 44
End: 2019-01-29
Payer: COMMERCIAL

## 2019-01-29 ENCOUNTER — TRANSCRIBE ORDERS (OUTPATIENT)
Dept: ADMINISTRATIVE | Facility: HOSPITAL | Age: 44
End: 2019-01-29

## 2019-01-29 ENCOUNTER — HOSPITAL ENCOUNTER (OUTPATIENT)
Dept: RADIOLOGY | Facility: HOSPITAL | Age: 44
Discharge: HOME | End: 2019-01-29
Payer: COMMERCIAL

## 2019-01-29 ENCOUNTER — OFFICE VISIT (OUTPATIENT)
Dept: LAB | Facility: HOSPITAL | Age: 44
End: 2019-01-29
Payer: COMMERCIAL

## 2019-01-29 VITALS
WEIGHT: 193.6 LBS | DIASTOLIC BLOOD PRESSURE: 77 MMHG | SYSTOLIC BLOOD PRESSURE: 120 MMHG | HEART RATE: 87 BPM | BODY MASS INDEX: 30.39 KG/M2 | HEIGHT: 67 IN

## 2019-01-29 DIAGNOSIS — Z98.890 STATUS POST ORIF OF FRACTURE OF ANKLE: ICD-10-CM

## 2019-01-29 DIAGNOSIS — T81.89XS DELAYED SURGICAL WOUND HEALING, SEQUELA: ICD-10-CM

## 2019-01-29 DIAGNOSIS — Z87.81 STATUS POST ORIF OF FRACTURE OF ANKLE: ICD-10-CM

## 2019-01-29 DIAGNOSIS — Z01.818 PREOP EXAMINATION: Primary | ICD-10-CM

## 2019-01-29 DIAGNOSIS — T84.84XA PAINFUL ORTHOPAEDIC HARDWARE (HCC): ICD-10-CM

## 2019-01-29 DIAGNOSIS — T84.84XA PAINFUL ORTHOPAEDIC HARDWARE (HCC): Primary | ICD-10-CM

## 2019-01-29 LAB
ABO GROUP BLD: NORMAL
ANION GAP SERPL CALCULATED.3IONS-SCNC: 9 MMOL/L (ref 4–13)
APTT PPP: 28 SECONDS (ref 24–33)
BASOPHILS # BLD MANUAL: 0.09 THOUSAND/UL (ref 0–0.1)
BASOPHILS NFR MAR MANUAL: 2 % (ref 0–1)
BLD GP AB SCN SERPL QL: NEGATIVE
BUN SERPL-MCNC: 15 MG/DL (ref 5–25)
CALCIUM SERPL-MCNC: 9.5 MG/DL (ref 8.3–10.1)
CHLORIDE SERPL-SCNC: 100 MMOL/L (ref 100–108)
CO2 SERPL-SCNC: 27 MMOL/L (ref 21–32)
CREAT SERPL-MCNC: 1.01 MG/DL (ref 0.6–1.3)
EOSINOPHIL # BLD MANUAL: 0.13 THOUSAND/UL (ref 0–0.4)
EOSINOPHIL NFR BLD MANUAL: 3 % (ref 0–6)
ERYTHROCYTE [DISTWIDTH] IN BLOOD BY AUTOMATED COUNT: 14.9 % (ref 11.6–15.1)
GFR SERPL CREATININE-BSD FRML MDRD: 69 ML/MIN/1.73SQ M
GLUCOSE P FAST SERPL-MCNC: 82 MG/DL (ref 65–99)
HCT VFR BLD AUTO: 38.3 % (ref 34.8–46.1)
HGB BLD-MCNC: 12.1 G/DL (ref 11.5–15.4)
INR PPP: 0.95 (ref 0.86–1.16)
LYMPHOCYTES # BLD AUTO: 1.41 THOUSAND/UL (ref 0.6–4.47)
LYMPHOCYTES # BLD AUTO: 33 % (ref 14–44)
MCH RBC QN AUTO: 28 PG (ref 26.8–34.3)
MCHC RBC AUTO-ENTMCNC: 31.6 G/DL (ref 31.4–37.4)
MCV RBC AUTO: 89 FL (ref 82–98)
MONOCYTES # BLD AUTO: 0.3 THOUSAND/UL (ref 0–1.22)
MONOCYTES NFR BLD: 7 % (ref 4–12)
NEUTROPHILS # BLD MANUAL: 2.34 THOUSAND/UL (ref 1.85–7.62)
NEUTS BAND NFR BLD MANUAL: 1 % (ref 0–8)
NEUTS SEG NFR BLD AUTO: 54 % (ref 43–75)
NRBC BLD AUTO-RTO: 0 /100 WBCS
PLATELET # BLD AUTO: 235 THOUSANDS/UL (ref 149–390)
PLATELET BLD QL SMEAR: ADEQUATE
PMV BLD AUTO: 11 FL (ref 8.9–12.7)
POTASSIUM SERPL-SCNC: 4.1 MMOL/L (ref 3.5–5.3)
PROTHROMBIN TIME: 10 SECONDS (ref 9.4–11.7)
RBC # BLD AUTO: 4.32 MILLION/UL (ref 3.81–5.12)
RBC MORPH BLD: NORMAL
RH BLD: POSITIVE
SODIUM SERPL-SCNC: 136 MMOL/L (ref 136–145)
SPECIMEN EXPIRATION DATE: NORMAL
TOTAL CELLS COUNTED SPEC: 100
TOXIC GRANULES BLD QL SMEAR: PRESENT
WBC # BLD AUTO: 4.26 THOUSAND/UL (ref 4.31–10.16)

## 2019-01-29 PROCEDURE — 36415 COLL VENOUS BLD VENIPUNCTURE: CPT | Performed by: ORTHOPAEDIC SURGERY

## 2019-01-29 PROCEDURE — 86901 BLOOD TYPING SEROLOGIC RH(D): CPT | Performed by: ORTHOPAEDIC SURGERY

## 2019-01-29 PROCEDURE — 80048 BASIC METABOLIC PNL TOTAL CA: CPT | Performed by: ORTHOPAEDIC SURGERY

## 2019-01-29 PROCEDURE — 86850 RBC ANTIBODY SCREEN: CPT | Performed by: ORTHOPAEDIC SURGERY

## 2019-01-29 PROCEDURE — 86900 BLOOD TYPING SEROLOGIC ABO: CPT | Performed by: ORTHOPAEDIC SURGERY

## 2019-01-29 PROCEDURE — 87081 CULTURE SCREEN ONLY: CPT | Performed by: ORTHOPAEDIC SURGERY

## 2019-01-29 PROCEDURE — 85007 BL SMEAR W/DIFF WBC COUNT: CPT | Performed by: ORTHOPAEDIC SURGERY

## 2019-01-29 PROCEDURE — 85027 COMPLETE CBC AUTOMATED: CPT | Performed by: ORTHOPAEDIC SURGERY

## 2019-01-29 PROCEDURE — 93005 ELECTROCARDIOGRAM TRACING: CPT

## 2019-01-29 PROCEDURE — 85610 PROTHROMBIN TIME: CPT | Performed by: ORTHOPAEDIC SURGERY

## 2019-01-29 PROCEDURE — 85730 THROMBOPLASTIN TIME PARTIAL: CPT | Performed by: ORTHOPAEDIC SURGERY

## 2019-01-29 PROCEDURE — 71046 X-RAY EXAM CHEST 2 VIEWS: CPT

## 2019-01-29 PROCEDURE — 99214 OFFICE O/P EST MOD 30 MIN: CPT | Performed by: ORTHOPAEDIC SURGERY

## 2019-01-29 NOTE — PROGRESS NOTES
Assessment/Plan:  1  Painful orthopaedic hardware [facility])  Case request operating room: [facility]    Ambulatory referral to [facility]    Basic metabolic panel    CBC and differential    APTT    Protime-INR    EKG 12 lead    XR chest pa & lateral    POCT urine HCG    Type and screen    Basic metabolic panel    CBC and differential    APTT    Protime-INR    Type and screen    Case request operating room: REMOVAL HARDWARE ANKLE   2  Status post ORIF of fracture of ankle  Case request operating room: [facility]    Ambulatory referral to [facility]    Basic metabolic panel    CBC and differential    APTT    Protime-INR    EKG 12 lead    XR chest pa & lateral    POCT urine HCG    Type and screen    Basic metabolic panel    CBC and differential    APTT    Protime-INR    Type and screen    Case request operating room: REMOVAL HARDWARE ANKLE   3  Delayed surgical wound healing, sequela  Case request operating room: REMOVAL HARDWARE ANKLE    Ambulatory referral to [facility]    Basic metabolic panel    CBC and differential    APTT    Protime-INR    EKG 12 lead    XR chest pa & lateral    POCT urine HCG    Type and screen    Basic metabolic panel    CBC and differential    APTT    Protime-INR    Type and screen    Case request operating room: REMOVAL HARDWARE ANKLE     Patient is here for follow-up regarding her nonhealing left lateral ankle incision from her open reduction internal fixation of her left ankle in May of 2018  At this point her fractures have healed in the lateral aspect of her ankle and with the persistence of a small sinus tract that remains open with intermittent periods of drainage I recommended that she undergo removal of hardware and wound closure to eliminate the persistent draining sinus  At this point I do not suspect a deep infection is present as she would be having more clinical symptoms other than just this draining sinus    She was recently placed on Bactrim by the wound care clinic at [facility]  She should continue the use of this since she has already started this regiment  She is ambulating quite comfortably in normal shoe wear but would prefer to have the sinus track addressed as well as removal of the hardware as she feels there may be some adhesions to the underlying hardware on the lateral aspect of her ankle which I do not appreciate here today  She will undergo a removal of hardware and wound closure on 2/5/2019 with a short inpatient stay afterwards to provide IV antibiotics and depending upon what is found intraoperatively further consultation with other subspecialty services if needed  The risks and benefits of undergoing this procedure were discussed at length  She is aware that in addition to the baseline risks of neurovascular damage, bleeding, infection, wound healing problems, persistent pain and blood clot in the lower extremity and lungs she has had an increased risk for further wound healing complications and superficial infection and deep infection due to her persistent use of cigarettes  I have been encouraging her to quit since her initial surgery in May and she has failed to do so yet  She states that she is currently smoking half a pack of cigarettes a day and is on Wellbutrin and is trying to quit however she would prefer to have her ankle issues rectified as soon as possible despite being at a slightly increased risk for having the same issue with her wound healing occur again due continuation of her cigarette use  Consents were signed and placed into the chart here today  After the hardware is removed she will have a period of nonweightbearing on crutches until her soft tissues heal and then she may return to full weight-bearing  All of her questions were addressed  Look for to seeing her back postoperatively  Subjective: Follow-up left ankle ORIF, delayed surgical wound healing    Patient ID: Ewelina Hdudleston is a 41 y o  female  HPI  Patient is here for follow-up regarding the above stated complaints  She underwent ORIF of her left ankle in May of 2018  She has had difficulty healing her wound since her index procedure on the lateral aspect of her ankle  She has continued smoking despite constant recommendations to quit  She states here today she has decreased the amount of smoking she is doing and is now down to half a pack a day with assistance of Wellbutrin  She denies weight-bearing ankle pain and discomfort but has concerns about adhesions of the hardware underneath her skin which may not be allowing this area persistent drainage to close  She states the wound has had periods of drainage and no drainage and is currently producing more drainage  On the Band-Aid today there is some yellowish gray drainage from the area of concern  She was placed on Bactrim by the wound care clinic  Review of Systems   Constitutional: Positive for activity change  HENT: Negative  Eyes: Negative  Respiratory: Negative  Cardiovascular: Negative  Gastrointestinal: Negative  Endocrine: Negative  Musculoskeletal: Negative  Skin: Negative  Neurological: Negative  Psychiatric/Behavioral: Negative            Past Medical History:   Diagnosis Date    History of wound infection     left ankle    Psychiatric disorder     depression, anxiety       Past Surgical History:   Procedure Laterality Date    [place] OPEN TREATMENT BIMALLEOLAR ANKLE FRACTURE Left 5/14/2018    Procedure: OPEN REDUCTION W/ INTERNAL FIXATION (ORIF) ANKLE;  Surgeon: Lenny Kimbrough DO;  Location: [facility];  Service: Orthopedics    REDUCTION MAMMAPLASTY         Family History   Problem Relation Age of Onset    No Known Problems Father     Diabetes Mother     Cancer Mother     Asthma Mother     No Known Problems Sister     No Known Problems Brother     No Known Problems Maternal Aunt     No Known Problems Maternal Uncle     No Known Problems Paternal Aunt     No Known Problems Paternal Uncle     No Known Problems Maternal Grandmother     No Known Problems Maternal Grandfather     No Known Problems Paternal Grandmother     No Known Problems Paternal Grandfather     ADD / ADHD Neg Hx     Anesthesia problems Neg Hx     Clotting disorder Neg Hx     Collagen disease Neg Hx     Dislocations Neg Hx     Learning disabilities Neg Hx     Neurological problems Neg Hx     Osteoporosis Neg Hx     Rheumatologic disease Neg Hx     Scoliosis Neg Hx     Vascular Disease Neg Hx        Social History     Occupational History    Not on file       Social History Main Topics    Smoking status: Current Every Day Smoker     Packs/day: 0 50     Types: Cigarettes    Smokeless tobacco: Never Used    Alcohol use Yes      Comment: social    Drug use: No    Sexual activity: Yes     Partners: Male         Current Outpatient Prescriptions:     albuterol (VENTOLIN HFA) 90 mcg/act inhaler, Inhale 2 puffs every 6 (six) hours as needed for wheezing, Disp: 1 Inhaler, Rfl: 0    amoxicillin-clavulanate (AUGMENTIN) 875-125 mg per tablet, Take 1 tablet by mouth every 12 (twelve) hours for 7 days, Disp: 14 tablet, Rfl: 0    ARIPiprazole (ABILIFY) 20 MG tablet, , Disp: , Rfl: 0    ARIPiprazole (ABILIFY) 5 mg tablet, Take 10 mg by mouth daily  , Disp: , Rfl:     buPROPion (WELLBUTRIN XL) 150 mg 24 hr tablet, Take 150 mg by mouth daily, Disp: , Rfl: 0    clonazePAM (KlonoPIN) 0 5 mg tablet, Take 0 5 mg by mouth 2 (two) times a day as needed  , Disp: , Rfl: 0    Elastic Bandages & Supports (WRIST BRACE DELUXE/RIGHT S/M) MISC, by Does not apply route continuous, Disp: 1 each, Rfl: 0    fluticasone (FLONASE) 50 mcg/act nasal spray, 2 sprays into each nostril daily, Disp: 16 g, Rfl: 0    gabapentin (NEURONTIN) 300 mg capsule, Take 1 capsule (300 mg total) by mouth 3 (three) times a day, Disp: 90 capsule, Rfl: 0    meloxicam (MOBIC) 7 5 mg tablet, Take 1 tablet (7 5 mg total) by mouth daily as needed (pain (with food)) for up to 30 days, Disp: 30 tablet, Rfl: 0    sertraline (ZOLOFT) 25 mg tablet, Take 50 mg by mouth daily, Disp: , Rfl:     sertraline (ZOLOFT) 50 mg tablet, , Disp: , Rfl: 0    sulfamethoxazole-trimethoprim (BACTRIM DS) 800-160 mg per tablet, take 1 tablet by mouth twice a day for 10 days, Disp: , Rfl: 0    cyclobenzaprine (FLEXERIL) 10 mg tablet, Take 1 tablet (10 mg total) by mouth 3 (three) times a day as needed for muscle spasms for up to 3 days, Disp: 9 tablet, Rfl: 0    Allergies   Allergen Reactions    Toradol [Ketorolac Tromethamine]      GI UPSET    Latex Rash    Ultram [Tramadol] Rash       Objective:  Vitals:    01/29/19 1042   BP: 120/77   Pulse: 87       Body mass index is 30 78 kg/m²  Left Ankle Exam   Swelling: none    Tenderness   The patient is experiencing tenderness in the lateral malleolus  Range of Motion   Dorsiflexion: normal   Plantar flexion: normal     Tests   Anterior drawer: negative  Varus tilt: negative    Other   Erythema: absent  Scars: present  Sensation: normal  Pulse: present    Comments:  Lateral ankle wound has healed proximally and distally except for a small 1 mm in the distal aspect of her lateral ankle incision that has yet to close in today has grayish yellowish discharge from it  There is no odor  There is no surrounding erythema  There is no hardware present  The skin does feel mobile over the hardware in the subcutaneous tissues  Neurovascularly intact  Ankle range of motion without pain and discomfort  Observations   Left Ankle/Foot   Positive for adhesive scar  Physical Exam   Constitutional: She is oriented to person, place, and time  She appears well-developed  HENT:   Head: Atraumatic  Eyes: EOM are normal    Neck: Neck supple  Cardiovascular: Normal rate      Pulmonary/Chest: Effort normal    Musculoskeletal:   See orthopedic exam   Neurological: She is alert and oriented to person, place, and time  Skin: Skin is warm and dry  Psychiatric: She has a normal mood and affect  Nursing note and vitals reviewed  I have personally reviewed pertinent films in PACS  X-rays of the left ankle from 12/14/2018 reviewed by me demonstrate no prominent hardware over the lateral aspect of the ankle in her ankle fracture is well healed  Fibular fracture is well-healed with a long plate screw construct  No sign of hardware failure  No air in the soft tissues  Ankle mortise is stable and controlled  Nahun JACK    Division of Adult Reconstruction  Department of [facility]

## 2019-01-29 NOTE — PRE-PROCEDURE INSTRUCTIONS
My Surgical Experience    The following information was developed to assist you to prepare for your operation  What do I need to do before coming to the hospital?   Arrange for a responsible person to drive you to and from the hospital    Arrange care for your children at home  Children are not allowed in the recovery areas of the hospital   Plan to wear clothing that is easy to put on and take off  If you are having shoulder surgery, wear a shirt that buttons or zippers in the front  Bathing  o Shower the evening before and the morning of your surgery with an antibacterial soap  Please refer to the Pre Op Showering Instructions for Surgery Patients Sheet   o Remove nail polish and all body piercing jewelry  o Do not shave any body part for at least 24 hours before surgery-this includes face, arms, legs and upper body  Food  o Nothing to eat or drink after midnight the night before your surgery  This includes candy and chewing gum  o Exception: If your surgery is after 12:00pm (noon), you may have clear liquids such as 7-Up®, ginger ale, apple or cranberry juice, Jell-O®, water, or clear broth until 8:00 am  o Do not drink milk or juice with pulp on the morning before surgery  o Do not drink alcohol 24 hours before surgery  Medicine  o Follow instructions you received from your surgeon about which medicines you may take on the day of surgery  o If instructed to take medicine on the morning of surgery, take pills with just a small sip of water  Call your prescribing doctor for specific infroamtion on what to do if you take insulin    What should I bring to the hospital?    Bring:  [place] or a walker, if you have them, for foot or knee surgery   A list of the daily medicines, vitamins, minerals, herbals and nutritional supplements you take   Include the dosages of medicines and the time you take them each day   Glasses, dentures or hearing aids   Minimal clothing; you will be wearing hospital sleepwear   Photo ID; required to verify your identity   If you have a Living Will or Power of , bring a copy of the documents   If you have an ostomy, bring an extra pouch and any supplies you use    Do not bring   Medicines or inhalers   Money, valuables or jewelry    What other information should I know about the day of surgery?  Notify your surgeons if you develop a cold, sore throat, cough, fever, rash or any other illness   Report to the Ambulatory Surgical/Same Day Surgery Unit   You will be instructed to stop at Registration only if you have not been pre-registered   Inform your  fi they do not stay that they will be asked by the staff to leave a phone number where they can be reached   Be available to be reached before surgery  In the event the operating room schedule changes, you may be asked to come in earlier or later than expected    *It is important to tell your doctor and others involved in your health care if you are taking or have been taking any non-prescription drugs, vitamins, minerals, herbals or other nutritional supplements  Any of these may interact with some food or medicines and cause a reaction      Pre-Surgery Instructions:   Medication Instructions    albuterol (VENTOLIN HFA) 90 mcg/act inhaler Instructed patient per Anesthesia Guidelines   ARIPiprazole (ABILIFY) 20 MG tablet Instructed patient per Anesthesia Guidelines   ARIPiprazole (ABILIFY) 5 mg tablet Instructed patient per Anesthesia Guidelines   buPROPion (WELLBUTRIN XL) 150 mg 24 hr tablet Instructed patient per Anesthesia Guidelines   clonazePAM (KlonoPIN) 0 5 mg tablet Instructed patient per Anesthesia Guidelines   Elastic Bandages & Supports (WRIST BRACE DELUXE/RIGHT S/M) MISC Instructed patient per Anesthesia Guidelines   fluticasone (FLONASE) 50 mcg/act nasal spray Instructed patient per Anesthesia Guidelines      gabapentin (NEURONTIN) 300 mg capsule Instructed patient per Anesthesia Guidelines   meloxicam (MOBIC) 7 5 mg tablet Instructed patient per Anesthesia Guidelines   sulfamethoxazole-trimethoprim (BACTRIM DS) 800-160 mg per tablet Instructed patient per Anesthesia Guidelines  To take gabapentin, abilify, wellbutrin a m  Of surgery

## 2019-01-30 ENCOUNTER — APPOINTMENT (OUTPATIENT)
Dept: WOUND CARE | Facility: HOSPITAL | Age: 44
End: 2019-01-30
Payer: COMMERCIAL

## 2019-01-30 PROCEDURE — 99212 OFFICE O/P EST SF 10 MIN: CPT

## 2019-01-31 ENCOUNTER — OFFICE VISIT (OUTPATIENT)
Dept: FAMILY MEDICINE CLINIC | Facility: CLINIC | Age: 44
End: 2019-01-31
Payer: COMMERCIAL

## 2019-01-31 VITALS
BODY MASS INDEX: 30.68 KG/M2 | WEIGHT: 193 LBS | TEMPERATURE: 98.7 F | RESPIRATION RATE: 18 BRPM | SYSTOLIC BLOOD PRESSURE: 116 MMHG | HEART RATE: 91 BPM | OXYGEN SATURATION: 98 % | DIASTOLIC BLOOD PRESSURE: 78 MMHG

## 2019-01-31 DIAGNOSIS — Z01.818 PRE-OPERATIVE EXAMINATION: Primary | ICD-10-CM

## 2019-01-31 LAB
MRSA NOSE QL CULT: NORMAL
SL AMB POCT URINE HCG: NEGATIVE

## 2019-01-31 PROCEDURE — 99214 OFFICE O/P EST MOD 30 MIN: CPT | Performed by: FAMILY MEDICINE

## 2019-01-31 PROCEDURE — 81025 URINE PREGNANCY TEST: CPT | Performed by: FAMILY MEDICINE

## 2019-02-02 LAB
ATRIAL RATE: 78 BPM
P AXIS: 25 DEGREES
PR INTERVAL: 212 MS
QRS AXIS: 29 DEGREES
QRSD INTERVAL: 86 MS
QT INTERVAL: 380 MS
QTC INTERVAL: 433 MS
T WAVE AXIS: 31 DEGREES
VENTRICULAR RATE: 78 BPM

## 2019-02-02 PROCEDURE — 93010 ELECTROCARDIOGRAM REPORT: CPT | Performed by: INTERNAL MEDICINE

## 2019-02-04 ENCOUNTER — ANESTHESIA EVENT (OUTPATIENT)
Dept: PERIOP | Facility: HOSPITAL | Age: 44
DRG: 217 | End: 2019-02-04
Payer: COMMERCIAL

## 2019-02-04 PROBLEM — Z01.818 PRE-OPERATIVE EXAMINATION: Status: ACTIVE | Noted: 2019-02-04

## 2019-02-04 NOTE — PROGRESS NOTES
Assessment/Plan:    Pre-operative examination  Patient cleared for surgery       Diagnoses and all orders for this visit:    Pre-operative examination  -     Pulse oximetry, spot; Future  -     POCT urine HCG          Subjective:      Patient ID: Jossy Mcneil is a 41 y o  female  Pt here for surgical clearance, having hardware removal from ankle by Dr Julia Barros  Pt is a smoker, advised to quit smoking as risk for anesthesia  Otherwise no complaints  The following portions of the patient's history were reviewed and updated as appropriate: allergies, current medications, past family history, past medical history, past social history, past surgical history and problem list     Review of Systems   Constitutional: Negative  HENT: Negative  Respiratory: Negative  Cardiovascular: Negative  Gastrointestinal: Negative  Genitourinary: Negative  Objective:      /78   Pulse 91   Temp 98 7 °F (37 1 °C)   Resp 18   Wt 87 5 kg (193 lb)   LMP 01/12/2019   SpO2 98%   BMI 30 68 kg/m²          Physical Exam   Constitutional: She appears well-developed and well-nourished  HENT:   Head: Normocephalic and atraumatic  Cardiovascular: Normal rate and regular rhythm  Pulmonary/Chest: Effort normal and breath sounds normal    Abdominal: Soft  Bowel sounds are normal    Musculoskeletal: Normal range of motion  Skin: Skin is warm  Vitals reviewed

## 2019-02-05 ENCOUNTER — HOSPITAL ENCOUNTER (OUTPATIENT)
Dept: RADIOLOGY | Facility: HOSPITAL | Age: 44
Setting detail: OUTPATIENT SURGERY
Discharge: HOME | DRG: 217 | End: 2019-02-05
Payer: COMMERCIAL

## 2019-02-05 ENCOUNTER — ANESTHESIA (OUTPATIENT)
Dept: PERIOP | Facility: HOSPITAL | Age: 44
DRG: 217 | End: 2019-02-05
Payer: COMMERCIAL

## 2019-02-05 ENCOUNTER — HOSPITAL ENCOUNTER (INPATIENT)
Facility: HOSPITAL | Age: 44
LOS: 2 days | Discharge: HOME | DRG: 217 | End: 2019-02-08
Attending: ORTHOPAEDIC SURGERY | Admitting: ORTHOPAEDIC SURGERY
Payer: COMMERCIAL

## 2019-02-05 DIAGNOSIS — Z98.890 STATUS POST ORIF OF FRACTURE OF ANKLE: Primary | ICD-10-CM

## 2019-02-05 DIAGNOSIS — T84.84XA PAINFUL ORTHOPAEDIC HARDWARE (HCC): ICD-10-CM

## 2019-02-05 DIAGNOSIS — Z72.0 TOBACCO ABUSE DISORDER: ICD-10-CM

## 2019-02-05 DIAGNOSIS — T81.89XS DELAYED SURGICAL WOUND HEALING, SEQUELA: ICD-10-CM

## 2019-02-05 DIAGNOSIS — Z87.81 STATUS POST ORIF OF FRACTURE OF ANKLE: Primary | ICD-10-CM

## 2019-02-05 LAB — EXT PREGNANCY TEST URINE: NEGATIVE

## 2019-02-05 PROCEDURE — 0SPG04Z REMOVAL OF INTERNAL FIXATION DEVICE FROM LEFT ANKLE JOINT, OPEN APPROACH: ICD-10-PCS | Performed by: ORTHOPAEDIC SURGERY

## 2019-02-05 PROCEDURE — 27680 RELEASE OF LOWER LEG TENDON: CPT | Performed by: ORTHOPAEDIC SURGERY

## 2019-02-05 PROCEDURE — 87176 TISSUE HOMOGENIZATION CULTR: CPT | Performed by: ORTHOPAEDIC SURGERY

## 2019-02-05 PROCEDURE — 87076 CULTURE ANAEROBE IDENT EACH: CPT | Performed by: ORTHOPAEDIC SURGERY

## 2019-02-05 PROCEDURE — 73600 X-RAY EXAM OF ANKLE: CPT

## 2019-02-05 PROCEDURE — 87147 CULTURE TYPE IMMUNOLOGIC: CPT | Performed by: ORTHOPAEDIC SURGERY

## 2019-02-05 PROCEDURE — 11400 EXC TR-EXT B9+MARG 0.5 CM<: CPT | Performed by: ORTHOPAEDIC SURGERY

## 2019-02-05 PROCEDURE — 87186 SC STD MICRODIL/AGAR DIL: CPT | Performed by: ORTHOPAEDIC SURGERY

## 2019-02-05 PROCEDURE — 20680 REMOVAL OF IMPLANT DEEP: CPT | Performed by: ORTHOPAEDIC SURGERY

## 2019-02-05 PROCEDURE — 87070 CULTURE OTHR SPECIMN AEROBIC: CPT | Performed by: ORTHOPAEDIC SURGERY

## 2019-02-05 PROCEDURE — 20680 REMOVAL OF IMPLANT DEEP: CPT | Performed by: PHYSICIAN ASSISTANT

## 2019-02-05 PROCEDURE — 87075 CULTR BACTERIA EXCEPT BLOOD: CPT | Performed by: ORTHOPAEDIC SURGERY

## 2019-02-05 PROCEDURE — 87185 SC STD ENZYME DETCJ PER NZM: CPT | Performed by: ORTHOPAEDIC SURGERY

## 2019-02-05 PROCEDURE — 87205 SMEAR GRAM STAIN: CPT | Performed by: ORTHOPAEDIC SURGERY

## 2019-02-05 PROCEDURE — 0JBR0ZZ EXCISION OF LEFT FOOT SUBCUTANEOUS TISSUE AND FASCIA, OPEN APPROACH: ICD-10-PCS | Performed by: ORTHOPAEDIC SURGERY

## 2019-02-05 PROCEDURE — 81025 URINE PREGNANCY TEST: CPT | Performed by: ANESTHESIOLOGY

## 2019-02-05 RX ORDER — ONDANSETRON 2 MG/ML
4 INJECTION INTRAMUSCULAR; INTRAVENOUS EVERY 6 HOURS PRN
Status: DISCONTINUED | OUTPATIENT
Start: 2019-02-05 | End: 2019-02-08 | Stop reason: HOSPADM

## 2019-02-05 RX ORDER — MIDAZOLAM HYDROCHLORIDE 1 MG/ML
INJECTION INTRAMUSCULAR; INTRAVENOUS AS NEEDED
Status: DISCONTINUED | OUTPATIENT
Start: 2019-02-05 | End: 2019-02-05 | Stop reason: SURG

## 2019-02-05 RX ORDER — DIPHENHYDRAMINE HYDROCHLORIDE 50 MG/ML
12.5 INJECTION INTRAMUSCULAR; INTRAVENOUS ONCE AS NEEDED
Status: DISCONTINUED | OUTPATIENT
Start: 2019-02-05 | End: 2019-02-05 | Stop reason: HOSPADM

## 2019-02-05 RX ORDER — LIDOCAINE HYDROCHLORIDE 10 MG/ML
INJECTION, SOLUTION INFILTRATION; PERINEURAL AS NEEDED
Status: DISCONTINUED | OUTPATIENT
Start: 2019-02-05 | End: 2019-02-05 | Stop reason: SURG

## 2019-02-05 RX ORDER — DEXAMETHASONE SODIUM PHOSPHATE 4 MG/ML
INJECTION, SOLUTION INTRA-ARTICULAR; INTRALESIONAL; INTRAMUSCULAR; INTRAVENOUS; SOFT TISSUE AS NEEDED
Status: DISCONTINUED | OUTPATIENT
Start: 2019-02-05 | End: 2019-02-05 | Stop reason: SURG

## 2019-02-05 RX ORDER — HYDROMORPHONE HCL/PF 1 MG/ML
0.5 SYRINGE (ML) INJECTION
Status: DISCONTINUED | OUTPATIENT
Start: 2019-02-05 | End: 2019-02-05 | Stop reason: HOSPADM

## 2019-02-05 RX ORDER — DOCUSATE SODIUM 100 MG/1
100 CAPSULE, LIQUID FILLED ORAL 2 TIMES DAILY
Status: DISCONTINUED | OUTPATIENT
Start: 2019-02-05 | End: 2019-02-08 | Stop reason: HOSPADM

## 2019-02-05 RX ORDER — BUPROPION HYDROCHLORIDE 150 MG/1
150 TABLET ORAL EVERY MORNING
Status: DISCONTINUED | OUTPATIENT
Start: 2019-02-06 | End: 2019-02-08 | Stop reason: HOSPADM

## 2019-02-05 RX ORDER — FLUTICASONE PROPIONATE 50 MCG
2 SPRAY, SUSPENSION (ML) NASAL DAILY
Status: DISCONTINUED | OUTPATIENT
Start: 2019-02-06 | End: 2019-02-08 | Stop reason: HOSPADM

## 2019-02-05 RX ORDER — OXYCODONE HYDROCHLORIDE 10 MG/1
10 TABLET ORAL EVERY 4 HOURS PRN
Status: DISCONTINUED | OUTPATIENT
Start: 2019-02-05 | End: 2019-02-08 | Stop reason: HOSPADM

## 2019-02-05 RX ORDER — OXYCODONE HYDROCHLORIDE 5 MG/1
5 TABLET ORAL EVERY 4 HOURS PRN
Status: DISCONTINUED | OUTPATIENT
Start: 2019-02-05 | End: 2019-02-08 | Stop reason: HOSPADM

## 2019-02-05 RX ORDER — GLYCOPYRROLATE 0.2 MG/ML
INJECTION INTRAMUSCULAR; INTRAVENOUS AS NEEDED
Status: DISCONTINUED | OUTPATIENT
Start: 2019-02-05 | End: 2019-02-05 | Stop reason: SURG

## 2019-02-05 RX ORDER — ALBUTEROL SULFATE 90 UG/1
2 AEROSOL, METERED RESPIRATORY (INHALATION) EVERY 6 HOURS PRN
Status: DISCONTINUED | OUTPATIENT
Start: 2019-02-05 | End: 2019-02-08 | Stop reason: HOSPADM

## 2019-02-05 RX ORDER — CLONAZEPAM 0.5 MG/1
0.5 TABLET ORAL 2 TIMES DAILY PRN
Status: DISCONTINUED | OUTPATIENT
Start: 2019-02-05 | End: 2019-02-08 | Stop reason: HOSPADM

## 2019-02-05 RX ORDER — ARIPIPRAZOLE 5 MG/1
20 TABLET ORAL DAILY
Status: DISCONTINUED | OUTPATIENT
Start: 2019-02-06 | End: 2019-02-08 | Stop reason: HOSPADM

## 2019-02-05 RX ORDER — HYDROMORPHONE HCL/PF 1 MG/ML
0.5 SYRINGE (ML) INJECTION
Status: DISCONTINUED | OUTPATIENT
Start: 2019-02-05 | End: 2019-02-08 | Stop reason: HOSPADM

## 2019-02-05 RX ORDER — LIDOCAINE HYDROCHLORIDE 10 MG/ML
0.5 INJECTION, SOLUTION INFILTRATION; PERINEURAL ONCE AS NEEDED
Status: DISCONTINUED | OUTPATIENT
Start: 2019-02-05 | End: 2019-02-05

## 2019-02-05 RX ORDER — EPHEDRINE SULFATE 50 MG/ML
INJECTION, SOLUTION INTRAVENOUS AS NEEDED
Status: DISCONTINUED | OUTPATIENT
Start: 2019-02-05 | End: 2019-02-05 | Stop reason: SURG

## 2019-02-05 RX ORDER — BUPIVACAINE HYDROCHLORIDE 5 MG/ML
INJECTION, SOLUTION PERINEURAL AS NEEDED
Status: DISCONTINUED | OUTPATIENT
Start: 2019-02-05 | End: 2019-02-05 | Stop reason: HOSPADM

## 2019-02-05 RX ORDER — IPRATROPIUM BROMIDE AND ALBUTEROL SULFATE 2.5; .5 MG/3ML; MG/3ML
3 SOLUTION RESPIRATORY (INHALATION) ONCE
Status: COMPLETED | OUTPATIENT
Start: 2019-02-05 | End: 2019-02-05

## 2019-02-05 RX ORDER — GABAPENTIN 300 MG/1
300 CAPSULE ORAL 3 TIMES DAILY
Status: DISCONTINUED | OUTPATIENT
Start: 2019-02-05 | End: 2019-02-08 | Stop reason: HOSPADM

## 2019-02-05 RX ORDER — KETAMINE HYDROCHLORIDE 50 MG/ML
INJECTION, SOLUTION, CONCENTRATE INTRAMUSCULAR; INTRAVENOUS AS NEEDED
Status: DISCONTINUED | OUTPATIENT
Start: 2019-02-05 | End: 2019-02-05 | Stop reason: SURG

## 2019-02-05 RX ORDER — ACETAMINOPHEN 325 MG/1
650 TABLET ORAL EVERY 6 HOURS PRN
Status: DISCONTINUED | OUTPATIENT
Start: 2019-02-05 | End: 2019-02-08 | Stop reason: HOSPADM

## 2019-02-05 RX ORDER — ONDANSETRON 2 MG/ML
4 INJECTION INTRAMUSCULAR; INTRAVENOUS ONCE AS NEEDED
Status: DISCONTINUED | OUTPATIENT
Start: 2019-02-05 | End: 2019-02-05 | Stop reason: HOSPADM

## 2019-02-05 RX ORDER — PROPOFOL 10 MG/ML
INJECTION, EMULSION INTRAVENOUS AS NEEDED
Status: DISCONTINUED | OUTPATIENT
Start: 2019-02-05 | End: 2019-02-05 | Stop reason: SURG

## 2019-02-05 RX ORDER — SODIUM CHLORIDE 9 MG/ML
75 INJECTION, SOLUTION INTRAVENOUS CONTINUOUS
Status: DISCONTINUED | OUTPATIENT
Start: 2019-02-05 | End: 2019-02-08 | Stop reason: HOSPADM

## 2019-02-05 RX ORDER — ONDANSETRON 2 MG/ML
INJECTION INTRAMUSCULAR; INTRAVENOUS AS NEEDED
Status: DISCONTINUED | OUTPATIENT
Start: 2019-02-05 | End: 2019-02-05 | Stop reason: SURG

## 2019-02-05 RX ORDER — MAGNESIUM HYDROXIDE 1200 MG/15ML
LIQUID ORAL AS NEEDED
Status: DISCONTINUED | OUTPATIENT
Start: 2019-02-05 | End: 2019-02-05 | Stop reason: HOSPADM

## 2019-02-05 RX ORDER — SODIUM CHLORIDE, SODIUM LACTATE, POTASSIUM CHLORIDE, CALCIUM CHLORIDE 600; 310; 30; 20 MG/100ML; MG/100ML; MG/100ML; MG/100ML
100 INJECTION, SOLUTION INTRAVENOUS CONTINUOUS
Status: DISCONTINUED | OUTPATIENT
Start: 2019-02-05 | End: 2019-02-05

## 2019-02-05 RX ORDER — FENTANYL CITRATE 50 UG/ML
INJECTION, SOLUTION INTRAMUSCULAR; INTRAVENOUS AS NEEDED
Status: DISCONTINUED | OUTPATIENT
Start: 2019-02-05 | End: 2019-02-05 | Stop reason: SURG

## 2019-02-05 RX ORDER — ASPIRIN 325 MG
325 TABLET ORAL 2 TIMES DAILY
Status: DISCONTINUED | OUTPATIENT
Start: 2019-02-06 | End: 2019-02-08 | Stop reason: HOSPADM

## 2019-02-05 RX ORDER — METOCLOPRAMIDE HYDROCHLORIDE 5 MG/ML
INJECTION INTRAMUSCULAR; INTRAVENOUS AS NEEDED
Status: DISCONTINUED | OUTPATIENT
Start: 2019-02-05 | End: 2019-02-05 | Stop reason: SURG

## 2019-02-05 RX ORDER — OXYCODONE HYDROCHLORIDE AND ACETAMINOPHEN 5; 325 MG/1; MG/1
1 TABLET ORAL EVERY 4 HOURS PRN
Status: DISCONTINUED | OUTPATIENT
Start: 2019-02-05 | End: 2019-02-05 | Stop reason: SDUPTHER

## 2019-02-05 RX ORDER — ALBUTEROL SULFATE 90 UG/1
AEROSOL, METERED RESPIRATORY (INHALATION) AS NEEDED
Status: DISCONTINUED | OUTPATIENT
Start: 2019-02-05 | End: 2019-02-05 | Stop reason: SURG

## 2019-02-05 RX ADMIN — IPRATROPIUM BROMIDE AND ALBUTEROL SULFATE 3 ML: .5; 3 SOLUTION RESPIRATORY (INHALATION) at 09:09

## 2019-02-05 RX ADMIN — PROPOFOL 50 MG: 10 INJECTION, EMULSION INTRAVENOUS at 12:13

## 2019-02-05 RX ADMIN — ONDANSETRON 4 MG: 2 INJECTION INTRAMUSCULAR; INTRAVENOUS at 12:00

## 2019-02-05 RX ADMIN — SODIUM CHLORIDE 75 ML/HR: 0.9 INJECTION, SOLUTION INTRAVENOUS at 17:01

## 2019-02-05 RX ADMIN — HYDROMORPHONE HYDROCHLORIDE 0.5 MG: 1 INJECTION, SOLUTION INTRAMUSCULAR; INTRAVENOUS; SUBCUTANEOUS at 22:11

## 2019-02-05 RX ADMIN — HYDROMORPHONE HYDROCHLORIDE 0.5 MG: 1 INJECTION, SOLUTION INTRAMUSCULAR; INTRAVENOUS; SUBCUTANEOUS at 14:05

## 2019-02-05 RX ADMIN — KETAMINE HYDROCHLORIDE 50 MG: 50 INJECTION INTRAMUSCULAR; INTRAVENOUS at 12:35

## 2019-02-05 RX ADMIN — SODIUM CHLORIDE, SODIUM LACTATE, POTASSIUM CHLORIDE, AND CALCIUM CHLORIDE 100 ML/HR: .6; .31; .03; .02 INJECTION, SOLUTION INTRAVENOUS at 08:58

## 2019-02-05 RX ADMIN — MIDAZOLAM HYDROCHLORIDE 2 MG: 1 INJECTION, SOLUTION INTRAMUSCULAR; INTRAVENOUS at 11:43

## 2019-02-05 RX ADMIN — PROPOFOL 50 MG: 10 INJECTION, EMULSION INTRAVENOUS at 13:00

## 2019-02-05 RX ADMIN — OXYCODONE HYDROCHLORIDE AND ACETAMINOPHEN 1 TABLET: 5; 325 TABLET ORAL at 14:10

## 2019-02-05 RX ADMIN — PROPOFOL 200 MG: 10 INJECTION, EMULSION INTRAVENOUS at 11:48

## 2019-02-05 RX ADMIN — SODIUM CHLORIDE, SODIUM LACTATE, POTASSIUM CHLORIDE, AND CALCIUM CHLORIDE: .6; .31; .03; .02 INJECTION, SOLUTION INTRAVENOUS at 13:00

## 2019-02-05 RX ADMIN — VANCOMYCIN HYDROCHLORIDE 1500 MG: 10 INJECTION, POWDER, LYOPHILIZED, FOR SOLUTION INTRAVENOUS at 09:35

## 2019-02-05 RX ADMIN — HYDROMORPHONE HYDROCHLORIDE 0.5 MG: 1 INJECTION, SOLUTION INTRAMUSCULAR; INTRAVENOUS; SUBCUTANEOUS at 14:21

## 2019-02-05 RX ADMIN — DEXAMETHASONE SODIUM PHOSPHATE 4 MG: 4 INJECTION, SOLUTION INTRA-ARTICULAR; INTRALESIONAL; INTRAMUSCULAR; INTRAVENOUS; SOFT TISSUE at 12:00

## 2019-02-05 RX ADMIN — DOCUSATE SODIUM 100 MG: 100 CAPSULE, LIQUID FILLED ORAL at 18:29

## 2019-02-05 RX ADMIN — FENTANYL CITRATE 50 MCG: 50 INJECTION, SOLUTION INTRAMUSCULAR; INTRAVENOUS at 13:00

## 2019-02-05 RX ADMIN — HYDROMORPHONE HYDROCHLORIDE 0.5 MG: 1 INJECTION, SOLUTION INTRAMUSCULAR; INTRAVENOUS; SUBCUTANEOUS at 13:55

## 2019-02-05 RX ADMIN — ALBUTEROL SULFATE 4 PUFF: 90 AEROSOL, METERED RESPIRATORY (INHALATION) at 13:21

## 2019-02-05 RX ADMIN — FENTANYL CITRATE 50 MCG: 50 INJECTION, SOLUTION INTRAMUSCULAR; INTRAVENOUS at 13:20

## 2019-02-05 RX ADMIN — VANCOMYCIN HYDROCHLORIDE 1500 MG: 1 INJECTION, POWDER, LYOPHILIZED, FOR SOLUTION INTRAVENOUS at 22:11

## 2019-02-05 RX ADMIN — OXYCODONE HYDROCHLORIDE 10 MG: 10 TABLET ORAL at 18:29

## 2019-02-05 RX ADMIN — GLYCOPYRROLATE 0.2 MG: 0.2 INJECTION, SOLUTION INTRAMUSCULAR; INTRAVENOUS at 11:55

## 2019-02-05 RX ADMIN — GABAPENTIN 300 MG: 300 CAPSULE ORAL at 18:29

## 2019-02-05 RX ADMIN — EPHEDRINE SULFATE 10 MG: 50 INJECTION, SOLUTION INTRAMUSCULAR; INTRAVENOUS; SUBCUTANEOUS at 11:55

## 2019-02-05 RX ADMIN — FENTANYL CITRATE 100 MCG: 50 INJECTION, SOLUTION INTRAMUSCULAR; INTRAVENOUS at 11:48

## 2019-02-05 RX ADMIN — METOCLOPRAMIDE HYDROCHLORIDE 10 MG: 5 INJECTION INTRAMUSCULAR; INTRAVENOUS at 12:00

## 2019-02-05 RX ADMIN — LIDOCAINE HYDROCHLORIDE 50 MG: 10 INJECTION, SOLUTION INFILTRATION; PERINEURAL at 11:48

## 2019-02-05 RX ADMIN — EPHEDRINE SULFATE 10 MG: 50 INJECTION, SOLUTION INTRAMUSCULAR; INTRAVENOUS; SUBCUTANEOUS at 12:00

## 2019-02-05 NOTE — OP NOTE
OPERATIVE REPORT  PATIENT NAME: Ray Duggan    :  1975  MRN: [id number]  Pt Location: [facility] OR ROOM 03    SURGERY DATE: 2019    Surgeon(s) and Role:     * Juancarlos Pozo DO - Primary     * Mel Roberts PA-C - Assisting, qualified resident was available an assistant was needed for patient positioning, prepping and draping, soft tissue retraction and to complete the case  Preop Diagnosis:  Painful orthopaedic hardware [facility]) [T84 84XA]  Status post ORIF of fracture of ankle [Z96 7, Z87 81]  Delayed surgical wound healing with sinus tract    Post-Op Diagnosis Codes:     * Painful orthopaedic hardware ([address] ) [K33 33PV]     * Status post ORIF of fracture of ankle [Z96 7, Z87 81]     * Delayed surgical wound healing with sinus tract    Procedure: 1) removal of hardware left ankle 2) irrigation debridement left lower leg  Specimen(s):  Cultures taken from deep ijeoma hardware area, specimen taken from area around distal fibula after hardware removed  ID Type Source Tests Collected by Time Destination   A : left ankle deep Wound Ankle ANAEROBIC CULTURE AND GRAM STAIN, WOUND CULTURE [facility] 2019 1223    B : culture deep tissue left ankle Wound Ankle ANAEROBIC CULTURE AND GRAM STAIN, CULTURE, TISSUE AND GRAM STAIN [facility] 2019 1232        Estimated Blood Loss:   20cc    Antibiotics:  Vancomycin 1 5 g IV    Intravenous fluids:  1300 cc    Anesthesia Type:   General    Tourniquet time:  73 min at 250 mm of mercury    Operative Indications:  Painful orthopaedic hardware [facility]) [T84 84XA]  Status post ORIF of fracture of ankle [Z96 7, Z87 81]  Delayed surgical wound healing, with sinus tract  Patient underwent ORIF of her left ankle in May of 2018  The fracture itself demonstrated progressive healing however the patient had significant issues healing the distal aspect of her wound    She was ultimately presenting with a 1 mm size pinhole sinus tract of the distal aspect of her incision despite efforts from wound Care consultation evaluation and treatment  The area of concern over the distal aspect of the incision had periods of no drainage and periods of intermittent drainage without odor  Over time her fractures were monitored for union and both the medial and lateral fracture fragments reach to union in the setting of their fixation  The patient did have complaints of pain over the distal aspect of her distal fibula in the area of the delayed wound healing with now what appeared to be a sinus tract  There is no hardware present in the small mm area of delayed closure  In the setting of pain, delayed surgical wound healing with a sinus tract and the presence of retained hardware in the setting of a united fracture was recommended that the patient undergo removal of hardware of her left ankle and wound closure  She is aware that due to her smoking history and current tobacco use she is again at increased risk for wound healing problems in deep infection  She is well aware of this and wants to pursue the surgical intervention  Consents were signed and placed in the chart  The patient was cleared by her PCP for the intended procedure  Operative Findings:  Prior to incision the 1 mm area/pinhole sinus tract of the distal aspect of her incision laterally was evaluated and there was no hardware present  It did seem to be adhered to the subcutaneous tissues  There is no prominent hardware in this area  The previous lateral incision was used and the area around the sinus tract was ellipsed in preparation for primary closure later  There was no kana pus after exposure however there was fibrinous tissue that could represent a chronic indolent infection down towards the distal aspect of the distal fibula just above the distal locking screw cluster  This was in the area of the sinus tract superficially  The hardware was removed without complication    The fibrinous chronically infected tissue was removed with sharp dissection, rongeur, and curette  There was an area of the distal peroneal tendons that was completely encased with scar tissue  These tendons underwent lysis from their scar tissue adhesions and were mobilized within their position behind the fibula  Fluoroscopy confirms removal of hardware from the left ankle  The wound was primarily closed with a combination of monofilament sutures both deep and superficial   Cultures were taken from the deep ijeoma hardware area and specimen was sent of the fibrinous tissue that appeared to be chronically infected  Complications:   None    Procedure and Technique:  Patient was seen examined in the preoperative holding area and the left lower extremity was identified marked by the orthopedic staff  The patient was taken back to the operating room where she was positioned in the supine position on the operating room table and administered general anesthesia by the anesthesia staff  The patient was administered 1 5 g of IV vancomycin as a form perioperative antibiotics  Her wound on the distal aspect of her left ankle was undressed from the gauze dressing and evaluated briefly and there was no significant drainage and only mild induration around the pinhole sinus tract  There is no hardware visible  A tourniquet was placed upon the left thigh and the left lower extremity was prepped and draped in standard sterile fashion  Final time-out was performed and all members of the operating room staff were in agreement of the correct procedure, on the correct patient and on the correct laterality  The lateral ankle incision was delineated using a marking pen and the prior incision that had healed did not need to be lengthened at all  The area around the sinus tract was ellipsed using a marking pen in preparation for primary skin closure    The leg was elevated to exsanguinate the leg as infection was presumed to present and the tourniquet was inflated to 250 mmHg  Using a 15 blade scalpel the healed surgical incision was in sized to the subcutaneous tissues  There was abundant scar tissue below the skin incision and during the exposure of the fibula  The superficial peroneal nerve was not encountered  Distally down towards the area of the pinhole sinus track and area of delayed wound healing this was carefully examined prior to incising through it and it did not appear to communicate openly with the plate and screws be needed as hardware was not evident when directly examining and visually  This area was then incised to elongate the incision and there was significant amount of fibrous chronically infected soft tissue deep to this area of the sinus tract which did communicate to the anterior portions of the distal fibular plate and screws  It did not track proximally up the distal fibula but did seem to create a localized reaction distally at about the level of the syndesmosis  Dissection was carried down through the scar tissue directly over top of the plate and screw construct  Cultures were taken of this fibrous tissue area next to the sinus tract in the distal aspect of the fixation of the fibula  This culture was sent for aerobic and anaerobic evaluation  Dissection continued with electrocautery to maintain hemostasis however minimal bleeders were encountered  The plate and screw construct was ex pose being careful not to damage to surrounding muscle and tendon structures of the peroneals  Fluoroscopy was present to assist in the hardware removal   The cortical screws in the distal locking screws were all removed without complication and the plate was removed without issue  Fluoroscopy images in the AP plane demonstrate that all hardware was removed from the lateral ankle and distal fibula  Next attention was turned to debriding the fibrous disorganized tissue presumed to be chronically infected tissue    This was done with a combination of rongeur, curette, and sharp dissection  The screw tracts from prior fixation were all curetted as well  After the wound appeared to be clean and free from the suspicious fibrinous tissue,  the wound was copiously irrigated with normal saline solution  The members of the surgical team changed gloves and a new down sheet was provided and attempts at closure began  Attention was turned to the distal wound where the sinus tract had been present  This area was ellipsed using a 15 blade after ensuring that the surrounding skin was it would be mobilized for primary closure  The tendons of the peroneal muscles were completely encased in scar tissue from her prior surgery and tenolysis was performed to free up these tendons from the envelope of scar and the scar envelope was removed  After doing so these tendons appear did pass freely behind their native position of the distal fibula without obstruction  The tissues were infiltrated with 0 5% Marcaine plain for local analgesia  Closure began with 2-0 PDS suture in an interrupted inverted fashion to reapproximate the skin edges  This was done successfully without excessive tension on the skin throughout the entire incision  The skin edges were reapproximated using a 3-0 nylon in a horizontal mattress fashion to jesús the skin edges  Again this was done with minimal tension  The wound was cleaned and dried  Xeroform was applied to the surgical site  This was followed by gauze ABD sterile Webril and a posterior ortho glass splint to allow the soft tissues to remain at rest as the wound heals  This was overwrapped with an Ace wrap  The tourniquet was deflated after 73 min  The drapes were removed and the patient was awaken from general anesthesia  The patient tolerated the procedure well was transferred to PACU in stable condition     I was present for the entire procedure    Patient Disposition:  PACU     SIGNATURE: Axel Gibson DO  DATE: February 5, 2019  TIME: 1:35 PM

## 2019-02-05 NOTE — PERIOPERATIVE NURSING NOTE
Maxime Martins rn spoke with Lauri Juárez rn who is working in Dr Van Deleon room about what time to start Vancomycin  Vancomycin to be started now

## 2019-02-05 NOTE — INTERVAL H&P NOTE
H&P reviewed  After examining the patient I find no changes in the patients condition since the H&P had been written  All of her questions were addressed at bedside today  Proceed to OR for removal of hardware left distal fibula

## 2019-02-05 NOTE — ANESTHESIA PREPROCEDURE EVALUATION
Review of Systems/Medical History  Patient summary reviewed  Chart reviewed      Cardiovascular  Exercise tolerance (METS): >4,     Pulmonary       GI/Hepatic            Endo/Other    Obesity    GYN       Hematology   Musculoskeletal       Neurology   Psychology   Anxiety,              Physical Exam    Airway    Mallampati score: II  TM Distance: >3 FB  Neck ROM: full     Dental       Cardiovascular  Rhythm: regular, Rate: normal,     Pulmonary      Other Findings        Anesthesia Plan  ASA Score- 2     Anesthesia Type- general with ASA Monitors  Additional Monitors:   Airway Plan:         Plan Factors-    Induction- intravenous  Postoperative Plan-     Informed Consent- Anesthetic plan and risks discussed with patient  I personally reviewed this patient with the CRNA  Discussed and agreed on the Anesthesia Plan with the CRNA  Karrie Nissen

## 2019-02-05 NOTE — ANESTHESIA POSTPROCEDURE EVALUATION
Post-Op Assessment Note      CV Status:  Stable    Mental Status:  Awake and somnolent    Hydration Status:  Stable    PONV Controlled:  Controlled    Airway Patency:  Patent and adequate    Post Op Vitals Reviewed: Yes          Staff: CRNA       Comments: asleep          BP      Temp      Pulse     Resp      SpO2

## 2019-02-05 NOTE — PROGRESS NOTES
Progress Note - Orthopedics   Dwaine Espinoza 41 y o  female MRN: [id number]  Unit/Bed#: OR POOL Encounter: [id number]    Assessment:  1) POD#0 Removal of hardware left ankle  2) Presumed osteomyelitis left distal fibula    Plan:  Vancomycin 1 5 g IV q 12 hours around the clock  DVT prophylaxis: ASA 325mg PO BID/SCDs/ambulation  Nonweightbearing left lower extremity in splint  PT/OT:  Nonweightbearing left lower extremity in splint with crutches  Analgesia p r n  Follow-up OR cultures  ID consult for presumed osteomyelitis status post remove hardware  Follow up a m  Labs  Disposition:  Pending ID antibiotic recommendations, likely to home with home care  Weight bearing:  Nonweightbearing left lower extremity    VTE Pharmacologic Prophylaxis: ASA 325mg PO BID  VTE Mechanical Prophylaxis: sequential compression device    Subjective:  Patient seen examined at bedside in PACU  Events of surgery discussed with the patient and the patient's boyfriend in the waiting room  All of her questions were addressed  Vitals: Blood pressure 111/80, pulse 94, temperature 99 3 °F (37 4 °C), temperature source Tympanic, resp  rate 18, height 5' 6 5" (1 689 m), weight 87 8 kg (193 lb 9 oz), last menstrual period 01/12/2019, SpO2 99 %, not currently breastfeeding  ,Body mass index is 30 77 kg/m²  Intake/Output Summary (Last 24 hours) at 02/05/19 1336  Last data filed at 02/05/19 1300   Gross per 24 hour   Intake             1000 ml   Output                0 ml   Net             1000 ml       Invasive Devices     Peripheral Intravenous Line            Peripheral IV 02/05/19 Left Wrist less than 1 day                Physical Exam: NAD  Ortho Exam: LLE:  Splint intact, +sensation intact to foot and toes  +Plantar flexion dorsiflexion of toes  No pain to passive stretch, foot warm    Lab, Imaging and other studies:  Intraop fluoroscopy left ankle:  Hardware successfully removed from fibula    Edita JACK    Division of Adult Reconstruction  Department of [facility]

## 2019-02-05 NOTE — PERIOPERATIVE NURSING NOTE
Patient's room ready, patient transfer to inpatient unit- room 214    Report given to JUAN ALBERTO Becerra

## 2019-02-05 NOTE — H&P (VIEW-ONLY)
Assessment/Plan:  1  Painful orthopaedic hardware [facility])  Case request operating room: [facility]    Ambulatory referral to [facility]    Basic metabolic panel    CBC and differential    APTT    Protime-INR    EKG 12 lead    XR chest pa & lateral    POCT urine HCG    Type and screen    Basic metabolic panel    CBC and differential    APTT    Protime-INR    Type and screen    Case request operating room: REMOVAL HARDWARE ANKLE   2  Status post ORIF of fracture of ankle  Case request operating room: [facility]    Ambulatory referral to [facility]    Basic metabolic panel    CBC and differential    APTT    Protime-INR    EKG 12 lead    XR chest pa & lateral    POCT urine HCG    Type and screen    Basic metabolic panel    CBC and differential    APTT    Protime-INR    Type and screen    Case request operating room: REMOVAL HARDWARE ANKLE   3  Delayed surgical wound healing, sequela  Case request operating room: REMOVAL HARDWARE ANKLE    Ambulatory referral to [facility]    Basic metabolic panel    CBC and differential    APTT    Protime-INR    EKG 12 lead    XR chest pa & lateral    POCT urine HCG    Type and screen    Basic metabolic panel    CBC and differential    APTT    Protime-INR    Type and screen    Case request operating room: REMOVAL HARDWARE ANKLE     Patient is here for follow-up regarding her nonhealing left lateral ankle incision from her open reduction internal fixation of her left ankle in May of 2018  At this point her fractures have healed in the lateral aspect of her ankle and with the persistence of a small sinus tract that remains open with intermittent periods of drainage I recommended that she undergo removal of hardware and wound closure to eliminate the persistent draining sinus  At this point I do not suspect a deep infection is present as she would be having more clinical symptoms other than just this draining sinus    She was recently placed on Bactrim by the wound care clinic at [facility]  She should continue the use of this since she has already started this regiment  She is ambulating quite comfortably in normal shoe wear but would prefer to have the sinus track addressed as well as removal of the hardware as she feels there may be some adhesions to the underlying hardware on the lateral aspect of her ankle which I do not appreciate here today  She will undergo a removal of hardware and wound closure on 2/5/2019 with a short inpatient stay afterwards to provide IV antibiotics and depending upon what is found intraoperatively further consultation with other subspecialty services if needed  The risks and benefits of undergoing this procedure were discussed at length  She is aware that in addition to the baseline risks of neurovascular damage, bleeding, infection, wound healing problems, persistent pain and blood clot in the lower extremity and lungs she has had an increased risk for further wound healing complications and superficial infection and deep infection due to her persistent use of cigarettes  I have been encouraging her to quit since her initial surgery in May and she has failed to do so yet  She states that she is currently smoking half a pack of cigarettes a day and is on Wellbutrin and is trying to quit however she would prefer to have her ankle issues rectified as soon as possible despite being at a slightly increased risk for having the same issue with her wound healing occur again due continuation of her cigarette use  Consents were signed and placed into the chart here today  After the hardware is removed she will have a period of nonweightbearing on crutches until her soft tissues heal and then she may return to full weight-bearing  All of her questions were addressed  Look for to seeing her back postoperatively  Subjective: Follow-up left ankle ORIF, delayed surgical wound healing    Patient ID: Yovany Aranda is a 41 y o  female  HPI  Patient is here for follow-up regarding the above stated complaints  She underwent ORIF of her left ankle in May of 2018  She has had difficulty healing her wound since her index procedure on the lateral aspect of her ankle  She has continued smoking despite constant recommendations to quit  She states here today she has decreased the amount of smoking she is doing and is now down to half a pack a day with assistance of Wellbutrin  She denies weight-bearing ankle pain and discomfort but has concerns about adhesions of the hardware underneath her skin which may not be allowing this area persistent drainage to close  She states the wound has had periods of drainage and no drainage and is currently producing more drainage  On the Band-Aid today there is some yellowish gray drainage from the area of concern  She was placed on Bactrim by the wound care clinic  Review of Systems   Constitutional: Positive for activity change  HENT: Negative  Eyes: Negative  Respiratory: Negative  Cardiovascular: Negative  Gastrointestinal: Negative  Endocrine: Negative  Musculoskeletal: Negative  Skin: Negative  Neurological: Negative  Psychiatric/Behavioral: Negative            Past Medical History:   Diagnosis Date    History of wound infection     left ankle    Psychiatric disorder     depression, anxiety       Past Surgical History:   Procedure Laterality Date    WI OPEN TREATMENT BIMALLEOLAR ANKLE FRACTURE Left 5/14/2018    Procedure: OPEN REDUCTION W/ INTERNAL FIXATION (ORIF) ANKLE;  Surgeon: Belem Fatima DO;  Location: [facility];  Service: Orthopedics    REDUCTION MAMMAPLASTY         Family History   Problem Relation Age of Onset    No Known Problems Father     Diabetes Mother     Cancer Mother     Asthma Mother     No Known Problems Sister     No Known Problems Brother     No Known Problems Maternal Aunt     No Known Problems Maternal Uncle     No Known Problems Paternal Aunt     No Known Problems Paternal Uncle     No Known Problems Maternal Grandmother     No Known Problems Maternal Grandfather     No Known Problems Paternal Grandmother     No Known Problems Paternal Grandfather     ADD / ADHD Neg Hx     Anesthesia problems Neg Hx     Clotting disorder Neg Hx     Collagen disease Neg Hx     Dislocations Neg Hx     Learning disabilities Neg Hx     Neurological problems Neg Hx     Osteoporosis Neg Hx     Rheumatologic disease Neg Hx     Scoliosis Neg Hx     Vascular Disease Neg Hx        Social History     Occupational History    Not on file       Social History Main Topics    Smoking status: Current Every Day Smoker     Packs/day: 0 50     Types: Cigarettes    Smokeless tobacco: Never Used    Alcohol use Yes      Comment: social    Drug use: No    Sexual activity: Yes     Partners: Male         Current Outpatient Prescriptions:     albuterol (VENTOLIN HFA) 90 mcg/act inhaler, Inhale 2 puffs every 6 (six) hours as needed for wheezing, Disp: 1 Inhaler, Rfl: 0    amoxicillin-clavulanate (AUGMENTIN) 875-125 mg per tablet, Take 1 tablet by mouth every 12 (twelve) hours for 7 days, Disp: 14 tablet, Rfl: 0    ARIPiprazole (ABILIFY) 20 MG tablet, , Disp: , Rfl: 0    ARIPiprazole (ABILIFY) 5 mg tablet, Take 10 mg by mouth daily  , Disp: , Rfl:     buPROPion (WELLBUTRIN XL) 150 mg 24 hr tablet, Take 150 mg by mouth daily, Disp: , Rfl: 0    clonazePAM (KlonoPIN) 0 5 mg tablet, Take 0 5 mg by mouth 2 (two) times a day as needed  , Disp: , Rfl: 0    Elastic Bandages & Supports (WRIST BRACE DELUXE/RIGHT S/M) MISC, by Does not apply route continuous, Disp: 1 each, Rfl: 0    fluticasone (FLONASE) 50 mcg/act nasal spray, 2 sprays into each nostril daily, Disp: 16 g, Rfl: 0    gabapentin (NEURONTIN) 300 mg capsule, Take 1 capsule (300 mg total) by mouth 3 (three) times a day, Disp: 90 capsule, Rfl: 0    meloxicam (MOBIC) 7 5 mg tablet, Take 1 tablet (7 5 mg total) by mouth daily as needed (pain (with food)) for up to 30 days, Disp: 30 tablet, Rfl: 0    sertraline (ZOLOFT) 25 mg tablet, Take 50 mg by mouth daily, Disp: , Rfl:     sertraline (ZOLOFT) 50 mg tablet, , Disp: , Rfl: 0    sulfamethoxazole-trimethoprim (BACTRIM DS) 800-160 mg per tablet, take 1 tablet by mouth twice a day for 10 days, Disp: , Rfl: 0    cyclobenzaprine (FLEXERIL) 10 mg tablet, Take 1 tablet (10 mg total) by mouth 3 (three) times a day as needed for muscle spasms for up to 3 days, Disp: 9 tablet, Rfl: 0    Allergies   Allergen Reactions    Toradol [Ketorolac Tromethamine]      GI UPSET    Latex Rash    Ultram [Tramadol] Rash       Objective:  Vitals:    01/29/19 1042   BP: 120/77   Pulse: 87       Body mass index is 30 78 kg/m²  Left Ankle Exam   Swelling: none    Tenderness   The patient is experiencing tenderness in the lateral malleolus  Range of Motion   Dorsiflexion: normal   Plantar flexion: normal     Tests   Anterior drawer: negative  Varus tilt: negative    Other   Erythema: absent  Scars: present  Sensation: normal  Pulse: present    Comments:  Lateral ankle wound has healed proximally and distally except for a small 1 mm in the distal aspect of her lateral ankle incision that has yet to close in today has grayish yellowish discharge from it  There is no odor  There is no surrounding erythema  There is no hardware present  The skin does feel mobile over the hardware in the subcutaneous tissues  Neurovascularly intact  Ankle range of motion without pain and discomfort  Observations   Left Ankle/Foot   Positive for adhesive scar  Physical Exam   Constitutional: She is oriented to person, place, and time  She appears well-developed  HENT:   Head: Atraumatic  Eyes: EOM are normal    Neck: Neck supple  Cardiovascular: Normal rate      Pulmonary/Chest: Effort normal    Musculoskeletal:   See orthopedic exam   Neurological: She is alert and oriented to person, place, and time  Skin: Skin is warm and dry  Psychiatric: She has a normal mood and affect  Nursing note and vitals reviewed  I have personally reviewed pertinent films in PACS  X-rays of the left ankle from 12/14/2018 reviewed by me demonstrate no prominent hardware over the lateral aspect of the ankle in her ankle fracture is well healed  Fibular fracture is well-healed with a long plate screw construct  No sign of hardware failure  No air in the soft tissues  Ankle mortise is stable and controlled  Nahun JACK    Division of Adult Reconstruction  Department of [facility]

## 2019-02-06 LAB
ANION GAP SERPL CALCULATED.3IONS-SCNC: 11 MMOL/L (ref 4–13)
BASOPHILS # BLD AUTO: 0.04 THOUSANDS/ΜL (ref 0–0.1)
BASOPHILS NFR BLD AUTO: 1 % (ref 0–1)
BUN SERPL-MCNC: 7 MG/DL (ref 5–25)
CALCIUM SERPL-MCNC: 7.9 MG/DL (ref 8.3–10.1)
CHLORIDE SERPL-SCNC: 103 MMOL/L (ref 100–108)
CO2 SERPL-SCNC: 23 MMOL/L (ref 21–32)
CREAT SERPL-MCNC: 0.71 MG/DL (ref 0.6–1.3)
CRP SERPL QL: <3 MG/L
EOSINOPHIL # BLD AUTO: 0.13 THOUSAND/ΜL (ref 0–0.61)
EOSINOPHIL NFR BLD AUTO: 2 % (ref 0–6)
ERYTHROCYTE [DISTWIDTH] IN BLOOD BY AUTOMATED COUNT: 15.1 % (ref 11.6–15.1)
ERYTHROCYTE [SEDIMENTATION RATE] IN BLOOD: 12 MM/HOUR (ref 2–25)
GFR SERPL CREATININE-BSD FRML MDRD: 106 ML/MIN/1.73SQ M
GLUCOSE SERPL-MCNC: 98 MG/DL (ref 65–140)
HCT VFR BLD AUTO: 31.5 % (ref 34.8–46.1)
HGB BLD-MCNC: 10.1 G/DL (ref 11.5–15.4)
IMM GRANULOCYTES # BLD AUTO: 0.02 THOUSAND/UL (ref 0–0.2)
IMM GRANULOCYTES NFR BLD AUTO: 0 % (ref 0–2)
LYMPHOCYTES # BLD AUTO: 1.82 THOUSANDS/ΜL (ref 0.6–4.47)
LYMPHOCYTES NFR BLD AUTO: 28 % (ref 14–44)
MCH RBC QN AUTO: 28.4 PG (ref 26.8–34.3)
MCHC RBC AUTO-ENTMCNC: 32.1 G/DL (ref 31.4–37.4)
MCV RBC AUTO: 89 FL (ref 82–98)
MONOCYTES # BLD AUTO: 0.55 THOUSAND/ΜL (ref 0.17–1.22)
MONOCYTES NFR BLD AUTO: 8 % (ref 4–12)
NEUTROPHILS # BLD AUTO: 4.07 THOUSANDS/ΜL (ref 1.85–7.62)
NEUTS SEG NFR BLD AUTO: 61 % (ref 43–75)
NRBC BLD AUTO-RTO: 0 /100 WBCS
PLATELET # BLD AUTO: 202 THOUSANDS/UL (ref 149–390)
PMV BLD AUTO: 10.4 FL (ref 8.9–12.7)
POTASSIUM SERPL-SCNC: 3.7 MMOL/L (ref 3.5–5.3)
RBC # BLD AUTO: 3.56 MILLION/UL (ref 3.81–5.12)
SODIUM SERPL-SCNC: 137 MMOL/L (ref 136–145)
WBC # BLD AUTO: 6.63 THOUSAND/UL (ref 4.31–10.16)

## 2019-02-06 PROCEDURE — 97163 PT EVAL HIGH COMPLEX 45 MIN: CPT

## 2019-02-06 PROCEDURE — 86140 C-REACTIVE PROTEIN: CPT | Performed by: PHYSICIAN ASSISTANT

## 2019-02-06 PROCEDURE — G8987 SELF CARE CURRENT STATUS: HCPCS

## 2019-02-06 PROCEDURE — G8979 MOBILITY GOAL STATUS: HCPCS

## 2019-02-06 PROCEDURE — 85025 COMPLETE CBC W/AUTO DIFF WBC: CPT | Performed by: PHYSICIAN ASSISTANT

## 2019-02-06 PROCEDURE — 97166 OT EVAL MOD COMPLEX 45 MIN: CPT

## 2019-02-06 PROCEDURE — 97110 THERAPEUTIC EXERCISES: CPT

## 2019-02-06 PROCEDURE — G8988 SELF CARE GOAL STATUS: HCPCS

## 2019-02-06 PROCEDURE — 85652 RBC SED RATE AUTOMATED: CPT | Performed by: PHYSICIAN ASSISTANT

## 2019-02-06 PROCEDURE — 80048 BASIC METABOLIC PNL TOTAL CA: CPT | Performed by: PHYSICIAN ASSISTANT

## 2019-02-06 PROCEDURE — 99024 POSTOP FOLLOW-UP VISIT: CPT | Performed by: ORTHOPAEDIC SURGERY

## 2019-02-06 PROCEDURE — 99244 OFF/OP CNSLTJ NEW/EST MOD 40: CPT | Performed by: INTERNAL MEDICINE

## 2019-02-06 PROCEDURE — G8978 MOBILITY CURRENT STATUS: HCPCS

## 2019-02-06 RX ADMIN — OXYCODONE HYDROCHLORIDE 10 MG: 10 TABLET ORAL at 12:27

## 2019-02-06 RX ADMIN — GABAPENTIN 300 MG: 300 CAPSULE ORAL at 08:29

## 2019-02-06 RX ADMIN — ASPIRIN 325 MG: 325 TABLET ORAL at 08:29

## 2019-02-06 RX ADMIN — VANCOMYCIN HYDROCHLORIDE 1500 MG: 10 INJECTION, POWDER, LYOPHILIZED, FOR SOLUTION INTRAVENOUS at 22:05

## 2019-02-06 RX ADMIN — GABAPENTIN 300 MG: 300 CAPSULE ORAL at 22:03

## 2019-02-06 RX ADMIN — OXYCODONE HYDROCHLORIDE 10 MG: 10 TABLET ORAL at 07:09

## 2019-02-06 RX ADMIN — ASPIRIN 325 MG: 325 TABLET ORAL at 17:00

## 2019-02-06 RX ADMIN — DOCUSATE SODIUM 100 MG: 100 CAPSULE, LIQUID FILLED ORAL at 08:29

## 2019-02-06 RX ADMIN — VANCOMYCIN HYDROCHLORIDE 1500 MG: 10 INJECTION, POWDER, LYOPHILIZED, FOR SOLUTION INTRAVENOUS at 10:26

## 2019-02-06 RX ADMIN — HYDROMORPHONE HYDROCHLORIDE 0.5 MG: 1 INJECTION, SOLUTION INTRAMUSCULAR; INTRAVENOUS; SUBCUTANEOUS at 13:39

## 2019-02-06 RX ADMIN — OXYCODONE HYDROCHLORIDE 10 MG: 10 TABLET ORAL at 02:38

## 2019-02-06 RX ADMIN — HYDROMORPHONE HYDROCHLORIDE 0.5 MG: 1 INJECTION, SOLUTION INTRAMUSCULAR; INTRAVENOUS; SUBCUTANEOUS at 17:34

## 2019-02-06 RX ADMIN — OXYCODONE HYDROCHLORIDE 10 MG: 10 TABLET ORAL at 16:56

## 2019-02-06 RX ADMIN — GABAPENTIN 300 MG: 300 CAPSULE ORAL at 02:37

## 2019-02-06 RX ADMIN — ARIPIPRAZOLE 20 MG: 5 TABLET ORAL at 08:29

## 2019-02-06 RX ADMIN — HYDROMORPHONE HYDROCHLORIDE 0.5 MG: 1 INJECTION, SOLUTION INTRAMUSCULAR; INTRAVENOUS; SUBCUTANEOUS at 08:24

## 2019-02-06 RX ADMIN — HYDROMORPHONE HYDROCHLORIDE 0.5 MG: 1 INJECTION, SOLUTION INTRAMUSCULAR; INTRAVENOUS; SUBCUTANEOUS at 22:05

## 2019-02-06 RX ADMIN — DOCUSATE SODIUM 100 MG: 100 CAPSULE, LIQUID FILLED ORAL at 17:00

## 2019-02-06 RX ADMIN — GABAPENTIN 300 MG: 300 CAPSULE ORAL at 16:56

## 2019-02-06 RX ADMIN — OXYCODONE HYDROCHLORIDE 10 MG: 10 TABLET ORAL at 23:17

## 2019-02-06 NOTE — PROGRESS NOTES
Progress Note - Orthopedics   Justine Zeng 41 y o  female MRN: [id number]  Unit/Bed#: [address] [place] [address] Encounter: [id number]    Assessment:  1) POD#1 Removal of hardware left ankle  2) Presumed osteomyelitis left distal fibula - will await ID consult recs    Plan:  Vancomycin 1 5 g IV q 12 hours until seen by ID  DVT prophylaxis: ASA 325mg PO BID/SCDs/ambulation  Nonweightbearing left lower extremity in splint at all times  PT/OT:  NWB left lower extremity in splint with crutches  Analgesia p r n  Follow-up OR cultures when available  ID consult for presumed osteomyelitis status post remove hardware  Follow up a m  Labs - slight anemia but asymptomatic and stable; ESR/CRP still pending  Disposition:  Pending ID antibiotic recommendations, likely to home with home care  Weight bearing:  Nonweightbearing left lower extremity    VTE Pharmacologic Prophylaxis: ASA 325mg PO BID  VTE Mechanical Prophylaxis: sequential compression device    Subjective:  Patient seen examined at bedside this morning  No overnight events  Pain in left ankle controlled with medication  Has remained NWB on left leg  Denies CP/SOB, parasthesias, dizziness/lightheadedness,     Vitals: Blood pressure 104/58, pulse 94, temperature 98 5 °F (36 9 °C), temperature source Oral, resp  rate 18, height 5' 6 5" (1 689 m), weight 87 8 kg (193 lb 9 oz), last menstrual period 01/12/2019, SpO2 95 %, not currently breastfeeding  ,Body mass index is 30 77 kg/m²  Intake/Output Summary (Last 24 hours) at 02/06/19 0802  Last data filed at 02/05/19 2211   Gross per 24 hour   Intake             1350 ml   Output             1000 ml   Net              350 ml       Invasive Devices     Peripheral Intravenous Line            Peripheral IV 02/05/19 Left Wrist less than 1 day                Physical Exam: NAD, A&Ox3, sitting comfortably in bed  Ortho Exam: LLE:  Posterior short leg splint intact, +sensation intact to foot and toes    +Plantar flexion dorsiflexion of toes   No pain to passive stretch, foot warm    Lab, Imaging and other studies:      Lab Results   Component Value Date    WBC 6 63 02/06/2019    HGB 10 1 (L) 02/06/2019    HCT 31 5 (L) 02/06/2019    MCV 89 02/06/2019     02/06/2019     Lab Results   Component Value Date    K 3 7 02/06/2019     02/06/2019    CO2 23 02/06/2019    BUN 7 02/06/2019    CREATININE 0 71 02/06/2019    GLUF 82 01/29/2019    CALCIUM 7 9 (L) 02/06/2019    AST 14 10/02/2018    ALT 8 10/02/2018    ALKPHOS 58 01/21/2018    EGFR 106 02/06/2019     ESR and CRP are pending    Jacquie Marks PA-C Orthopedics

## 2019-02-06 NOTE — PHYSICAL THERAPY NOTE
PT EVALUATION   02/06/19 1215   Pain Assessment   Pain Assessment 0-10   Pain Score 5  (L ankle area)   Home Living   Type of [address] One level;Stairs to enter with rails  (2 stairs outside and full flight inside to apt)   Home Equipment Crutches   Additional Comments patient used crutches in past, recently independent   Prior Function   Level of [place] Independent with ADLs and functional mobility   Lives With Significant other   Receives Help From Friend(s)   ADL Assistance Independent   IADLs Independent   Comments patient with history of complications with healing ankle fracture, now s/p hardware removal   Restrictions/Precautions   LLE Weight Bearing Per Order NWB   Other Precautions Fall Risk;Pain   General   Additional Pertinent History chart reviewed, patient admitted for hardware removal L ankle ORIF   Family/Caregiver Present Yes   Cognition   Overall Cognitive Status WFL   Arousal/Participation Cooperative   Attention Within functional limits   Orientation Level Oriented X4   Following Commands Follows all commands and directions without difficulty   RLE Assessment   RLE Assessment WFL   LLE Assessment   LLE Assessment (ankle splinted and ace wrapped,hip and knee WFL)   Coordination   Movements are Fluid and Coordinated 0   Bed Mobility   Supine to Sit 7  Independent   Sit to Supine 7  Independent   Transfers   Sit to Stand 5  Supervision   Stand to Sit 5  Supervision   Ambulation/Elevation   Gait Assistance 4  Minimal assist   Additional items Assist x 1;Verbal cues; Tactile cues   Assistive Device Crutches   Distance 20 feet x 1, 15 feet x 1 , NWB LLE with axcillary crutches   Balance   Static Sitting Good   Dynamic Sitting Good   Static Standing Fair   Dynamic Standing Fair   Ambulatory Fair -   Activity Tolerance   Activity Tolerance Patient limited by fatigue;Patient limited by pain   Nurse Made Aware yes   Assessment   Prognosis Good   Problem List Decreased strength;Decreased range of motion;Decreased endurance; Impaired balance;Decreased mobility; Decreased coordination;Pain;Orthopedic restrictions   Assessment Patient seen for Physical Therapy evaluation  Patient admitted with Painful orthopaedic hardware [facility])  Comorbidities affecting patient's physical performance include: ORIF ankle, delayed healing wound ankle, obeisty anxiety and depression, trimalleolar fx ankle, lipoma BLEs, taobacco abuse, chronic pain L ankle chronic pain syndrome, bronchitis  Personal factors affecting patient at time of initial evaluation include: lives in two story house, stairs to enter home, inability to navigate community distances, inability to navigate level surfaces without external assistance, inability to perform dynamic tasks in community, limited home support, inability to perform caregiver support/tasks, inability to perform physical activity and inability to perform IADLS   Prior to admission, patient was independent with functional mobility without assistive device, independent with ADLS, independent with IADLS, living in a multi-level home, ambulating household distance, ambulating community distances and home with family assist   Please find objective findings from Physical Therapy assessment regarding body systems outlined above with impairments and limitations including weakness, decreased ROM, impaired balance, decreased endurance, impaired coordination, gait deviations, pain, decreased activity tolerance, decreased functional mobility tolerance, fall risk and orthopedic restrictions  The Barthel Index was used as a functional outcome tool presenting with a score of 55 today indicating marked limitations of functional mobility and ADLS    Patient's clinical presentation is currently unstable/unpredictable as seen in patient's presentation of vital sign response, changing level of pain, increased fall risk, new onset of impairment of functional mobility, decreased endurance and new onset of weakness  Pt would benefit from continued Physical Therapy treatment to address deficits as defined above and maximize level of functional mobility  As demonstrated by objective findings, the assigned level of complexity for this evaluation is high  Goals   Patient Goals go home and heal   STG Expiration Date 02/13/19   Short Term Goal #1 transfers and gait independently with crutches   Short Term Goal #2 gait endurance to functional household distances all to meet patient goal of returning home   LTG Expiration Date 02/20/19   Long Term Goal #1 gait endurance to functional community distances   Plan   Treatment/Interventions ADL retraining;Functional transfer training;LE strengthening/ROM; Elevations; Therapeutic exercise; Endurance training;Patient/family training;Equipment eval/education;Gait training; Compensatory technique education   PT Frequency Once a day   Recommendation   Recommendation Home with family support   Barthel Index   Feeding 10   Bathing 0   Grooming Score 0   Dressing Score 5   Bladder Score 10   Bowels Score 10   Toilet Use Score 5   Transfers (Bed/Chair) Score 10   Mobility (Level Surface) Score 0   Stairs Score 5   Barthel Index Score 54   Licensure   [place] License Number  Juan Lu PT [id number]

## 2019-02-06 NOTE — UTILIZATION REVIEW
Initial Clinical Review    SURGERY AUTHORIZED AS OUTPATIENT ADMITTED AS OBSERVATION STATUS 2/5/19  POST OP FOR SUSPECTED OSTEOMYELITIS CONSULTED ID CONVERTED TO INPATIENT ADMISSION 2/6/19 CULTURES SUSPECT MSSA NEEDING IV ATBX  Inpatient Admission (Order [id number])   Admission   Date: 2/6/2019 Department: [address] [place] [address] [address] [place] Released By/Authorizing: Hans Pavon DO (auto-released)   Order Information     Order Name   INPATIENT ADMISSION [[id number]]     The link below is only for use if the above order is AMB REFERRAL TO [address]   Face to Face Certification Statement (for AMB REFERRAL TO [address] Only)   Order History   Inpatient   Date/Time Action Taken User Additional Information   02/06/19 1732 Release Hans Pavon DO (auto-released) From Order: [id number]   02/06/19 1732 Complete Hans Pavon DO    Order Details     Frequency Duration Priority Order Class   Once 1  occurrence Routine Hospital Performed   Inpatient Admission   Order: [id number]   Status:  Completed   Visible to patient:  No (Not Released) Next appt:  02/11/2019 at 01:00 PM in Pain Medicine Jodee Looney MD)                             Order Questions     Question Answer Comment   Admitting Physician NORY YAÑEZ    Level of Care Med Surg    Estimated length of stay More than 2 Midnights    Certification I certify that inpatient services are medically necessary for this patient for a duration of greater than two midnights  See H&P and MD Progress Notes for additional information about the patient's course of treatment  Age/Sex: 41 y o  female  Surgery Date: 2/5/19  Procedure: Procedure: 1) removal of hardware left ankle 2) irrigation debridement left lower leg  Specimen(s):  Cultures taken from deep ijeoma hardware area, specimen taken from area around distal fibula after hardware removed    ID Type Source Tests Collected by Time Destination   A : left ankle deep Wound Ankle ANAEROBIC CULTURE AND GRAM STAIN, WOUND CULTURE Vianne Mask, DO 2/5/2019 1223     B : culture deep tissue left ankle Wound Ankle ANAEROBIC CULTURE AND GRAM STAIN, CULTURE, TISSUE AND GRAM STAIN Vianne Mask, DO 2/5/2019 1232        Operative Findings:  Prior to incision the 1 mm area/pinhole sinus tract of the distal aspect of her incision laterally was evaluated and there was no hardware present  It did seem to be adhered to the subcutaneous tissues  There is no prominent hardware in this area  The previous lateral incision was used and the area around the sinus tract was ellipsed in preparation for primary closure later  There was no kana pus after exposure however there was fibrinous tissue that could represent a chronic indolent infection down towards the distal aspect of the distal fibula just above the distal locking screw cluster  This was in the area of the sinus tract superficially  The hardware was removed without complication  The fibrinous chronically infected tissue was removed with sharp dissection, rongeur, and curette  There was an area of the distal peroneal tendons that was completely encased with scar tissue  These tendons underwent lysis from their scar tissue adhesions and were mobilized within their position behind the fibula  Fluoroscopy confirms removal of hardware from the left ankle  The wound was primarily closed with a combination of monofilament sutures both deep and superficial   Cultures were taken from the deep ijeoma hardware area and specimen was sent of the fibrinous tissue that appeared to be chronically infected    Anesthesia: GENERAL  Admission Orders: Date/Time/Statement: 2/6/19  Orders Placed This Encounter   Procedures    Place in Observation     Standing Status:   Standing     Number of Occurrences:   1     Order Specific Question:   Admitting Physician     Answer:   Lata Nuno [[id number]]     Order Specific Question:   Level of Care     Answer:   Med Surg [16]     Vital Signs: /58 (BP Location: Right arm)   Pulse 94   Temp 98 5 °F (36 9 °C) (Oral)   Resp 18   Ht 5' 6 5" (1 689 m)   Wt 87 8 kg (193 lb 9 oz)   LMP 01/12/2019   SpO2 95%   BMI 30 77 kg/m²   Diet:        Diet Orders            Start     Ordered    02/05/19 1722  Room Service  Once     Question:  Type of Service  Answer:  Room Service-Appropriate    02/05/19 1721    02/05/19 1658  Diet Regular; Regular House  Diet effective now     Question Answer Comment   Diet Type Regular    Regular Regular House    RD to adjust diet per protocol?  Yes        02/05/19 1657        Mobility: NONWEIGHT BEARING LEFT LOWER EXTREMITY  DVT Prophylaxis: ASA BID  Pain Control:   Pain Medications             ARIPiprazole (ABILIFY) 20 MG tablet 15 mg daily      buPROPion (WELLBUTRIN XL) 150 mg 24 hr tablet Take 150 mg by mouth every morning      clonazePAM (KlonoPIN) 0 5 mg tablet Take 0 5 mg by mouth 2 (two) times a day as needed      gabapentin (NEURONTIN) 300 mg capsule Take 1 capsule (300 mg total) by mouth 3 (three) times a day    meloxicam (MOBIC) 7 5 mg tablet Take 1 tablet (7 5 mg total) by mouth daily as needed (pain (with food)) for up to 30 days          INPATIENT ADMISSION  CONSULTID  Scheduled Meds:  Current Facility-Administered Medications:  acetaminophen 650 mg Oral Q6H PRN Jason Trinh, PA-C    albuterol 2 puff Inhalation Q6H PRN Jason Trinh, PA-C    ARIPiprazole 20 mg Oral Daily Jason Trinh, PA-C    aspirin 325 mg Oral BID Jason Trinh, PA-C    buPROPion 150 mg Oral QAM Jason Trinh, PA-C    clonazePAM 0 5 mg Oral BID PRN Jason Trinh, PA-C    docusate sodium 100 mg Oral BID Jason Trinh, PA-C    fluticasone 2 spray Nasal Daily Jason Trinh, PA-C    gabapentin 300 mg Oral TID Jason Trinh, PA-C    HYDROmorphone 0 5 mg Intravenous Q3H PRN Jaosn Trinh, PA-C    ondansetron 4 mg Intravenous Q6H PRN Jason Trinh, PA-C    oxyCODONE 10 mg Oral Q4H PRN Jason Trinh, PA-C    oxyCODONE 5 mg Oral Q4H PRN Jason Trinh, PA-C    sodium chloride 75 mL/hr Intravenous Continuous LOTUS Cook-ROSIE Last Rate: 75 mL/hr (02/07/19 0510)   vancomycin 1,500 mg Intravenous Q12H Sandra Mariee PA-C Last Rate: 1,500 mg (02/06/19 2205)     Continuous Infusions:  sodium chloride 75 mL/hr Last Rate: 75 mL/hr (02/07/19 0510)     PRN Meds:   acetaminophen    albuterol    clonazePAM    HYDROmorphone    ondansetron    oxyCODONE    oxyCODONE     SURGEON POST OP  Assessment:  1) POD#0 Removal of hardware left ankle  2) Presumed osteomyelitis left distal fibula  Plan:  Vancomycin 1 5 g IV q 12 hours around the clock  DVT prophylaxis: ASA 325mg PO BID/SCDs/ambulation  Nonweightbearing left lower extremity in splint  PT/OT:  Nonweightbearing left lower extremity in splint with crutches  Analgesia p r n  Follow-up OR cultures  ID consult for presumed osteomyelitis status post remove hardware  Follow up a m  Labs  Disposition:  Pending ID antibiotic recommendations, likely to home with home care  Weight bearing:  Nonweightbearing left lower extremity  Ortho Exam: LLE:  Splint intact, +sensation intact to foot and toes  +Plantar flexion dorsiflexion of toes  No pain to passive stretch, foot warm  VTE Pharmacologic Prophylaxis: ASA 325mg PO BID  VTE Mechanical Prophylaxis: sequential compression device     ID CONSULT  Impression/Recommendations:  1   S  Aureus ankle ORIF infection  Following # 2  In the setting of chronic nonhealing wound/sinus tract  Suspect MSSA given recent superficial wound culture  Now status post hardware removal   Consider residual osteomyelitis  Clinically stable without sepsis  Rec:  ? Continue vancomycin for now  ? Pharmacy consult for vancomycin dosing  ? Follow-up final susceptibilities and tailor antibiotics  ? Recommend 6 weeks IV antibiotics given concern for possible residual osteomyelitis  ? Can place PICC line in AM  ? Will provide scripts to CM in AM once antibiotic plan finalized  ?  Will need weekly CBC with diff, creatinine while on IV antibiotics  ?  Patient understands need to follow-up with ID after discharge

## 2019-02-06 NOTE — PLAN OF CARE
DISCHARGE PLANNING     Discharge to home or other facility with appropriate resources Not Progressing        INFECTION - ADULT     Absence or prevention of progression during hospitalization Not Progressing        PAIN - ADULT     Verbalizes/displays adequate comfort level or baseline comfort level Not Progressing        Potential for Falls     Patient will remain free of falls Not Progressing        SAFETY ADULT     Maintain or return to baseline ADL function Not Progressing        SKIN/TISSUE INTEGRITY - ADULT     Incision(s), wounds(s) or drain site(s) healing without S/S of infection Not Progressing

## 2019-02-06 NOTE — PROGRESS NOTES
02/06/19 [address]    Patient Information   Mental Status Alert   Primary Caregiver Self   Support System Extended family   Activities of Daily Living Prior to Admission   Functional Status Independent   Assistive Device Crutches   Living Arrangement Apartment   Ambulation Independent   Means of Transportation   Means of Transport to Appts: License/No car     Pt seen by PT/OT, is generally independent with her own care  Has crutches for ambulation and lives with SO who assists with house care  No DCP needs assessed at this time, plan for OP PT upon DC

## 2019-02-06 NOTE — OCCUPATIONAL THERAPY NOTE
OT EVALUATION     02/06/19 1135   Restrictions/Precautions   LLE Weight Bearing Per Order NWB   Braces or Orthoses (ace wrapped splint LLE)   Other Precautions Fall Risk;Pain   Pain Assessment   Pain Assessment 0-10   Pain Score 5   Pain Type Acute pain   Pain Location Ankle   Pain Orientation Left   Home Living   Type of [address] level;Stairs to enter with rails   Bathroom Shower/Tub Tub/shower unit   Home Equipment Crutches   Prior Function   Level of [place] Independent with ADLs and functional mobility   Lives With Significant other   Receives Help From Friend(s)   ADL Assistance Independent   IADLs Independent   Vocational On disability   Comments Prior to hospitalization pt reports she was independent with all self care and functional mobility tasks without device  She reprots she will be moving in with her boyfriend who has a 2nd floor apartment ([address] outside)  Pt was managing her own meds , meal prep and grocery shopping  She has a license but no car to drive  Boyfriend does laundry  ADL   Eating Assistance 7  Independent   Grooming Assistance 7  Independent   UB Bathing Assistance 7  Independent   LB Bathing Assistance 5  Supervision/Setup   LB Bathing Deficit Supervision/safety   UB Dressing Assistance 7  Independent   LB Dressing Assistance 4  Minimal Assistance   LB Dressing Deficit Requires assistive device for steadying;Supervision/safety; Increased time to complete   Toileting Assistance  5  Supervision/Setup   Bed Mobility   Supine to Sit 7  Independent   Sit to Supine 7  Independent   Transfers   Sit to Stand 5  Supervision   Stand to Sit 5  Supervision   Stand pivot 5  Supervision   Functional Mobility   Functional Mobility 5  Supervision   Additional Comments AT walker level while maintaining NWB, no LOB, no SOB   Balance   Static Sitting Good   Dynamic Sitting Good   Static Standing Fair   Dynamic Standing Fair   Activity Tolerance   Activity Tolerance Patient tolerated treatment well   RUE Assessment   RUE Assessment WNL   LUE Assessment   LUE Assessment WNL   Hand Function   Gross Motor Coordination Functional   Fine Motor Coordination Functional   Vision-Basic Assessment   Current Vision Wears glasses only for reading   Cognition   Overall Cognitive Status [facility]   Arousal/Participation Alert; Responsive; Cooperative   Attention Within functional limits   Orientation Level Oriented X4   Memory Within functional limits   Following Commands Follows all commands and directions without difficulty   Assessment   Limitation Decreased ADL status; Decreased self-care trans;Decreased high-level ADLs   Prognosis Good   Assessment Patient evaluated by Occupational Therapy  Patient admitted with Painful orthopaedic hardware [facility])  The patients occupational profile, medical and therapy history includes a extensive additional review of physical, cognitive, or psychosocial history related to current functional performance  Comorbidities affecting functional mobility and ADLS include: anxiety, chronic pain, depression and obesity  Prior to admission, patient was independent with functional mobility without assistive device, independent with ADLS, independent with IADLS and living with boyfriend in a single level home with 10-12 steps to enter  The evaluation identifies the following performance deficits: impaired balance, increased fall risk, new onset of impairment of functional mobility, decreased ADLS, decreased IADLS, pain and impaired judgement, that result in activity limitations and/or participation restrictions  This evaluation requires clinical decision making of high complexity, because the patient presents with comorbidites that affect occupational performance and required significant modification of tasks or assistance with consideration of multiple treatment options    The Barthel Index was used as a functional outcome tool presenting with a score of 60, indicating moderate limitations of functional mobility and ADLS  Patient will benefit from skilled Occupational Therapy services to address above deficits and facilitate a safe return to prior level of function  Goals   Patient Goals to return home   STG Time Frame (1-7 days)   Short Term Goal #1 Goals established to promote patient goal of returning home:  Patient will increase standing tolerance to 3 minutes during ADL task to decrease assistance level and decrease fall risk;Patient will increase functional mobility to and from bathroom with most appropriate device and IND to increase performance with ADLS and to use a toilet;Patient will be able to to verbalize understanding and perform energy conservation/proper body mechanics during ADLS and functional mobility at least 85% of the time with min cueing to decrease signs of fatigue and increase stamina to return to prior level of function; Patient will increase dynamic standing balance to F+ to improve postural stability and decrease fall risk during standing ADLS and transfers  LTG Time Frame (8-14 days)   Long Term Goal #1 Patient will increase standing tolerance to 6 minutes during ADL task to decrease assistance level and decrease fall risk;  Patient will be able to to verbalize understanding and perform energy conservation/proper body mechanics during ADLS and functional mobility at least 95% of the time with min cueing to decrease signs of fatigue and increase stamina to return to prior level of function;  Patient will increase dynamic standing balance to G to improve postural stability and decrease fall risk during standing ADLS and transfers       Functional Transfer Goals   Pt Will Perform All Functional Transfers (STG: IND)   ADL Goals   Pt Will Perform Bathing (STG: LB set up and AD PRN LTG: IND and AD PNR)   Pt Will Perform LE Dressing (STG: set up and AD PRN LTG: IND and AD PNR)   Pt Will Perform Toileting (STG: IND)   Plan   Treatment Interventions ADL retraining;Functional transfer training;Patient/family training;Equipment evaluation/education; Compensatory technique education; Energy conservation   OT Frequency 3-5x/wk   Recommendation   OT Discharge Recommendation Home with family support  (PT services)   Barthel Index   Feeding 10   Bathing 0   Grooming Score 5   Dressing Score 5   Bladder Score 10   Bowels Score 10   Toilet Use Score 5   Transfers (Bed/Chair) Score 10   Mobility (Level Surface) Score 0   Stairs Score 5   Barthel Index Score 61   Licensure   [place] License Number  [facility] OTR/L [id number]

## 2019-02-06 NOTE — CONSULTS
Consultation - Infectious Disease   Melvin Velazquez 41 y o  female MRN: [id number]  Unit/Bed#: [address] [place] [address] Encounter: [id number]      IMPRESSION & RECOMMENDATIONS:   Impression/Recommendations:  1   S  Aureus ankle ORIF infection  Following # 2  In the setting of chronic nonhealing wound/sinus tract  Suspect MSSA given recent superficial wound culture  Now status post hardware removal   Consider residual osteomyelitis  Clinically stable without sepsis  Rec:  · Continue vancomycin for now  · Pharmacy consult for vancomycin dosing  · Follow-up final susceptibilities and tailor antibiotics  · Recommend 6 weeks IV antibiotics given concern for possible residual osteomyelitis  · Can place PICC line in AM  · Will provide scripts to CM in AM once antibiotic plan finalized  · Will need weekly CBC with diff, creatinine while on IV antibiotics  · Patient understands need to follow-up with ID after discharge    2  Trimalleolar fracture  Status post delayed ORIF  Bone now healed and hardware removed as above  Rec:  · Close orthopedic follow-up ongoing    3  Tobacco abuse  Risk factor for wound nonhealing  Rec:  · Needs tobacco cessation  Discussed in detail with patient  Discussed the above plan in detail with the patient and Dr Dani Gamez  Antibiotics:  Vancomycin # 2    Thank you for this consultation  We will follow along with you  HISTORY OF PRESENT ILLNESS:  Reason for Consult:  ORIF infection    HPI: Melvin Velazquez is a 41 y o  female with a history of a closed left trimalleolar fracture in May of 2018  She was initially managed conservatively and non operatively  She continued to experience significant pain and underwent delayed ORIF 2 weeks after her initial injury  She was seen in follow-up in initially was noted to have incisions that were healing well    In late July she started to developed clear drainage from the distal aspect of her incision and was found to have a small Vicryl that was removed  She was prescribed Keflex  Upon follow-up it was noted that this area initially healed well and was scabbed over, but then in late August subsequently began to drain again  She was sent to wound care continues to have a small pinhole area over the distal aspect of her incision that failed to heal   This became more purulent in nature and she was seen in the ED in November and prescribed clindamycin  It is noted throughout this time that she continued to smoke  She continued to have persistent sinus drainage and it was noted that her fracture was healed so she was taken to the operating room yesterday for elective hardware removal   Intraoperatively she was noted to have fibrinous tissue possibly representing chronic indolent infection or the distal aspect of the fibula that was in the area of the sinus tract  Deep cultures were taken and are growing Staph aureus  Of note a wound culture from 01/02/2019 was growing MSSA  We are asked to comment on further evaluation and management  REVIEW OF SYSTEMS:  Denies fevers, chills, sweats, nausea, vomiting, or diarrhea  A complete system-based review of systems is otherwise negative      PAST MEDICAL HISTORY:  Past Medical History:   Diagnosis Date    Bipolar disorder ([facility] [facility] )     Depression     History of wound infection     left ankle    Psychiatric disorder     depression, anxiety    Seasonal allergic reaction      Past Surgical History:   Procedure Laterality Date    MD OPEN TREATMENT BIMALLEOLAR ANKLE FRACTURE Left 5/14/2018    Procedure: OPEN REDUCTION W/ INTERNAL FIXATION (ORIF) ANKLE;  Surgeon: Terence Pfeiffer DO;  Location: [address] [place] [address];  Service: Orthopedics    MD REMOVAL DEEP IMPLANT Left 2/5/2019    Procedure: REMOVAL HARDWARE ANKLE;  Surgeon: Terence Pfeiffer DO;  Location: [facility];  Service: Orthopedics    REDUCTION MAMMAPLASTY  2001    reduction       FAMILY HISTORY:  Non-contributory    SOCIAL HISTORY:  History   Alcohol Use    Yes Comment: social     History   Drug Use No     History   Smoking Status    Current Every Day Smoker    Packs/day: 0 50    Years: 25 00    Types: Cigarettes   Smokeless Tobacco    Never Used       ALLERGIES:  Allergies   Allergen Reactions    Toradol [Ketorolac Tromethamine]      GI UPSET    Latex Rash    Ultram [Tramadol] Rash       MEDICATIONS:  All current active medications have been reviewed  PHYSICAL EXAM:  Vitals:  Temp:  [98 2 °F (36 8 °C)-98 9 °F (37 2 °C)] 98 4 °F (36 9 °C)  HR:  [76-94] 76  Resp:  [16-18] 18  BP: (104-120)/(58-73) 111/64  SpO2:  [93 %-98 %] 93 %  Temp (24hrs), Av 5 °F (36 9 °C), Min:98 2 °F (36 8 °C), Max:98 9 °F (37 2 °C)  Current: Temperature: 98 4 °F (36 9 °C)     Physical Exam:  General:  Well-nourished, well-developed, in no acute distress  Eyes:  Conjunctive clear with no hemorrhages or effusions  Oropharynx:  No ulcers, no lesions  Neck:  Supple, no lymphadenopathy  Lungs:  Clear to auscultation bilaterally, no accessory muscle use  Cardiac:  Regular rate and rhythm, no murmurs  Abdomen:  Soft, non-tender, non-distended  Extremities:  No peripheral cyanosis, clubbing, or edema  Left foot OR wrap intact without strike through  Skin:  No rashes, no ulcers  Neurological:  Moves all four extremities spontaneously, sensation grossly intact    LABS, IMAGING, & OTHER STUDIES:  Lab Results:  I have personally reviewed pertinent labs      Results from last 7 days  Lab Units 19  0708   POTASSIUM mmol/L 3 7   CHLORIDE mmol/L 103   CO2 mmol/L 23   BUN mg/dL 7   CREATININE mg/dL 0 71   EGFR ml/min/1 73sq m 106   CALCIUM mg/dL 7 9*       Results from last 7 days  Lab Units 19  0708   WBC Thousand/uL 6 63   HEMOGLOBIN g/dL 10 1*   PLATELETS Thousands/uL 202       Results from last 7 days  Lab Units 19  1232 19  1223   GRAM STAIN RESULT  2+ Polys  1+ Mononuclear Cells  2+ RBC's  Rare Gram positive cocci in pairs Rare Polys  3+ RBC's  Rare Gram positive cocci in pairs   WOUND CULTURE   --  Culture too young- will reincubate       Imaging Studies:   I have personally reviewed pertinent imaging study reports and images in PACS  Chest x-ray no pneumonia    EKG, Pathology, and Other Studies:   I have personally reviewed pertinent reports

## 2019-02-07 DIAGNOSIS — A49.01 MSSA (METHICILLIN SUSCEPTIBLE STAPHYLOCOCCUS AUREUS): Primary | ICD-10-CM

## 2019-02-07 LAB
ANION GAP SERPL CALCULATED.3IONS-SCNC: 7 MMOL/L (ref 4–13)
BACTERIA TISS AEROBE CULT: ABNORMAL
BUN SERPL-MCNC: 5 MG/DL (ref 5–25)
CALCIUM SERPL-MCNC: 8.3 MG/DL (ref 8.3–10.1)
CHLORIDE SERPL-SCNC: 105 MMOL/L (ref 100–108)
CO2 SERPL-SCNC: 28 MMOL/L (ref 21–32)
CREAT SERPL-MCNC: 0.78 MG/DL (ref 0.6–1.3)
GFR SERPL CREATININE-BSD FRML MDRD: 95 ML/MIN/1.73SQ M
GLUCOSE SERPL-MCNC: 88 MG/DL (ref 65–140)
GRAM STN SPEC: ABNORMAL
POTASSIUM SERPL-SCNC: 3.7 MMOL/L (ref 3.5–5.3)
SODIUM SERPL-SCNC: 140 MMOL/L (ref 136–145)
VANCOMYCIN TROUGH SERPL-MCNC: 10.2 UG/ML (ref 10–20)

## 2019-02-07 PROCEDURE — 94664 DEMO&/EVAL PT USE INHALER: CPT

## 2019-02-07 PROCEDURE — 99024 POSTOP FOLLOW-UP VISIT: CPT | Performed by: ORTHOPAEDIC SURGERY

## 2019-02-07 PROCEDURE — 99232 SBSQ HOSP IP/OBS MODERATE 35: CPT | Performed by: INTERNAL MEDICINE

## 2019-02-07 PROCEDURE — 80202 ASSAY OF VANCOMYCIN: CPT | Performed by: INTERNAL MEDICINE

## 2019-02-07 PROCEDURE — 97110 THERAPEUTIC EXERCISES: CPT

## 2019-02-07 PROCEDURE — 80048 BASIC METABOLIC PNL TOTAL CA: CPT | Performed by: PHYSICIAN ASSISTANT

## 2019-02-07 RX ORDER — CEFAZOLIN SODIUM 2 G/50ML
2000 SOLUTION INTRAVENOUS EVERY 8 HOURS
Status: DISCONTINUED | OUTPATIENT
Start: 2019-02-07 | End: 2019-02-08 | Stop reason: HOSPADM

## 2019-02-07 RX ADMIN — ASPIRIN 325 MG: 325 TABLET ORAL at 17:58

## 2019-02-07 RX ADMIN — CEFAZOLIN SODIUM 2000 MG: 2 SOLUTION INTRAVENOUS at 19:51

## 2019-02-07 RX ADMIN — ARIPIPRAZOLE 20 MG: 5 TABLET ORAL at 09:35

## 2019-02-07 RX ADMIN — GABAPENTIN 300 MG: 300 CAPSULE ORAL at 09:35

## 2019-02-07 RX ADMIN — OXYCODONE HYDROCHLORIDE 10 MG: 10 TABLET ORAL at 19:46

## 2019-02-07 RX ADMIN — OXYCODONE HYDROCHLORIDE 10 MG: 10 TABLET ORAL at 05:10

## 2019-02-07 RX ADMIN — DOCUSATE SODIUM 100 MG: 100 CAPSULE, LIQUID FILLED ORAL at 17:58

## 2019-02-07 RX ADMIN — HYDROMORPHONE HYDROCHLORIDE 0.5 MG: 1 INJECTION, SOLUTION INTRAMUSCULAR; INTRAVENOUS; SUBCUTANEOUS at 14:47

## 2019-02-07 RX ADMIN — OXYCODONE HYDROCHLORIDE 10 MG: 10 TABLET ORAL at 09:31

## 2019-02-07 RX ADMIN — HYDROMORPHONE HYDROCHLORIDE 0.5 MG: 1 INJECTION, SOLUTION INTRAMUSCULAR; INTRAVENOUS; SUBCUTANEOUS at 11:42

## 2019-02-07 RX ADMIN — ASPIRIN 325 MG: 325 TABLET ORAL at 09:35

## 2019-02-07 RX ADMIN — HYDROMORPHONE HYDROCHLORIDE 0.5 MG: 1 INJECTION, SOLUTION INTRAMUSCULAR; INTRAVENOUS; SUBCUTANEOUS at 08:32

## 2019-02-07 RX ADMIN — DOCUSATE SODIUM 100 MG: 100 CAPSULE, LIQUID FILLED ORAL at 09:35

## 2019-02-07 RX ADMIN — SODIUM CHLORIDE 75 ML/HR: 0.9 INJECTION, SOLUTION INTRAVENOUS at 19:48

## 2019-02-07 RX ADMIN — SODIUM CHLORIDE 75 ML/HR: 0.9 INJECTION, SOLUTION INTRAVENOUS at 05:10

## 2019-02-07 RX ADMIN — CEFAZOLIN SODIUM 2000 MG: 2 SOLUTION INTRAVENOUS at 12:58

## 2019-02-07 RX ADMIN — GABAPENTIN 300 MG: 300 CAPSULE ORAL at 21:32

## 2019-02-07 RX ADMIN — GABAPENTIN 300 MG: 300 CAPSULE ORAL at 17:58

## 2019-02-07 NOTE — PLAN OF CARE
DISCHARGE PLANNING     Discharge to home or other facility with appropriate resources Progressing        DISCHARGE PLANNING - CARE MANAGEMENT     Discharge to post-acute care or home with appropriate resources Progressing        INFECTION - ADULT     Absence or prevention of progression during hospitalization Progressing        PAIN - ADULT     Verbalizes/displays adequate comfort level or baseline comfort level Progressing        Potential for Falls     Patient will remain free of falls Progressing        SAFETY ADULT     Maintain or return to baseline ADL function Progressing        SKIN/TISSUE INTEGRITY - ADULT     Incision(s), wounds(s) or drain site(s) healing without S/S of infection Progressing

## 2019-02-07 NOTE — PROGRESS NOTES
Progress Note - Infectious Disease   Justine Zegn 41 y o  female MRN: [id number]  Unit/Bed#: [address] [place] Encounter: [id number]      Impression/Recommendations:  1  MSSA ankle ORIF infection  Following # 2  In the setting of chronic nonhealing wound/sinus tract  Now status post hardware removal   Consider residual osteomyelitis  Clinically stable without sepsis  Rec:  ? Change antibiotics to high-dose cefazolin to continue for 6 weeks IV antibiotics through 3/18/19 given concern for possible residual osteomyelitis  ? IR consult for PICC  D/C PICC after last dose of IV antibiotics  ? Check weekly CBC-diff, creatinine while on IV antibiotics  ? Outpatient follow up with ID in 2 weeks  Patient understands need to follow-up in our office and be monitoring closely as an outpatient with [facility]  ? D/C planning  Rx provided to CM      2  Trimalleolar fracture  Status post delayed ORIF  Bone now healed and hardware removed as above  Rec:  ? Close orthopedic follow-up ongoing     3  Tobacco abuse  Risk factor for wound nonhealing  Rec:  ? Needs tobacco cessation  Discussed in detail with patient      Discussed the above plan in detail with the patient and her boyfriend at the bedside        Antibiotics:  Vancomycin # 3    Subjective:  Patient seen on PM rounds  Denies fevers, chills, sweats, nausea, vomiting, or diarrhea  24 Hour Events:  No documented fevers, chills, sweats, nausea, vomiting, or diarrhea      Objective:  Vitals:  Temp:  [98 1 °F (36 7 °C)-98 5 °F (36 9 °C)] 98 1 °F (36 7 °C)  HR:  [76-80] 80  Resp:  [18] 18  BP: (111-131)/(64-77) 122/74  SpO2:  [93 %-98 %] 95 %  Temp (24hrs), Av 3 °F (36 8 °C), Min:98 1 °F (36 7 °C), Max:98 5 °F (36 9 °C)  Current: Temperature: 98 1 °F (36 7 °C)    Physical Exam:   General:  No acute distress  Psychiatric:  Awake and alert  Pulmonary:  Normal respiratory excursion without accessory muscle use  Abdomen:  Soft, nontender  Extremities:  No edema, left foot splint intact  Skin:  No rashes    Lab Results:  I have personally reviewed pertinent labs  Results from last 7 days  Lab Units 02/07/19  0522 02/06/19  0708   POTASSIUM mmol/L 3 7 3 7   CHLORIDE mmol/L 105 103   CO2 mmol/L 28 23   BUN mg/dL 5 7   CREATININE mg/dL 0 78 0 71   EGFR ml/min/1 73sq m 95 106   CALCIUM mg/dL 8 3 7 9*       Results from last 7 days  Lab Units 02/06/19  0708   WBC Thousand/uL 6 63   HEMOGLOBIN g/dL 10 1*   PLATELETS Thousands/uL 202       Results from last 7 days  Lab Units 02/05/19  1232 02/05/19  1223   GRAM STAIN RESULT  2+ Polys  1+ Mononuclear Cells  2+ RBC's  Rare Gram positive cocci in pairs Rare Polys  3+ RBC's  Rare Gram positive cocci in pairs   WOUND CULTURE   --  Few Colonies of Staphylococcus aureus*       Imaging Studies:   I have personally reviewed pertinent imaging study reports and images in PACS  EKG, Pathology, and Other Studies:   I have personally reviewed pertinent reports

## 2019-02-07 NOTE — SOCIAL WORK
CM asked to assist with home IV abx therapy for the next 6 weeks, received scripts from ID today  Referral out to [facility] re: coverage and pt is covered at 100%  [facility] able to go to the home and provide weekly PICC care and labs as well  D/W pt and Surgery, no other DC  needs noted in the home at this time  Pt to receive PICC line in the am     Liaison to visit pt in room tomorrow am to discuss it further with her

## 2019-02-07 NOTE — PHYSICAL THERAPY NOTE
PT TREATMENT     02/07/19 1225   Pain Assessment   Pain Assessment 0-10   Pain Score 5  (L ankle area with movement mostly)   Restrictions/Precautions   LLE Weight Bearing Per Order NWB   Other Precautions Fall Risk;Pain   General   Chart Reviewed Yes   Family/Caregiver Present No   Cognition   Arousal/Participation Cooperative   Subjective   Subjective patient reports independent use of commode and moving around room with crutches and assist mostly for IV pole   Bed Mobility   Supine to Sit 7  Independent   Sit to Supine 7  Independent   Transfers   Sit to Stand 7  Independent   Stand to Sit 7  Independent   Ambulation/Elevation   Gait Assistance (supervision to independent)   Additional items Verbal cues   Assistive Device Axillary crutches   Distance 30 feet with change in direction, crutches adjusted for improved fit and better upright standing posture/body mechanics   Assessment   Assessment patient tolerated gait well with axillary crutches and supervision of 1 person only  Patient tolerating short distances for functional household distances and voicing confidence with stair climbing with assist of significant other  PT to follow as needed for independent gait and increasing functional mobility as tolerated with ankle pain  continuing NWB LLE   Plan   Treatment/Interventions ADL retraining;Functional transfer training;LE strengthening/ROM; Elevations; Therapeutic exercise; Endurance training;Patient/family training;Equipment eval/education;Gait training; Compensatory technique education   PT Frequency Once a day   Recommendation   Recommendation Home with family support   Licensure   [place] License Number  Nelida Beard PT [id number]

## 2019-02-07 NOTE — PROGRESS NOTES
Progress Note - Orthopedics   Hortencia Dowd 41 y o  female MRN: [id number]  Unit/Bed#: [address] [place] [address] Encounter: [id number]    Assessment:  1) POD#2 Removal of hardware left ankle  2) Presumed osteomyelitis left distal fibula     Plan:  Vancomycin 1 5 g IV q 12 hours; Vanco trough normal this morning; ID on board and will tailor antibiotics based on final cultures  Vanco now DC and changed to Ancef 2g Q8H  Will likely need PICC line placed for 6 weeks of antibiotics - will defer to ID team  DVT prophylaxis: ASA 325mg PO BID/SCDs/ambulation  Nonweightbearing left lower extremity in splint at all times  PT/OT:  NWB left lower extremity in splint with crutches  Analgesia p r n  Follow-up OR cultures when available  Follow up a m  Labs - ESR/CRP normal  Disposition:  Likely home with PICC line for 6 weeks of antibiotics; ID will tailor antibiotics when final wound cultures are available    Weight bearing:  Nonweightbearing left lower extremity    VTE Pharmacologic Prophylaxis: ASA 325mg PO BID  VTE Mechanical Prophylaxis: sequential compression device    Subjective:  Patient seen and examined at bedside this morning  No overnight events  Pain in left ankle controlled with medication  Has remained NWB on left leg  Evaluated by ID yesterday, will likely need PICC line and 6 weeks of antibiotics pending the final cultures  Denies CP/SOB, parasthesias, dizziness/lightheadedness    Vitals: Blood pressure 122/74, pulse 80, temperature 98 1 °F (36 7 °C), temperature source Oral, resp  rate 18, height 5' 6 5" (1 689 m), weight 87 8 kg (193 lb 9 oz), last menstrual period 01/12/2019, SpO2 95 %, not currently breastfeeding  ,Body mass index is 30 77 kg/m²        Intake/Output Summary (Last 24 hours) at 02/07/19 1128  Last data filed at 02/07/19 0510   Gross per 24 hour   Intake             1000 ml   Output              300 ml   Net              700 ml       Invasive Devices     Peripheral Intravenous Line            Peripheral IV 02/05/19 Left Wrist 2 days                Physical Exam: NAD, A&Ox3, sitting comfortably in bed  Ortho Exam: LLE:  Posterior short leg splint intact, +sensation intact to foot and toes  +Plantar flexion dorsiflexion of toes    No pain to passive stretch, foot warm, 2+ DP pulse    Lab, Imaging and other studies:      Lab Results   Component Value Date    WBC 6 63 02/06/2019    HGB 10 1 (L) 02/06/2019    HCT 31 5 (L) 02/06/2019    MCV 89 02/06/2019     02/06/2019     Lab Results   Component Value Date    K 3 7 02/07/2019     02/07/2019    CO2 28 02/07/2019    BUN 5 02/07/2019    CREATININE 0 78 02/07/2019    GLUF 82 01/29/2019    CALCIUM 8 3 02/07/2019    AST 14 10/02/2018    ALT 8 10/02/2018    ALKPHOS 58 01/21/2018    EGFR 95 02/07/2019     ESR : 12  CRP : <3 0  Vanc Tr : 10 2    Oskar Teixeira PA-C Orthopedics

## 2019-02-07 NOTE — UTILIZATION REVIEW
Continued Stay Review    Date: 2/7/19  Vital Signs: /74 (BP Location: Right arm)   Pulse 80   Temp 98 1 °F (36 7 °C) (Oral)   Resp 18   Ht 5' 6 5" (1 689 m)   Wt 87 8 kg (193 lb 9 oz)   LMP 01/12/2019   SpO2 95%   BMI 30 77 kg/m²   Assessment/Plan:   SURGEON  Assessment:  1) POD#2 Removal of hardware left ankle  2) Presumed osteomyelitis left distal fibula   Plan:  Vancomycin 1 5 g IV q 12 hours; Vanco trough normal this morning; ID on board and will tailor antibiotics based on final cultures  Vanco now DC and changed to Ancef 2g Q8H  Will likely need PICC line placed for 6 weeks of antibiotics - will defer to ID team  DVT prophylaxis: ASA 325mg PO BID/SCDs/ambulation  Nonweightbearing left lower extremity in splint at all times  PT/OT:  NWB left lower extremity in splint with crutches  Analgesia p r n  Follow-up OR cultures when available  Follow up a m   Labs - ESR/CRP normal  Nonweightbearing left lower extremity    ACUTE LOC  IR CONSULT  IV ANCEF   VANCO TROUGH  IV DILAUDID X 6 DOSES  IN 24HR   OXYCODONE X7 DOSES IN 24HR   Medications:   Scheduled Meds:   Current Facility-Administered Medications:  acetaminophen 650 mg Oral Q6H PRN Stacy Coast, PA-C    albuterol 2 puff Inhalation Q6H PRN [facility], PA-C    ARIPiprazole 20 mg Oral Daily [facility], PA-C    aspirin 325 mg Oral BID [facility], PA-C    buPROPion 150 mg Oral QAM [facility], PA-C    cefazolin 2,000 mg Intravenous Q8H Amparo Mccarthy MD    clonazePAM 0 5 mg Oral BID PRN [facility], PA-C    docusate sodium 100 mg Oral BID [facility], PA-C    fluticasone 2 spray Nasal Daily [facility], PA-C    gabapentin 300 mg Oral TID [facility], PA-C    HYDROmorphone 0 5 mg Intravenous Q3H PRN Stacy Coast, PA-C    ondansetron 4 mg Intravenous Q6H PRN [facility], PA-C    oxyCODONE 10 mg Oral Q4H PRN [facility], PA-C    oxyCODONE 5 mg Oral Q4H PRN [facility], PA-C    sodium chloride 75 mL/hr Intravenous Continuous Melisa Casillas PA-C Last Rate: 75 mL/hr (02/07/19 0510)     Continuous Infusions:   sodium chloride 75 mL/hr Last Rate: 75 mL/hr (02/07/19 0510)     PRN Meds:   acetaminophen    albuterol    clonazePAM    HYDROmorphone    ondansetron    oxyCODONE    oxyCODONE  Age/Sex: 41 y o  female   Discharge Plan:

## 2019-02-08 ENCOUNTER — APPOINTMENT (INPATIENT)
Dept: NON INVASIVE DIAGNOSTICS | Facility: HOSPITAL | Age: 44
DRG: 217 | End: 2019-02-08
Payer: COMMERCIAL

## 2019-02-08 VITALS
SYSTOLIC BLOOD PRESSURE: 116 MMHG | TEMPERATURE: 98.4 F | RESPIRATION RATE: 18 BRPM | BODY MASS INDEX: 30.38 KG/M2 | OXYGEN SATURATION: 94 % | DIASTOLIC BLOOD PRESSURE: 66 MMHG | HEART RATE: 81 BPM | WEIGHT: 193.56 LBS | HEIGHT: 67 IN

## 2019-02-08 LAB
ANION GAP SERPL CALCULATED.3IONS-SCNC: 7 MMOL/L (ref 4–13)
BACTERIA SPEC ANAEROBE CULT: ABNORMAL
BACTERIA SPEC ANAEROBE CULT: ABNORMAL
BACTERIA WND AEROBE CULT: ABNORMAL
BACTERIA WND AEROBE CULT: ABNORMAL
BUN SERPL-MCNC: 8 MG/DL (ref 5–25)
CALCIUM SERPL-MCNC: 8.4 MG/DL (ref 8.3–10.1)
CHLORIDE SERPL-SCNC: 103 MMOL/L (ref 100–108)
CO2 SERPL-SCNC: 29 MMOL/L (ref 21–32)
CREAT SERPL-MCNC: 0.85 MG/DL (ref 0.6–1.3)
GFR SERPL CREATININE-BSD FRML MDRD: 85 ML/MIN/1.73SQ M
GLUCOSE SERPL-MCNC: 88 MG/DL (ref 65–140)
GRAM STN SPEC: ABNORMAL
POTASSIUM SERPL-SCNC: 3.8 MMOL/L (ref 3.5–5.3)
SODIUM SERPL-SCNC: 139 MMOL/L (ref 136–145)

## 2019-02-08 PROCEDURE — 36573 INSJ PICC RS&I 5 YR+: CPT

## 2019-02-08 PROCEDURE — 80048 BASIC METABOLIC PNL TOTAL CA: CPT | Performed by: PHYSICIAN ASSISTANT

## 2019-02-08 PROCEDURE — 02HV33Z INSERTION OF INFUSION DEVICE INTO SUPERIOR VENA CAVA, PERCUTANEOUS APPROACH: ICD-10-PCS | Performed by: RADIOLOGY

## 2019-02-08 PROCEDURE — 97110 THERAPEUTIC EXERCISES: CPT

## 2019-02-08 PROCEDURE — 36573 INSJ PICC RS&I 5 YR+: CPT | Performed by: RADIOLOGY

## 2019-02-08 PROCEDURE — 99024 POSTOP FOLLOW-UP VISIT: CPT | Performed by: ORTHOPAEDIC SURGERY

## 2019-02-08 PROCEDURE — 99232 SBSQ HOSP IP/OBS MODERATE 35: CPT | Performed by: INTERNAL MEDICINE

## 2019-02-08 PROCEDURE — C1751 CATH, INF, PER/CENT/MIDLINE: HCPCS

## 2019-02-08 RX ORDER — CEFAZOLIN SODIUM 2 G/50ML
2000 SOLUTION INTRAVENOUS EVERY 8 HOURS
Qty: 6300 ML | Refills: 0
Start: 2019-02-08 | End: 2019-03-22 | Stop reason: HOSPADM

## 2019-02-08 RX ORDER — METRONIDAZOLE 500 MG/1
500 TABLET ORAL EVERY 8 HOURS SCHEDULED
Status: DISCONTINUED | OUTPATIENT
Start: 2019-02-08 | End: 2019-02-08

## 2019-02-08 RX ORDER — METRONIDAZOLE 500 MG/1
500 TABLET ORAL EVERY 8 HOURS SCHEDULED
Status: DISCONTINUED | OUTPATIENT
Start: 2019-02-08 | End: 2019-02-08 | Stop reason: HOSPADM

## 2019-02-08 RX ORDER — METRONIDAZOLE 500 MG/1
500 TABLET ORAL EVERY 8 HOURS SCHEDULED
Qty: 126 TABLET | Refills: 0
Start: 2019-02-08 | End: 2019-03-22 | Stop reason: HOSPADM

## 2019-02-08 RX ORDER — LIDOCAINE HYDROCHLORIDE 10 MG/ML
INJECTION, SOLUTION INFILTRATION; PERINEURAL CODE/TRAUMA/SEDATION MEDICATION
Status: COMPLETED | OUTPATIENT
Start: 2019-02-08 | End: 2019-02-08

## 2019-02-08 RX ORDER — ASPIRIN 325 MG
325 TABLET ORAL 2 TIMES DAILY
Qty: 84 TABLET | Refills: 0 | Status: SHIPPED | OUTPATIENT
Start: 2019-02-08 | End: 2019-09-19 | Stop reason: ALTCHOICE

## 2019-02-08 RX ORDER — OXYCODONE HYDROCHLORIDE 5 MG/1
5 TABLET ORAL EVERY 4 HOURS PRN
Qty: 30 TABLET | Refills: 0 | Status: SHIPPED | OUTPATIENT
Start: 2019-02-08 | End: 2019-02-13 | Stop reason: SDUPTHER

## 2019-02-08 RX ADMIN — DOCUSATE SODIUM 100 MG: 100 CAPSULE, LIQUID FILLED ORAL at 09:09

## 2019-02-08 RX ADMIN — LIDOCAINE HYDROCHLORIDE 1 ML: 10 INJECTION, SOLUTION INFILTRATION; PERINEURAL at 10:17

## 2019-02-08 RX ADMIN — OXYCODONE HYDROCHLORIDE 10 MG: 10 TABLET ORAL at 11:11

## 2019-02-08 RX ADMIN — CEFAZOLIN SODIUM 2000 MG: 2 SOLUTION INTRAVENOUS at 19:16

## 2019-02-08 RX ADMIN — GABAPENTIN 300 MG: 300 CAPSULE ORAL at 09:10

## 2019-02-08 RX ADMIN — METRONIDAZOLE 500 MG: 500 TABLET ORAL at 11:12

## 2019-02-08 RX ADMIN — OXYCODONE HYDROCHLORIDE 5 MG: 5 TABLET ORAL at 16:10

## 2019-02-08 RX ADMIN — METRONIDAZOLE 500 MG: 500 TABLET ORAL at 19:07

## 2019-02-08 RX ADMIN — ASPIRIN 325 MG: 325 TABLET ORAL at 09:09

## 2019-02-08 RX ADMIN — OXYCODONE HYDROCHLORIDE 10 MG: 10 TABLET ORAL at 06:45

## 2019-02-08 RX ADMIN — CEFAZOLIN SODIUM 2000 MG: 2 SOLUTION INTRAVENOUS at 12:37

## 2019-02-08 RX ADMIN — ARIPIPRAZOLE 20 MG: 5 TABLET ORAL at 09:08

## 2019-02-08 RX ADMIN — CEFAZOLIN SODIUM 2000 MG: 2 SOLUTION INTRAVENOUS at 05:15

## 2019-02-08 RX ADMIN — DOCUSATE SODIUM 100 MG: 100 CAPSULE, LIQUID FILLED ORAL at 19:07

## 2019-02-08 RX ADMIN — ASPIRIN 325 MG: 325 TABLET ORAL at 19:07

## 2019-02-08 RX ADMIN — OXYCODONE HYDROCHLORIDE 10 MG: 10 TABLET ORAL at 01:01

## 2019-02-08 RX ADMIN — GABAPENTIN 300 MG: 300 CAPSULE ORAL at 16:10

## 2019-02-08 NOTE — SOCIAL WORK
Per surgery, pt stable for DC today  Pt in need of ride home  [employer] called and referred for assist with DC  Pt scheduled for DC transportation at 815pm ride with [employer]  Pt has mobile line to received a text, will let RN know when she gets the text to go  Reference number for the ride is [id number]  Unit RN aware as well  Pt significant other here to assist pt going home  CM spoke with [employer], added a 2nd person to the [email or web address] ride  Per pt, [facility] pharmacy attempting to deliver supplies tonight  Pt not at home at this time  OC RN will bring meds and supplies with her at 9am tomorrow

## 2019-02-08 NOTE — PROCEDURES
PICC Line Insertion  Date/Time: 2/8/2019 10:21 AM  Performed by: Jose Angel Jordan  Authorized by: Peter Monroe     Patient location:  IR  Consent:     Consent obtained:  Written    Consent given by:  Patient    Risks discussed:  Bleeding and infection  Universal protocol:     Procedure explained and questions answered to patient or proxy's satisfaction: yes      Relevant documents present and verified: yes      Radiology Images displayed and confirmed  If images not available, report reviewed: yes      Site/side marked: yes      Immediately prior to procedure, a time out was called: yes      Patient identity confirmed:  Verbally with patient and arm band  Pre-procedure details:     Hand hygiene: Hand hygiene performed prior to insertion      Sterile barrier technique: All elements of maximal sterile technique followed      Skin preparation:  ChloraPrep    Skin preparation agent: Skin preparation agent completely dried prior to procedure    Indications:     PICC line indications: long term antibiotics    Anesthesia (see MAR for exact dosages): Anesthesia method:  Local infiltration    Local anesthetic:  Lidocaine 1% w/o epi  Procedure details:     Location:  Basilic    Vessel type: vein      Laterality:  Right    Approach: percutaneous technique used      Patient position:  Flat    Procedural supplies:  Double lumen    Catheter size:  5 5 Fr    Landmarks identified: yes      Ultrasound guidance: yes      Sterile ultrasound techniques: Sterile gel and sterile probe covers were used      Number of attempts:  1    Successful placement: yes      Total catheter length (cm):  48    Catheter out on skin (cm):  0    Arm circumference:  28  Post-procedure details:     Post-procedure:  Dressing applied, securement device placed and line sutured    Assessment:  Blood return through all ports and placement verified by x-ray    Patient tolerance of procedure: Tolerated well, no immediate complications    Observer:  Yes Observer name:  Adalid Crowe RN

## 2019-02-08 NOTE — SEDATION DOCUMENTATION
PICC line placed in right basilic vein  Patient tolerated well and transferred back to inpatient room

## 2019-02-08 NOTE — PROGRESS NOTES
Progress Note - Infectious Disease   Robert Diaz 41 y o  female MRN: [id number]  Unit/Bed#: [address] [place] [address] Encounter: [id number]      Impression/Recommendations:  1   Left ankle ORIF infection   Following # 2   In the setting of chronic nonhealing wound/sinus tract   MSSA/S  Anginosis/Prevotella  Now status post hardware removal   Consider residual osteomyelitis   Clinically stable without sepsis  Rec:  ? Continue high-dose cefazolin to continue for 6 weeks IV antibiotics through 3/18/19 given concern for possible residual osteomyelitis  ? Add Flagyl with plan to also continue for 6 weeks  ? Check weekly CBC-diff, creatinine while on IV antibiotics  ? Avoid EtOH and monitor for symptoms of neuropathy on long-term Flagyl  Discussed with patient  ? D/C PICC after last dose of IV antibiotics  ? Outpatient follow up with ID in 2 weeks  Patient understands need to follow-up in our office and be monitoring closely as an outpatient with [facility]  ? D/C planning for home IV antibiotics  Rx provided to CM      2   Trimalleolar fracture   Status post delayed ORIF   Bone now healed and hardware removed as above  Rec:  ? Close orthopedic follow-up ongoing     3   Tobacco abuse   Risk factor for wound nonhealing  Rec:  ? Needs tobacco cessation   Discussed in detail with patient      The patient is stable from an ID standpoint for D/C at any time      Antibiotics:  Cefazolin #1  Antibiotics #4    Subjective:  Patient seen on PM rounds  Denies fevers, chills, sweats, nausea, vomiting, or diarrhea  24 Hour Events:  Got PICC this AM   No documented fevers, chills, sweats, nausea, vomiting, or diarrhea      Objective:  Vitals:  Temp:  [98 3 °F (36 8 °C)-98 6 °F (37 °C)] 98 6 °F (37 °C)  HR:  [73-84] 78  Resp:  [16-18] 16  BP: (118-142)/(79-87) 118/79  SpO2:  [96 %-98 %] 97 %  Temp (24hrs), Av 4 °F (36 9 °C), Min:98 3 °F (36 8 °C), Max:98 6 °F (37 °C)  Current: Temperature: 98 6 °F (37 °C)    Physical Exam:   General:  No acute distress  Psychiatric:  Awake and alert  Pulmonary:  Normal respiratory excursion without accessory muscle use  Abdomen:  Soft, nontender  Extremities:  No edema, PICC intact  Skin:  No rashes    Lab Results:  I have personally reviewed pertinent labs  Results from last 7 days  Lab Units 02/08/19  0613 02/07/19  0522 02/06/19  0708   POTASSIUM mmol/L 3 8 3 7 3 7   CHLORIDE mmol/L 103 105 103   CO2 mmol/L 29 28 23   BUN mg/dL 8 5 7   CREATININE mg/dL 0 85 0 78 0 71   EGFR ml/min/1 73sq m 85 95 106   CALCIUM mg/dL 8 4 8 3 7 9*       Results from last 7 days  Lab Units 02/06/19  0708   WBC Thousand/uL 6 63   HEMOGLOBIN g/dL 10 1*   PLATELETS Thousands/uL 202       Results from last 7 days  Lab Units 02/05/19  1232 02/05/19  1223   GRAM STAIN RESULT  2+ Polys  1+ Mononuclear Cells  2+ RBC's  Rare Gram positive cocci in pairs Rare Polys  3+ RBC's  Rare Gram positive cocci in pairs   WOUND CULTURE   --  Few Colonies of Staphylococcus aureus*  Few Colonies of Streptococcus anginosus*       Imaging Studies:   I have personally reviewed pertinent imaging study reports and images in PACS  EKG, Pathology, and Other Studies:   I have personally reviewed pertinent reports

## 2019-02-08 NOTE — PROGRESS NOTES
Progress Note - Orthopedics   Neoma Matters 41 y o  female MRN: [id number]  Unit/Bed#: [address] [place] [address] Encounter: [id number]    Assessment:  1) POD#3 Removal of hardware left ankle  2) Presumed osteomyelitis left distal fibula - MSSA and Prevotrella bivia    Plan:  Per ID recs; will continue IV Ancef and PO Flagyl until 3/18/19  PICC line placed this morning in anticipation of DC  DVT prophylaxis: ASA 325mg PO BID/SCDs/ambulation  Nonweightbearing left lower extremity in splint at all times  PT/OT:  NWB left lower extremity in splint with crutches  Analgesia p r n  Disposition:  Home tonight after last Ancef dose; PICC line now placed and VNA to begin tomorrow for IV antibiotics until 3/18/19  Follow up with ortho 2/13 and ID in 2 weeks    Weight bearing:  Nonweightbearing left lower extremity    VTE Pharmacologic Prophylaxis: ASA 325mg PO BID  VTE Mechanical Prophylaxis: sequential compression device    Subjective:  Patient seen and examined at bedside this morning  No overnight events  Pain in left ankle controlled with medication  PICC line placed this morning  Has remained NWB on left leg  Denies CP/SOB, parasthesias, dizziness/lightheadedness    Vitals: Blood pressure 116/66, pulse 81, temperature 98 4 °F (36 9 °C), temperature source Oral, resp  rate 18, height 5' 6 5" (1 689 m), weight 87 8 kg (193 lb 9 oz), last menstrual period 01/12/2019, SpO2 94 %, not currently breastfeeding  ,Body mass index is 30 77 kg/m²        Intake/Output Summary (Last 24 hours) at 02/08/19 1346  Last data filed at 02/08/19 1101   Gross per 24 hour   Intake              420 ml   Output              450 ml   Net              -30 ml       Invasive Devices     Peripherally Inserted Central Catheter Line            PICC Line 07/37/13 Right Basilic less than 1 day          Peripheral Intravenous Line            Peripheral IV 02/05/19 Left Wrist 3 days                Physical Exam: NAD, A&Ox3, sitting comfortably in bed eating lunch  Ortho Exam: LLE:  Posterior short leg splint intact, +sensation intact to foot and toes  +Plantar flexion dorsiflexion of toes    No pain to passive stretch, foot warm, 2+ DP pulse    Lab, Imaging and other studies:      Lab Results   Component Value Date    WBC 6 63 02/06/2019    HGB 10 1 (L) 02/06/2019    HCT 31 5 (L) 02/06/2019    MCV 89 02/06/2019     02/06/2019     Lab Results   Component Value Date    K 3 8 02/08/2019     02/08/2019    CO2 29 02/08/2019    BUN 8 02/08/2019    CREATININE 0 85 02/08/2019    GLUF 82 01/29/2019    CALCIUM 8 4 02/08/2019    AST 14 10/02/2018    ALT 8 10/02/2018    ALKPHOS 58 01/21/2018    EGFR 85 02/08/2019     Wound culture - MSSA  Anaerobic culture - Prevotrella verito Sandoval PA-C Orthopedics

## 2019-02-08 NOTE — PHYSICAL THERAPY NOTE
PT TREATMENT     02/08/19 1200   Pain Assessment   Pain Assessment 0-10   Pain Score 4  (L ankle area)   Restrictions/Precautions   LLE Weight Bearing Per Order NWB   Other Precautions Pain   General   Chart Reviewed Yes   Family/Caregiver Present No   Cognition   Arousal/Participation Cooperative   Subjective   Subjective patient reports possibly going home today   Bed Mobility   Supine to Sit 7  Independent   Sit to Supine 7  Independent   Transfers   Sit to Stand 7  Independent   Stand to Sit 7  Independent   Ambulation/Elevation   Gait Assistance (supervision to independent)   Additional items Verbal cues   Assistive Device Axillary crutches   Distance 30 feet x 2 with change in direction  Patient also ambualted to bathroom for independent for use of toilet  Stair Management Assistance (supervision to min assist)   Additional items Assist x 1;Verbal cues; Tactile cues   Stair Management Technique One rail R;Step to pattern; With crutches  (NWB LLE)   Number of Stairs 4   Assessment   Assessment Patient demonstrating improved balance and endurance with gait on level and unlevel surfaces and will benefit from continued use of crutches to maintain NWB LLE   Plan   Treatment/Interventions ADL retraining;Functional transfer training;LE strengthening/ROM; Elevations; Therapeutic exercise; Endurance training;Equipment eval/education;Gait training; Compensatory technique education   PT Frequency 5x/wk   Recommendation   Recommendation Home with family support   Licensure   [place] License Number  Anna Schwarz PT [id number]

## 2019-02-08 NOTE — UTILIZATION REVIEW
Continued Stay Review    Date: 2/8/19  Vital Signs: /79 (BP Location: Right arm)   Pulse 78   Temp 98 6 °F (37 °C) (Oral)   Resp 16   Ht 5' 6 5" (1 689 m)   Wt 87 8 kg (193 lb 9 oz)   LMP 01/12/2019   SpO2 97%   BMI 30 77 kg/m²   Assessment/Plan:   Assessment:  1) POD#3 Removal of hardware left ankle  2) Presumed osteomyelitis left distal fibula - MSSA and Prevotrella bivia     Plan:  Per ID recs; will continue IV Ancef and PO Flagyl until 3/18/19  PICC line placed this morning in anticipation of DC  DVT prophylaxis: ASA 325mg PO BID/SCDs/ambulation  Nonweightbearing left lower extremity in splint at all times  PT/OT:  NWB left lower extremity in splint with crutches  Analgesia p r n  Disposition:  Home tonight after last Ancef dose; PICC line now placed and [facility] to begin tomorrow for IV antibiotics until 3/18/19   Follow up with ortho 2/13 and ID in 2 weeks     Weight bearing:  Nonweightbearing left lower extremity  Medications:   Scheduled Meds:   Current Facility-Administered Medications:  acetaminophen 650 mg Oral Q6H PRN Catrina Owens PA-C    albuterol 2 puff Inhalation Q6H PRN Catrina Owens PA-C    ARIPiprazole 20 mg Oral Daily Catrina Owens PA-C    aspirin 325 mg Oral BID Catrina Owens PA-C    buPROPion 150 mg Oral QAM Catrina Owens PA-C    cefazolin 2,000 mg Intravenous Q8H Tiffany Thompson MD Last Rate: 2,000 mg (02/08/19 0515)   clonazePAM 0 5 mg Oral BID PRN Catrina Owens PA-C    docusate sodium 100 mg Oral BID Catrina Owens PA-C    fluticasone 2 spray Nasal Daily Catrina Owens PA-C    gabapentin 300 mg Oral TID Catrina Owens PA-C    HYDROmorphone 0 5 mg Intravenous Q3H PRN Catrina Owens PA-C    metroNIDAZOLE 500 mg Oral [facility] Tiffany Thompson MD    ondansetron 4 mg Intravenous Q6H PRN Catrina Owens PA-C    oxyCODONE 10 mg Oral Q4H PRN Catrina Owens PA-C    oxyCODONE 5 mg Oral Q4H PRN Catrina Owens PA-C    sodium chloride 75 mL/hr Intravenous Continuous Hannah Flores PA-C Last Rate: 75 mL/hr (02/07/19 1948)     Continuous Infusions:   sodium chloride 75 mL/hr Last Rate: 75 mL/hr (02/07/19 1948)     PRN Meds:   acetaminophen    albuterol    clonazePAM    HYDROmorphone    ondansetron    oxyCODONE    oxyCODONE  Pertinent Labs/Diagnostic Results:   Age/Sex: 41 y o  female   Discharge Plan:

## 2019-02-08 NOTE — DISCHARGE SUMMARY
Discharge Summary - Melvin Velazquez 41 y o  female MRN: [id number]    Unit/Bed#: [address] [address] Encounter: [id number]    Admission Date:   Admission Orders     Ordered        02/06/19 1732  Inpatient Admission  Once         02/05/19 1430  Place in Observation  Once               Admitting Diagnosis: Painful orthopaedic hardware [facility]) [G00 96IF]  Status post ORIF of fracture of ankle [Z96 7, Z87 81]  Delayed surgical wound healing, sequela [T81 89XS]    HPI:  Oskar Thomas is a pleasant 82-year-old female who sustained a left ankle trimalleolar fracture in May of 2018  She underwent an open reduction internal fixation on May 74TT without complication  Subsequently, she developed significant pain on the lateral side of her ankle throughout her therapy and a sinus track with a draining wound near the lateral malleolus  She was treated by Wound Care with debridement antibiotics  The fracture of the distal fibula healed well  However, due to her ongoing pain, she was indicated for a hardware removal of the left distal fibula due to painful orthopedic hardware  After obtaining medical clearance, discussing the risks and benefits of surgery, and signing and placing consent in the chart, she underwent the procedure as planned on Monday, February 5, 2019 with Dr Dani Gamez  Procedures Performed:   1) hardware removal and wound debridement of the left distal fibula on February 5th by Dr Dani Gamez - no complications  2) PICC line placement by Dr Jennifer Manriquez on February 8th - no complications     Summary of Hospital Course:  Patient presented on to 2/5/19 for a hardware removal of her left distal fibula due to painful orthopedic hardware with and a chronic draining wound near the lateral malleolus  She underwent the procedure as planned, was transferred to the PACU without incident, and was subsequently transferred to the floor  Please refer to the operative note for further details    Intraoperative cultures were obtained to evaluate if there is a chronic infection  She did have chronically infected appearing tissue during the operation  She was placed in a posterior splint and made nonweightbearing on the left lower extremity  She was able to work with therapy and maintain nonweightbearing precautions of the left lower extremity the wound cultures grew methicillin-susceptible Staphylococcus aureus and the anaerobic cultures grew Prevotrella bivia  The Infectious Disease team was consulted and tailored antibiotics to the specific bacteria  The decision was made to cover the MSSA with IV Ancef 2g every 8 hr for 6 weeks and cover the P bivia with Flagyl by mouth every 8 hr for 6 weeks  A PICC line was placed by interventional Radiology on the morning of to wait without incident  Throughout her hospitalization, her vital signs and labs remained stable  Significant Findings, Care, Treatment and Services Provided:   Status post removal of hardware left distal fibula with positive wound cultures as above     Complications: None    Discharge Diagnosis:   1) s/p removal of hardware left distal fibula  2) Presumed osteomyelitis left distal fibula with wound cultures of MSSA and Prevotrella bivia    Resolved Problems  Date Reviewed: 1/29/2019    None          Condition at Discharge: good         Discharge instructions/Information to patient and family:   See after visit summary for information provided to patient and family  - follow-up appointment as an outpatient with Dr Heraclio Gomez scheduled for Wednesday, February 13, 2019 at  1:15 p m   - follow-up with Infectious Disease as an outpatient on Wednesday, February 20, 2019 at 9:30 a m  Provisions for Follow-Up Care:  See after visit summary for information related to follow-up care and any pertinent home health orders  PCP: Kaylie Ojeda DO    Disposition: Home with [facility]NA services    Planned Readmission: No    Discharge Statement   I spent a 30 minutes discharging the patient  This time was spent on the day of discharge  I had direct contact with the patient on the day of discharge  Additional documentation is required if more than 30 minutes were spent on discharge  Discharge Medications:  See after visit summary for reconciled discharge medications provided to patient and family

## 2019-02-08 NOTE — SOCIAL WORK
CM advised that pt will be going home to her significant others apartment at [address] at [address], [place] [address], her contact number will be [phone] which is a mobile line  CM spoke with infusion liaison today  PICC line inserted this am   RN and Medication will come to the pt at [address] [place] [address] on 2/9/18  Pt aware and willing to stay for her last dose tonight then can [employer] ride home upon discharge or taxi if unwilling to wait on ride  PICC info and address sent to [facility] via [employer]  D/W PCP and unit RN

## 2019-02-09 NOTE — NURSING NOTE
Pt discharged to home via wheelchair with significant other  IV Access removed  AVS reviewed with pt and prescriptions given to pt  All belongings with pt at discharge

## 2019-02-11 ENCOUNTER — TELEPHONE (OUTPATIENT)
Dept: INFECTIOUS DISEASES | Facility: CLINIC | Age: 44
End: 2019-02-11

## 2019-02-11 NOTE — UTILIZATION REVIEW
Auth:   [id number]    Notification of Discharge  This is a Notification of Discharge from our facility [address]  Please be advised that this patient has been discharge from our facility  Below you will find the admission and discharge date and time including the patients disposition  PRESENTATION DATE: 2/5/2019  8:31 AM  IP ADMISSION DATE: 2/6/19 1732  DISCHARGE DATE: 2/8/2019  8:15 PM  DISPOSITION: [address] [facility] Utilization Review Department  Phone: [phone]; Fax [phone]  [email or web address]  ATTENTION: Please call with any questions or concerns to [phone]  and carefully listen to the prompts so that you are directed to the right person  Send all requests for admission clinical reviews, approved or denied determinations and any other requests to fax [phone]   All voicemails are confidential

## 2019-02-11 NOTE — TELEPHONE ENCOUNTER
Attempt to call pt with discharge instructions and follow up  Left message  Also called [facility] and spoke to scott about orders  Faxed over to [phone]

## 2019-02-12 ENCOUNTER — TRANSITIONAL CARE MANAGEMENT (OUTPATIENT)
Dept: FAMILY MEDICINE CLINIC | Facility: CLINIC | Age: 44
End: 2019-02-12

## 2019-02-13 ENCOUNTER — OFFICE VISIT (OUTPATIENT)
Dept: OBGYN CLINIC | Facility: CLINIC | Age: 44
End: 2019-02-13
Payer: COMMERCIAL

## 2019-02-13 VITALS
BODY MASS INDEX: 30.77 KG/M2 | SYSTOLIC BLOOD PRESSURE: 113 MMHG | DIASTOLIC BLOOD PRESSURE: 76 MMHG | HEIGHT: 67 IN | HEART RATE: 79 BPM

## 2019-02-13 DIAGNOSIS — Z87.81 STATUS POST ORIF OF FRACTURE OF ANKLE: Primary | ICD-10-CM

## 2019-02-13 DIAGNOSIS — M86.9 OSTEOMYELITIS OF LEFT FIBULA, UNSPECIFIED TYPE (HCC): ICD-10-CM

## 2019-02-13 DIAGNOSIS — Z98.890 S/P HARDWARE REMOVAL: ICD-10-CM

## 2019-02-13 DIAGNOSIS — Z98.890 STATUS POST ORIF OF FRACTURE OF ANKLE: Primary | ICD-10-CM

## 2019-02-13 PROCEDURE — 99024 POSTOP FOLLOW-UP VISIT: CPT | Performed by: ORTHOPAEDIC SURGERY

## 2019-02-13 PROCEDURE — 1111F DSCHRG MED/CURRENT MED MERGE: CPT | Performed by: ORTHOPAEDIC SURGERY

## 2019-02-13 RX ORDER — OXYCODONE HYDROCHLORIDE 5 MG/1
5 TABLET ORAL EVERY 4 HOURS PRN
Qty: 30 TABLET | Refills: 0 | Status: SHIPPED | OUTPATIENT
Start: 2019-02-13 | End: 2019-02-20

## 2019-02-13 NOTE — PROGRESS NOTES
Assessment/Plan:  1  Status post ORIF of fracture of ankle  XR ankle 3+ vw left    oxyCODONE (ROXICODONE) 5 mg immediate release tablet   2  S/P hardware removal     3  Osteomyelitis of left fibula, unspecified type [facility])       Patient is here 1 week status post removal of hardware and wound closure for painful lateral ankle hardware and nonhealing surgical wound  She states that she continues to make efforts with her smoking cessation and she now only smokes 5 cigarettes a day  Her wound is healing well here today without signs of localized infection  She was placed back into a well-padded posterior U splint to allow her soft tissue to continue to heal   She is to remain nonweightbearing with use of the crutches while in the splint  Subjective:  1 week status post removal of hardware and wound closure of left ankle  Patient ID: Leandro Lopez is a 41 y o  female  HPI  Patient is here 1 week status post removal of hardware and wound closure of her left ankle  She states her pain continues to decline but she does have persistent pain and she is taking her oxycodone as directed  She denies any paresthesias in the foot and ankle  She has been compliant with nonweightbearing and elevation of the left foot and ankle  She states that she has been working on smoking cessation and is currently only smoking 5 cigarettes a day  Her pain can reach 8/10 on the pain scale at times but says she states that is getting better  Review of Systems   Constitutional: Positive for activity change  HENT: Negative  Eyes: Negative  Respiratory: Negative  Cardiovascular: Negative  Gastrointestinal: Negative  Endocrine: Negative  Musculoskeletal: Positive for arthralgias  Skin: Negative  Neurological: Negative  Psychiatric/Behavioral: Negative            Past Medical History:   Diagnosis Date    Bipolar disorder ([facility] [facility] )     Depression     History of wound infection     left ankle    Psychiatric disorder     depression, anxiety    Seasonal allergic reaction        Past Surgical History:   Procedure Laterality Date    IR PICC LINE  2/8/2019    [place] OPEN TREATMENT BIMALLEOLAR ANKLE FRACTURE Left 5/14/2018    Procedure: OPEN REDUCTION W/ INTERNAL FIXATION (ORIF) ANKLE;  Surgeon: My Rice DO;  Location: [facility];  Service: Orthopedics    [place] REMOVAL DEEP IMPLANT Left 2/5/2019    Procedure: REMOVAL HARDWARE ANKLE;  Surgeon: My Rice DO;  [facility]: [facility];  Service: Orthopedics    REDUCTION MAMMAPLASTY  2001    reduction       Family History   Problem Relation Age of Onset    No Known Problems Father     Diabetes Mother     Cancer Mother     Asthma Mother     No Known Problems Sister     No Known Problems Brother     No Known Problems Maternal Aunt     No Known Problems Maternal Uncle     No Known Problems Paternal Aunt     No Known Problems Paternal Uncle     No Known Problems Maternal Grandmother     No Known Problems Maternal Grandfather     No Known Problems Paternal Grandmother     No Known Problems Paternal Grandfather     ADD / ADHD Neg Hx     Anesthesia problems Neg Hx     Clotting disorder Neg Hx     Collagen disease Neg Hx     Dislocations Neg Hx     Learning disabilities Neg Hx     Neurological problems Neg Hx     Osteoporosis Neg Hx     Rheumatologic disease Neg Hx     Scoliosis Neg Hx     Vascular Disease Neg Hx        Social History     Occupational History    Not on file   Tobacco Use    Smoking status: Current Every Day Smoker     Packs/day: 0 50     Years: 25 00     Pack years: 12 50     Types: Cigarettes    Smokeless tobacco: Never Used   Substance and Sexual Activity    Alcohol use: Yes     Comment: social    Drug use: No    Sexual activity: Yes     Partners: Male         Current Outpatient Medications:     albuterol (VENTOLIN HFA) 90 mcg/act inhaler, Inhale 2 puffs every 6 (six) hours as needed for wheezing, Disp: 1 Inhaler, Rfl: 0    ARIPiprazole (ABILIFY) 20 MG tablet, 15 mg daily  , Disp: , Rfl: 0    aspirin 325 mg tablet, Take 1 tablet (325 mg total) by mouth 2 (two) times a day, Disp: 84 tablet, Rfl: 0    buPROPion (WELLBUTRIN XL) 150 mg 24 hr tablet, Take 150 mg by mouth every morning  , Disp: , Rfl: 0    ceFAZolin (ANCEF) 2000 mg IVPB, Infuse 2,000 mg into a venous catheter every 8 (eight) hours for 42 days, Disp: 6300 mL, Rfl: 0    clonazePAM (KlonoPIN) 0 5 mg tablet, Take 0 5 mg by mouth 2 (two) times a day as needed  , Disp: , Rfl: 0    Elastic Bandages & Supports (WRIST BRACE DELUXE/RIGHT S/M) MISC, by Does not apply route continuous, Disp: 1 each, Rfl: 0    fluticasone (FLONASE) 50 mcg/act nasal spray, 2 sprays into each nostril daily, Disp: 16 g, Rfl: 0    gabapentin (NEURONTIN) 300 mg capsule, Take 1 capsule (300 mg total) by mouth 3 (three) times a day, Disp: 90 capsule, Rfl: 0    metroNIDAZOLE (FLAGYL) 500 mg tablet, Take 1 tablet (500 mg total) by mouth every 8 (eight) hours for 42 days, Disp: 126 tablet, Rfl: 0    oxyCODONE (ROXICODONE) 5 mg immediate release tablet, Take 1 tablet (5 mg total) by mouth every 4 (four) hours as needed for moderate pain for up to 7 daysMax Daily Amount: 30 mg, Disp: 30 tablet, Rfl: 0    Allergies   Allergen Reactions    Toradol [Ketorolac Tromethamine]      GI UPSET    Latex Rash    Ultram [Tramadol] Rash       Objective:  Vitals:    02/13/19 1253   BP: 113/76   Pulse: 79       Body mass index is 30 77 kg/m²  Left Ankle Exam     Tenderness   The patient is experiencing tenderness in the lateral malleolus  Swelling: none    Other   Scars: present  Sensation: normal  Pulse: present    Comments:  Lateral ankle surgical incision healing well  Sutures in place  No erythema  No significant swelling  No drainage appreciated  Foot is neurovascularly intact  Compartments are soft  No calf pain          Observations   Left Ankle/Foot   Positive for adhesive scar         Physical Exam   Constitutional: She is oriented to person, place, and time  She appears well-developed  HENT:   Head: Atraumatic  Eyes: EOM are normal    Neck: Neck supple  Cardiovascular: Normal rate  Pulmonary/Chest: Effort normal    Musculoskeletal:   See orthopedic exam   Neurological: She is alert and oriented to person, place, and time  Skin: Skin is warm and dry  Psychiatric: She has a normal mood and affect  Nursing note and vitals reviewed  Nahun JACK    Division of Adult Reconstruction  Department of [facility]

## 2019-02-17 ENCOUNTER — HOSPITAL ENCOUNTER (EMERGENCY)
Facility: HOSPITAL | Age: 44
Discharge: HOME | End: 2019-02-17
Attending: EMERGENCY MEDICINE | Admitting: EMERGENCY MEDICINE
Payer: COMMERCIAL

## 2019-02-17 VITALS
BODY MASS INDEX: 32.27 KG/M2 | DIASTOLIC BLOOD PRESSURE: 72 MMHG | RESPIRATION RATE: 18 BRPM | WEIGHT: 203 LBS | HEART RATE: 78 BPM | OXYGEN SATURATION: 97 % | SYSTOLIC BLOOD PRESSURE: 122 MMHG | TEMPERATURE: 98.3 F

## 2019-02-17 DIAGNOSIS — Z45.2 PICC (PERIPHERALLY INSERTED CENTRAL CATHETER) IN PLACE: Primary | ICD-10-CM

## 2019-02-17 PROCEDURE — 99283 EMERGENCY DEPT VISIT LOW MDM: CPT

## 2019-02-17 NOTE — ED PROVIDER NOTES
History  Chief Complaint   Patient presents with    PICC Line Problem     States she was pushing her syringe of medication thru PICC and blood backed up into syringe, states she panicked and came to ED     53-year-old female presenting today for evaluation of PICC line  Currently has a right upper extremity PICC line for antibiotic use for a left foot infection that she has been using over the past 2 weeks, and will be on a total of 6 weeks of treatment  States that she was placing antibiotics into the PICC line however blood shot back into the syringe  She otherwise is feeling well without any other complaints at this point in time  Denies fevers, worsening pain, nausea, vomiting  Prior to Admission Medications   Prescriptions Last Dose Informant Patient Reported? Taking?    ARIPiprazole (ABILIFY) 20 MG tablet 2019 at Unknown time  Yes Yes   Sig: 15 mg daily     Elastic Bandages & Supports (WRIST BRACE DELUXE/RIGHT S/M) MISC   No No   Sig: by Does not apply route continuous   albuterol (VENTOLIN HFA) 90 mcg/act inhaler More than a month at Unknown time  No No   Sig: Inhale 2 puffs every 6 (six) hours as needed for wheezing   aspirin 325 mg tablet 2019 at Unknown time  No Yes   Sig: Take 1 tablet (325 mg total) by mouth 2 (two) times a day   buPROPion (WELLBUTRIN XL) 150 mg 24 hr tablet 2019 at Unknown time  Yes Yes   Sig: Take 150 mg by mouth every morning     ceFAZolin (ANCEF) 2000 mg IVPB 2019 at Unknown time  No Yes   Sig: Infuse 2,000 mg into a venous catheter every 8 (eight) hours for 42 days   clonazePAM (KlonoPIN) 0 5 mg tablet 2019 at Unknown time  Yes Yes   Sig: Take 0 5 mg by mouth 2 (two) times a day as needed     fluticasone (FLONASE) 50 mcg/act nasal spray 2019 at Unknown time  No Yes   Si sprays into each nostril daily   gabapentin (NEURONTIN) 300 mg capsule 2019 at Unknown time  No Yes   Sig: Take 1 capsule (300 mg total) by mouth 3 (three) times a day   metroNIDAZOLE (FLAGYL) 500 mg tablet 2/17/2019 at Unknown time  No Yes   Sig: Take 1 tablet (500 mg total) by mouth every 8 (eight) hours for 42 days   oxyCODONE (ROXICODONE) 5 mg immediate release tablet 2/16/2019 at Unknown time  No Yes   Sig: Take 1 tablet (5 mg total) by mouth every 4 (four) hours as needed for moderate pain for up to 7 daysMax Daily Amount: 30 mg      Facility-Administered Medications: None       Past Medical History:   Diagnosis Date    Bipolar disorder ([facility] [facility]     Depression     History of wound infection     left ankle    Psychiatric disorder     depression, anxiety    Seasonal allergic reaction        Past Surgical History:   Procedure Laterality Date    IR PICC LINE  2/8/2019    [place] OPEN TREATMENT BIMALLEOLAR ANKLE FRACTURE Left 5/14/2018    Procedure: OPEN REDUCTION W/ INTERNAL FIXATION (ORIF) ANKLE;  Surgeon: Hans Pavon DO;  [place]: [facility];  Service: Orthopedics    [place] REMOVAL DEEP IMPLANT Left 2/5/2019    Procedure: REMOVAL HARDWARE ANKLE;  Surgeon: Hans Pavon DO;  Location: [facility] OR;  Service: Orthopedics    REDUCTION MAMMAPLASTY  2001    reduction       Family History   Problem Relation Age of Onset    No Known Problems Father     Diabetes Mother     Cancer Mother     Asthma Mother     No Known Problems Sister     No Known Problems Brother     No Known Problems Maternal Aunt     No Known Problems Maternal Uncle     No Known Problems Paternal Aunt     No Known Problems Paternal Uncle     No Known Problems Maternal Grandmother     No Known Problems Maternal Grandfather     No Known Problems Paternal Grandmother     No Known Problems Paternal Grandfather     ADD / ADHD Neg Hx     Anesthesia problems Neg Hx     Clotting disorder Neg Hx     Collagen disease Neg Hx     Dislocations Neg Hx     Learning disabilities Neg Hx     Neurological problems Neg Hx     Osteoporosis Neg Hx     Rheumatologic disease Neg Hx     Scoliosis Neg Hx     Vascular Disease Neg Hx      I have reviewed and agree with the history as documented  Social History     Tobacco Use    Smoking status: Current Every Day Smoker     Packs/day: 0 50     Years: 25 00     Pack years: 12 50     Types: Cigarettes    Smokeless tobacco: Never Used   Substance Use Topics    Alcohol use: Yes     Comment: social    Drug use: No        Review of Systems   Constitutional: Negative  HENT: Negative  Eyes: Negative  Respiratory: Negative  Cardiovascular: Negative  Gastrointestinal: Negative  Genitourinary: Negative  Musculoskeletal: Negative  Skin: Positive for wound  Negative for color change, pallor and rash  Neurological: Negative  All other systems reviewed and are negative  Physical Exam  Physical Exam   Constitutional: She is oriented to person, place, and time  She appears well-developed and well-nourished  HENT:   Head: Normocephalic and atraumatic  Nose: Nose normal    Eyes: Conjunctivae are normal    Cardiovascular: Normal rate, regular rhythm, normal heart sounds and intact distal pulses  Exam reveals no gallop and no friction rub  No murmur heard  Pulmonary/Chest: Effort normal and breath sounds normal  No stridor  No respiratory distress  She has no wheezes  She has no rales  She exhibits no tenderness  spo2 is 97% indicating adequate oxygenation    Neurological: She is alert and oriented to person, place, and time  Skin: Skin is warm and dry  Capillary refill takes less than 2 seconds  Nursing note and vitals reviewed        Vital Signs  ED Triage Vitals [02/17/19 1119]   Temperature Pulse Respirations Blood Pressure SpO2   98 3 °F (36 8 °C) 78 18 122/72 97 %      Temp Source Heart Rate Source Patient Position - Orthostatic VS BP Location FiO2 (%)   Oral Monitor Sitting Left arm --      Pain Score       No Pain           Vitals:    02/17/19 1119   BP: 122/72   Pulse: 78   Patient Position - Orthostatic VS: Sitting Visual Acuity      ED Medications  Medications - No data to display    Diagnostic Studies  Results Reviewed     None                 No orders to display              Procedures  Procedures       Phone Contacts  ED Phone Contact    ED Course                               MDM  Number of Diagnoses or Management Options  PICC (peripherally inserted central catheter) in place:   Diagnosis management comments: Patient was given proper education regarding PICC line use  PICC line was flushed successfully and patent  Patient is informed to return to the emergency department for worsening of symptoms and was given proper education regarding their diagnosis and symptoms  Otherwise the patient is informed to follow up with their primary care doctor for re-evaluation  The patient verbalizes understanding and agrees with above assessment and plan  All questions were answered  Please Note: Flu[employer] Direct voice recognition software may have been used in the creation of this document  Wrong words or sound a like substitutions may have occurred due to the inherent limitations of the voice software  Amount and/or Complexity of Data Reviewed  Review and summarize past medical records: yes  Independent visualization of images, tracings, or specimens: yes        Disposition  Final diagnoses:   PICC (peripherally inserted central catheter) in place     Time reflects when diagnosis was documented in both MDM as applicable and the Disposition within this note     Time User Action Codes Description Comment    2/17/2019 11:52 AM Aliya Zee Add [Z45 2] PICC (peripherally inserted central catheter) in place       ED Disposition     ED Disposition Condition Date/Time Comment    Discharge Good Sun Feb 17, 2019 11:52 AM Mandy Vogt discharge to home/self care              Follow-up Information     Follow up With Specialties Details Why Contact Info Additional Information    [address] [facility] Emergency Medicine Go to  If symptoms worsen [address] [facility]  [phone] [facility] ED, [place], [place], [address]          Patient's Medications   Discharge Prescriptions    No medications on file     No discharge procedures on file      ED Provider  Electronically Signed by           Rachna Albarado PA-C  02/17/19 8237

## 2019-02-20 ENCOUNTER — OFFICE VISIT (OUTPATIENT)
Dept: INFECTIOUS DISEASES | Facility: CLINIC | Age: 44
End: 2019-02-20
Payer: COMMERCIAL

## 2019-02-20 VITALS
SYSTOLIC BLOOD PRESSURE: 120 MMHG | WEIGHT: 201 LBS | HEIGHT: 67 IN | BODY MASS INDEX: 31.55 KG/M2 | HEART RATE: 18 BPM | TEMPERATURE: 98.1 F | RESPIRATION RATE: 17 BRPM | DIASTOLIC BLOOD PRESSURE: 78 MMHG

## 2019-02-20 DIAGNOSIS — T84.7XXA INFECTION OF LOWER EXTREMITY ASSOCIATED WITH HARDWARE (HCC): ICD-10-CM

## 2019-02-20 DIAGNOSIS — M86.672 OTHER CHRONIC OSTEOMYELITIS OF LEFT ANKLE (HCC): Primary | ICD-10-CM

## 2019-02-20 DIAGNOSIS — Z87.81 STATUS POST ORIF OF FRACTURE OF ANKLE: ICD-10-CM

## 2019-02-20 DIAGNOSIS — Z72.0 TOBACCO ABUSE DISORDER: ICD-10-CM

## 2019-02-20 DIAGNOSIS — Z98.890 STATUS POST ORIF OF FRACTURE OF ANKLE: ICD-10-CM

## 2019-02-20 PROCEDURE — 99214 OFFICE O/P EST MOD 30 MIN: CPT | Performed by: PHYSICIAN ASSISTANT

## 2019-02-20 NOTE — PATIENT INSTRUCTIONS
Continue IV antibiotics and weekly bloodwork as planned  Return to office in 2 weeks  Call office with any questions or new concerns  If you develop a fever of 100 4 or greater, you need to be evaluated in the ER due to presence of PICC line

## 2019-02-20 NOTE — PROGRESS NOTES
Assessment/Plan:    Infection of lower extremity associated with hardware (HCC)  Polymicrobial L ankle ORIF infection  S/p  removal of hardware with MSSA/S  Anginosis/Prevotella on cultures  Currently on IV cefazolin/po flagyl to complete 6 weeks on 3/18/19  She is currently tolerating antibiotics without difficulty  She does admit to having a few beers last night  Reviewed with her that she should not drink alcohol while taking flagyl  For now, continue antibiotic regimen as ordered with weekly Cbc and Cr   RTO in 2 weeks  Tobacco abuse disorder  Discussed smoking cessation with patient and that quitting smoking will benefit wound healing  Diagnoses and all orders for this visit:    Other chronic osteomyelitis of left ankle ([address] )    Status post ORIF of fracture of ankle    Tobacco abuse disorder    Infection of lower extremity associated with hardware ([facility] [address] )          Subjective:      Patient ID: China Carlisle is a 41 y o  female  HPI  40 y/o female presents for office f/u today regarding Polymicrobial L ankle ORIF infection  The patient had a chronic nonhealing wound/sinus tract following placement of hardware  She underwent removal of hardware with MSSA/S  Anginosis/Prevotella on cultures  She was placed on IV cefazolin and po flagyl to complete a total of 6 weeks for possible residual osteomyelitis  She is so far tolerating the antibiotics without difficulty  She denies any fever, chills, rash, diarrhea  She continues to smoke and admits to drinking a few beers last night  The following portions of the patient's history were reviewed and updated as appropriate: allergies, current medications, past family history, past medical history, past social history, past surgical history and problem list     Review of Systems   Constitutional: Negative for chills and fever  HENT: Negative for mouth sores  Respiratory: Negative for cough and shortness of breath      Cardiovascular: Negative for chest pain, palpitations and leg swelling  Gastrointestinal: Negative for abdominal pain, diarrhea, nausea and vomiting  Genitourinary: Negative for difficulty urinating, dysuria and frequency  Musculoskeletal: Negative for arthralgias, back pain, joint swelling and myalgias  Skin: Negative for rash and wound  Allergic/Immunologic: Negative for immunocompromised state  Neurological: Negative for headaches  Psychiatric/Behavioral: Negative for behavioral problems and confusion  Objective:      /78   Pulse (!) 18   Temp 98 1 °F (36 7 °C)   Resp 17   Ht 5' 6 5" (1 689 m)   Wt 91 2 kg (201 lb)   LMP 02/10/2019 (Approximate)   BMI 31 96 kg/m²          Physical Exam   Constitutional: She is oriented to person, place, and time  She appears well-developed and well-nourished  No distress  HENT:   Head: Normocephalic and atraumatic  Right Ear: External ear normal    Left Ear: External ear normal    Nose: Nose normal    Mouth/Throat: Oropharynx is clear and moist  No oropharyngeal exudate  Eyes: Conjunctivae are normal  Right eye exhibits no discharge  Left eye exhibits no discharge  No scleral icterus  Neck: Normal range of motion  Neck supple  Cardiovascular: Normal rate, regular rhythm and normal heart sounds  Pulmonary/Chest: Effort normal and breath sounds normal  No stridor  No respiratory distress  She has no wheezes  She has no rales  She exhibits no tenderness  Abdominal: Soft  Bowel sounds are normal  She exhibits no distension  There is no tenderness  Musculoskeletal:   Left foot/extremity with splint and dressing in place  Neurological: She is alert and oriented to person, place, and time  Skin: Skin is warm and dry  No rash noted  She is not diaphoretic  No erythema  No pallor  PICC insertion site without erythema, drainage or tenderness  Psychiatric: She has a normal mood and affect   Her behavior is normal        Labs:   2/19/19  Wbc: 6 1  Hgb: 11 3  Plt: 301  Cr: 0 70

## 2019-02-20 NOTE — ASSESSMENT & PLAN NOTE
Polymicrobial L ankle ORIF infection  S/p  removal of hardware with MSSA/S  Anginosis/Prevotella on cultures  Currently on IV cefazolin/po flagyl to complete 6 weeks on 3/18/19  She is currently tolerating antibiotics without difficulty  She does admit to having a few beers last night  Reviewed with her that she should not drink alcohol while taking flagyl  For now, continue antibiotic regimen as ordered with weekly Cbc and Cr   RTO in 2 weeks

## 2019-02-21 ENCOUNTER — OFFICE VISIT (OUTPATIENT)
Dept: OBGYN CLINIC | Facility: CLINIC | Age: 44
End: 2019-02-21

## 2019-02-21 VITALS
DIASTOLIC BLOOD PRESSURE: 74 MMHG | HEART RATE: 81 BPM | HEIGHT: 67 IN | SYSTOLIC BLOOD PRESSURE: 117 MMHG | BODY MASS INDEX: 31.96 KG/M2

## 2019-02-21 DIAGNOSIS — M86.9 OSTEOMYELITIS OF LEFT FIBULA, UNSPECIFIED TYPE (HCC): ICD-10-CM

## 2019-02-21 DIAGNOSIS — Z98.890 S/P HARDWARE REMOVAL: ICD-10-CM

## 2019-02-21 DIAGNOSIS — Z98.890 STATUS POST ORIF OF FRACTURE OF ANKLE: Primary | ICD-10-CM

## 2019-02-21 DIAGNOSIS — Z87.81 STATUS POST ORIF OF FRACTURE OF ANKLE: Primary | ICD-10-CM

## 2019-02-21 PROCEDURE — 99024 POSTOP FOLLOW-UP VISIT: CPT | Performed by: ORTHOPAEDIC SURGERY

## 2019-02-21 NOTE — PROGRESS NOTES
Assessment/Plan:  1  Status post ORIF of fracture of ankle     2  S/P hardware removal     3  Osteomyelitis of left fibula, unspecified type [facility])       Patient is here 2 weeks status post removal of hardware from her lateral left ankle which demonstrated presumed osteomyelitis  She has been compliant with her oral and intravenous antibiotic use as per the Infectious Disease recommendations  Her incision is well healed and her sutures were removed here in the office today  No sign of delayed closure or drainage or erythema  Her sutures removed in the office is due tolerated this well  She was placed into a Cam boot with a soft dressing and she may start to return to weight-bearing as tolerated in a progressive fashion  She can use the Cam boot for the next 2 weeks and then progress to normal s   hoe wear and wean off of her crutches as tolerated  I will see her back in 1 months time for repeat clinical evaluation and x-ray of her left ankle    Subjective:  2 week follow-up status post removal of hardware left ankle, presumed osteomyelitis distal fibula    Patient ID: Roslyn Gordon is a 41 y o  female  HPI  Patient is here for follow-up regarding the above stated complaints  She states that she has been compliant with her oral and IV antibiotic use as recommended by her infectious disease team   She has been compliant with her nonweightbearing efforts as well  She denies any increasing pain and states that her surgical pain has continued to decrease and she is quite comfortable  She denies any paresthesias in the foot and ankle  Review of Systems   Constitutional: Positive for activity change  HENT: Negative  Eyes: Negative  Respiratory: Negative  Cardiovascular: Negative  Gastrointestinal: Negative  Endocrine: Negative  Musculoskeletal: Positive for gait problem  Skin: Negative  Psychiatric/Behavioral: Negative            Past Medical History:   Diagnosis Date    Bipolar disorder ([facility] [facility] )     Depression     History of wound infection     left ankle    Psychiatric disorder     depression, anxiety    Seasonal allergic reaction        Past Surgical History:   Procedure Laterality Date    IR PICC LINE  2/8/2019    WI OPEN TREATMENT BIMALLEOLAR ANKLE FRACTURE Left 5/14/2018    Procedure: OPEN REDUCTION W/ INTERNAL FIXATION (ORIF) ANKLE;  Surgeon: Axel Gibson DO;  Location: [facility];  Service: Orthopedics    WI REMOVAL DEEP IMPLANT Left 2/5/2019    Procedure: REMOVAL HARDWARE ANKLE;  Surgeon: Axel Gibson DO;  [facility]: [facility];  Service: Orthopedics    REDUCTION MAMMAPLASTY  2001    reduction       Family History   Problem Relation Age of Onset    No Known Problems Father     Diabetes Mother     Cancer Mother     Asthma Mother     No Known Problems Sister     No Known Problems Brother     No Known Problems Maternal Aunt     No Known Problems Maternal Uncle     No Known Problems Paternal Aunt     No Known Problems Paternal Uncle     No Known Problems Maternal Grandmother     No Known Problems Maternal Grandfather     No Known Problems Paternal Grandmother     No Known Problems Paternal Grandfather     ADD / ADHD Neg Hx     Anesthesia problems Neg Hx     Clotting disorder Neg Hx     Collagen disease Neg Hx     Dislocations Neg Hx     Learning disabilities Neg Hx     Neurological problems Neg Hx     Osteoporosis Neg Hx     Rheumatologic disease Neg Hx     Scoliosis Neg Hx     Vascular Disease Neg Hx        Social History     Occupational History    Not on file   Tobacco Use    Smoking status: Current Every Day Smoker     Packs/day: 0 50     Years: 25 00     Pack years: 12 50     Types: Cigarettes    Smokeless tobacco: Never Used   Substance and Sexual Activity    Alcohol use: Yes     Comment: social    Drug use: No    Sexual activity: Yes     Partners: Male         Current Outpatient Medications:     albuterol (VENTOLIN HFA) 90 mcg/act inhaler, Inhale 2 puffs every 6 (six) hours as needed for wheezing, Disp: 1 Inhaler, Rfl: 0    ARIPiprazole (ABILIFY) 20 MG tablet, 15 mg daily  , Disp: , Rfl: 0    aspirin 325 mg tablet, Take 1 tablet (325 mg total) by mouth 2 (two) times a day, Disp: 84 tablet, Rfl: 0    buPROPion (WELLBUTRIN XL) 150 mg 24 hr tablet, Take 150 mg by mouth every morning  , Disp: , Rfl: 0    ceFAZolin (ANCEF) 2000 mg IVPB, Infuse 2,000 mg into a venous catheter every 8 (eight) hours for 42 days, Disp: 6300 mL, Rfl: 0    clonazePAM (KlonoPIN) 0 5 mg tablet, Take 0 5 mg by mouth 2 (two) times a day as needed  , Disp: , Rfl: 0    Elastic Bandages & Supports (WRIST BRACE DELUXE/RIGHT S/M) MISC, by Does not apply route continuous, Disp: 1 each, Rfl: 0    fluticasone (FLONASE) 50 mcg/act nasal spray, 2 sprays into each nostril daily, Disp: 16 g, Rfl: 0    gabapentin (NEURONTIN) 300 mg capsule, Take 1 capsule (300 mg total) by mouth 3 (three) times a day, Disp: 90 capsule, Rfl: 0    metroNIDAZOLE (FLAGYL) 500 mg tablet, Take 1 tablet (500 mg total) by mouth every 8 (eight) hours for 42 days, Disp: 126 tablet, Rfl: 0    Allergies   Allergen Reactions    Toradol [Ketorolac Tromethamine]      GI UPSET    Latex Rash    Ultram [Tramadol] Rash       Objective:  Vitals:    02/21/19 1533   BP: 117/74   Pulse: 81       Body mass index is 31 96 kg/m²  Right Ankle Exam     Tenderness   The patient is experiencing tenderness in the lateral malleolus  Swelling: mild    Other   Erythema: absent  Scars: present  Sensation: normal  Pulse: present     Comments:  Lateral ankle incision is healing well  Sutures were in place and were removed here in the office today without complication  No erythema or drainage from the lateral incision  No pain with active and passive range of motion of the ankle  Neurovascular intact          Observations     Right Ankle/Foot   Positive for adhesive scar         Physical Exam   Constitutional: She is oriented to person, place, and time  She appears well-developed  HENT:   Head: Atraumatic  Eyes: EOM are normal    Neck: Neck supple  Cardiovascular: Normal rate  Pulmonary/Chest: Effort normal    Musculoskeletal:   See orthopedic exam   Neurological: She is alert and oriented to person, place, and time  Skin: Skin is warm and dry  Psychiatric: She has a normal mood and affect  Nursing note and vitals reviewed  [place]    Division of Adult Reconstruction  Department of [facility]

## 2019-02-25 ENCOUNTER — TELEPHONE (OUTPATIENT)
Dept: FAMILY MEDICINE CLINIC | Facility: CLINIC | Age: 44
End: 2019-02-25

## 2019-02-25 NOTE — TELEPHONE ENCOUNTER
Pt is uncomfortable and would like a call back asap and has to no way to get here  Asking for diflucan and external cream which she has used before with no problems

## 2019-02-25 NOTE — TELEPHONE ENCOUNTER
S/w Pt and states that she would like a medication called into the pharmacy  Pt is not able to get into the office due to transportation issues   Pt C/o vaginal itching due to antibiotics she is currently taking

## 2019-02-26 ENCOUNTER — OFFICE VISIT (OUTPATIENT)
Dept: FAMILY MEDICINE CLINIC | Facility: CLINIC | Age: 44
End: 2019-02-26
Payer: COMMERCIAL

## 2019-02-26 VITALS
DIASTOLIC BLOOD PRESSURE: 78 MMHG | WEIGHT: 201 LBS | BODY MASS INDEX: 31.55 KG/M2 | HEIGHT: 67 IN | OXYGEN SATURATION: 100 % | TEMPERATURE: 98.5 F | HEART RATE: 103 BPM | SYSTOLIC BLOOD PRESSURE: 124 MMHG

## 2019-02-26 DIAGNOSIS — B37.9 YEAST INFECTION: Primary | ICD-10-CM

## 2019-02-26 PROCEDURE — 99213 OFFICE O/P EST LOW 20 MIN: CPT | Performed by: NURSE PRACTITIONER

## 2019-02-26 PROCEDURE — 3008F BODY MASS INDEX DOCD: CPT | Performed by: NURSE PRACTITIONER

## 2019-02-26 RX ORDER — FLUCONAZOLE 150 MG/1
150 TABLET ORAL ONCE
Qty: 1 TABLET | Refills: 0 | Status: SHIPPED | OUTPATIENT
Start: 2019-02-26 | End: 2019-02-26

## 2019-02-26 RX ORDER — CLOTRIMAZOLE AND BETAMETHASONE DIPROPIONATE 10; .64 MG/G; MG/G
CREAM TOPICAL 2 TIMES DAILY
Qty: 30 G | Refills: 0 | Status: SHIPPED | OUTPATIENT
Start: 2019-02-26 | End: 2019-03-22 | Stop reason: HOSPADM

## 2019-02-26 RX ORDER — VITAMIN A 3000 MCG
1 CAPSULE ORAL DAILY
Qty: 30 CAPSULE | Refills: 0 | Status: SHIPPED | OUTPATIENT
Start: 2019-02-26 | End: 2019-03-22 | Stop reason: HOSPADM

## 2019-02-28 ENCOUNTER — OFFICE VISIT (OUTPATIENT)
Dept: PAIN MEDICINE | Facility: CLINIC | Age: 44
End: 2019-02-28
Payer: COMMERCIAL

## 2019-02-28 VITALS
DIASTOLIC BLOOD PRESSURE: 60 MMHG | SYSTOLIC BLOOD PRESSURE: 100 MMHG | BODY MASS INDEX: 31.96 KG/M2 | HEART RATE: 81 BPM | HEIGHT: 67 IN

## 2019-02-28 DIAGNOSIS — Z98.890 STATUS POST ORIF OF FRACTURE OF ANKLE: ICD-10-CM

## 2019-02-28 DIAGNOSIS — G89.4 CHRONIC PAIN SYNDROME: Primary | ICD-10-CM

## 2019-02-28 DIAGNOSIS — M25.572 CHRONIC PAIN OF LEFT ANKLE: ICD-10-CM

## 2019-02-28 DIAGNOSIS — Z87.81 STATUS POST ORIF OF FRACTURE OF ANKLE: ICD-10-CM

## 2019-02-28 DIAGNOSIS — G89.29 CHRONIC PAIN OF LEFT ANKLE: ICD-10-CM

## 2019-02-28 PROCEDURE — 99214 OFFICE O/P EST MOD 30 MIN: CPT | Performed by: ANESTHESIOLOGY

## 2019-02-28 RX ORDER — GABAPENTIN 300 MG/1
300 CAPSULE ORAL 3 TIMES DAILY
Qty: 90 CAPSULE | Refills: 1 | Status: SHIPPED | OUTPATIENT
Start: 2019-02-28 | End: 2019-04-15 | Stop reason: SDUPTHER

## 2019-02-28 RX ORDER — FLUCONAZOLE 150 MG/1
TABLET ORAL
Refills: 0 | COMMUNITY
Start: 2019-02-26 | End: 2019-03-20 | Stop reason: SDUPTHER

## 2019-02-28 NOTE — PROGRESS NOTES
Pain Medicine Follow-Up Note    Assessment:  1  Chronic pain syndrome    2  Chronic pain of left ankle    3  Status post ORIF of fracture of ankle        Plan:  New Medications Ordered This Visit   Medications    fluconazole (DIFLUCAN) 150 mg tablet     Sig: take 1 tablet by mouth as a single dose     Refill:  0     My impressions and treatment recommendations were discussed in detail with the patient who verbalized understanding and had no further questions  The patient reports that she had her left ankle infected orthopedic hardware removed by Dr Perfecto Dent recently  She reports that her pain has improved significantly since undergoing this, but still reports that the gabapentin 300 mg 3 times daily is giving her significant relief  She stopped the meloxicam due to being maintained on aspirin  As such, I felt a reasonable to continue her on gabapentin 300 mg 3 times daily  Side effects were reviewed with the patient  Follow-up is planned in 2 months time or sooner as warranted  Discharge instructions were provided  I personally saw and examined the patient and I agree with the above discussed plan of care  History of Present Illness:    Yovany Aranda is a 41 y o  female who presents to [facility] for interval re-evaluation of the above stated pain complaints  The patient has a past medical and chronic pain history as outlined in the assessment section  She was last seen on January 3, 2019 at which time she was maintained on gabapentin 300 mg 3 times daily  At today's office visit, the patient's pain score is 5/10 on the verbal numerical pain rating scale  The patient states that her pain is worse in the evening and occasional in nature  She reports the quality of her pain as burning, dull/aching, sharp, shooting, numbness, and pins and needles    She is reporting 50% relief of symptoms since being maintained on gabapentin 300 mg 3 times daily    She also reports that in the interim between office visits, she had the infected orthopedic hardware removed and is currently on long-term antibiotic therapy for the presumed osteomyelitis  Other than as stated above, the patient denies any interval changes in medications, medical condition, mental condition, symptoms, or allergies since the last office visit  Review of Systems:    Review of Systems   Respiratory: Negative for shortness of breath  Cardiovascular: Negative for chest pain  Gastrointestinal: Negative for constipation, diarrhea, nausea and vomiting  Musculoskeletal: Positive for gait problem (difficulty walking) and joint swelling (joint stiffness)  Negative for arthralgias and myalgias  Skin: Negative for rash  Neurological: Negative for dizziness, seizures and weakness  All other systems reviewed and are negative          Patient Active Problem List   Diagnosis    Anxiety and depression    Obesity    Trimalleolar fracture of ankle, closed, left, initial encounter    Mass of soft tissue of right lower extremity    Closed fracture of left ankle    Status post ORIF of fracture of ankle    Delayed surgical wound healing    Lipoma of right lower extremity    Lipoma of left lower extremity    Tobacco abuse disorder    Chronic pain of left ankle    Chronic pain syndrome    Acute non-recurrent pansinusitis    Wheezing    Bronchitis    Painful orthopaedic hardware ([address] )    Pre-operative examination    Infection of lower extremity associated with hardware ([address] )    Yeast infection       Past Medical History:   Diagnosis Date    Bipolar disorder ([facility] )     Depression     History of wound infection     left ankle    Psychiatric disorder     depression, anxiety    Seasonal allergic reaction        Past Surgical History:   Procedure Laterality Date    IR PICC LINE  2/8/2019    [place] OPEN TREATMENT BIMALLEOLAR ANKLE FRACTURE Left 5/14/2018    Procedure: OPEN REDUCTION W/ INTERNAL FIXATION (ORIF) ANKLE;  Surgeon: Marleny Bateman DO;  [place] [facility];  Service: Orthopedics    MI REMOVAL DEEP IMPLANT Left 2/5/2019    Procedure: REMOVAL HARDWARE ANKLE;  Surgeon: Marleny Bateman DO;  [place] [facility];  Service: Orthopedics    REDUCTION MAMMAPLASTY  2001    reduction       Family History   Problem Relation Age of Onset    No Known Problems Father     Diabetes Mother     Cancer Mother     Asthma Mother     No Known Problems Sister     No Known Problems Brother     No Known Problems Maternal Aunt     No Known Problems Maternal Uncle     No Known Problems Paternal Aunt     No Known Problems Paternal Uncle     No Known Problems Maternal Grandmother     No Known Problems Maternal Grandfather     No Known Problems Paternal Grandmother     No Known Problems Paternal Grandfather     ADD / ADHD Neg Hx     Anesthesia problems Neg Hx     Clotting disorder Neg Hx     Collagen disease Neg Hx     Dislocations Neg Hx     Learning disabilities Neg Hx     Neurological problems Neg Hx     Osteoporosis Neg Hx     Rheumatologic disease Neg Hx     Scoliosis Neg Hx     Vascular Disease Neg Hx        Social History     Occupational History    Not on file   Tobacco Use    Smoking status: Current Every Day Smoker     Packs/day: 0 50     Years: 25 00     Pack years: 12 50     Types: Cigarettes    Smokeless tobacco: Never Used   Substance and Sexual Activity    Alcohol use: Yes     Comment: social    Drug use: No    Sexual activity: Yes     Partners: Male         Current Outpatient Medications:     albuterol (VENTOLIN HFA) 90 mcg/act inhaler, Inhale 2 puffs every 6 (six) hours as needed for wheezing, Disp: 1 Inhaler, Rfl: 0    ARIPiprazole (ABILIFY) 20 MG tablet, 15 mg daily  , Disp: , Rfl: 0    aspirin 325 mg tablet, Take 1 tablet (325 mg total) by mouth 2 (two) times a day, Disp: 84 tablet, Rfl: 0    buPROPion (WELLBUTRIN XL) 150 mg 24 hr tablet, Take 150 mg by mouth every morning  , Disp: , Rfl: 0    ceFAZolin (ANCEF) 2000 mg IVPB, Infuse 2,000 mg into a venous catheter every 8 (eight) hours for 42 days, Disp: 6300 mL, Rfl: 0    clonazePAM (KlonoPIN) 0 5 mg tablet, Take 0 5 mg by mouth 2 (two) times a day as needed  , Disp: , Rfl: 0    clotrimazole-betamethasone (LOTRISONE) 1-0 05 % cream, Apply topically 2 (two) times a day, Disp: 30 g, Rfl: 0    Elastic Bandages & Supports (WRIST BRACE DELUXE/RIGHT S/M) MISC, by Does not apply route continuous, Disp: 1 each, Rfl: 0    fluconazole (DIFLUCAN) 150 mg tablet, take 1 tablet by mouth as a single dose, Disp: , Rfl: 0    fluticasone (FLONASE) 50 mcg/act nasal spray, 2 sprays into each nostril daily, Disp: 16 g, Rfl: 0    gabapentin (NEURONTIN) 300 mg capsule, Take 1 capsule (300 mg total) by mouth 3 (three) times a day, Disp: 90 capsule, Rfl: 0    Lactobacillus (RA ACIDOPHILUS) 300 MG CAPS, Take 1 capsule (300 mg total) by mouth daily for 30 days, Disp: 30 capsule, Rfl: 0    metroNIDAZOLE (FLAGYL) 500 mg tablet, Take 1 tablet (500 mg total) by mouth every 8 (eight) hours for 42 days, Disp: 126 tablet, Rfl: 0    Allergies   Allergen Reactions    Toradol [Ketorolac Tromethamine]      GI UPSET    Latex Rash    Ultram [Tramadol] Rash       Physical Exam:    /60 (BP Location: Left arm, Patient Position: Sitting, Cuff Size: Standard)   Pulse 81   Ht 5' 6 5" (1 689 m)   LMP 02/10/2019 (Approximate)   BMI 31 96 kg/m²     Constitutional:normal, well developed, well nourished, alert, in no distress and non-toxic and no overt pain behavior  Eyes:anicteric  HEENT:grossly intact  Neck:supple, symmetric, trachea midline and no masses   Pulmonary:even and unlabored  Cardiovascular:No edema or pitting edema present  Skin:Normal without rashes or lesions and well hydrated  Psychiatric:Mood and affect appropriate  Neurologic:Cranial Nerves II-XII grossly intact  Musculoskeletal:Left foot maintain in an orthopedic boot

## 2019-03-05 ENCOUNTER — OFFICE VISIT (OUTPATIENT)
Dept: INFECTIOUS DISEASES | Facility: CLINIC | Age: 44
End: 2019-03-05
Payer: COMMERCIAL

## 2019-03-05 VITALS
HEART RATE: 76 BPM | BODY MASS INDEX: 31.33 KG/M2 | RESPIRATION RATE: 16 BRPM | TEMPERATURE: 99 F | HEIGHT: 67 IN | SYSTOLIC BLOOD PRESSURE: 118 MMHG | WEIGHT: 199.6 LBS | DIASTOLIC BLOOD PRESSURE: 80 MMHG

## 2019-03-05 DIAGNOSIS — B37.9 YEAST INFECTION: ICD-10-CM

## 2019-03-05 DIAGNOSIS — Z72.0 TOBACCO ABUSE DISORDER: ICD-10-CM

## 2019-03-05 DIAGNOSIS — M86.672 OTHER CHRONIC OSTEOMYELITIS OF LEFT ANKLE (HCC): Primary | ICD-10-CM

## 2019-03-05 DIAGNOSIS — A49.01 MSSA (METHICILLIN SUSCEPTIBLE STAPHYLOCOCCUS AUREUS): ICD-10-CM

## 2019-03-05 DIAGNOSIS — T84.7XXA INFECTION OF LOWER EXTREMITY ASSOCIATED WITH HARDWARE (HCC): ICD-10-CM

## 2019-03-05 PROCEDURE — 99214 OFFICE O/P EST MOD 30 MIN: CPT | Performed by: PHYSICIAN ASSISTANT

## 2019-03-05 NOTE — PROGRESS NOTES
Assessment/Plan:    Infection of lower extremity associated with hardware (HCC)  Polymicrobial L ankle ORIF infection  S/p  removal of hardware with MSSA/S  Anginosis/Prevotella on cultures  Currently on IV cefazolin/po flagyl to complete 6 weeks on 3/18/19  She is currently tolerating antibiotics without difficulty  Her left ankle incision is well healed  She will complete the 6 weeks of antibiotics as ordered  Continue weekly labs  PICC can be removed 3/19/19  Patient not to drink alcohol while on flagyl or for a few days after completing the antibiotic  No further ID f/u at this time  Yeast infection  Patient recently treated for yeast infection with Diflucan  She states her symptoms have resolved  Tobacco abuse disorder  Patient continues to smoke  Encouraged her to quit  Diagnoses and all orders for this visit:    Other chronic osteomyelitis of left ankle ([address] )  -     Discontinue PICC line; Future    Infection of lower extremity associated with hardware (HCC)    MSSA (methicillin susceptible Staphylococcus aureus)    Tobacco abuse disorder    Yeast infection          Subjective:      Patient ID: China Carlisle is a 41 y o  female  HPI  40 y/o female presents for office f/u today regarding Polymicrobial L ankle ORIF infection  The patient had a chronic nonhealing wound/sinus tract following placement of hardware  She underwent removal of hardware with MSSA/S  Anginosis/Prevotella on cultures  She was placed on IV cefazolin and po flagyl to complete a total of 6 weeks for possible residual osteomyelitis  She denies missing any doses  States her PICC is functioning well  She denies any fever, chills, rash  She did recently have vaginal candidiasis  She is also having loose stools twice daily  She denies any alcohol use  Continues to smoke        The following portions of the patient's history were reviewed and updated as appropriate: allergies, current medications, past family history, past medical history, past social history, past surgical history and problem list     Review of Systems   Constitutional: Negative for chills and fever  HENT: Negative for mouth sores  Respiratory: Negative for cough and shortness of breath  Cardiovascular: Negative for chest pain, palpitations and leg swelling  Gastrointestinal: Negative for abdominal pain, diarrhea, nausea and vomiting  Genitourinary: Negative for difficulty urinating, dysuria and frequency  Musculoskeletal: Negative for arthralgias, back pain, joint swelling and myalgias  Skin: Negative for rash and wound  Psychiatric/Behavioral: Negative for behavioral problems and confusion  Objective:      /80   Pulse 76   Temp 99 °F (37 2 °C)   Resp 16   Ht 5' 6 5" (1 689 m)   Wt 90 5 kg (199 lb 9 6 oz)   LMP 02/10/2019 (Approximate)   BMI 31 73 kg/m²          Physical Exam   Constitutional: She is oriented to person, place, and time  She appears well-developed and well-nourished  No distress  HENT:   Head: Normocephalic and atraumatic  Right Ear: External ear normal    Left Ear: External ear normal    Nose: Nose normal    Mouth/Throat: Oropharynx is clear and moist  No oropharyngeal exudate  Eyes: Conjunctivae are normal  Right eye exhibits no discharge  Left eye exhibits no discharge  No scleral icterus  Neck: Normal range of motion  Neck supple  Cardiovascular: Normal rate, regular rhythm and normal heart sounds  Pulmonary/Chest: Effort normal and breath sounds normal  No stridor  No respiratory distress  She has no wheezes  She has no rales  She exhibits no tenderness  Abdominal: Soft  Bowel sounds are normal  She exhibits no distension  There is no tenderness  Musculoskeletal: Normal range of motion  She exhibits no edema  Left ankle incision well healed  Neurological: She is alert and oriented to person, place, and time  Skin: Skin is warm and dry  She is not diaphoretic   No erythema  PICC insertion site without erythema, drainage or tenderness  Psychiatric: She has a normal mood and affect  Her behavior is normal    Nursing note and vitals reviewed          Labs:   2/26/19  Wbc: 7 3  Hgb: 11 5  Plt: 299  Cr: 0 63

## 2019-03-05 NOTE — ASSESSMENT & PLAN NOTE
Polymicrobial L ankle ORIF infection  S/p  removal of hardware with MSSA/S  Anginosis/Prevotella on cultures  Currently on IV cefazolin/po flagyl to complete 6 weeks on 3/18/19  She is currently tolerating antibiotics without difficulty  Her left ankle incision is well healed  She will complete the 6 weeks of antibiotics as ordered  Continue weekly labs  PICC can be removed 3/19/19  Patient not to drink alcohol while on flagyl or for a few days after completing the antibiotic  No further ID f/u at this time

## 2019-03-19 ENCOUNTER — TELEPHONE (OUTPATIENT)
Dept: FAMILY MEDICINE CLINIC | Facility: CLINIC | Age: 44
End: 2019-03-19

## 2019-03-19 NOTE — TELEPHONE ENCOUNTER
Since it's been almost a month since last visit, please schedule for a visit in office to be examined

## 2019-03-19 NOTE — TELEPHONE ENCOUNTER
S/w Pt, states she is still having yeast infection sx  She did take rx as prescribed   Pt would like to know if something can be called into pharmacy or if she should holden an appt to be seen, please advise        [phone]

## 2019-03-20 ENCOUNTER — OFFICE VISIT (OUTPATIENT)
Dept: FAMILY MEDICINE CLINIC | Facility: CLINIC | Age: 44
End: 2019-03-20
Payer: COMMERCIAL

## 2019-03-20 VITALS
SYSTOLIC BLOOD PRESSURE: 102 MMHG | OXYGEN SATURATION: 97 % | BODY MASS INDEX: 31.8 KG/M2 | TEMPERATURE: 97.6 F | HEART RATE: 104 BPM | DIASTOLIC BLOOD PRESSURE: 80 MMHG | RESPIRATION RATE: 16 BRPM | WEIGHT: 200 LBS

## 2019-03-20 DIAGNOSIS — B37.9 YEAST INFECTION: Primary | ICD-10-CM

## 2019-03-20 PROCEDURE — 99213 OFFICE O/P EST LOW 20 MIN: CPT | Performed by: FAMILY MEDICINE

## 2019-03-20 RX ORDER — FLUCONAZOLE 150 MG/1
150 TABLET ORAL ONCE
Qty: 2 TABLET | Refills: 0 | Status: SHIPPED | OUTPATIENT
Start: 2019-03-20 | End: 2019-03-20

## 2019-03-22 ENCOUNTER — APPOINTMENT (OUTPATIENT)
Dept: RADIOLOGY | Facility: CLINIC | Age: 44
End: 2019-03-22
Payer: COMMERCIAL

## 2019-03-22 ENCOUNTER — OFFICE VISIT (OUTPATIENT)
Dept: OBGYN CLINIC | Facility: CLINIC | Age: 44
End: 2019-03-22

## 2019-03-22 VITALS
SYSTOLIC BLOOD PRESSURE: 120 MMHG | DIASTOLIC BLOOD PRESSURE: 87 MMHG | HEIGHT: 67 IN | HEART RATE: 74 BPM | WEIGHT: 199.8 LBS | BODY MASS INDEX: 31.36 KG/M2

## 2019-03-22 DIAGNOSIS — M86.9 OSTEOMYELITIS OF LEFT FIBULA, UNSPECIFIED TYPE (HCC): ICD-10-CM

## 2019-03-22 DIAGNOSIS — Z87.81 STATUS POST ORIF OF FRACTURE OF ANKLE: Primary | ICD-10-CM

## 2019-03-22 DIAGNOSIS — Z98.890 STATUS POST ORIF OF FRACTURE OF ANKLE: ICD-10-CM

## 2019-03-22 DIAGNOSIS — Z98.890 S/P HARDWARE REMOVAL: ICD-10-CM

## 2019-03-22 DIAGNOSIS — Z87.81 STATUS POST ORIF OF FRACTURE OF ANKLE: ICD-10-CM

## 2019-03-22 DIAGNOSIS — Z98.890 STATUS POST ORIF OF FRACTURE OF ANKLE: Primary | ICD-10-CM

## 2019-03-22 PROCEDURE — 99024 POSTOP FOLLOW-UP VISIT: CPT | Performed by: ORTHOPAEDIC SURGERY

## 2019-03-22 PROCEDURE — 73610 X-RAY EXAM OF ANKLE: CPT

## 2019-03-22 RX ORDER — MELOXICAM 15 MG/1
7.5 TABLET ORAL DAILY
COMMUNITY
End: 2019-04-15 | Stop reason: SDUPTHER

## 2019-03-22 NOTE — PROGRESS NOTES
Assessment/Plan:  1  Status post ORIF of fracture of ankle     2  S/P hardware removal     3  Osteomyelitis of left fibula, unspecified type ([facility] [address] )       Patient is here for follow-up regarding her left ankle which underwent ORIF by me in the past   Her postoperative course was complicated by delayed wound healing and ultimately needed wound closure and removal of hardware from her left ankle at which time was expected that she had osteomyelitis of her left distal fibula  She has completed her antibiotic regiment and course with her Infectious Disease doctors  She is here today without pain in her ankle and is tolerating normal shoe well and ambulation well without discomfort  Her incision is completely healed over the lateral aspect of her ankle  There is no increased warmth around the ankle or swelling  At this point I feel that she has done very well postoperatively considering the challenges she had postoperatively  She may pursue activities as tolerated  She may pursue any job/career path she wishes at the do not feel her ankle is a limiting factor at this point  She may experience symptoms consistent with posttraumatic arthritis in the future and this was discussed today  She was advised that if she has some discomfort she may take anti-inflammatories as needed  If the pain persists despite using knees or progressively gets worse she needs to return for evaluation to either myself or her infectious disease physician  All of her questions were addressed today  I will see her back as needed moving forward    Subjective:  6 week follow-up status post removal of hardware left ankle, treatment for osteomyelitis left ankle    Patient ID: Jossy Mcneil is a 41 y o  female  HPI  Patient is here 6 weeks status post removal of hardware and wound closure of her left distal fibula    She states that she has completed her antibiotic therapy as recommended by her Infectious Disease doctor for presumed osteomyelitis of her left distal fibula  She states that currently she is ambulating in normal shoe wear without discomfort and pain  She is pleased with the recovery that she has made thus far  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  Review of Systems   Constitutional: Positive for activity change (Increased)  HENT: Negative  Eyes: Negative  Respiratory: Negative  Cardiovascular: Negative  Gastrointestinal: Negative  Endocrine: Negative  Musculoskeletal: Negative for arthralgias, gait problem and joint swelling  Skin: Negative  Neurological: Negative  Psychiatric/Behavioral: Negative            Past Medical History:   Diagnosis Date    Bipolar disorder ([facility] [facility] )     Depression     History of wound infection     left ankle    Psychiatric disorder     depression, anxiety    Seasonal allergic reaction        Past Surgical History:   Procedure Laterality Date    IR PICC LINE  2/8/2019    IL OPEN TREATMENT BIMALLEOLAR ANKLE FRACTURE Left 5/14/2018    Procedure: OPEN REDUCTION W/ INTERNAL FIXATION (ORIF) ANKLE;  Surgeon: Idalmis Varma DO;  Location: [facility] OR;  Service: Orthopedics    IL REMOVAL DEEP IMPLANT Left 2/5/2019    Procedure: REMOVAL HARDWARE ANKLE;  Surgeon: Idalmis Varma DO;  Location: [facility] OR;  Service: Orthopedics    REDUCTION MAMMAPLASTY  2001    reduction       Family History   Problem Relation Age of Onset    No Known Problems Father     Diabetes Mother     Cancer Mother     Asthma Mother     No Known Problems Sister     No Known Problems Brother     No Known Problems Maternal Aunt     No Known Problems Maternal Uncle     No Known Problems Paternal Aunt     No Known Problems Paternal Uncle     No Known Problems Maternal Grandmother     No Known Problems Maternal Grandfather     No Known Problems Paternal Grandmother     No Known Problems Paternal Grandfather     ADD / ADHD Neg Hx     Anesthesia problems Neg Hx     Clotting disorder Neg Hx     Collagen disease Neg Hx     Dislocations Neg Hx     Learning disabilities Neg Hx     Neurological problems Neg Hx     Osteoporosis Neg Hx     Rheumatologic disease Neg Hx     Scoliosis Neg Hx     Vascular Disease Neg Hx        Social History     Occupational History    Not on file   Tobacco Use    Smoking status: Current Every Day Smoker     Packs/day: 0 50     Years: 25 00     Pack years: 12 50     Types: Cigarettes    Smokeless tobacco: Never Used   Substance and Sexual Activity    Alcohol use: Yes     Comment: social    Drug use: No    Sexual activity: Yes     Partners: Male         Current Outpatient Medications:     albuterol (VENTOLIN HFA) 90 mcg/act inhaler, Inhale 2 puffs every 6 (six) hours as needed for wheezing, Disp: 1 Inhaler, Rfl: 0    ARIPiprazole (ABILIFY) 20 MG tablet, 15 mg daily  , Disp: , Rfl: 0    clonazePAM (KlonoPIN) 0 5 mg tablet, Take 0 5 mg by mouth 2 (two) times a day as needed  , Disp: , Rfl: 0    FLUoxetine HCl (PROZAC PO), Take by mouth, Disp: , Rfl:     gabapentin (NEURONTIN) 300 mg capsule, Take 1 capsule (300 mg total) by mouth 3 (three) times a day, Disp: 90 capsule, Rfl: 1    aspirin 325 mg tablet, Take 1 tablet (325 mg total) by mouth 2 (two) times a day (Patient not taking: Reported on 3/22/2019), Disp: 84 tablet, Rfl: 0    meloxicam (MOBIC) 15 mg tablet, Take 7 5 mg by mouth daily, Disp: , Rfl:     Allergies   Allergen Reactions    Toradol [Ketorolac Tromethamine]      GI UPSET    Latex Rash    Ultram [Tramadol] Rash       Objective:  Vitals:    03/22/19 1434   BP: 120/87   Pulse: 74       Body mass index is 31 77 kg/m²  Left Ankle Exam     Tenderness   The patient is experiencing no tenderness     Swelling: none    Muscle Strength   Dorsiflexion:  5/5   Plantar flexion:  5/5   Anterior tibial:  5/5   Posterior tibial:  5/5  Gastrocsoleus:  5/5  Peroneal muscle:  5/5    Tests   Anterior drawer: negative    Other Erythema: absent  Scars: present  Sensation: normal  Pulse: present    Comments:  Lateral ankle incision is well healed without signs of delayed closure  No erythema or increased tenderness around the incision  There is no pain with passive and active range of motion of the ankle mortise  The medial ankle incision is well healed  There is no swelling around the ankle  Foot is neurovascular intact  Observations   Left Ankle/Foot   Positive for adhesive scar  Strength/Myotome Testing     Left Ankle/Foot   Dorsiflexion: 5  Plantar flexion: 5      Physical Exam   Constitutional: She is oriented to person, place, and time  She appears well-developed  HENT:   Head: Atraumatic  Eyes: EOM are normal    Neck: Neck supple  Cardiovascular: Normal rate  Pulmonary/Chest: Effort normal    Musculoskeletal:   See orthopedic exam   Neurological: She is alert and oriented to person, place, and time  Skin: Skin is warm and dry  Psychiatric: She has a normal mood and affect  Nursing note and vitals reviewed  I have personally reviewed pertinent films in PACS  X-rays of the left ankle obtained today  There has been no interval change since last evaluation  No reactive bone  Fracture has healed in the distal fibula  Screw holes are still present from prior hardware removal   Medial hardware appears stable  Nahun JACK    Division of Adult Reconstruction  Department of [facility]

## 2019-03-23 LAB
BACTERIA GENITAL AEROBE CULT: ABNORMAL
C TRACH RRNA SPEC QL NAA+PROBE: NEGATIVE
Lab: ABNORMAL
Lab: ABNORMAL
N GONORRHOEA RRNA SPEC QL NAA+PROBE: NEGATIVE

## 2019-04-15 ENCOUNTER — OFFICE VISIT (OUTPATIENT)
Dept: PAIN MEDICINE | Facility: CLINIC | Age: 44
End: 2019-04-15
Payer: COMMERCIAL

## 2019-04-15 VITALS
BODY MASS INDEX: 32.62 KG/M2 | HEIGHT: 67 IN | DIASTOLIC BLOOD PRESSURE: 64 MMHG | RESPIRATION RATE: 18 BRPM | WEIGHT: 207.8 LBS | SYSTOLIC BLOOD PRESSURE: 122 MMHG | HEART RATE: 106 BPM

## 2019-04-15 DIAGNOSIS — G89.29 CHRONIC PAIN OF LEFT ANKLE: ICD-10-CM

## 2019-04-15 DIAGNOSIS — Z87.81 STATUS POST ORIF OF FRACTURE OF ANKLE: ICD-10-CM

## 2019-04-15 DIAGNOSIS — G89.4 CHRONIC PAIN SYNDROME: Primary | ICD-10-CM

## 2019-04-15 DIAGNOSIS — Z98.890 STATUS POST ORIF OF FRACTURE OF ANKLE: ICD-10-CM

## 2019-04-15 DIAGNOSIS — M25.572 CHRONIC PAIN OF LEFT ANKLE: ICD-10-CM

## 2019-04-15 PROCEDURE — 99214 OFFICE O/P EST MOD 30 MIN: CPT | Performed by: ANESTHESIOLOGY

## 2019-04-15 RX ORDER — MELOXICAM 15 MG/1
7.5 TABLET ORAL DAILY PRN
Qty: 30 TABLET | Refills: 0 | Status: SHIPPED | OUTPATIENT
Start: 2019-04-15 | End: 2019-09-19 | Stop reason: ALTCHOICE

## 2019-04-15 RX ORDER — GABAPENTIN 300 MG/1
300 CAPSULE ORAL 3 TIMES DAILY
Qty: 90 CAPSULE | Refills: 0 | Status: SHIPPED | OUTPATIENT
Start: 2019-04-15 | End: 2019-09-19 | Stop reason: ALTCHOICE

## 2019-06-03 DIAGNOSIS — J40 BRONCHITIS: ICD-10-CM

## 2019-06-25 RX ORDER — ARIPIPRAZOLE 30 MG/1
TABLET ORAL
Refills: 0 | COMMUNITY
Start: 2019-04-19 | End: 2019-09-19 | Stop reason: ALTCHOICE

## 2019-06-25 RX ORDER — OXYCODONE HYDROCHLORIDE AND ACETAMINOPHEN 5; 325 MG/1; MG/1
1 TABLET ORAL EVERY 4 HOURS PRN
Refills: 0 | COMMUNITY
Start: 2019-05-08 | End: 2019-09-19 | Stop reason: ALTCHOICE

## 2019-06-25 RX ORDER — ARIPIPRAZOLE 300 MG
KIT INTRAMUSCULAR
Refills: 0 | COMMUNITY
Start: 2019-06-20 | End: 2021-10-21

## 2019-06-25 RX ORDER — FLUOXETINE 10 MG/1
CAPSULE ORAL
Refills: 0 | COMMUNITY
Start: 2019-04-19 | End: 2019-09-19 | Stop reason: ALTCHOICE

## 2019-06-25 RX ORDER — AMOXICILLIN 500 MG/1
CAPSULE ORAL
Refills: 0 | COMMUNITY
Start: 2019-05-08 | End: 2019-09-19 | Stop reason: ALTCHOICE

## 2019-06-25 RX ORDER — FLUOXETINE HYDROCHLORIDE 20 MG/1
CAPSULE ORAL
Refills: 0 | COMMUNITY
Start: 2019-05-13

## 2019-07-23 ENCOUNTER — OFFICE VISIT (OUTPATIENT)
Dept: FAMILY MEDICINE CLINIC | Facility: CLINIC | Age: 44
End: 2019-07-23
Payer: COMMERCIAL

## 2019-07-23 VITALS
DIASTOLIC BLOOD PRESSURE: 84 MMHG | SYSTOLIC BLOOD PRESSURE: 122 MMHG | BODY MASS INDEX: 35.94 KG/M2 | TEMPERATURE: 97.5 F | WEIGHT: 226.06 LBS | HEART RATE: 87 BPM | OXYGEN SATURATION: 98 %

## 2019-07-23 DIAGNOSIS — N39.0 ACUTE UTI: Primary | ICD-10-CM

## 2019-07-23 DIAGNOSIS — R30.0 DYSURIA: ICD-10-CM

## 2019-07-23 LAB
SL AMB  POCT GLUCOSE, UA: NORMAL
SL AMB LEUKOCYTE ESTERASE,UA: 500
SL AMB POCT BILIRUBIN,UA: NEGATIVE
SL AMB POCT BLOOD,UA: 250
SL AMB POCT CLARITY,UA: ABNORMAL
SL AMB POCT COLOR,UA: YELLOW
SL AMB POCT KETONES,UA: NEGATIVE
SL AMB POCT NITRITE,UA: NEGATIVE
SL AMB POCT PH,UA: 6
SL AMB POCT SPECIFIC GRAVITY,UA: 1.01
SL AMB POCT URINE PROTEIN: 30
SL AMB POCT UROBILINOGEN: NORMAL

## 2019-07-23 PROCEDURE — 4004F PT TOBACCO SCREEN RCVD TLK: CPT | Performed by: NURSE PRACTITIONER

## 2019-07-23 PROCEDURE — 99213 OFFICE O/P EST LOW 20 MIN: CPT | Performed by: NURSE PRACTITIONER

## 2019-07-23 PROCEDURE — 81002 URINALYSIS NONAUTO W/O SCOPE: CPT | Performed by: NURSE PRACTITIONER

## 2019-07-23 RX ORDER — SULFAMETHOXAZOLE AND TRIMETHOPRIM 800; 160 MG/1; MG/1
1 TABLET ORAL EVERY 12 HOURS SCHEDULED
Qty: 6 TABLET | Refills: 0 | Status: SHIPPED | OUTPATIENT
Start: 2019-07-23 | End: 2019-07-26

## 2019-07-23 RX ORDER — FLUOXETINE HYDROCHLORIDE 40 MG/1
40 CAPSULE ORAL EVERY MORNING
Refills: 0 | COMMUNITY
Start: 2019-07-03

## 2019-07-23 NOTE — PROGRESS NOTES
Assessment/Plan:  1  Urinate every 4 hours  2  Drink plenty fluids will feeling ill  3  Follow up if condition changes or worsens       Diagnoses and all orders for this visit:    Acute UTI  -     sulfamethoxazole-trimethoprim (BACTRIM DS) 800-160 mg per tablet; Take 1 tablet by mouth every 12 (twelve) hours for 3 days    Dysuria  -     POCT urine dip  -     Urine culture    Other orders  -     FLUoxetine (PROzac) 40 MG capsule; Take 40 mg by mouth every morning          Subjective:      Patient ID: Benito Engel is a 45 y o  female  82-year-old female presents with dysuria since Saturday  Reports some frequency and urgency symptoms  Denies pregnancy  Last menstrual period 1 week ago  Denies STIs  Denies medications  Denies fever  Denies any pelvic/back pain  The following portions of the patient's history were reviewed and updated as appropriate: allergies and current medications  Review of Systems   Constitutional: Negative  Respiratory: Negative  Cardiovascular: Negative  Genitourinary: Positive for dysuria, frequency and urgency  Objective:      /84 (BP Location: Left arm, Patient Position: Sitting, Cuff Size: Adult)   Pulse 87   Temp 97 5 °F (36 4 °C) (Tympanic)   Wt 103 kg (226 lb 1 oz)   LMP 07/14/2019   SpO2 98%   BMI 35 94 kg/m²          Physical Exam   Constitutional: She appears well-developed and well-nourished  Cardiovascular: Normal rate and regular rhythm  Pulmonary/Chest: Effort normal and breath sounds normal    Abdominal: Soft   Bowel sounds are normal

## 2019-07-26 LAB
BACTERIA UR CULT: ABNORMAL
Lab: ABNORMAL
SL AMB ANTIMICROBIAL SUSCEPTIBILITY: ABNORMAL

## 2019-09-19 ENCOUNTER — OFFICE VISIT (OUTPATIENT)
Dept: FAMILY MEDICINE CLINIC | Facility: CLINIC | Age: 44
End: 2019-09-19
Payer: COMMERCIAL

## 2019-09-19 VITALS
HEART RATE: 92 BPM | WEIGHT: 227 LBS | RESPIRATION RATE: 16 BRPM | OXYGEN SATURATION: 98 % | BODY MASS INDEX: 36.09 KG/M2 | SYSTOLIC BLOOD PRESSURE: 124 MMHG | DIASTOLIC BLOOD PRESSURE: 84 MMHG

## 2019-09-19 DIAGNOSIS — M54.2 NECK PAIN: Primary | ICD-10-CM

## 2019-09-19 PROCEDURE — 99214 OFFICE O/P EST MOD 30 MIN: CPT | Performed by: FAMILY MEDICINE

## 2019-09-19 NOTE — PROGRESS NOTES
After explaining procedure and risks/benefits and answering any questions, patient gave verbal consent for dry needling of 5 noted trigger points Right trapezial ridge and R posterior neck muscle trigger points  Patient tolerated procedure well  Sterile single use disposable acupuncture needles used and all needles were accounted for and disposed of in the sharps container after the procedure

## 2019-09-19 NOTE — PROGRESS NOTES
Assessment/Plan:    Bilateral hand pain  - Will refer patient to physical therapy at this time as she wants to try conservative therapy  - If no improvement in symptoms, corticosteroid injections and surgical options discussed  - Will order xray of cervical spine as patient complains of numbness and tingling down her arms when turning her neck in certain angles      Subjective:      Patient ID: Matt Simental is a 45 y o  female  HPI    This is a 45year old female presenting for evaluation of bilateral hand pain  Patient states she has been having issues with her hands for the last 10 years  She experience pain from her fingers to her elbows  The pain gets better throughout the day  Patient also states the pain is associated with a tingling sensation  This sensation stays constant throughout the day  Patient was seen in the office in December 2018 for this issue and was given wrist splints  She says she tried wearing these at night for over a month without any improvement in her symptoms  Patient was also referred to physical therapy at the time, however never went  Patient states playing games on her phone makes the pain and tingling worse  The following portions of the patient's history were reviewed and updated as appropriate: allergies, current medications, past family history, past medical history, past social history, past surgical history and problem list     Review of Systems   Constitutional: Negative for activity change, fatigue and fever  HENT: Negative for congestion, ear pain, sneezing and sore throat  Respiratory: Negative for cough, shortness of breath and wheezing  Cardiovascular: Negative for chest pain  Gastrointestinal: Negative for abdominal pain, constipation, diarrhea, nausea and vomiting  Skin: Negative  Neurological: Positive for numbness (Numbness and tingling in hands bilaterally)  Negative for dizziness, weakness and headaches         Objective:  /84 Pulse 92   Resp 16   Wt 103 kg (227 lb)   SpO2 98%   BMI 36 09 kg/m²      Physical Exam   Constitutional: She is oriented to person, place, and time  She appears well-developed and well-nourished  No distress  HENT:   Head: Normocephalic and atraumatic  Right Ear: External ear normal    Left Ear: External ear normal    Eyes: Conjunctivae are normal  Right eye exhibits no discharge  Left eye exhibits no discharge  No scleral icterus  Cardiovascular: Normal rate, regular rhythm and normal heart sounds  Exam reveals no gallop and no friction rub  No murmur heard  Pulmonary/Chest: Effort normal and breath sounds normal  No respiratory distress  She has no wheezes  She has no rales  Abdominal: Soft  Bowel sounds are normal  She exhibits no distension  There is no tenderness  There is no rebound and no guarding  Musculoskeletal: She exhibits no edema or tenderness  Right hand: She exhibits normal range of motion, no tenderness and no bony tenderness  Normal sensation noted  Normal strength noted  Left hand: She exhibits normal range of motion, no tenderness and no bony tenderness  Normal sensation noted  Normal strength noted  Positive Phalen test bilaterally, negative Tinel   Neurological: She is alert and oriented to person, place, and time  No sensory deficit  She exhibits normal muscle tone  Skin: Skin is warm and dry  No rash noted  She is not diaphoretic  No erythema  No pallor  Psychiatric: She has a normal mood and affect  Her behavior is normal    Vitals reviewed

## 2019-09-19 NOTE — PATIENT INSTRUCTIONS
Good Somatics resources:    [employer] has various free Somatics resources  Type in 'somatics' in the search bar to check them out  1    Somatics materials: [email or web address] [email or web address]  com/catOrdering html:  AUDIO: Somatic Exercises[TM] narrated by Jillene Felty:       )   Somatic Exercises[TM]for the Neck, Jaw and Skull (CDs)       Somatic Exercises[TM]for the Hands, Wrists, Elbows and Shoulders (CDs)   Somatic Exercises[TM]for Rounded Shoulders and Depressed Chests (CDs)                 AUDIO: Somatic Exercises[TM] narrated by An Dejesus Ed D :      Somatic Exercises[TM]for the Arms, Hands, Neck and Shoulders (CDs)      Somatic Exercises[TM]for the Brain, Muscles & Nerves (CDs)     1  Also carries CDs by Jillene Felty, the developer of Somatics, as listed in the site above  Somatics Educational Resources  [address], [address] [facility], [place] [address]    3  [address] (has MP3 downloads), not too pricey: such as Eliminate Back Pain'  [email or web address]-[email or web address]-exercises-[email or web address]-home/        Tips on practicing Somatic education:  1  What counts is the level of careful body awareness one brings to the exercises, not the vigorousness or amplitude of the movement  2  Just like learning anything, Somatic education takes time  3  The more consistent your practice, the better the results  4  These exercises in general are very safe, however, if something hurts, back off or don't do it  Use your common sense  Enjoy your exploration of Somatics! - Dr Henok Mccracken:   What you need to know about carpal tunnel surgery:  Carpal tunnel surgery, or decompression, is used to take pressure off the median nerve in your wrist  The median nerve controls muscles and feeling in the hand  Surgery may be done through an opening on your palm  This is called open surgery   Your surgeon may instead put a scope and tools into 1 or 2 small incisions on your wrist or palm  This is called endoscopic surgery  How to prepare for surgery: Your healthcare provider will tell you how to prepare for surgery  He may tell you not to eat or drink anything after midnight on the day of your surgery  He will tell you which medicines to take or not take on the day of surgery  Arrange to have someone drive you home after surgery  You may get antibiotics before surgery to prevent an infection  Tell your healthcare provider if you have ever had an allergic reaction to antibiotics  What will happen during surgery:   · You may be given local or regional anesthesia to help prevent pain during surgery  Local anesthesia will make only your wrist numb  Regional anesthesia will make your wrist, hand, and arm numb  You may instead be given general anesthesia to keep you asleep and free from pain  You may need this anesthesia if your surgeon thinks surgery will take a long time or involve a large part of your wrist      · For open surgery, your surgeon will make an incision on the palm of your hand  The incision may extend to your wrist  A ligament will be cut to release the pressure on the nerve  This ligament is a band of tissue that connects joints in your wrist  Your surgeon may also remove scar tissue or anything else that may be pressing on the nerve  · For endoscopic surgery, your surgeon will make 1 or 2 incisions on your wrist or palm  He will insert the endoscope with the camera through an incision to help guide him during surgery  Tools may be put in your wrist to help protect the nerves  He will then cut the ligament that is pressing on the nerve  · The incision will be closed with stitches and covered with bandages  What will happen after surgery:  A splint may be placed on your wrist to keep it from moving  You will be taken to a room where you will rest until you are fully awake and gain feeling in your arm   Your healthcare provider may ask you to move your fingers soon after your surgery  Do not try to get out of bed until your healthcare provider says it is okay  Risks of carpal tunnel surgery: You may bleed more than expected or get an infection  Your skin may bruise  A thick, painful scar may form where you had surgery  You may develop trigger finger (fingers locked in a bent position)  Surgery may cause long-term numbness or weakness in your fingers, hand, or wrist  Your symptoms may not go away, and you may need surgery again  Seek care immediately if:   · Your stitches come apart  · Blood soaks through your bandage  · You cannot feel or move your hand or fingers  · You feel a lump or swelling in your wrist   Contact your healthcare provider or hand specialist if:   · You have a fever or chills  · You feel weak or achy  · You have pain, even after you take medicine  · You have swelling, stiffness, or numbness in your fingers  · Your finger becomes stuck in the same position  · You have questions or concerns about your condition or care  Medicines: You may need any of the following:  · Antibiotics  help prevent or fight a bacterial infection  · Prescription pain medicine  may be given  Ask how to take this medicine safely  · Take your medicine as directed  Contact your healthcare provider if you think your medicine is not helping or if you have side effects  Tell him or her if you are allergic to any medicine  Keep a list of the medicines, vitamins, and herbs you take  Include the amounts, and when and why you take them  Bring the list or the pill bottles to follow-up visits  Carry your medicine list with you in case of an emergency  Go to physical or occupational therapy, if directed:  A physical therapist can teach you exercises to help improve movement and strength  Physical therapy can also help decrease pain and loss of function   An occupational therapist can help you find ways to do work and other activities to reduce strain on your wrist   Apply ice to your wrist:  Ice helps decrease swelling and pain  Ice may also help prevent tissue damage  Use an ice pack or put crushed ice in a plastic bag  Cover the ice pack or bag with a towel  Place it on your wrist for 15 to 20 minutes every hour, or as directed  Limit activity as directed:  Do not pull, lift, or move heavy objects until your healthcare provider says it is okay  Ask when you can return to work  Take time to rest your hand  If you work on a computer, rest your hand often  You may need to elevate your arm several times a day  This helps decrease pain and swelling  Follow up with your healthcare provider or hand specialist as directed: You may need to return to have your stitches removed  Ask how long you need to wear your splint  Write down your questions so you remember to ask them during your visits  © 2017 [address] [employer] Information is for End User's use only and may not be sold, redistributed or otherwise used for commercial purposes  All illustrations and images included in CareNotes® are the copyrighted property of [employer] M [employer] Inc  or Michael Jara  The above information is an  only  It is not intended as medical advice for individual conditions or treatments  Talk to your doctor, nurse or pharmacist before following any medical regimen to see if it is safe and effective for you  Carpal Tunnel Syndrome   AMBULATORY CARE:   Carpal tunnel syndrome (CTS)  is a condition that causes pressure to build in the carpal tunnel  The carpal tunnel is a small area between bones and tissues in your wrist  Swelling in this area puts pressure on the median nerve  The median nerve controls muscles and feeling in the hand          Common signs and symptoms:   · Dull, sharp, or shooting pain in your hand    · Numbness, tingling, or a burning feeling in your thumb, first finger, and middle finger    · Arm pain that may extend to your shoulder    · Weakness in your hand    · Swelling in your hand    · Not being able to control how your hand moves, or you drop objects  Seek care immediately if:   · You suddenly lose feeling in your hand or fingers and you cannot move them  · Your hand suddenly changes color  Contact your healthcare provider if:   · Your symptoms get worse  · Your hand and fingers are so weak that you cannot grab, squeeze, or lift items  · You have questions or concerns about your condition or care  Treatment  may not be needed  If your symptoms continue or are severe, you may need any of the following:  · NSAIDs  may be recommended to decrease swelling and pain  NSAIDs are available without a doctor's order  Ask your healthcare provider which medicine is right for you and how much to take  Take as directed  NSAIDs can cause stomach bleeding or kidney problems if not taken correctly  If you take blood thinning medicine, always ask your healthcare provider if NSAIDs are safe for you  · Steroid injections  may help decrease pain and swelling  Steroid medicine is injected into the carpal tunnel  · Transcutaneous electric nerve stimulation  uses mild electrical impulses to help decrease your wrist pain  · Surgery  called decompression may be used to take pressure off of the median nerve in your wrist   Manage your symptoms:   · Apply ice to your wrist   Ice helps decrease swelling and pain in your wrist  Ice may also help prevent tissue damage  Use an ice pack, or put crushed ice in a plastic bag  Cover the ice pack with a towel  Place it on your wrist for 15 to 20 minutes every hour, or as directed  · Rest your hands  Let your hands rest for a short time between repetitive motions, such as typing  If you feel pain, stop what you are doing and gently massage your wrist and hand  · Get physical and occupational therapy, if directed    Physical therapists will show you ways to exercise and strengthen your wrist  Occupational therapists will show you safe ways to use your wrist while you do your usual activities  · Use a wrist splint as directed  A splint will keep your wrist straight or in a slightly bent position  A wrist splint decreases pressure on the median nerve by letting your wrist rest  You may need to wear the splint for up to 8 weeks  You may need to wear it at night  Follow up with your healthcare provider as directed:  Write down your questions so you remember to ask them during your visits  © 2017 [address] [facility] Information is for End User's use only and may not be sold, redistributed or otherwise used for commercial purposes  All illustrations and images included in CareNotes® are the copyrighted property of A D [employer] M [employer] Inc  or Michael Jara  The above information is an  only  It is not intended as medical advice for individual conditions or treatments  Talk to your doctor, nurse or pharmacist before following any medical regimen to see if it is safe and effective for you

## 2020-02-14 ENCOUNTER — OFFICE VISIT (OUTPATIENT)
Dept: FAMILY MEDICINE CLINIC | Facility: CLINIC | Age: 45
End: 2020-02-14
Payer: COMMERCIAL

## 2020-02-14 VITALS
SYSTOLIC BLOOD PRESSURE: 132 MMHG | HEART RATE: 95 BPM | HEIGHT: 66 IN | DIASTOLIC BLOOD PRESSURE: 90 MMHG | TEMPERATURE: 98.7 F | WEIGHT: 253 LBS | BODY MASS INDEX: 40.66 KG/M2 | OXYGEN SATURATION: 97 %

## 2020-02-14 DIAGNOSIS — Z72.0 TOBACCO ABUSE DISORDER: ICD-10-CM

## 2020-02-14 DIAGNOSIS — Z23 ENCOUNTER FOR IMMUNIZATION: ICD-10-CM

## 2020-02-14 DIAGNOSIS — Z12.39 SCREENING FOR BREAST CANCER: ICD-10-CM

## 2020-02-14 DIAGNOSIS — Z02.89 ENCOUNTER FOR PHYSICAL EXAMINATION RELATED TO EMPLOYMENT: Primary | ICD-10-CM

## 2020-02-14 DIAGNOSIS — Z11.59 NEED FOR HEPATITIS B SCREENING TEST: ICD-10-CM

## 2020-02-14 PROCEDURE — 3008F BODY MASS INDEX DOCD: CPT | Performed by: FAMILY MEDICINE

## 2020-02-14 PROCEDURE — 86580 TB INTRADERMAL TEST: CPT | Performed by: FAMILY MEDICINE

## 2020-02-14 PROCEDURE — 99213 OFFICE O/P EST LOW 20 MIN: CPT | Performed by: FAMILY MEDICINE

## 2020-02-14 PROCEDURE — 4004F PT TOBACCO SCREEN RCVD TLK: CPT | Performed by: FAMILY MEDICINE

## 2020-02-14 NOTE — PROGRESS NOTES
Assessment/Plan:     Diagnoses and all orders for this visit:    Encounter for physical examination related to employment    Screening for breast cancer  -     Mammo screening bilateral w 3d & cad; Future    Need for hepatitis B screening test  -     Hepatitis B Immunity Panel; Future    Tobacco abuse disorder    Encounter for immunization  -     TB Skin Test      Patient was counseled on tobacco abuse and cessation  Does not want to attempt tobacco cessation at this time  Will return on Monday for a nurse visit PPD reading  Subjective:      Patient ID: Brayan Gong is a 45 y o  female  HPI  Patient is a 44 yo female coming in for pre-employment physical and would like physical form filled out along with TB test  Patient says she does not want an annual female gyn exam today  PMH of depression on 60mg Prozac, and anxiety on clonazepam 0 5mg BID PRN by Dr Jim Celaya at [facility]  Patient is a 1PPD smoker, drinks occasionally about 3-4 drinks per session on weekends  The following portions of the patient's history were reviewed and updated as appropriate: allergies, current medications, past family history, past medical history, past social history and problem list     Review of Systems   Constitutional: Negative for fever  HENT: Negative for rhinorrhea and sore throat  Eyes: Negative for pain and visual disturbance  Respiratory: Negative for shortness of breath  Cardiovascular: Negative for chest pain  Gastrointestinal: Negative for abdominal pain, blood in stool, constipation, diarrhea, nausea and vomiting  Genitourinary: Negative for dysuria, frequency and hematuria  Skin: Negative for rash  Neurological: Negative for headaches  Objective:      /90   Pulse 95   Temp 98 7 °F (37 1 °C)   Ht 5' 6" (1 676 m)   Wt 115 kg (253 lb)   SpO2 97%   BMI 40 84 kg/m²          Physical Exam   Constitutional: She is oriented to person, place, and time   She appears well-developed and well-nourished  No distress  Eyes: Pupils are equal, round, and reactive to light  EOM are normal    Cardiovascular: Normal rate and regular rhythm  No murmur heard  Pulmonary/Chest: Effort normal and breath sounds normal  She has no wheezes  Abdominal: Soft  Bowel sounds are normal  There is no tenderness  There is no guarding  Neurological: She is oriented to person, place, and time  Skin: Skin is warm  Capillary refill takes less than 2 seconds  Psychiatric: She has a normal mood and affect  Vitals reviewed

## 2020-02-17 ENCOUNTER — CLINICAL SUPPORT (OUTPATIENT)
Dept: FAMILY MEDICINE CLINIC | Facility: CLINIC | Age: 45
End: 2020-02-17

## 2020-02-17 DIAGNOSIS — Z11.1 ENCOUNTER FOR PPD SKIN TEST READING: Primary | ICD-10-CM

## 2020-02-17 LAB
INDURATION: 0 MM
TB SKIN TEST: NEGATIVE

## 2020-02-17 NOTE — PROGRESS NOTES
Patient came in to have PPD read that was placed on Friday  Patient returning next Monday for 2nd PPD  Has form to be signed off on

## 2020-03-04 ENCOUNTER — CLINICAL SUPPORT (OUTPATIENT)
Dept: FAMILY MEDICINE CLINIC | Facility: CLINIC | Age: 45
End: 2020-03-04
Payer: COMMERCIAL

## 2020-03-04 DIAGNOSIS — Z11.1 SCREENING FOR TUBERCULOSIS: Primary | ICD-10-CM

## 2020-03-04 PROCEDURE — 86580 TB INTRADERMAL TEST: CPT

## 2020-06-09 ENCOUNTER — TELEMEDICINE (OUTPATIENT)
Dept: FAMILY MEDICINE CLINIC | Facility: CLINIC | Age: 45
End: 2020-06-09
Payer: COMMERCIAL

## 2020-06-09 DIAGNOSIS — F10.10 ALCOHOL ABUSE, DAILY USE: ICD-10-CM

## 2020-06-09 DIAGNOSIS — K29.50 CHRONIC GASTRITIS WITHOUT BLEEDING, UNSPECIFIED GASTRITIS TYPE: Primary | ICD-10-CM

## 2020-06-09 PROCEDURE — 99214 OFFICE O/P EST MOD 30 MIN: CPT | Performed by: FAMILY MEDICINE

## 2020-06-09 RX ORDER — FAMOTIDINE 20 MG/1
20 TABLET, FILM COATED ORAL DAILY
Qty: 90 TABLET | Refills: 2 | Status: SHIPPED | OUTPATIENT
Start: 2020-06-09 | End: 2021-10-21 | Stop reason: ALTCHOICE

## 2020-11-20 NOTE — PROGRESS NOTES
Assessment/Plan:    Yeast infection  Diflucan 150mg once then another tablet 2 days later       Diagnoses and all orders for this visit:    Yeast infection  -     Chlamydia/GC amplified DNA by PCR  -     Genital Comprehensive Culture  -     fluconazole (DIFLUCAN) 150 mg tablet; Take 1 tablet (150 mg total) by mouth once for 1 dose Then take 1 tablet by mouth once two days later          Subjective:      Patient ID: Kai Torres is a 41 y o  female  Pt here for recurrent yeast infection  Was on 6 weeks course of IV abx for hardware infection of ankle  Finished her last doses on Monday  Was tx of yeast infection couple weeks ago which resolved but she is now having similar symptoms  The following portions of the patient's history were reviewed and updated as appropriate: allergies, current medications, past family history, past medical history, past social history, past surgical history and problem list     Review of Systems   Genitourinary: Positive for vaginal discharge  Itching   All other systems reviewed and are negative  Objective:      /80 (BP Location: Left arm, Patient Position: Sitting, Cuff Size: Large)   Pulse 104   Temp 97 6 °F (36 4 °C) (Tympanic)   Resp 16   Wt 90 7 kg (200 lb)   SpO2 97%   BMI 31 80 kg/m²          Physical Exam   Constitutional: She appears well-developed and well-nourished  Cardiovascular: Normal rate  Pulmonary/Chest: Effort normal    Genitourinary: Vaginal discharge found     Genitourinary Comments: Mild erythema GENERAL:  72y/o Male NAD, resting comfortably.  HEAD:  Atraumatic, Normocephalic  EYES: EOMI, PERRLA, conjunctiva and sclera clear  NECK: Supple, No JVD, no cervical lymphadenopathy, non-tender  CHEST/LUNG: Clear to auscultation bilaterally; No wheeze, rhonchi, or rales  HEART: Regular rate and rhythm; S1&S2  ABDOMEN: Soft, Nontender, Nondistended x 4 quadrants; Bowel sounds present  EXTREMITIES:   Peripheral Pulses Present, No clubbing, no cyanosis, or no edema, no calf tenderness  PSYCH: AAOx3, cooperative, appropriate  NEUROLOGY: WNL  SKIN: WNL

## 2021-08-03 ENCOUNTER — IMMUNIZATIONS (OUTPATIENT)
Dept: FAMILY MEDICINE CLINIC | Facility: HOSPITAL | Age: 46
End: 2021-08-03

## 2021-08-03 DIAGNOSIS — Z23 ENCOUNTER FOR IMMUNIZATION: Primary | ICD-10-CM

## 2021-08-03 PROCEDURE — 0011A SARS-COV-2 / COVID-19 MRNA VACCINE (MODERNA) 100 MCG: CPT

## 2021-08-03 PROCEDURE — 91301 SARS-COV-2 / COVID-19 MRNA VACCINE (MODERNA) 100 MCG: CPT

## 2021-08-31 ENCOUNTER — IMMUNIZATIONS (OUTPATIENT)
Dept: FAMILY MEDICINE CLINIC | Facility: HOSPITAL | Age: 46
End: 2021-08-31

## 2021-08-31 DIAGNOSIS — Z23 ENCOUNTER FOR IMMUNIZATION: Primary | ICD-10-CM

## 2021-08-31 PROCEDURE — 91301 SARS-COV-2 / COVID-19 MRNA VACCINE (MODERNA) 100 MCG: CPT

## 2021-08-31 PROCEDURE — 0012A SARS-COV-2 / COVID-19 MRNA VACCINE (MODERNA) 100 MCG: CPT

## 2021-09-23 ENCOUNTER — OFFICE VISIT (OUTPATIENT)
Dept: FAMILY MEDICINE CLINIC | Facility: CLINIC | Age: 46
End: 2021-09-23
Payer: COMMERCIAL

## 2021-09-23 VITALS
TEMPERATURE: 97.6 F | SYSTOLIC BLOOD PRESSURE: 136 MMHG | DIASTOLIC BLOOD PRESSURE: 90 MMHG | WEIGHT: 238 LBS | RESPIRATION RATE: 16 BRPM | BODY MASS INDEX: 38.25 KG/M2 | HEIGHT: 66 IN

## 2021-09-23 DIAGNOSIS — Z13.6 SCREENING FOR CARDIOVASCULAR CONDITION: ICD-10-CM

## 2021-09-23 DIAGNOSIS — F17.219 CIGARETTE NICOTINE DEPENDENCE WITH NICOTINE-INDUCED DISORDER: ICD-10-CM

## 2021-09-23 DIAGNOSIS — L60.0 INGROWN NAIL OF GREAT TOE OF RIGHT FOOT: ICD-10-CM

## 2021-09-23 DIAGNOSIS — L91.8 SKIN TAG: Primary | ICD-10-CM

## 2021-09-23 DIAGNOSIS — Z11.59 NEED FOR HEPATITIS C SCREENING TEST: ICD-10-CM

## 2021-09-23 DIAGNOSIS — L60.0 INGROWN NAIL OF GREAT TOE OF LEFT FOOT: ICD-10-CM

## 2021-09-23 DIAGNOSIS — Z71.6 ENCOUNTER FOR SMOKING CESSATION COUNSELING: ICD-10-CM

## 2021-09-23 PROCEDURE — 3725F SCREEN DEPRESSION PERFORMED: CPT | Performed by: FAMILY MEDICINE

## 2021-09-23 PROCEDURE — 4004F PT TOBACCO SCREEN RCVD TLK: CPT | Performed by: FAMILY MEDICINE

## 2021-09-23 PROCEDURE — 99213 OFFICE O/P EST LOW 20 MIN: CPT | Performed by: FAMILY MEDICINE

## 2021-09-23 PROCEDURE — 3008F BODY MASS INDEX DOCD: CPT | Performed by: FAMILY MEDICINE

## 2021-09-23 RX ORDER — ARIPIPRAZOLE 15 MG/1
15 TABLET ORAL DAILY
COMMUNITY
Start: 2021-09-21

## 2021-09-23 NOTE — PATIENT INSTRUCTIONS
1  Please discuss about initiating Wellbutrin SR or Chantix for smoking cessation with your psychiatrist next week

## 2021-09-23 NOTE — PROGRESS NOTES
Assessment/Plan:         Diagnoses and all orders for this visit:    Skin tag  -     Ambulatory referral to Plastic Surgery; Future    Ingrown nail of great toe of right foot  -     Ambulatory referral to Podiatry; Future    Ingrown nail of great toe of left foot  -     Ambulatory referral to Podiatry; Future    Need for hepatitis C screening test  -     Hepatitis C antibody; Future    Screening for cardiovascular condition  -     Comprehensive metabolic panel; Future  -     Lipid Panel with Direct LDL reflex; Future    Other orders  -     ARIPiprazole (ABILIFY) 15 mg tablet    Encounter for smoking cessation counseling     Nicotine Cigarette Use Disorder            Tobacco Cessation Counseling: Tobacco cessation counseling was provided  The patient is sincerely urged to quit consumption of tobacco  She is ready to quit tobacco  Medication options discussed  Subjective:      Patient ID: Belinda Thompson is a 42 y o  female smoker with obesity and depression presenting today with dermatological concerns      Mole  - location: one month  - progression: got bigger  - irritation: Y  - erythema: Y  - drainage: Y  - vision: Y   - past event: N   - current interventions: none    Ingrown toe nail:   - R big toe pain   - ingrown toe nail for about 3 months  - past event: N      Burning sensation in both feet    Smoking:   - quantity: 1 pack per day  - willingness to quit: Y  - past interventions: tried patch,   - sees a psychiatrist for bipolar disorder  - will be seeing one next week    Annual Physical f/u        The following portions of the patient's history were reviewed and updated as appropriate: allergies, current medications, past family history, past medical history, past social history, past surgical history and problem list     Review of Systems   All other systems reviewed and are negative          Objective:      /90 (BP Location: Left arm, Patient Position: Sitting, Cuff Size: Large)   Temp 97 6 °F (36 4 °C) (Tympanic)   Resp 16   Ht 5' 6" (1 676 m)   Wt 108 kg (238 lb)   BMI 38 41 kg/m²          Physical Exam  Vitals reviewed  Constitutional:       General: She is not in acute distress  Appearance: She is obese  She is not diaphoretic  HENT:      Head: Normocephalic  Eyes:      Conjunctiva/sclera: Conjunctivae normal       Comments: Skin tag on the upper R eyelid without any surrounding erythema or extension into the eyelid  Photo as seen below    Pulmonary:      Effort: Pulmonary effort is normal  No respiratory distress  Feet:      Right foot:      Toenail Condition: Right toenails are abnormally thick and ingrown  Left foot:      Toenail Condition: Left toenails are abnormally thick and ingrown  Comments: See photo below   Skin:     General: Skin is warm  Comments: Multiple skin tags scattered around her neck   Neurological:      Mental Status: She is alert and oriented to person, place, and time     Psychiatric:         Mood and Affect: Mood normal          Behavior: Behavior normal

## 2021-10-01 LAB
ALBUMIN SERPL-MCNC: 4.8 G/DL (ref 3.8–4.8)
ALBUMIN/GLOB SERPL: 2 {RATIO} (ref 1.2–2.2)
ALP SERPL-CCNC: 69 IU/L (ref 44–121)
ALT SERPL-CCNC: 27 IU/L (ref 0–32)
AST SERPL-CCNC: 25 IU/L (ref 0–40)
BILIRUB SERPL-MCNC: 0.5 MG/DL (ref 0–1.2)
BUN SERPL-MCNC: 14 MG/DL (ref 6–24)
BUN/CREAT SERPL: 16 (ref 9–23)
CALCIUM SERPL-MCNC: 9.7 MG/DL (ref 8.7–10.2)
CHLORIDE SERPL-SCNC: 102 MMOL/L (ref 96–106)
CHOLEST SERPL-MCNC: 271 MG/DL (ref 100–199)
CO2 SERPL-SCNC: 23 MMOL/L (ref 20–29)
CREAT SERPL-MCNC: 0.86 MG/DL (ref 0.57–1)
GLOBULIN SER-MCNC: 2.4 G/DL (ref 1.5–4.5)
GLUCOSE SERPL-MCNC: 93 MG/DL (ref 65–99)
HCV AB S/CO SERPL IA: <0.1 S/CO RATIO (ref 0–0.9)
HDLC SERPL-MCNC: 62 MG/DL
LDLC SERPL CALC-MCNC: 179 MG/DL (ref 0–99)
LDLC/HDLC SERPL: 2.9 RATIO (ref 0–3.2)
POTASSIUM SERPL-SCNC: 4.3 MMOL/L (ref 3.5–5.2)
PROT SERPL-MCNC: 7.2 G/DL (ref 6–8.5)
SL AMB EGFR AFRICAN AMERICAN: 95 ML/MIN/1.73
SL AMB EGFR NON AFRICAN AMERICAN: 82 ML/MIN/1.73
SL AMB VLDL CHOLESTEROL CALC: 30 MG/DL (ref 5–40)
SODIUM SERPL-SCNC: 139 MMOL/L (ref 134–144)
TRIGL SERPL-MCNC: 162 MG/DL (ref 0–149)

## 2021-10-14 ENCOUNTER — OFFICE VISIT (OUTPATIENT)
Dept: PODIATRY | Facility: CLINIC | Age: 46
End: 2021-10-14
Payer: COMMERCIAL

## 2021-10-14 VITALS
DIASTOLIC BLOOD PRESSURE: 90 MMHG | HEIGHT: 66 IN | SYSTOLIC BLOOD PRESSURE: 136 MMHG | BODY MASS INDEX: 38.25 KG/M2 | RESPIRATION RATE: 17 BRPM | WEIGHT: 238 LBS

## 2021-10-14 DIAGNOSIS — M79.672 PAIN IN BOTH FEET: Primary | ICD-10-CM

## 2021-10-14 DIAGNOSIS — B35.1 ONYCHOMYCOSIS: ICD-10-CM

## 2021-10-14 DIAGNOSIS — M79.671 PAIN IN BOTH FEET: Primary | ICD-10-CM

## 2021-10-14 DIAGNOSIS — L03.039 PARONYCHIA OF TOENAIL, UNSPECIFIED LATERALITY: ICD-10-CM

## 2021-10-14 DIAGNOSIS — L60.0 INGROWN TOENAIL: ICD-10-CM

## 2021-10-14 PROCEDURE — 99203 OFFICE O/P NEW LOW 30 MIN: CPT | Performed by: PODIATRIST

## 2021-10-14 RX ORDER — TERBINAFINE HYDROCHLORIDE 250 MG/1
250 TABLET ORAL DAILY
Qty: 30 TABLET | Refills: 0 | Status: SHIPPED | OUTPATIENT
Start: 2021-10-14 | End: 2021-11-04 | Stop reason: SDUPTHER

## 2021-10-21 ENCOUNTER — OFFICE VISIT (OUTPATIENT)
Dept: FAMILY MEDICINE CLINIC | Facility: CLINIC | Age: 46
End: 2021-10-21
Payer: COMMERCIAL

## 2021-10-21 VITALS
SYSTOLIC BLOOD PRESSURE: 120 MMHG | RESPIRATION RATE: 18 BRPM | HEART RATE: 70 BPM | HEIGHT: 67 IN | TEMPERATURE: 98.1 F | BODY MASS INDEX: 36.73 KG/M2 | WEIGHT: 234 LBS | OXYGEN SATURATION: 96 % | DIASTOLIC BLOOD PRESSURE: 80 MMHG

## 2021-10-21 DIAGNOSIS — Z00.00 ANNUAL PHYSICAL EXAM: ICD-10-CM

## 2021-10-21 DIAGNOSIS — Z23 ENCOUNTER FOR IMMUNIZATION: ICD-10-CM

## 2021-10-21 DIAGNOSIS — Z11.4 SCREENING FOR HIV (HUMAN IMMUNODEFICIENCY VIRUS): Primary | ICD-10-CM

## 2021-10-21 DIAGNOSIS — Z12.31 ENCOUNTER FOR SCREENING MAMMOGRAM FOR BREAST CANCER: ICD-10-CM

## 2021-10-21 DIAGNOSIS — M79.672 PAIN OF LEFT HEEL: ICD-10-CM

## 2021-10-21 DIAGNOSIS — M79.18 MYOFASCIAL PAIN DYSFUNCTION SYNDROME: ICD-10-CM

## 2021-10-21 PROCEDURE — 99396 PREV VISIT EST AGE 40-64: CPT | Performed by: FAMILY MEDICINE

## 2021-10-21 PROCEDURE — 3008F BODY MASS INDEX DOCD: CPT | Performed by: FAMILY MEDICINE

## 2021-11-03 ENCOUNTER — EVALUATION (OUTPATIENT)
Dept: PHYSICAL THERAPY | Facility: CLINIC | Age: 46
End: 2021-11-03
Payer: COMMERCIAL

## 2021-11-03 DIAGNOSIS — M25.511 RIGHT SHOULDER PAIN, UNSPECIFIED CHRONICITY: ICD-10-CM

## 2021-11-03 DIAGNOSIS — M79.18 MYOFASCIAL PAIN DYSFUNCTION SYNDROME: ICD-10-CM

## 2021-11-03 DIAGNOSIS — M79.672 HEEL PAIN, CHRONIC, LEFT: Primary | ICD-10-CM

## 2021-11-03 DIAGNOSIS — G89.29 HEEL PAIN, CHRONIC, LEFT: Primary | ICD-10-CM

## 2021-11-03 PROCEDURE — 97161 PT EVAL LOW COMPLEX 20 MIN: CPT

## 2021-11-04 ENCOUNTER — OFFICE VISIT (OUTPATIENT)
Dept: PODIATRY | Facility: CLINIC | Age: 46
End: 2021-11-04
Payer: COMMERCIAL

## 2021-11-04 VITALS
WEIGHT: 234 LBS | HEIGHT: 67 IN | RESPIRATION RATE: 17 BRPM | SYSTOLIC BLOOD PRESSURE: 120 MMHG | DIASTOLIC BLOOD PRESSURE: 80 MMHG | BODY MASS INDEX: 36.73 KG/M2

## 2021-11-04 DIAGNOSIS — B35.1 ONYCHOMYCOSIS: ICD-10-CM

## 2021-11-04 DIAGNOSIS — M21.42 ACQUIRED FLAT FOOT, LEFT: ICD-10-CM

## 2021-11-04 DIAGNOSIS — M72.2 PLANTAR FASCIITIS: ICD-10-CM

## 2021-11-04 DIAGNOSIS — M21.961 ACQUIRED DEFORMITY OF RIGHT FOOT: ICD-10-CM

## 2021-11-04 DIAGNOSIS — M72.2 PLANTAR FASCIITIS OF LEFT FOOT: Primary | ICD-10-CM

## 2021-11-04 DIAGNOSIS — M21.962 ACQUIRED DEFORMITY OF LEFT FOOT: ICD-10-CM

## 2021-11-04 DIAGNOSIS — M21.41 ACQUIRED FLAT FOOT, RIGHT: ICD-10-CM

## 2021-11-04 PROCEDURE — L3020 FOOT LONGITUD/METATARSAL SUP: HCPCS | Performed by: PODIATRIST

## 2021-11-04 PROCEDURE — 99213 OFFICE O/P EST LOW 20 MIN: CPT | Performed by: PODIATRIST

## 2021-11-04 RX ORDER — TERBINAFINE HYDROCHLORIDE 250 MG/1
250 TABLET ORAL DAILY
Qty: 30 TABLET | Refills: 0 | Status: SHIPPED | OUTPATIENT
Start: 2021-11-04 | End: 2021-12-16 | Stop reason: SDUPTHER

## 2021-11-04 RX ORDER — MELOXICAM 7.5 MG/1
7.5 TABLET ORAL DAILY
Qty: 10 TABLET | Refills: 0 | Status: SHIPPED | OUTPATIENT
Start: 2021-11-04 | End: 2022-02-03

## 2021-11-10 ENCOUNTER — OFFICE VISIT (OUTPATIENT)
Dept: PHYSICAL THERAPY | Facility: CLINIC | Age: 46
End: 2021-11-10
Payer: COMMERCIAL

## 2021-11-10 DIAGNOSIS — G89.29 HEEL PAIN, CHRONIC, LEFT: ICD-10-CM

## 2021-11-10 DIAGNOSIS — M79.18 MYOFASCIAL PAIN DYSFUNCTION SYNDROME: Primary | ICD-10-CM

## 2021-11-10 DIAGNOSIS — M79.672 HEEL PAIN, CHRONIC, LEFT: ICD-10-CM

## 2021-11-10 DIAGNOSIS — M25.511 RIGHT SHOULDER PAIN, UNSPECIFIED CHRONICITY: ICD-10-CM

## 2021-11-10 PROCEDURE — 97140 MANUAL THERAPY 1/> REGIONS: CPT

## 2021-11-10 PROCEDURE — 97110 THERAPEUTIC EXERCISES: CPT

## 2021-11-11 ENCOUNTER — APPOINTMENT (OUTPATIENT)
Dept: PHYSICAL THERAPY | Facility: CLINIC | Age: 46
End: 2021-11-11
Payer: COMMERCIAL

## 2021-11-17 ENCOUNTER — APPOINTMENT (OUTPATIENT)
Dept: PHYSICAL THERAPY | Facility: CLINIC | Age: 46
End: 2021-11-17
Payer: COMMERCIAL

## 2021-11-18 ENCOUNTER — OFFICE VISIT (OUTPATIENT)
Dept: SURGERY | Facility: CLINIC | Age: 46
End: 2021-11-18
Payer: COMMERCIAL

## 2021-11-18 ENCOUNTER — APPOINTMENT (OUTPATIENT)
Dept: PHYSICAL THERAPY | Facility: CLINIC | Age: 46
End: 2021-11-18
Payer: COMMERCIAL

## 2021-11-18 VITALS
WEIGHT: 235 LBS | DIASTOLIC BLOOD PRESSURE: 80 MMHG | BODY MASS INDEX: 36.88 KG/M2 | TEMPERATURE: 98.7 F | HEART RATE: 63 BPM | HEIGHT: 67 IN | SYSTOLIC BLOOD PRESSURE: 134 MMHG

## 2021-11-18 DIAGNOSIS — L98.9 FACIAL SKIN LESION: Primary | ICD-10-CM

## 2021-11-18 DIAGNOSIS — L91.8 SKIN TAG: ICD-10-CM

## 2021-11-18 PROCEDURE — 4004F PT TOBACCO SCREEN RCVD TLK: CPT | Performed by: SURGERY

## 2021-11-18 PROCEDURE — 99202 OFFICE O/P NEW SF 15 MIN: CPT | Performed by: SURGERY

## 2021-11-18 PROCEDURE — 3008F BODY MASS INDEX DOCD: CPT | Performed by: SURGERY

## 2021-11-22 ENCOUNTER — APPOINTMENT (OUTPATIENT)
Dept: PHYSICAL THERAPY | Facility: CLINIC | Age: 46
End: 2021-11-22
Payer: COMMERCIAL

## 2021-11-24 ENCOUNTER — APPOINTMENT (OUTPATIENT)
Dept: PHYSICAL THERAPY | Facility: CLINIC | Age: 46
End: 2021-11-24
Payer: COMMERCIAL

## 2021-12-16 ENCOUNTER — OFFICE VISIT (OUTPATIENT)
Dept: PODIATRY | Facility: CLINIC | Age: 46
End: 2021-12-16
Payer: COMMERCIAL

## 2021-12-16 VITALS — BODY MASS INDEX: 36.88 KG/M2 | WEIGHT: 235 LBS | HEIGHT: 67 IN | RESPIRATION RATE: 17 BRPM

## 2021-12-16 DIAGNOSIS — L60.0 INGROWN TOENAIL: ICD-10-CM

## 2021-12-16 DIAGNOSIS — M79.672 LEFT FOOT PAIN: ICD-10-CM

## 2021-12-16 DIAGNOSIS — B35.1 ONYCHOMYCOSIS: ICD-10-CM

## 2021-12-16 DIAGNOSIS — M72.2 PLANTAR FASCIITIS OF LEFT FOOT: Primary | ICD-10-CM

## 2021-12-16 DIAGNOSIS — M72.2 PLANTAR FASCIITIS: ICD-10-CM

## 2021-12-16 PROCEDURE — 99212 OFFICE O/P EST SF 10 MIN: CPT | Performed by: PODIATRIST

## 2021-12-16 PROCEDURE — 20550 NJX 1 TENDON SHEATH/LIGAMENT: CPT | Performed by: PODIATRIST

## 2021-12-16 RX ORDER — CELECOXIB 200 MG/1
200 CAPSULE ORAL DAILY
Qty: 10 CAPSULE | Refills: 0 | Status: SHIPPED | OUTPATIENT
Start: 2021-12-16 | End: 2022-02-03

## 2021-12-16 RX ORDER — TERBINAFINE HYDROCHLORIDE 250 MG/1
250 TABLET ORAL DAILY
Qty: 30 TABLET | Refills: 0 | Status: SHIPPED | OUTPATIENT
Start: 2021-12-16 | End: 2022-01-03 | Stop reason: SDUPTHER

## 2021-12-28 ENCOUNTER — APPOINTMENT (EMERGENCY)
Dept: RADIOLOGY | Facility: HOSPITAL | Age: 46
End: 2021-12-28
Payer: COMMERCIAL

## 2021-12-28 ENCOUNTER — HOSPITAL ENCOUNTER (EMERGENCY)
Facility: HOSPITAL | Age: 46
Discharge: HOME | End: 2021-12-28
Attending: EMERGENCY MEDICINE
Payer: COMMERCIAL

## 2021-12-28 VITALS
HEART RATE: 66 BPM | DIASTOLIC BLOOD PRESSURE: 89 MMHG | WEIGHT: 238.2 LBS | OXYGEN SATURATION: 98 % | SYSTOLIC BLOOD PRESSURE: 145 MMHG | RESPIRATION RATE: 18 BRPM | TEMPERATURE: 97.9 F | BODY MASS INDEX: 38.28 KG/M2 | HEIGHT: 66 IN
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
## 2021-12-28 DIAGNOSIS — S16.1XXA STRAIN OF NECK MUSCLE, INITIAL ENCOUNTER: ICD-10-CM

## 2021-12-28 LAB
EXT PREG TEST URINE: NEGATIVE
EXT. CONTROL ED NAV: NORMAL

## 2021-12-28 PROCEDURE — 73030 X-RAY EXAM OF SHOULDER: CPT

## 2021-12-28 PROCEDURE — 81025 URINE PREGNANCY TEST: CPT | Performed by: PHYSICIAN ASSISTANT

## 2021-12-28 PROCEDURE — 72125 CT NECK SPINE W/O DYE: CPT

## 2021-12-28 PROCEDURE — 99285 EMERGENCY DEPT VISIT HI MDM: CPT | Performed by: PHYSICIAN ASSISTANT

## 2021-12-28 PROCEDURE — 99284 EMERGENCY DEPT VISIT MOD MDM: CPT

## 2021-12-28 RX ORDER — CYCLOBENZAPRINE HCL 10 MG
10 TABLET ORAL ONCE
Status: COMPLETED | OUTPATIENT
Start: 2021-12-28 | End: 2021-12-28

## 2021-12-28 RX ORDER — CYCLOBENZAPRINE HCL 10 MG
10 TABLET ORAL 3 TIMES DAILY PRN
Qty: 9 TABLET | Refills: 0 | Status: SHIPPED | OUTPATIENT
Start: 2021-12-28 | End: 2022-03-24

## 2021-12-28 RX ADMIN — CYCLOBENZAPRINE HYDROCHLORIDE 10 MG: 10 TABLET, FILM COATED ORAL at 10:38

## 2021-12-28 NOTE — DISCHARGE INSTRUCTIONS
PLEASE DO NOT DRIVE OR OPERATE HEAVY MACHINERY WHILE TAKING FLEXERIL AS THIS MEDICATION MAY MAKE YOU SLEEPY AND AT RISK FOR FALLS OR INJURIES AND SEDATION
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
## 2021-12-28 NOTE — Clinical Note
Lilly Beach was seen and treated in our emergency department on 12/28/2021  Diagnosis:     Ariane Brown  may return to work on return date  She may return on this date: 12/30/2021         If you have any questions or concerns, please don't hesitate to call        Jossie Billy PA-C    ______________________________           _______________          _______________  Hospital Representative                              Date                                Time

## 2022-01-03 ENCOUNTER — PROCEDURE VISIT (OUTPATIENT)
Dept: PODIATRY | Facility: CLINIC | Age: 47
End: 2022-01-03
Payer: COMMERCIAL

## 2022-01-03 VITALS
WEIGHT: 238 LBS | BODY MASS INDEX: 38.25 KG/M2 | SYSTOLIC BLOOD PRESSURE: 137 MMHG | HEIGHT: 66 IN | RESPIRATION RATE: 17 BRPM | DIASTOLIC BLOOD PRESSURE: 85 MMHG

## 2022-01-03 DIAGNOSIS — L60.0 INGROWN TOENAIL: Primary | ICD-10-CM

## 2022-01-03 DIAGNOSIS — L03.031 PARONYCHIA OF TOENAIL OF RIGHT FOOT: ICD-10-CM

## 2022-01-03 DIAGNOSIS — M72.2 PLANTAR FASCIITIS OF LEFT FOOT: ICD-10-CM

## 2022-01-03 DIAGNOSIS — B35.1 ONYCHOMYCOSIS: ICD-10-CM

## 2022-01-03 PROCEDURE — 11750 EXCISION NAIL&NAIL MATRIX: CPT | Performed by: PODIATRIST

## 2022-01-03 PROCEDURE — 99212 OFFICE O/P EST SF 10 MIN: CPT | Performed by: PODIATRIST

## 2022-01-03 RX ORDER — MELOXICAM 15 MG/1
15 TABLET ORAL DAILY
Qty: 30 TABLET | Refills: 0 | Status: SHIPPED | OUTPATIENT
Start: 2022-01-03 | End: 2022-02-03

## 2022-01-03 RX ORDER — TERBINAFINE HYDROCHLORIDE 250 MG/1
250 TABLET ORAL DAILY
Qty: 30 TABLET | Refills: 0 | Status: SHIPPED | OUTPATIENT
Start: 2022-01-03 | End: 2022-02-02

## 2022-01-03 NOTE — PROGRESS NOTES
Nail removal    Date/Time: 1/3/2022 3:10 PM  Performed by: Jack Torres DPM  Authorized by: Jack Torres DPM     Patient location:  [facility] Protocol:  Consent: Verbal consent obtained  Written consent obtained  Risks and benefits: risks, benefits and alternatives were discussed  Consent given by: patient  Time out: Immediately prior to procedure a "time out" was called to verify the correct patient, procedure, equipment, support staff and site/side marked as required  Timeout called at: 1/3/2022 3:11 PM   Patient understanding: patient states understanding of the procedure being performed  Patient identity confirmed: verbally with patient      Location:     Toe location: Right hallux demonstrates incurvated toenail  Both tibial and fibular aspect are ingrown  Pre-procedure details:     Skin preparation:  Betadine    Preparation: Patient was prepped and draped in the usual sterile fashion    Anesthesia (see MAR for exact dosages): Anesthesia method: Right hallux nerve block performed with 6 cc 2% lidocaine plain  Nail Removal:     Nail removal amount: Both tibial and fibular aspect of right hallux nail plate avulsed  Nail bed sutured: no      Removed nail replaced and anchored: no    Trephination:     Subungual hematoma drained: no    Ingrown nail:     Wedge excision of skin: no      Nail matrix amount removed: Nail matrixectomy performed via 3, 30 seconds, application of phenol  Post-procedure details:     Dressing:  4x4 sterile gauze, antibiotic ointment and gauze roll    Patient tolerance of procedure: Tolerated well, no immediate complications  Comments:      Patient advised on procedure  Consent form signed  Written instruction given for aftercare  In addition patient be placed on Mobic which will help with pain heel pain    Refill of Lamisil ordered as well       Foot Exam          General  General Appearance: appears stated age and healthy   Orientation: alert and oriented to person, place, and time   Affect: appropriate   Gait: antalgic         Right Foot/Ankle      Inspection and Palpation  Swelling: none   Arch: pes planus  Hammertoes: fifth toe  Hallux limitus: yes  Skin Exam: callus and maceration;      Neurovascular  Dorsalis pedis: 3+  Posterior tibial: 3+        Left Foot/Ankle       Inspection and Palpation  Swelling: none   Arch: pes planus  Hammertoes: fifth toe  Hallux limitus: yes  Skin Exam: callus and maceration;      Neurovascular  Dorsalis pedis: 3+  Posterior tibial: 3+           Physical Exam  Vitals and nursing note reviewed  Constitutional:       Appearance: Normal appearance  Cardiovascular:      Rate and Rhythm: Normal rate and regular rhythm       Pulses:           Dorsalis pedis pulses are 3+ on the right side and 3+ on the left side         Posterior tibial pulses are 3+ on the right side and 3+ on the left side  Feet:      Right foot:      Skin integrity: Skin breakdown and callus present       Left foot:      Skin integrity: Skin breakdown and callus present  Skin:     Capillary Refill: Capillary refill takes less than 2 seconds       Comments:   Patient has dystrophy of all nails   Hallux bilateral demonstrates distal mycosis  This is resolving slowly   Right hallux demonstrates incurvated toenail  Both tibial and fibular aspect are ingrown  Negative pus        Neurological:      Mental Status: She is alert         patient has flexible forefoot   She pronates in stance and gait   There is pain with palpation left plantar fascia insertion

## 2022-01-04 ENCOUNTER — TELEPHONE (OUTPATIENT)
Dept: FAMILY MEDICINE CLINIC | Facility: CLINIC | Age: 47
End: 2022-01-04

## 2022-01-04 ENCOUNTER — HOSPITAL ENCOUNTER (OUTPATIENT)
Dept: RADIOLOGY | Facility: HOSPITAL | Age: 47
Discharge: HOME | End: 2022-01-04
Payer: COMMERCIAL

## 2022-01-04 VITALS — WEIGHT: 238 LBS | HEIGHT: 66 IN | BODY MASS INDEX: 38.25 KG/M2

## 2022-01-04 DIAGNOSIS — Z12.39 SCREENING FOR BREAST CANCER: ICD-10-CM

## 2022-01-04 PROCEDURE — 77067 SCR MAMMO BI INCL CAD: CPT

## 2022-01-04 PROCEDURE — 77063 BREAST TOMOSYNTHESIS BI: CPT

## 2022-01-04 NOTE — TELEPHONE ENCOUNTER
Patient requires a form to be completed  Patient is aware of 5-7 business day turn around time  Please refer to the following information:       Type of Form: [address] [place] [address] surgical pre certification request    How patient would like to receive form: fax    Fax number:  [phone]      Copy scanned to encounter  Copy provided to patient  Original in blue team folder to be completed
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
## 2022-01-06 ENCOUNTER — TELEPHONE (OUTPATIENT)
Dept: FAMILY MEDICINE CLINIC | Facility: CLINIC | Age: 47
End: 2022-01-06

## 2022-01-06 DIAGNOSIS — B34.9 VIRAL INFECTION, UNSPECIFIED: ICD-10-CM

## 2022-01-06 DIAGNOSIS — Z20.822 EXPOSURE TO COVID-19 VIRUS: ICD-10-CM

## 2022-01-06 NOTE — TELEPHONE ENCOUNTER
Patient is experiencing Headache and body aches  Please place COVID swab order in chart    Scheduled for tomorrow afternoon, 1/7/22 at 1:30

## 2022-01-07 ENCOUNTER — CLINICAL SUPPORT (OUTPATIENT)
Dept: FAMILY MEDICINE CLINIC | Facility: CLINIC | Age: 47
End: 2022-01-07

## 2022-01-07 DIAGNOSIS — Z11.52 ENCOUNTER FOR SCREENING FOR COVID-19: Primary | ICD-10-CM

## 2022-01-07 PROCEDURE — U0003 INFECTIOUS AGENT DETECTION BY NUCLEIC ACID (DNA OR RNA); SEVERE ACUTE RESPIRATORY SYNDROME CORONAVIRUS 2 (SARS-COV-2) (CORONAVIRUS DISEASE [COVID-19]), AMPLIFIED PROBE TECHNIQUE, MAKING USE OF HIGH THROUGHPUT TECHNOLOGIES AS DESCRIBED BY CMS-2020-01-R: HCPCS | Performed by: STUDENT IN AN ORGANIZED HEALTH CARE EDUCATION/TRAINING PROGRAM

## 2022-01-10 LAB — SARS-COV-2 RNA RESP QL NAA+PROBE: POSITIVE

## 2022-01-27 ENCOUNTER — OFFICE VISIT (OUTPATIENT)
Dept: PODIATRY | Facility: CLINIC | Age: 47
End: 2022-01-27
Payer: COMMERCIAL

## 2022-01-27 VITALS — BODY MASS INDEX: 38.25 KG/M2 | RESPIRATION RATE: 17 BRPM | WEIGHT: 238 LBS | HEIGHT: 66 IN | HEART RATE: 66 BPM

## 2022-01-27 DIAGNOSIS — L03.031 PARONYCHIA OF TOENAIL OF RIGHT FOOT: Primary | ICD-10-CM

## 2022-01-27 PROCEDURE — 99213 OFFICE O/P EST LOW 20 MIN: CPT | Performed by: PODIATRIST

## 2022-01-27 RX ORDER — SULFAMETHOXAZOLE AND TRIMETHOPRIM 800; 160 MG/1; MG/1
1 TABLET ORAL EVERY 12 HOURS SCHEDULED
Qty: 20 TABLET | Refills: 0 | Status: SHIPPED | OUTPATIENT
Start: 2022-01-27 | End: 2022-02-06

## 2022-01-27 RX ORDER — SODIUM HYPOCHLORITE 2.5 MG/ML
1 SOLUTION TOPICAL DAILY
Qty: 473 ML | Refills: 0 | Status: SHIPPED | OUTPATIENT
Start: 2022-01-27 | End: 2022-02-06

## 2022-01-28 NOTE — PROGRESS NOTES
Assessment/Plan:    Patient evaluated, digit cleansed with saline, dressed with Betadine on topical antibiotic, patient to start oral antibiotic, patient educated on dressing changes, patient to monitor signs of infection, return to the office with condition recur, patient to follow-up next week     Diagnoses and all orders for this visit:    Paronychia of toenail of right foot  -     sulfamethoxazole-trimethoprim (BACTRIM DS) 800-160 mg per tablet; Take 1 tablet by mouth every 12 (twelve) hours for 10 days  -     mupirocin (BACTROBAN) 2 % ointment; Apply topically daily  -     sodium hypochlorite (DAKIN'S HALF-STRENGTH) external solution; Apply 1 application topically in the morning for 10 doses          Subjective:      Patient ID: Melisa Celis is a 42 y o  female  Return patient for a follow-up on nail procedure performed on the right hallux 3 weeks ago, patient report constant pain to the digit, with mild serosanguineous drainage and malodor, patient denies constitutional symptoms, patient denies trauma to the foot, patient could not maintain her follow-up appointments due to COVID infection      The following portions of the patient's history were reviewed and updated as appropriate: allergies, current medications, past family history, past medical history, past social history, past surgical history and problem list     Review of Systems   Constitutional: Negative  Respiratory: Negative  Cardiovascular: Negative  Musculoskeletal: Negative  Skin: Negative                  Historical Information   Past Medical History:   Diagnosis Date    Bipolar disorder ([facility] [facility] )     Depression     History of wound infection     left ankle    Psychiatric disorder     depression, anxiety    Seasonal allergic reaction      Past Surgical History:   Procedure Laterality Date    IR PICC LINE  2/8/2019    [place] OPEN TREATMENT BIMALLEOLAR ANKLE FRACTURE Left 5/14/2018    Procedure: OPEN REDUCTION W/ INTERNAL FIXATION (ORIF) ANKLE;  Surgeon: Ruby Dangelo DO;  Location: [address] [place] [address];  Service: Orthopedics    [place] REMOVAL DEEP IMPLANT Left 2/5/2019    Procedure: REMOVAL HARDWARE ANKLE;  Surgeon: Ruby Dangelo DO;  Location: [address] [place] [address];  Service: Orthopedics    REDUCTION MAMMAPLASTY  2001    reduction     Social History   Social History     Substance and Sexual Activity   Alcohol Use Not Currently    Comment: social     Social History     Substance and Sexual Activity   Drug Use No     Social History     Tobacco Use   Smoking Status Current Every Day Smoker    Packs/day: 1 00    Years: 25 00    Pack years: 25 00    Types: Cigarettes    Start date: 9/23/2000   Smokeless Tobacco Never Used     Family History:   Family History   Problem Relation Age of Onset    No Known Problems Father     Diabetes Mother     Cancer Mother     Asthma Mother     Breast cancer Mother 54    No Known Problems Brother     No Known Problems Maternal Aunt     No Known Problems Maternal Uncle     No Known Problems Maternal Grandmother     No Known Problems Maternal Grandfather     No Known Problems Paternal Grandmother     No Known Problems Paternal Grandfather     No Known Problems Daughter     ADD / ADHD Neg Hx     Anesthesia problems Neg Hx     Clotting disorder Neg Hx     Collagen disease Neg Hx     Dislocations Neg Hx     Learning disabilities Neg Hx     Neurological problems Neg Hx     Osteoporosis Neg Hx     Rheumatologic disease Neg Hx     Scoliosis Neg Hx     Vascular Disease Neg Hx        Meds/Allergies   all medications and allergies reviewed  Allergies   Allergen Reactions    Toradol [Ketorolac Tromethamine]      GI UPSET    Latex Rash    Ultram [Tramadol] Rash       Objective:      Pulse 66   Resp 17   Ht 5' 6" (1 676 m)   Wt 108 kg (238 lb)   BMI 38 41 kg/m²          Physical Exam  Constitutional:       General: She is not in acute distress  Appearance: She is well-developed   She is not ill-appearing, toxic-appearing or diaphoretic  HENT:      Head: Normocephalic and atraumatic  Cardiovascular:      Pulses: Normal pulses  Dorsalis pedis pulses are 2+ on the right side and 2+ on the left side  Posterior tibial pulses are 2+ on the right side and 2+ on the left side  Comments: Palpable pedal pulse, CFT is less than 3 seconds, temperature gradient within normal limits, pedal hair present, no trophic skin changes  Pulmonary:      Effort: No respiratory distress  Musculoskeletal:         General: Normal range of motion  Comments: No pain on palpation on range of motion of ankle joint subtalar joint metatarsal joint tarsal joints bilateral foot   Feet:      Right foot:      Protective Sensation: 10 sites tested  10 sites sensed  Skin integrity: No ulcer, blister, skin breakdown or erythema  Left foot:      Protective Sensation: 10 sites tested  10 sites sensed  Skin integrity: No ulcer, blister, skin breakdown or erythema  Skin:     Capillary Refill: Capillary refill takes 2 to 3 seconds  Coloration: Skin is not pale  Comments: Paronychia noted to the right hallux, with granuloma noted dorsal nail, with edema and erythema localized to the proximal nail fold, nail is dystrophic with subungual debris, mild malodor noted   Neurological:      Mental Status: She is alert and oriented to person, place, and time  Comments:  muscle power 5/5, protective sensations intact, no focal motor deficit  Foot Exam    Right Foot/Ankle     Inspection and Palpation  Skin Exam: no blister, no maceration, no ulcer and no erythema     Neurovascular  Dorsalis pedis: 2+  Posterior tibial: 2+      Left Foot/Ankle      Inspection and Palpation  Skin Exam: no blister, no maceration, no ulcer and no erythema     Neurovascular  Dorsalis pedis: 2+  Posterior tibial: 2+              Portions of the record may have been created with voice recognition software   Occasional wrong word or "sound a like" substitutions may have occurred due to the inherent limitations of voice recognition software  Read the chart carefully and recognize, using context, where substitutions have occurred

## 2022-02-01 LAB
BACTERIA SPEC AEROBE CULT: NORMAL
Lab: NORMAL

## 2022-02-03 ENCOUNTER — OFFICE VISIT (OUTPATIENT)
Dept: FAMILY MEDICINE CLINIC | Facility: CLINIC | Age: 47
End: 2022-02-03
Payer: COMMERCIAL

## 2022-02-03 ENCOUNTER — HOSPITAL ENCOUNTER (OUTPATIENT)
Dept: RADIOLOGY | Facility: HOSPITAL | Age: 47
Discharge: HOME | End: 2022-02-03
Payer: COMMERCIAL

## 2022-02-03 VITALS
RESPIRATION RATE: 17 BRPM | BODY MASS INDEX: 38.12 KG/M2 | HEART RATE: 78 BPM | OXYGEN SATURATION: 98 % | WEIGHT: 237.2 LBS | SYSTOLIC BLOOD PRESSURE: 114 MMHG | HEIGHT: 66 IN | TEMPERATURE: 97.2 F | DIASTOLIC BLOOD PRESSURE: 84 MMHG

## 2022-02-03 VITALS — BODY MASS INDEX: 38.25 KG/M2 | HEIGHT: 66 IN | WEIGHT: 238 LBS

## 2022-02-03 DIAGNOSIS — G89.29 CHRONIC RIGHT SHOULDER PAIN: ICD-10-CM

## 2022-02-03 DIAGNOSIS — R92.8 ABNORMAL SCREENING MAMMOGRAM: ICD-10-CM

## 2022-02-03 DIAGNOSIS — R07.0 THROAT PAIN: ICD-10-CM

## 2022-02-03 DIAGNOSIS — M75.41 IMPINGEMENT SYNDROME OF RIGHT SHOULDER: Primary | ICD-10-CM

## 2022-02-03 DIAGNOSIS — M25.511 CHRONIC RIGHT SHOULDER PAIN: ICD-10-CM

## 2022-02-03 DIAGNOSIS — J06.9 VIRAL URI WITH COUGH: ICD-10-CM

## 2022-02-03 LAB — S PYO AG THROAT QL: NEGATIVE

## 2022-02-03 PROCEDURE — 77065 DX MAMMO INCL CAD UNI: CPT

## 2022-02-03 PROCEDURE — 76642 ULTRASOUND BREAST LIMITED: CPT

## 2022-02-03 PROCEDURE — 99213 OFFICE O/P EST LOW 20 MIN: CPT | Performed by: FAMILY MEDICINE

## 2022-02-03 PROCEDURE — 87880 STREP A ASSAY W/OPTIC: CPT | Performed by: FAMILY MEDICINE

## 2022-02-03 PROCEDURE — G0279 TOMOSYNTHESIS, MAMMO: HCPCS

## 2022-02-03 RX ORDER — NAPROXEN 500 MG/1
500 TABLET ORAL 2 TIMES DAILY WITH MEALS
Qty: 14 TABLET | Refills: 0 | Status: SHIPPED | OUTPATIENT
Start: 2022-02-03 | End: 2022-03-24
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
## 2022-02-05 LAB — B-HEM STREP SPEC QL CULT: NEGATIVE

## 2022-02-10 ENCOUNTER — OFFICE VISIT (OUTPATIENT)
Dept: PODIATRY | Facility: CLINIC | Age: 47
End: 2022-02-10
Payer: COMMERCIAL

## 2022-02-10 VITALS
SYSTOLIC BLOOD PRESSURE: 114 MMHG | WEIGHT: 237 LBS | DIASTOLIC BLOOD PRESSURE: 84 MMHG | BODY MASS INDEX: 38.09 KG/M2 | HEIGHT: 66 IN | HEART RATE: 78 BPM | RESPIRATION RATE: 17 BRPM

## 2022-02-10 DIAGNOSIS — L03.031 PARONYCHIA OF TOENAIL OF RIGHT FOOT: Primary | ICD-10-CM

## 2022-02-10 DIAGNOSIS — B35.1 ONYCHOMYCOSIS: ICD-10-CM

## 2022-02-10 PROCEDURE — 99213 OFFICE O/P EST LOW 20 MIN: CPT | Performed by: PODIATRIST

## 2022-02-10 RX ORDER — GENTAMICIN SULFATE 1 MG/G
CREAM TOPICAL 3 TIMES DAILY
Qty: 15 G | Refills: 0 | Status: SHIPPED | OUTPATIENT
Start: 2022-02-10 | End: 2022-03-24

## 2022-02-10 RX ORDER — TERBINAFINE HYDROCHLORIDE 250 MG/1
250 TABLET ORAL DAILY
Qty: 30 TABLET | Refills: 0 | Status: SHIPPED | OUTPATIENT
Start: 2022-02-10 | End: 2022-03-12

## 2022-02-14 ENCOUNTER — OFFICE VISIT (OUTPATIENT)
Dept: FAMILY MEDICINE CLINIC | Facility: CLINIC | Age: 47
End: 2022-02-14
Payer: COMMERCIAL

## 2022-02-14 VITALS
WEIGHT: 243.8 LBS | DIASTOLIC BLOOD PRESSURE: 100 MMHG | RESPIRATION RATE: 18 BRPM | BODY MASS INDEX: 39.18 KG/M2 | OXYGEN SATURATION: 100 % | HEIGHT: 66 IN | SYSTOLIC BLOOD PRESSURE: 142 MMHG | HEART RATE: 88 BPM | TEMPERATURE: 97.9 F

## 2022-02-14 DIAGNOSIS — R10.9 FLANK PAIN: ICD-10-CM

## 2022-02-14 DIAGNOSIS — N30.00 ACUTE CYSTITIS WITHOUT HEMATURIA: Primary | ICD-10-CM

## 2022-02-14 LAB
SL AMB  POCT GLUCOSE, UA: NORMAL
SL AMB LEUKOCYTE ESTERASE,UA: 500
SL AMB POCT BILIRUBIN,UA: NEGATIVE
SL AMB POCT BLOOD,UA: NEGATIVE
SL AMB POCT CLARITY,UA: ABNORMAL
SL AMB POCT COLOR,UA: YELLOW
SL AMB POCT KETONES,UA: NEGATIVE
SL AMB POCT NITRITE,UA: NEGATIVE
SL AMB POCT PH,UA: 6
SL AMB POCT SPECIFIC GRAVITY,UA: 1.01
SL AMB POCT URINE PROTEIN: NEGATIVE
SL AMB POCT UROBILINOGEN: NORMAL

## 2022-02-14 PROCEDURE — 4004F PT TOBACCO SCREEN RCVD TLK: CPT | Performed by: FAMILY MEDICINE

## 2022-02-14 PROCEDURE — 3008F BODY MASS INDEX DOCD: CPT | Performed by: FAMILY MEDICINE

## 2022-02-14 PROCEDURE — 99213 OFFICE O/P EST LOW 20 MIN: CPT | Performed by: FAMILY MEDICINE

## 2022-02-14 PROCEDURE — 81002 URINALYSIS NONAUTO W/O SCOPE: CPT | Performed by: FAMILY MEDICINE

## 2022-02-14 RX ORDER — NITROFURANTOIN 25; 75 MG/1; MG/1
100 CAPSULE ORAL 2 TIMES DAILY
Qty: 10 CAPSULE | Refills: 0 | Status: SHIPPED | OUTPATIENT
Start: 2022-02-14 | End: 2022-02-19

## 2022-02-14 RX ORDER — CLONAZEPAM 1 MG/1
TABLET ORAL
COMMUNITY
Start: 2022-01-26

## 2022-02-14 RX ORDER — SULFAMETHOXAZOLE AND TRIMETHOPRIM 800; 160 MG/1; MG/1
1 TABLET ORAL 2 TIMES DAILY
Qty: 6 TABLET | Refills: 0 | Status: CANCELLED | OUTPATIENT
Start: 2022-02-14 | End: 2022-02-17

## 2022-02-14 NOTE — PROGRESS NOTES
[address] Visit  Justine Zeng  42 y o  female  [id number]     Subjective:      Patient ID: Justine Zeng is a 42 y o  female  HPI  Patient is a 29-year-old presenting with symptoms of UTI for the past couple days  Patient notes that she has been experiencing increased urinary frequency but denies any fever, chills, nausea vomiting or abdominal pain  Patient states that she used a vibrator 2 days ago  Review of Systems    See above    Objective:    /100   Pulse 88   Temp 97 9 °F (36 6 °C) (Tympanic)   Resp 18   Ht 5' 6" (1 676 m)   Wt 111 kg (243 lb 12 8 oz)   LMP 01/26/2022   SpO2 100%   BMI 39 35 kg/m²        Physical Exam  Vitals and nursing note reviewed  Cardiovascular:      Rate and Rhythm: Normal rate and regular rhythm  Heart sounds: Normal heart sounds  Pulmonary:      Effort: Pulmonary effort is normal    Abdominal:      General: Abdomen is flat  Bowel sounds are normal       Tenderness: There is no abdominal tenderness  There is no right CVA tenderness or left CVA tenderness  Assessment/Plan:     Diagnoses and all orders for this visit:    Acute cystitis without hematuria  -     nitrofurantoin (MACROBID) 100 mg capsule; Take 1 capsule (100 mg total) by mouth 2 (two) times a day for 5 days  Patient advised to present to the nearest emergency room should she experience sharp back pain and or fever chills    Flank pain  -     POCT urine dip  -     Urine culture        The patient was counseled regarding diagnostic results, instructions for management, risk factor reductions, prognosis, patient and family education, impressions, risks and benefits of treatment options  The treatment plan was reviewed with the patient/guardian  The patient/guardian understands and agrees with the treatment plan  --  Stephon Carrillo,     "This note has been constructed using a voice recognition system   Therefore there may be syntax, spelling, and/or grammatical errors   Please call if you have any questions "

## 2022-02-16 LAB
BACTERIA UR CULT: NORMAL
Lab: NO GROWTH

## 2022-02-18 LAB
PATH REPORT.SITE OF ORIGIN SPEC: NORMAL
PAYMENT PROCEDURE: NORMAL
SL AMB .: NORMAL

## 2022-03-03 ENCOUNTER — OFFICE VISIT (OUTPATIENT)
Dept: PODIATRY | Facility: CLINIC | Age: 47
End: 2022-03-03
Payer: COMMERCIAL

## 2022-03-03 VITALS — HEART RATE: 88 BPM | RESPIRATION RATE: 18 BRPM | BODY MASS INDEX: 39.05 KG/M2 | WEIGHT: 243 LBS | HEIGHT: 66 IN

## 2022-03-03 DIAGNOSIS — L03.031 PARONYCHIA OF TOENAIL OF RIGHT FOOT: Primary | ICD-10-CM

## 2022-03-03 DIAGNOSIS — B35.1 ONYCHOMYCOSIS: ICD-10-CM

## 2022-03-03 PROCEDURE — 99213 OFFICE O/P EST LOW 20 MIN: CPT | Performed by: PODIATRIST

## 2022-03-11 LAB
FUNGUS SPEC CULT: NORMAL
Lab: NORMAL
SL AMB HISTOLOGY SPECIMEN: NORMAL

## 2022-03-14 NOTE — PROGRESS NOTES
Assessment/Plan:    Condition is resolving, erythema and edema improved, no ascending cellulitis, patient discontinue dressing changes, patient to monitor signs infection, patient to finish Lamisil regimen, patient to return to the office in 2 weeks for re-evaluation     Diagnoses and all orders for this visit:    Paronychia of toenail of right foot    Onychomycosis          Subjective:      Patient ID: Skyler De La Garza is a 42 y o  female  Marked improvement with the use of oral Lamisil, patient denies any pain associated with the digit      The following portions of the patient's history were reviewed and updated as appropriate: allergies, current medications, past family history, past medical history, past social history, past surgical history and problem list     Review of Systems   Constitutional: Negative  Respiratory: Negative  Cardiovascular: Negative  Musculoskeletal: Negative  Skin: Negative                  Historical Information   Past Medical History:   Diagnosis Date    [facility] [facility] )     Depression     History of wound infection     left ankle    Psychiatric disorder     depression, anxiety    Seasonal allergic reaction      Past Surgical History:   Procedure Laterality Date    IR PICC LINE  2/8/2019    [place] OPEN TREATMENT BIMALLEOLAR ANKLE FRACTURE Left 5/14/2018    Procedure: OPEN REDUCTION W/ INTERNAL FIXATION (ORIF) ANKLE;  Surgeon: Radha Sousa DO;  Location: [facility];  Service: Orthopedics    [place] REMOVAL DEEP IMPLANT Left 2/5/2019    Procedure: REMOVAL HARDWARE ANKLE;  Surgeon: Radha Sousa DO;  Location: [address] [place] [address];  Service: Orthopedics    REDUCTION MAMMAPLASTY  2001    reduction     Social History   Social History     Substance and Sexual Activity   Alcohol Use Not Currently    Comment: social     Social History     Substance and Sexual Activity   Drug Use No     Social History     Tobacco Use   Smoking Status Current Every Day Smoker    Packs/day: 1 00    Years: 25 00    Pack years: 25 00    Types: Cigarettes    Start date: 9/23/2000   Smokeless Tobacco Never Used     Family History:   Family History   Problem Relation Age of Onset    No Known Problems Father     Diabetes Mother     Cancer Mother     Asthma Mother     Breast cancer Mother 54    No Known Problems Brother     No Known Problems Maternal Aunt     No Known Problems Maternal Uncle     No Known Problems Maternal Grandmother     No Known Problems Maternal Grandfather     No Known Problems Paternal Grandmother     No Known Problems Paternal Grandfather     No Known Problems Daughter     ADD / ADHD Neg Hx     Anesthesia problems Neg Hx     Clotting disorder Neg Hx     Collagen disease Neg Hx     Dislocations Neg Hx     Learning disabilities Neg Hx     Neurological problems Neg Hx     Osteoporosis Neg Hx     Rheumatologic disease Neg Hx     Scoliosis Neg Hx     Vascular Disease Neg Hx        Meds/Allergies   all medications and allergies reviewed  Allergies   Allergen Reactions    Toradol [Ketorolac Tromethamine]      GI UPSET    Latex Rash    Ultram [Tramadol] Rash       Objective:      Pulse 88   Resp 18   Ht 5' 6" (1 676 m)   Wt 110 kg (243 lb)   BMI 39 22 kg/m²              Physical Exam  Constitutional:       General: She is not in acute distress  Appearance: She is well-developed  She is not ill-appearing, toxic-appearing or diaphoretic  HENT:      Head: Normocephalic and atraumatic  Cardiovascular:      Pulses: Normal pulses  Dorsalis pedis pulses are 2+ on the right side and 2+ on the left side  Posterior tibial pulses are 2+ on the right side and 2+ on the left side  Comments: Palpable pedal pulse, CFT is less than 3 seconds, temperature gradient within normal limits, pedal hair present, no trophic skin changes  Pulmonary:      Effort: No respiratory distress  Musculoskeletal:         General: Normal range of motion        Comments: No pain on palpation on range of motion of ankle joint subtalar joint metatarsal joint tarsal joints bilateral foot   Feet:      Right foot:      Protective Sensation: 10 sites tested  10 sites sensed  Skin integrity: No ulcer, blister, skin breakdown or erythema  Left foot:      Protective Sensation: 10 sites tested  10 sites sensed  Skin integrity: No ulcer, blister, skin breakdown or erythema  Skin:     Capillary Refill: Capillary refill takes 2 to 3 seconds  Coloration: Skin is not pale  Comments: Paronychia is resolving, erythema is better, no pain on palpation,   Neurological:      Mental Status: She is alert and oriented to person, place, and time  Comments:  muscle power 5/5, protective sensations intact, no focal motor deficit  Foot Exam    Right Foot/Ankle     Inspection and Palpation  Skin Exam: no blister, no maceration, no ulcer and no erythema     Neurovascular  Dorsalis pedis: 2+  Posterior tibial: 2+      Left Foot/Ankle      Inspection and Palpation  Skin Exam: no blister, no maceration, no ulcer and no erythema     Neurovascular  Dorsalis pedis: 2+  Posterior tibial: 2+              Portions of the record may have been created with voice recognition software  Occasional wrong word or "sound a like" substitutions may have occurred due to the inherent limitations of voice recognition software  Read the chart carefully and recognize, using context, where substitutions have occurred

## 2022-03-14 NOTE — PROGRESS NOTES
Assessment/Plan:    Patient evaluated, digit cleansed with saline, dressed with Betadine on topical antibiotic, patient to start oral antibiotic, patient educated on dressing changes, patient to monitor signs of infection, return to the office with condition recur, patient to follow-up next week  Nail specimen harvested and sent for the laboratory for possible fungal paronychia, patient to start on Lamisil regimen, patient to return next week for re-evaluation  Diagnoses and all orders for this visit:    Paronychia of toenail of right foot  -     terbinafine (LamISIL) 250 mg tablet; Take 1 tablet (250 mg total) by mouth daily  -     gentamicin (GARAMYCIN) 0 1 % cream; Apply topically 3 (three) times a day (Patient not taking: Reported on 2/14/2022 )    Onychomycosis          Subjective:      Patient ID: Yaneth Tuttle is a 42 y o  female  Follow-up on nail procedure performed few weeks ago, patient states compliance with oral antibiotics although noted that the nail still painful, she noted drainage from underneath the nail, patient denies constitutional symptoms  The following portions of the patient's history were reviewed and updated as appropriate: allergies, current medications, past family history, past medical history, past social history, past surgical history and problem list     Review of Systems   Constitutional: Negative  Respiratory: Negative  Cardiovascular: Negative  Musculoskeletal: Negative  Skin: Negative                  Historical Information   Past Medical History:   Diagnosis Date    B[facility] disorder ([facility] [facility] )     Depression     History of wound infection     left ankle    Psychiatric disorder     depression, anxiety    Seasonal allergic reaction      Past Surgical History:   Procedure Laterality Date    IR PICC LINE  2/8/2019    [place] OPEN TREATMENT BIMALLEOLAR ANKLE FRACTURE Left 5/14/2018    Procedure: OPEN REDUCTION W/ INTERNAL FIXATION (ORIF) ANKLE;  Surgeon: Richard Pitts Marilyn Burrows DO;  Location: [address] [place] [address];  Service: Orthopedics    [place] REMOVAL DEEP IMPLANT Left 2/5/2019    Procedure: REMOVAL HARDWARE ANKLE;  Surgeon: Chetan Madrigal DO;  Location: [address] [place] [address];  Service: Orthopedics    REDUCTION MAMMAPLASTY  2001    reduction     Social History   Social History     Substance and Sexual Activity   Alcohol Use Not Currently    Comment: social     Social History     Substance and Sexual Activity   Drug Use No     Social History     Tobacco Use   Smoking Status Current Every Day Smoker    Packs/day: 1 00    Years: 25 00    Pack years: 25 00    Types: Cigarettes    Start date: 9/23/2000   Smokeless Tobacco Never Used     Family History:   Family History   Problem Relation Age of Onset    No Known Problems Father     Diabetes Mother     Cancer Mother     Asthma Mother     Breast cancer Mother 54    No Known Problems Brother     No Known Problems Maternal Aunt     No Known Problems Maternal Uncle     No Known Problems Maternal Grandmother     No Known Problems Maternal Grandfather     No Known Problems Paternal Grandmother     No Known Problems Paternal Grandfather     No Known Problems Daughter     ADD / ADHD Neg Hx     Anesthesia problems Neg Hx     Clotting disorder Neg Hx     Collagen disease Neg Hx     Dislocations Neg Hx     Learning disabilities Neg Hx     Neurological problems Neg Hx     Osteoporosis Neg Hx     Rheumatologic disease Neg Hx     Scoliosis Neg Hx     Vascular Disease Neg Hx        Meds/Allergies   all medications and allergies reviewed  Allergies   Allergen Reactions    Toradol [Ketorolac Tromethamine]      GI UPSET    Latex Rash    Ultram [Tramadol] Rash       Objective:      /84   Pulse 78   Resp 17   Ht 5' 6" (1 676 m)   Wt 108 kg (237 lb)   LMP 01/26/2022   BMI 38 25 kg/m²              Physical Exam  Constitutional:       General: She is not in acute distress  Appearance: She is well-developed   She is not ill-appearing, toxic-appearing or diaphoretic  HENT:      Head: Normocephalic and atraumatic  Cardiovascular:      Pulses: Normal pulses  Dorsalis pedis pulses are 2+ on the right side and 2+ on the left side  Posterior tibial pulses are 2+ on the right side and 2+ on the left side  Comments: Palpable pedal pulse, CFT is less than 3 seconds, temperature gradient within normal limits, pedal hair present, no trophic skin changes  Pulmonary:      Effort: No respiratory distress  Musculoskeletal:         General: Normal range of motion  Comments: No pain on palpation on range of motion of ankle joint subtalar joint metatarsal joint tarsal joints bilateral foot   Feet:      Right foot:      Protective Sensation: 10 sites tested  10 sites sensed  Skin integrity: No ulcer, blister, skin breakdown or erythema  Left foot:      Protective Sensation: 10 sites tested  10 sites sensed  Skin integrity: No ulcer, blister, skin breakdown or erythema  Skin:     Capillary Refill: Capillary refill takes 2 to 3 seconds  Coloration: Skin is not pale  Comments: Paronychia noted to the right hallux, with granuloma noted dorsal nail, with edema and erythema localized to the proximal nail fold, nail is dystrophic with subungual debris, mild malodor noted   Neurological:      Mental Status: She is alert and oriented to person, place, and time  Comments:  muscle power 5/5, protective sensations intact, no focal motor deficit  Foot Exam    Right Foot/Ankle     Inspection and Palpation  Skin Exam: no blister, no maceration, no ulcer and no erythema     Neurovascular  Dorsalis pedis: 2+  Posterior tibial: 2+      Left Foot/Ankle      Inspection and Palpation  Skin Exam: no blister, no maceration, no ulcer and no erythema     Neurovascular  Dorsalis pedis: 2+  Posterior tibial: 2+              Portions of the record may have been created with voice recognition software  Occasional wrong word or "sound a like" substitutions may have occurred due to the inherent limitations of voice recognition software  Read the chart carefully and recognize, using context, where substitutions have occurred

## 2022-03-15 ENCOUNTER — TELEPHONE (OUTPATIENT)
Dept: FAMILY MEDICINE CLINIC | Facility: CLINIC | Age: 47
End: 2022-03-15

## 2022-03-15 NOTE — TELEPHONE ENCOUNTER
Patient requires a form to be completed  Patient is aware of 5-7 business day turn around time  Please refer to the following information:       Type of Form:  Health examination fitness for duty     How patient would like to receive form:     Patient phone number:  [phone]      Copy scanned to encounter  Copy provided to patient  Original in blue team folder to be completed

## 2022-03-16 NOTE — TELEPHONE ENCOUNTER
Please see previous messages regarding PPD and immunizations    Form placed in your folder as you did her last annual exam

## 2022-03-16 NOTE — TELEPHONE ENCOUNTER
Left message for patient to call back  Needs nurse visit for 2 step PPD and need immunization records

## 2022-03-16 NOTE — TELEPHONE ENCOUNTER
Patient returned the call about her form  She had the ppd at her doesn't need covid19 testing and has been vaccinated  She also is not going to be working in [place] so she does not believe she needs the immunization section filled out  She does not have her immunization record so if she needs this section filled out then she will need titers  Confirmed phone number is correct if you need to call her back

## 2022-03-18 NOTE — TELEPHONE ENCOUNTER
This form was not found in my folder  I printed form from under Media tab and completed to the best I can  In addition, form says patient will provide me with list of responsibilities so that I can write if patient is able to physically handle these  I have never received a list of work responsibilities by patient and last visit with her She had shoulder impingement/ bursitis so I wrote on form that she must have limited amount of heavy lifting above her head       Dr Tania Nuñez

## 2022-03-21 NOTE — TELEPHONE ENCOUNTER
Form was placed in my folder by Steph See since I did patient's last annual physical  I was going into the chart to fill out her form and saw the message that Dr Kimi Liao has printed and completed it  If patient needs anything else and I can help please let me know  Thank you

## 2022-03-22 ENCOUNTER — TELEPHONE (OUTPATIENT)
Dept: FAMILY MEDICINE CLINIC | Facility: CLINIC | Age: 47
End: 2022-03-22

## 2022-03-22 NOTE — TELEPHONE ENCOUNTER
Pt is at [facility] at [facility] [address] for duty statement    Scanned into encounter    Placed in red clinical folder    Fax [phone]

## 2022-03-23 NOTE — TELEPHONE ENCOUNTER
Pt in the office, checking on status of this form  Reminded of the 5-7 day time frame       Please call pt and let her know when it is faxed [phone]

## 2022-03-23 NOTE — TELEPHONE ENCOUNTER
I am not certain if this needs to be completed again  Dr Oswaldo Banks note stated he filled it out on 3/18, but there is no completed form in the media tab of EMR  Current form looks the same with the exception of an additional page  Additional page appears to be a cover sheet, addressed to Dr Nathalie Ray that says "Please re-fill out form   Shoulder issue was from October visit "

## 2022-03-23 NOTE — TELEPHONE ENCOUNTER
PT  called demanding we send this paperwork to his doctor, dr Judy Contreras because he has never waited 5-7 days for anything from him  Pt needs this paperwork completed by tomorrow or she will lose her employment oppurtunity  I explained to the  and pt that if the docotrs are not in the office, they will not be able to get to the paperwork       Advised them I will send this high priority to the team and we will do the best we can to get it completed asap

## 2022-03-23 NOTE — TELEPHONE ENCOUNTER
Form has been placed in Dr Benita Johnson folder as she is listed PCP  Dr Ha Lopez filled out form last week from exam in October   Dr Abby Ware was last physician to complete annual physical

## 2022-03-24 ENCOUNTER — OFFICE VISIT (OUTPATIENT)
Dept: FAMILY MEDICINE CLINIC | Facility: CLINIC | Age: 47
End: 2022-03-24
Payer: COMMERCIAL

## 2022-03-24 VITALS
WEIGHT: 239 LBS | HEIGHT: 67 IN | RESPIRATION RATE: 18 BRPM | OXYGEN SATURATION: 98 % | TEMPERATURE: 96.8 F | DIASTOLIC BLOOD PRESSURE: 70 MMHG | HEART RATE: 96 BPM | BODY MASS INDEX: 37.51 KG/M2 | SYSTOLIC BLOOD PRESSURE: 126 MMHG

## 2022-03-24 DIAGNOSIS — Z02.9 ENCOUNTERS FOR ADMINISTRATIVE PURPOSES: Primary | ICD-10-CM

## 2022-03-24 DIAGNOSIS — Z92.29 HISTORY OF MMR VACCINATION: ICD-10-CM

## 2022-03-24 DIAGNOSIS — T50.B95A REACTION TO MEASLES-MUMPS-RUBELLA IMMUNIZATION, INITIAL ENCOUNTER: ICD-10-CM

## 2022-03-24 PROCEDURE — 3008F BODY MASS INDEX DOCD: CPT | Performed by: FAMILY MEDICINE

## 2022-03-24 PROCEDURE — 4004F PT TOBACCO SCREEN RCVD TLK: CPT | Performed by: FAMILY MEDICINE

## 2022-03-24 PROCEDURE — 99213 OFFICE O/P EST LOW 20 MIN: CPT | Performed by: FAMILY MEDICINE

## 2022-03-24 NOTE — TELEPHONE ENCOUNTER
I just called and spoke with patient  Her initial form I filled out was not accepted because It mentioned that she had some shoulder impingement and could do limited overhead exercises  I had called her prior to filling out the form in a timely manner and explained to her that I would have to mention that as that was based on my last physical with her  She expressed she understood and was ok with that at the time  Subsequently she has called back I see per chart review upset and wanting form to be redone and her  was upset  This form can not have fully cleared on it based on my last physical without patient being re-evaluated as properly clear as I can not go on her word of mouth that she has no shoulder issues without a physical exam to safely clear for all duties with work  She understood and would like an appointment as soon as possible to have this form completed

## 2022-03-24 NOTE — PROGRESS NOTES
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 and on last physical exam with me on 02/03/2022 had signs of shoulder impingement  Today Neer's and Clemente negative along with 5/5 for rotator cuff strength  Remainder of MSK and physical within normal limits  Patient cleared physically  Form for work mentions possible MMR titers for which patient says she may not need  Do not have proof of immunizations or titers in her chart and as such ordered titers in case this is necessary  Subjective:      Patient ID: Aj Bui is a 42 y o  female  HPI  Patient 29-year-old female 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  Last physical exam noted some shoulder impingement which patient denies today  She denies any headaches, vision changes, chest pain, abdominal pain, shortness of breath, MSK pain  Form was previously written noting her shoulder impingement and limiting her from extensive overhead work however this was declined by her job and she needed to repeat physical exam in order to start  The following portions of the patient's history were reviewed and updated as appropriate: allergies, current medications, past family history, past medical history, past social history and problem list     Review of Systems    Per HPI    Objective:      /70 (BP Location: Left arm, Patient Position: Sitting, Cuff Size: Adult)   Pulse 96   Temp (!) 96 8 °F (36 °C) (Tympanic)   Resp 18   Ht 5' 6 5" (1 689 m)   Wt 108 kg (239 lb)   SpO2 98%   BMI 38 00 kg/m²          Physical Exam  Constitutional:       Appearance: Normal appearance  She is obese  HENT:      Head: Normocephalic  Right Ear: Tympanic membrane normal  There is no impacted cerumen  Left Ear: Tympanic membrane normal  There is no impacted cerumen  Nose: Nose normal  No congestion or rhinorrhea  Mouth/Throat:      Mouth: Mucous membranes are moist       Pharynx: No oropharyngeal exudate or posterior oropharyngeal erythema  Eyes:      General: No scleral icterus  Pupils: Pupils are equal, round, and reactive to light  Cardiovascular:      Rate and Rhythm: Normal rate and regular rhythm  Pulses: Normal pulses  Heart sounds: Normal heart sounds  Pulmonary:      Effort: Pulmonary effort is normal       Breath sounds: Normal breath sounds  Abdominal:      General: Bowel sounds are normal       Palpations: Abdomen is soft  Tenderness: There is no abdominal tenderness  There is no guarding  Musculoskeletal:         General: Normal range of motion  Right lower leg: No edema  Left lower leg: No edema  Comments: Negative Neer's and Clemente test   Negative scarf sign  Rotator cuff muscles supraspinatus, infraspinatus, subscapularis 5/5 MSK strength  Hip flexion, extension, along with knee flexion extension 5/5 MSK strength  Foot plantar and dorsiflexion 5/5 muscular strength  Skin:     General: Skin is warm  Capillary Refill: Capillary refill takes less than 2 seconds  Neurological:      General: No focal deficit present  Mental Status: She is alert and oriented to person, place, and time  Sensory: No sensory deficit  Motor: No weakness     Psychiatric:         Mood and Affect: Mood normal          Behavior: Behavior normal

## 2022-03-25 ENCOUNTER — TELEPHONE (OUTPATIENT)
Dept: ADMINISTRATIVE | Facility: OTHER | Age: 47
End: 2022-03-25

## 2022-03-25 NOTE — LETTER
Vaccination Request Form: [id number] aka SARS-CoV-2 (Priya Vazquez or Ed Wilks or J & J)      Date Requested: 22  Patient: Marcial Villarreal  Patient : 1975   Referring Provider: Marjorie Hernandez MD       The above patient has informed us that they have had their   most recent COVID-19 aka SARS-CoV-2 (Priya Vazquez or Ed Wilks or J & J) administered at your facility  Please   complete this form and attach all corresponding documentation  Date of Vaccine(s) Given  ______________________________    Lot Number(s) _______________________________________    Manufacture(s) ______________________________________    Dose Amount (s) _____________________________________    Expiration Date(s) ____________________________________    Comments __________________________________________________________  ____________________________________________________________________  ____________________________________________________________________  ____________________________________________________________________    Administering Facility  ________________________________________________    Vaccine Administered By (print name) ___________________________________      Form Completed By (print name) _______________________________________      Signature ___________________________________________________________      These reports are needed for  compliance  Please fax this completed form and a copy of the Vaccine Document(s) to our office located at [place] [address] as soon as possible to [phone] andre Desai: Phone [phone]    We thank you for your assistance in treating our mutual patient

## 2022-04-05 NOTE — PROGRESS NOTES
Assessment/Plan:  1  Use medication as prescribed  2  Follow-up condition changes or worsens  3  Advised patient to take probiotics to help avoid yeast infection  Diagnoses and all orders for this visit:    Yeast infection  -     fluconazole (DIFLUCAN) 150 mg tablet; Take 1 tablet (150 mg total) by mouth once for 1 dose  -     clotrimazole-betamethasone (LOTRISONE) 1-0 05 % cream; Apply topically 2 (two) times a day  -     Lactobacillus (RA ACIDOPHILUS) 300 MG CAPS; Take 1 capsule (300 mg total) by mouth daily for 30 days          Subjective:      Patient ID: Duy Ward is a 41 y o  female  65-year-old female presents with white discharge from her vagina and a lot of itching for about a week  Reports that she is currently being treated via IV for osteomyelitis  Reports that her vaginal area is very tender and irritated from her yeast infection  Reports severe itching  Denies any STIs  Denies pregnancy  The following portions of the patient's history were reviewed and updated as appropriate: allergies and current medications  Review of Systems   Constitutional: Negative  Genitourinary: Positive for vaginal discharge  Vaginal itching         Objective:      /78   Pulse 103   Temp 98 5 °F (36 9 °C)   Ht 5' 6 5" (1 689 m)   Wt 91 2 kg (201 lb)   LMP 02/10/2019 (Approximate)   SpO2 100%   BMI 31 96 kg/m²          Physical Exam   Constitutional: She appears well-developed and well-nourished  Cardiovascular: Normal rate and regular rhythm  Pulmonary/Chest: Effort normal and breath sounds normal    Abdominal: Soft   Bowel sounds are normal  Yes

## 2022-05-12 ENCOUNTER — OFFICE VISIT (OUTPATIENT)
Dept: PODIATRY | Facility: CLINIC | Age: 47
End: 2022-05-12
Payer: COMMERCIAL

## 2022-05-12 VITALS
SYSTOLIC BLOOD PRESSURE: 126 MMHG | DIASTOLIC BLOOD PRESSURE: 70 MMHG | BODY MASS INDEX: 37.51 KG/M2 | HEIGHT: 67 IN | RESPIRATION RATE: 18 BRPM | HEART RATE: 96 BPM | WEIGHT: 239 LBS

## 2022-05-12 DIAGNOSIS — L03.031 PARONYCHIA, TOE, RIGHT: ICD-10-CM

## 2022-05-12 DIAGNOSIS — M79.671 RIGHT FOOT PAIN: ICD-10-CM

## 2022-05-12 DIAGNOSIS — M72.2 PLANTAR FASCIITIS OF RIGHT FOOT: Primary | ICD-10-CM

## 2022-05-12 DIAGNOSIS — L60.0 INGROWN TOENAIL: ICD-10-CM

## 2022-05-12 PROCEDURE — 99213 OFFICE O/P EST LOW 20 MIN: CPT | Performed by: PODIATRIST

## 2022-05-12 PROCEDURE — 20550 NJX 1 TENDON SHEATH/LIGAMENT: CPT | Performed by: PODIATRIST

## 2022-06-20 ENCOUNTER — OFFICE VISIT (OUTPATIENT)
Dept: FAMILY MEDICINE CLINIC | Facility: CLINIC | Age: 47
End: 2022-06-20
Payer: COMMERCIAL

## 2022-06-20 VITALS
SYSTOLIC BLOOD PRESSURE: 130 MMHG | BODY MASS INDEX: 39.11 KG/M2 | OXYGEN SATURATION: 97 % | TEMPERATURE: 96.9 F | HEIGHT: 67 IN | WEIGHT: 249.2 LBS | HEART RATE: 81 BPM | DIASTOLIC BLOOD PRESSURE: 88 MMHG | RESPIRATION RATE: 18 BRPM

## 2022-06-20 DIAGNOSIS — M53.87 SCIATICA ASSOCIATED WITH DISORDER OF LUMBOSACRAL SPINE: ICD-10-CM

## 2022-06-20 DIAGNOSIS — R53.83 FATIGUE, UNSPECIFIED TYPE: ICD-10-CM

## 2022-06-20 DIAGNOSIS — E78.5 HYPERLIPIDEMIA LDL GOAL <100: ICD-10-CM

## 2022-06-20 DIAGNOSIS — M62.830 SPASM OF MUSCLE OF LOWER BACK: ICD-10-CM

## 2022-06-20 DIAGNOSIS — M54.31 RIGHT SIDED SCIATICA: ICD-10-CM

## 2022-06-20 DIAGNOSIS — G62.9 PERIPHERAL POLYNEUROPATHY: Primary | ICD-10-CM

## 2022-06-20 PROCEDURE — 4004F PT TOBACCO SCREEN RCVD TLK: CPT | Performed by: FAMILY MEDICINE

## 2022-06-20 PROCEDURE — 3008F BODY MASS INDEX DOCD: CPT | Performed by: FAMILY MEDICINE

## 2022-06-20 PROCEDURE — 99213 OFFICE O/P EST LOW 20 MIN: CPT | Performed by: FAMILY MEDICINE

## 2022-06-20 RX ORDER — TIZANIDINE HYDROCHLORIDE 2 MG/1
4 CAPSULE, GELATIN COATED ORAL
Qty: 8 CAPSULE | Refills: 0 | Status: SHIPPED | OUTPATIENT
Start: 2022-06-20

## 2022-06-20 RX ORDER — ATORVASTATIN CALCIUM 20 MG/1
20 TABLET, FILM COATED ORAL DAILY
Qty: 30 TABLET | Refills: 5 | Status: SHIPPED | OUTPATIENT
Start: 2022-06-20

## 2022-06-20 NOTE — PROGRESS NOTES
Assessment/Plan:     Diagnoses and all orders for this visit:    Peripheral polyneuropathy  -     HEMOGLOBIN A1C W/ EAG ESTIMATION; Future    BMI 39 0-39 9,adult  -     HEMOGLOBIN A1C W/ EAG ESTIMATION; Future    Hyperlipidemia LDL goal <100  -     atorvastatin (LIPITOR) 20 mg tablet; Take 1 tablet (20 mg total) by mouth daily    Sciatica associated with disorder of lumbosacral spine  -     Ambulatory Referral to Physical Therapy; Future    Right sided sciatica  -     Ambulatory Referral to Physical Therapy; Future    Fatigue, unspecified type  -     TSH, 3rd generation with Free T4 reflex; Future  -     Vitamin D 25 hydroxy; Future    Spasm of muscle of lower back  -     TiZANidine (ZANAFLEX) 2 MG capsule; Take 2 capsules (4 mg total) by mouth daily at bedtime as needed for muscle spasms      43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 right sided sciatica  Referral for PT ordered to help stengthen paraspinal muscles and conditioning  If symptoms not iproved after 6-8 weeks can consider MRI  No saddle anaestheisia or loss of bowel or urine  Normal MSK strength    -Tizanidine ordered for lower back spasm QHS and advised to not operate heavy machinery or vehicle after taking  Subjective:      Patient ID: Romeo You is a 42 y o  female  HPI  Patient is a 43 yo female who presents with bilateral lower back pain on and off for 1 week  She also reports radiation of pain with numbness and tingling down right leg  No saddle paraesthesia or loss of bowel or urine  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  Pain started a week and ago and resolved however returned yesterday  Tingling goes down lateral aspect of right leg up until midfoot       The following portions of the patient's history were reviewed and updated as appropriate: allergies, current medications, past family history, past medical history and problem list     Review of Systems    Per HPI    Objective:      /88 (BP Location: Left arm, Patient Position: Sitting, Cuff Size: Standard)   Pulse 81   Temp (!) 96 9 °F (36 1 °C) (Tympanic)   Resp 18   Ht 5' 6 5" (1 689 m)   Wt 113 kg (249 lb 3 2 oz)   LMP 06/10/2022 (Approximate)   SpO2 97%   BMI 39 62 kg/m²          Physical Exam  Constitutional:       Appearance: She is obese  Cardiovascular:      Rate and Rhythm: Normal rate and regular rhythm  Pulses: Normal pulses  Heart sounds: Normal heart sounds  Musculoskeletal:         General: No swelling  Right lower leg: No edema  Left lower leg: No edema  Comments: Right Straight leg test positive  Right hip flexion, extension, leg extension and flexion 5/5 MSK strength  Sensation intact  Skin:     Capillary Refill: Capillary refill takes less than 2 seconds  Neurological:      General: No focal deficit present  Mental Status: She is alert and oriented to person, place, and time  Sensory: No sensory deficit  Motor: No weakness     Psychiatric:         Mood and Affect: Mood normal          Behavior: Behavior normal

## 2022-06-24 LAB
25(OH)D3+25(OH)D2 SERPL-MCNC: 23.5 NG/ML (ref 30–100)
EST. AVERAGE GLUCOSE BLD GHB EST-MCNC: 111 MG/DL
HBA1C MFR BLD: 5.5 % (ref 4.8–5.6)
TSH SERPL DL<=0.005 MIU/L-ACNC: 2.39 UIU/ML (ref 0.45–4.5)

## 2022-06-30 ENCOUNTER — OFFICE VISIT (OUTPATIENT)
Dept: PODIATRY | Facility: CLINIC | Age: 47
End: 2022-06-30
Payer: COMMERCIAL

## 2022-06-30 VITALS
BODY MASS INDEX: 39.08 KG/M2 | HEIGHT: 67 IN | WEIGHT: 249 LBS | RESPIRATION RATE: 18 BRPM | HEART RATE: 81 BPM | SYSTOLIC BLOOD PRESSURE: 130 MMHG | DIASTOLIC BLOOD PRESSURE: 88 MMHG

## 2022-06-30 DIAGNOSIS — M79.671 RIGHT FOOT PAIN: ICD-10-CM

## 2022-06-30 DIAGNOSIS — M72.2 PLANTAR FASCIITIS: Primary | ICD-10-CM

## 2022-06-30 PROCEDURE — 20550 NJX 1 TENDON SHEATH/LIGAMENT: CPT | Performed by: PODIATRIST

## 2022-06-30 RX ORDER — NAPROXEN 500 MG/1
500 TABLET ORAL 2 TIMES DAILY WITH MEALS
Qty: 30 TABLET | Refills: 0 | Status: SHIPPED | OUTPATIENT
Start: 2022-06-30

## 2022-07-21 ENCOUNTER — OFFICE VISIT (OUTPATIENT)
Dept: PODIATRY | Facility: CLINIC | Age: 47
End: 2022-07-21
Payer: COMMERCIAL

## 2022-07-21 VITALS
HEIGHT: 67 IN | WEIGHT: 249 LBS | BODY MASS INDEX: 39.08 KG/M2 | DIASTOLIC BLOOD PRESSURE: 88 MMHG | SYSTOLIC BLOOD PRESSURE: 130 MMHG | HEART RATE: 81 BPM | RESPIRATION RATE: 18 BRPM

## 2022-07-21 DIAGNOSIS — M72.2 PLANTAR FASCIITIS, BILATERAL: Primary | ICD-10-CM

## 2022-07-21 PROCEDURE — 99213 OFFICE O/P EST LOW 20 MIN: CPT | Performed by: PODIATRIST

## 2022-07-21 RX ORDER — METHYLPREDNISOLONE 4 MG/1
TABLET ORAL
Qty: 21 TABLET | Refills: 0 | Status: SHIPPED | OUTPATIENT
Start: 2022-07-21

## 2022-07-21 RX ORDER — GABAPENTIN 300 MG/1
300 CAPSULE ORAL
Qty: 30 CAPSULE | Refills: 0 | Status: SHIPPED | OUTPATIENT
Start: 2022-07-21

## 2022-08-04 NOTE — PROGRESS NOTES
Assessment/Plan:    Slant back performed to the right 3rd digit to remove the offending nail border, discussed with the patient dressing changes and signs of infection  Patient educated on proper shoe gear and use of wide toe box shoes  Patient educated on RI CE therapy, and stretching exercises, patient educated on the use of orthotics to offload plantar fascia  Trigger point injection to the right foot at the right calcaneal tuberosity, injection site prepped using alcohol, 2 cc of equal amount of Marcaine 0 5% plain and Kenalog 10 injected using 27 gauge needle, patient tolerated procedure well with no immediate complications  Diagnoses and all orders for this visit:    Plantar fasciitis of right foot    Ingrown toenail    Paronychia, toe, right    Right foot pain          Subjective:      Patient ID: Nuria Gambino is a 41 y o  female  Return patient for a new complaint of pain to the right heel after a period of inactivity, patient has a history of plantar fascia in the past, patient also report pain to the right 3rd digit, no constitutional symptoms no history of trauma      The following portions of the patient's history were reviewed and updated as appropriate: allergies, current medications, past family history, past medical history, past social history, past surgical history and problem list     Review of Systems   Constitutional: Negative  Respiratory: Negative  Cardiovascular: Negative  Musculoskeletal: Negative  Skin: Negative                  Historical Information   Past Medical History:   Diagnosis Date    Bipolar disorder ([facility] [facility] )     Depression     History of wound infection     left ankle    Psychiatric disorder     depression, anxiety    Seasonal allergic reaction      Past Surgical History:   Procedure Laterality Date    IR PICC LINE  2/8/2019    MT OPEN TREATMENT BIMALLEOLAR ANKLE FRACTURE Left 5/14/2018    Procedure: OPEN REDUCTION W/ INTERNAL FIXATION (ORIF) ANKLE; Surgeon: Rush Young DO;  Location: [address] [place] [address];  Service: Orthopedics    IL REMOVAL DEEP IMPLANT Left 2/5/2019    Procedure: REMOVAL HARDWARE ANKLE;  Surgeon: Rush Young DO;  Location: [address] [place] [address];  Service: Orthopedics    REDUCTION MAMMAPLASTY  2001    reduction     Social History   Social History     Substance and Sexual Activity   Alcohol Use Not Currently    Comment: social     Social History     Substance and Sexual Activity   Drug Use No     Social History     Tobacco Use   Smoking Status Current Every Day Smoker    Packs/day: 1 00    Years: 25 00    Pack years: 25 00    Types: Cigarettes    Start date: 9/23/2000   Smokeless Tobacco Never Used     Family History:   Family History   Problem Relation Age of Onset    No Known Problems Father     Diabetes Mother     Cancer Mother     Asthma Mother     Breast cancer Mother 54    No Known Problems Brother     No Known Problems Maternal Aunt     No Known Problems Maternal Uncle     No Known Problems Maternal Grandmother     No Known Problems Maternal Grandfather     No Known Problems Paternal Grandmother     No Known Problems Paternal Grandfather     No Known Problems Daughter     ADD / ADHD Neg Hx     Anesthesia problems Neg Hx     Clotting disorder Neg Hx     Collagen disease Neg Hx     Dislocations Neg Hx     Learning disabilities Neg Hx     Neurological problems Neg Hx     Osteoporosis Neg Hx     Rheumatologic disease Neg Hx     Scoliosis Neg Hx     Vascular Disease Neg Hx        Meds/Allergies   all medications and allergies reviewed  Allergies   Allergen Reactions    Toradol [Ketorolac Tromethamine]      GI UPSET    Latex Rash    Ultram [Tramadol] Rash       Objective:      /70   Pulse 96   Resp 18   Ht 5' 6 5" (1 689 m)   Wt 108 kg (239 lb)   BMI 38 00 kg/m²              Physical Exam  Constitutional:       General: She is not in acute distress  Appearance: She is well-developed   She is not ill-appearing, toxic-appearing or diaphoretic  HENT:      Head: Normocephalic and atraumatic  Cardiovascular:      Pulses: Normal pulses  Dorsalis pedis pulses are 2+ on the right side and 2+ on the left side  Posterior tibial pulses are 2+ on the right side and 2+ on the left side  Comments: Palpable pedal pulse, CFT is less than 3 seconds, temperature gradient within normal limits, pedal hair present, no trophic skin changes  Pulmonary:      Effort: No respiratory distress  Musculoskeletal:         General: Normal range of motion  Comments: Pain on palpation to the medial calcaneal tuberosity and the insertion of the medial band of the plantar fascia right foot   Feet:      Right foot:      Protective Sensation: 10 sites tested  10 sites sensed  Skin integrity: No ulcer, blister, skin breakdown or erythema  Left foot:      Protective Sensation: 10 sites tested  10 sites sensed  Skin integrity: No ulcer, blister, skin breakdown or erythema  Skin:     Capillary Refill: Capillary refill takes 2 to 3 seconds  Coloration: Skin is not pale  Comments: Paronychia noted to the right 3rd toe lateral nail fold mild erythema and edema painful on palpation   Neurological:      Mental Status: She is alert and oriented to person, place, and time  Comments:  muscle power 5/5, protective sensations intact, no focal motor deficit  Foot Exam    Right Foot/Ankle     Inspection and Palpation  Skin Exam: no blister, no maceration, no ulcer and no erythema     Neurovascular  Dorsalis pedis: 2+  Posterior tibial: 2+      Left Foot/Ankle      Inspection and Palpation  Skin Exam: no blister, no maceration, no ulcer and no erythema     Neurovascular  Dorsalis pedis: 2+  Posterior tibial: 2+              Portions of the record may have been created with voice recognition software   Occasional wrong word or "sound a like" substitutions may have occurred due to the inherent limitations of voice recognition software  Read the chart carefully and recognize, using context, where substitutions have occurred

## 2022-09-01 ENCOUNTER — OFFICE VISIT (OUTPATIENT)
Dept: PODIATRY | Facility: CLINIC | Age: 47
End: 2022-09-01
Payer: COMMERCIAL

## 2022-09-01 VITALS
SYSTOLIC BLOOD PRESSURE: 130 MMHG | HEIGHT: 67 IN | BODY MASS INDEX: 39.08 KG/M2 | HEART RATE: 81 BPM | DIASTOLIC BLOOD PRESSURE: 88 MMHG | WEIGHT: 249 LBS | RESPIRATION RATE: 18 BRPM

## 2022-09-01 DIAGNOSIS — M72.2 PLANTAR FASCIITIS OF RIGHT FOOT: Primary | ICD-10-CM

## 2022-09-01 PROCEDURE — 99213 OFFICE O/P EST LOW 20 MIN: CPT | Performed by: PODIATRIST

## 2022-09-01 RX ORDER — ACETAMINOPHEN AND CODEINE PHOSPHATE 300; 30 MG/1; MG/1
1 TABLET ORAL 3 TIMES DAILY
Qty: 12 TABLET | Refills: 0 | Status: SHIPPED | OUTPATIENT
Start: 2022-09-01 | End: 2022-09-05

## 2022-09-20 NOTE — PROGRESS NOTES
Assessment/Plan:      Patient educated on RI CE therapy, and stretching exercises, patient educated on the use of orthotics to offload plantar fascia  Trigger point injection to the right foot at the right calcaneal tuberosity, injection site prepped using alcohol, 2 cc of equal amount of Marcaine 0 5% plain and Kenalog 10 injected using 27 gauge needle, patient tolerated procedure well with no immediate complications  Patient educated on RI CE therapy patient educated on proper shoe gear, patient educated possible physical therapy and surgical management patient educated on use of orthotics  Diagnoses and all orders for this visit:    Plantar fasciitis  -     naproxen (Naprosyn) 500 mg tablet; Take 1 tablet (500 mg total) by mouth 2 (two) times a day with meals    Right foot pain          Subjective:      Patient ID: Melisa Celis is a 41 y o  female  Return patient for a new complaint of pain to the right heel after a period of inactivity, patient has a history of plantar fascia in the past, s patient states that she received some relief after the injection last visit,      The following portions of the patient's history were reviewed and updated as appropriate: allergies, current medications, past family history, past medical history, past social history, past surgical history and problem list     Review of Systems   Constitutional: Negative  Respiratory: Negative  Cardiovascular: Negative  Musculoskeletal: Negative  Skin: Negative                  Historical Information   Past Medical History:   Diagnosis Date    Bipolar disorder ([facility] [facility] )     Depression     History of wound infection     left ankle    Psychiatric disorder     depression, anxiety    Seasonal allergic reaction      Past Surgical History:   Procedure Laterality Date    IR PICC LINE  2/8/2019    [place] OPEN TREATMENT BIMALLEOLAR ANKLE FRACTURE Left 5/14/2018    Procedure: OPEN REDUCTION W/ INTERNAL FIXATION (ORIF) ANKLE; Surgeon: Kyler Canada DO;  Location: [address] [place] [address];  Service: Orthopedics    [place] REMOVAL DEEP IMPLANT Left 2/5/2019    Procedure: REMOVAL HARDWARE ANKLE;  Surgeon: Kyler Canada DO;  Location: [facility];  Service: Orthopedics    REDUCTION MAMMAPLASTY  2001    reduction     Social History   Social History     Substance and Sexual Activity   Alcohol Use Not Currently    Comment: social     Social History     Substance and Sexual Activity   Drug Use No     Social History     Tobacco Use   Smoking Status Current Every Day Smoker    Packs/day: 1 00    Years: 25 00    Pack years: 25 00    Types: Cigarettes    Start date: 9/23/2000   Smokeless Tobacco Never Used     Family History:   Family History   Problem Relation Age of Onset    No Known Problems Father     Diabetes Mother     Cancer Mother     Asthma Mother     Breast cancer Mother 54    No Known Problems Brother     No Known Problems Maternal Aunt     No Known Problems Maternal Uncle     No Known Problems Maternal Grandmother     No Known Problems Maternal Grandfather     No Known Problems Paternal Grandmother     No Known Problems Paternal Grandfather     No Known Problems Daughter     ADD / ADHD Neg Hx     Anesthesia problems Neg Hx     Clotting disorder Neg Hx     Collagen disease Neg Hx     Dislocations Neg Hx     Learning disabilities Neg Hx     Neurological problems Neg Hx     Osteoporosis Neg Hx     Rheumatologic disease Neg Hx     Scoliosis Neg Hx     Vascular Disease Neg Hx        Meds/Allergies   all medications and allergies reviewed  Allergies   Allergen Reactions    Toradol [Ketorolac Tromethamine]      GI UPSET    Latex Rash    Ultram [Tramadol] Rash       Objective:      /88   Pulse 81   Resp 18   Ht 5' 6 5" (1 689 m)   Wt 113 kg (249 lb)   LMP 06/10/2022 (Approximate)   BMI 39 59 kg/m²              Physical Exam  Constitutional:       General: She is not in acute distress       Appearance: She is well-developed  She is not ill-appearing, toxic-appearing or diaphoretic  HENT:      Head: Normocephalic and atraumatic  Cardiovascular:      Pulses: Normal pulses  Dorsalis pedis pulses are 2+ on the right side and 2+ on the left side  Posterior tibial pulses are 2+ on the right side and 2+ on the left side  Comments: Palpable pedal pulse, CFT is less than 3 seconds, temperature gradient within normal limits, pedal hair present, no trophic skin changes  Pulmonary:      Effort: No respiratory distress  Musculoskeletal:         General: Normal range of motion  Comments: Pain on palpation to the medial calcaneal tuberosity and the insertion of the medial band of the plantar fascia right foot   Feet:      Right foot:      Protective Sensation: 10 sites tested  10 sites sensed  Skin integrity: No ulcer, blister, skin breakdown or erythema  Left foot:      Protective Sensation: 10 sites tested  10 sites sensed  Skin integrity: No ulcer, blister, skin breakdown or erythema  Skin:     Capillary Refill: Capillary refill takes 2 to 3 seconds  Coloration: Skin is not pale  Neurological:      Mental Status: She is alert and oriented to person, place, and time  Comments:  muscle power 5/5, protective sensations intact, no focal motor deficit  Foot Exam    Right Foot/Ankle     Inspection and Palpation  Skin Exam: no blister, no maceration, no ulcer and no erythema     Neurovascular  Dorsalis pedis: 2+  Posterior tibial: 2+      Left Foot/Ankle      Inspection and Palpation  Skin Exam: no blister, no maceration, no ulcer and no erythema     Neurovascular  Dorsalis pedis: 2+  Posterior tibial: 2+              Portions of the record may have been created with voice recognition software  Occasional wrong word or "sound a like" substitutions may have occurred due to the inherent limitations of voice recognition software   Read the chart carefully and recognize, using context, where substitutions have occurred

## 2022-09-20 NOTE — PROGRESS NOTES
Assessment/Plan:      Patient is not compliant with physical therapy, still report marked pain to bilateral lower extremity, no x-rays performed, order MRI possible will order Tylenol 3 pain management  ,    Diagnoses and all orders for this visit:    Plantar fasciitis of right foot  -     MRI foot/forefoot toes right wo contrast; Future  -     acetaminophen-codeine (TYLENOL #3) 300-30 mg per tablet; Take 1 tablet by mouth 3 (three) times a day for 4 days          Subjective:      Patient ID: Ana Luisa Ulloa is a 41 y o  female  Return patient for bilateral plantar fascia right more than left, patient still in pain, patient denies constitutional symptoms      The following portions of the patient's history were reviewed and updated as appropriate: allergies, current medications, past family history, past medical history, past social history, past surgical history and problem list     Review of Systems   Constitutional: Negative  Respiratory: Negative  Cardiovascular: Negative  Musculoskeletal: Negative  Skin: Negative                  Historical Information   Past Medical History:   Diagnosis Date    Bipolar disorder ([facility] )     Depression     History of wound infection     left ankle    Psychiatric disorder     depression, anxiety    Seasonal allergic reaction      Past Surgical History:   Procedure Laterality Date    IR PICC LINE  2/8/2019    [place] OPEN TREATMENT BIMALLEOLAR ANKLE FRACTURE Left 5/14/2018    Procedure: OPEN REDUCTION W/ INTERNAL FIXATION (ORIF) ANKLE;  Surgeon: Terence Pfeiffer DO;  Location: [facility] MAIN OR;  Service: Orthopedics    [place] REMOVAL DEEP IMPLANT Left 2/5/2019    Procedure: REMOVAL HARDWARE ANKLE;  Surgeon: Terence Pfeiffer DO;  Location: [facility];  Service: Orthopedics    REDUCTION MAMMAPLASTY  2001    reduction     Social History   Social History     Substance and Sexual Activity   Alcohol Use Not Currently    Comment: social     Social History     Substance and Sexual Activity   Drug Use No     Social History     Tobacco Use   Smoking Status Current Every Day Smoker    Packs/day: 1 00    Years: 25 00    Pack years: 25 00    Types: Cigarettes    Start date: 9/23/2000   Smokeless Tobacco Never Used     Family History:   Family History   Problem Relation Age of Onset    No Known Problems Father     Diabetes Mother     Cancer Mother     Asthma Mother     Breast cancer Mother 54    No Known Problems Brother     No Known Problems Maternal Aunt     No Known Problems Maternal Uncle     No Known Problems Maternal Grandmother     No Known Problems Maternal Grandfather     No Known Problems Paternal Grandmother     No Known Problems Paternal Grandfather     No Known Problems Daughter     ADD / ADHD Neg Hx     Anesthesia problems Neg Hx     Clotting disorder Neg Hx     Collagen disease Neg Hx     Dislocations Neg Hx     Learning disabilities Neg Hx     Neurological problems Neg Hx     Osteoporosis Neg Hx     Rheumatologic disease Neg Hx     Scoliosis Neg Hx     Vascular Disease Neg Hx        Meds/Allergies   all medications and allergies reviewed  Allergies   Allergen Reactions    Toradol [Ketorolac Tromethamine]      GI UPSET    Latex Rash    Ultram [Tramadol] Rash       Objective:      /88   Pulse 81   Resp 18   Ht 5' 6 5" (1 689 m)   Wt 113 kg (249 lb)   BMI 39 59 kg/m²              Physical Exam  Constitutional:       General: She is not in acute distress  Appearance: She is well-developed  She is not ill-appearing, toxic-appearing or diaphoretic  HENT:      Head: Normocephalic and atraumatic  Cardiovascular:      Pulses: Normal pulses  Dorsalis pedis pulses are 2+ on the right side and 2+ on the left side  Posterior tibial pulses are 2+ on the right side and 2+ on the left side        Comments: Palpable pedal pulse, CFT is less than 3 seconds, temperature gradient within normal limits, pedal hair present, no trophic skin changes  Pulmonary:      Effort: No respiratory distress  Musculoskeletal:         General: Normal range of motion  Comments: Pain on palpation to the medial calcaneal tuberosity and the insertion of the medial band of the plantar fascia right foot   Feet:      Right foot:      Protective Sensation: 10 sites tested  10 sites sensed  Skin integrity: No ulcer, blister, skin breakdown or erythema  Left foot:      Protective Sensation: 10 sites tested  10 sites sensed  Skin integrity: No ulcer, blister, skin breakdown or erythema  Skin:     Capillary Refill: Capillary refill takes 2 to 3 seconds  Coloration: Skin is not pale  Neurological:      Mental Status: She is alert and oriented to person, place, and time  Comments:  muscle power 5/5, protective sensations intact, no focal motor deficit  Foot Exam    Right Foot/Ankle     Inspection and Palpation  Skin Exam: no blister, no maceration, no ulcer and no erythema     Neurovascular  Dorsalis pedis: 2+  Posterior tibial: 2+      Left Foot/Ankle      Inspection and Palpation  Skin Exam: no blister, no maceration, no ulcer and no erythema     Neurovascular  Dorsalis pedis: 2+  Posterior tibial: 2+              Portions of the record may have been created with voice recognition software  Occasional wrong word or "sound a like" substitutions may have occurred due to the inherent limitations of voice recognition software  Read the chart carefully and recognize, using context, where substitutions have occurred

## 2022-09-22 ENCOUNTER — OFFICE VISIT (OUTPATIENT)
Dept: PODIATRY | Facility: CLINIC | Age: 47
End: 2022-09-22

## 2022-09-22 VITALS
WEIGHT: 249 LBS | DIASTOLIC BLOOD PRESSURE: 88 MMHG | HEIGHT: 67 IN | BODY MASS INDEX: 39.08 KG/M2 | RESPIRATION RATE: 18 BRPM | SYSTOLIC BLOOD PRESSURE: 130 MMHG

## 2022-09-22 DIAGNOSIS — M72.2 PLANTAR FASCIITIS OF RIGHT FOOT: Primary | ICD-10-CM

## 2022-09-22 DIAGNOSIS — M79.671 RIGHT FOOT PAIN: ICD-10-CM

## 2022-12-01 ENCOUNTER — HOSPITAL ENCOUNTER (EMERGENCY)
Facility: HOSPITAL | Age: 47
Discharge: HOME | End: 2022-12-01
Attending: EMERGENCY MEDICINE

## 2022-12-01 ENCOUNTER — APPOINTMENT (EMERGENCY)
Dept: RADIOLOGY | Facility: HOSPITAL | Age: 47
End: 2022-12-01

## 2022-12-01 VITALS
SYSTOLIC BLOOD PRESSURE: 123 MMHG | OXYGEN SATURATION: 98 % | TEMPERATURE: 97.5 F | DIASTOLIC BLOOD PRESSURE: 83 MMHG | WEIGHT: 251 LBS | RESPIRATION RATE: 17 BRPM | BODY MASS INDEX: 39.91 KG/M2 | HEART RATE: 86 BPM

## 2022-12-01 DIAGNOSIS — R07.9 CHEST PAIN, UNSPECIFIED: Primary | ICD-10-CM

## 2022-12-01 LAB
ALBUMIN SERPL BCP-MCNC: 3.4 G/DL (ref 3.5–5)
ALP SERPL-CCNC: 69 U/L (ref 46–116)
ALT SERPL W P-5'-P-CCNC: 23 U/L (ref 12–78)
ANION GAP SERPL CALCULATED.3IONS-SCNC: 3 MMOL/L (ref 4–13)
AST SERPL W P-5'-P-CCNC: 19 U/L (ref 5–45)
BASOPHILS # BLD AUTO: 0.05 THOUSANDS/ÂΜL (ref 0–0.1)
BASOPHILS NFR BLD AUTO: 1 % (ref 0–1)
BILIRUB SERPL-MCNC: 0.21 MG/DL (ref 0.2–1)
BUN SERPL-MCNC: 12 MG/DL (ref 5–25)
CALCIUM ALBUM COR SERPL-MCNC: 9 MG/DL (ref 8.3–10.1)
CALCIUM SERPL-MCNC: 8.5 MG/DL (ref 8.3–10.1)
CARDIAC TROPONIN I PNL SERPL HS: <2 NG/L
CHLORIDE SERPL-SCNC: 107 MMOL/L (ref 96–108)
CO2 SERPL-SCNC: 29 MMOL/L (ref 21–32)
CREAT SERPL-MCNC: 0.77 MG/DL (ref 0.6–1.3)
D DIMER PPP FEU-MCNC: 0.35 UG/ML FEU
EOSINOPHIL # BLD AUTO: 0.12 THOUSAND/ÂΜL (ref 0–0.61)
EOSINOPHIL NFR BLD AUTO: 2 % (ref 0–6)
ERYTHROCYTE [DISTWIDTH] IN BLOOD BY AUTOMATED COUNT: 12.7 % (ref 11.6–15.1)
GFR SERPL CREATININE-BSD FRML MDRD: 93 ML/MIN/1.73SQ M
GLUCOSE SERPL-MCNC: 84 MG/DL (ref 65–140)
HCT VFR BLD AUTO: 36.8 % (ref 34.8–46.1)
HGB BLD-MCNC: 12.1 G/DL (ref 11.5–15.4)
IMM GRANULOCYTES # BLD AUTO: 0.01 THOUSAND/UL (ref 0–0.2)
IMM GRANULOCYTES NFR BLD AUTO: 0 % (ref 0–2)
LYMPHOCYTES # BLD AUTO: 1.5 THOUSANDS/ÂΜL (ref 0.6–4.47)
LYMPHOCYTES NFR BLD AUTO: 28 % (ref 14–44)
MCH RBC QN AUTO: 29.7 PG (ref 26.8–34.3)
MCHC RBC AUTO-ENTMCNC: 32.9 G/DL (ref 31.4–37.4)
MCV RBC AUTO: 90 FL (ref 82–98)
MONOCYTES # BLD AUTO: 0.5 THOUSAND/ÂΜL (ref 0.17–1.22)
MONOCYTES NFR BLD AUTO: 9 % (ref 4–12)
NEUTROPHILS # BLD AUTO: 3.26 THOUSANDS/ÂΜL (ref 1.85–7.62)
NEUTS SEG NFR BLD AUTO: 60 % (ref 43–75)
NRBC BLD AUTO-RTO: 0 /100 WBCS
PLATELET # BLD AUTO: 236 THOUSANDS/UL (ref 149–390)
PMV BLD AUTO: 10.2 FL (ref 8.9–12.7)
POTASSIUM SERPL-SCNC: 3.9 MMOL/L (ref 3.5–5.3)
PROT SERPL-MCNC: 7.1 G/DL (ref 6.4–8.4)
RBC # BLD AUTO: 4.07 MILLION/UL (ref 3.81–5.12)
SODIUM SERPL-SCNC: 139 MMOL/L (ref 135–147)
WBC # BLD AUTO: 5.44 THOUSAND/UL (ref 4.31–10.16)

## 2022-12-01 NOTE — DISCHARGE INSTRUCTIONS
Your encouraged to quit smoking    Follow-up with your primary care provider/[facility] cardiologist for further evaluation    Call for appointment    Return to ED for increased chest pain, worsening symptoms

## 2022-12-03 LAB
ATRIAL RATE: 82 BPM
P AXIS: 63 DEGREES
PR INTERVAL: 216 MS
QRS AXIS: 55 DEGREES
QRSD INTERVAL: 92 MS
QT INTERVAL: 414 MS
QTC INTERVAL: 483 MS
T WAVE AXIS: 75 DEGREES
VENTRICULAR RATE: 82 BPM

## 2022-12-05 NOTE — PROGRESS NOTES
Assessment/Plan:      Patient is not compliant with physical therapy, still report marked pain to bilateral lower extremity, no x-rays performed, patient also did not obtain MRI, patient states that she does not go to physical therapy yet, Trigger point injection to the right foot at the plantar fasciitis insertion, injection site prepped using alcohol, 2 cc of equal amount of Marcaine 0 5% plain and Kenalog 10 injected using 27 gauge needle, patient tolerated procedure well with no immediate complications  Diagnoses and all orders for this visit:    Plantar fasciitis of right foot    Right foot pain          Subjective:      Patient ID: Nuria Gambino is a 41 y o  female  Return patient for bilateral plantar fascia right more than left, patient still in pain, patient denies constitutional symptoms      The following portions of the patient's history were reviewed and updated as appropriate: allergies, current medications, past family history, past medical history, past social history, past surgical history and problem list     Review of Systems   Constitutional: Negative  Respiratory: Negative  Cardiovascular: Negative  Musculoskeletal: Negative  Skin: Negative                  Historical Information   Past Medical History:   Diagnosis Date   • Bipolar disorder ([facility] [address] )    • Depression    • History of wound infection     left ankle   • Psychiatric disorder     depression, anxiety   • Seasonal allergic reaction      Past Surgical History:   Procedure Laterality Date   • IR PICC LINE  2/8/2019   • [place] OPEN TREATMENT BIMALLEOLAR ANKLE FRACTURE Left 5/14/2018    Procedure: OPEN REDUCTION W/ INTERNAL FIXATION (ORIF) ANKLE;  Surgeon: Kyler Canada DO;  Location: [address] [place] [address];  Service: Orthopedics   • [place] REMOVAL DEEP IMPLANT Left 2/5/2019    Procedure: REMOVAL HARDWARE ANKLE;  Surgeon: Kyler Canada DO;  Location: [address] [place] [address];  Service: Orthopedics   • REDUCTION MAMMAPLASTY  2001    reduction     Social History   Social History     Substance and Sexual Activity   Alcohol Use Not Currently    Comment: social     Social History     Substance and Sexual Activity   Drug Use No     Social History     Tobacco Use   Smoking Status Every Day   • Packs/day: 1 00   • Years: 25 00   • Pack years: 25 00   • Types: Cigarettes   • Start date: 9/23/2000   Smokeless Tobacco Never     Family History:   Family History   Problem Relation Age of Onset   • No Known Problems Father    • Diabetes Mother    • Cancer Mother    • Asthma Mother    • Breast cancer Mother 54   • No Known Problems Brother    • No Known Problems Maternal Aunt    • No Known Problems Maternal Uncle    • No Known Problems Maternal Grandmother    • No Known Problems Maternal Grandfather    • No Known Problems Paternal Grandmother    • No Known Problems Paternal Grandfather    • No Known Problems Daughter    • ADD / ADHD Neg Hx    • Anesthesia problems Neg Hx    • Clotting disorder Neg Hx    • Collagen disease Neg Hx    • Dislocations Neg Hx    • Learning disabilities Neg Hx    • Neurological problems Neg Hx    • Osteoporosis Neg Hx    • Rheumatologic disease Neg Hx    • Scoliosis Neg Hx    • Vascular Disease Neg Hx        Meds/Allergies   all medications and allergies reviewed  Allergies   Allergen Reactions   • Toradol [Ketorolac Tromethamine]      GI UPSET   • Latex Rash   • Ultram [Tramadol] Rash       Objective:      /88   Resp 18   Ht 5' 6 5" (1 689 m)   Wt 113 kg (249 lb)   BMI 39 59 kg/m²              Physical Exam  Constitutional:       General: She is not in acute distress  Appearance: She is well-developed  She is not ill-appearing, toxic-appearing or diaphoretic  HENT:      Head: Normocephalic and atraumatic  Cardiovascular:      Pulses: Normal pulses  Dorsalis pedis pulses are 2+ on the right side and 2+ on the left side  Posterior tibial pulses are 2+ on the right side and 2+ on the left side        Comments: Palpable pedal pulse, CFT is less than 3 seconds, temperature gradient within normal limits, pedal hair present, no trophic skin changes  Pulmonary:      Effort: No respiratory distress  Musculoskeletal:         General: Normal range of motion  Comments: Pain on palpation to the medial calcaneal tuberosity and the insertion of the medial band of the plantar fascia right foot   Feet:      Right foot:      Protective Sensation: 10 sites tested  10 sites sensed  Skin integrity: No ulcer, blister, skin breakdown or erythema  Left foot:      Protective Sensation: 10 sites tested  10 sites sensed  Skin integrity: No ulcer, blister, skin breakdown or erythema  Skin:     Capillary Refill: Capillary refill takes 2 to 3 seconds  Coloration: Skin is not pale  Neurological:      Mental Status: She is alert and oriented to person, place, and time  Comments:  muscle power 5/5, protective sensations intact, no focal motor deficit  Foot Exam    Right Foot/Ankle     Inspection and Palpation  Skin Exam: no blister, no maceration, no ulcer and no erythema     Neurovascular  Dorsalis pedis: 2+  Posterior tibial: 2+      Left Foot/Ankle      Inspection and Palpation  Skin Exam: no blister, no maceration, no ulcer and no erythema     Neurovascular  Dorsalis pedis: 2+  Posterior tibial: 2+              Portions of the record may have been created with voice recognition software  Occasional wrong word or "sound a like" substitutions may have occurred due to the inherent limitations of voice recognition software  Read the chart carefully and recognize, using context, where substitutions have occurred

## 2022-12-22 ENCOUNTER — OFFICE VISIT (OUTPATIENT)
Dept: PODIATRY | Facility: CLINIC | Age: 47
End: 2022-12-22

## 2022-12-22 VITALS
RESPIRATION RATE: 17 BRPM | DIASTOLIC BLOOD PRESSURE: 83 MMHG | BODY MASS INDEX: 39.39 KG/M2 | WEIGHT: 251 LBS | SYSTOLIC BLOOD PRESSURE: 123 MMHG | HEIGHT: 67 IN

## 2022-12-22 DIAGNOSIS — M79.671 RIGHT FOOT PAIN: ICD-10-CM

## 2022-12-22 DIAGNOSIS — M72.2 PLANTAR FASCIITIS OF RIGHT FOOT: Primary | ICD-10-CM

## 2022-12-22 RX ORDER — MELOXICAM 15 MG/1
15 TABLET ORAL DAILY
Qty: 30 TABLET | Refills: 0 | Status: SHIPPED | OUTPATIENT
Start: 2022-12-22

## 2022-12-22 RX ORDER — OXYCODONE HYDROCHLORIDE AND ACETAMINOPHEN 5; 325 MG/1; MG/1
1 TABLET ORAL EVERY 4 HOURS PRN
Qty: 12 TABLET | Refills: 0 | Status: SHIPPED | OUTPATIENT
Start: 2022-12-22

## 2022-12-29 NOTE — PROGRESS NOTES
Assessment/Plan:      Patient is not compliant with physical therapy, still report marked pain to bilateral lower extremity, no x-rays performed, patient also did not obtain MRI, patient states that she does not go to physical therapy yet, Trigger point injection to the right foot at the plantar fasciitis insertion, injection site prepped using alcohol, 2 cc of equal amount of Marcaine 0 5% plain and Kenalog 10 injected using 27 gauge needle, patient tolerated procedure well with no immediate complications  Diagnoses and all orders for this visit:    Plantar fasciitis of right foot  -     meloxicam (MOBIC) 15 mg tablet; Take 1 tablet (15 mg total) by mouth daily  -     oxyCODONE-acetaminophen (Percocet) 5-325 mg per tablet; Take 1 tablet by mouth every 4 (four) hours as needed for moderate pain Max Daily Amount: 6 tablets    Right foot pain          Subjective:      Patient ID: Samantha Pickett is a 41 y o  female  Return patient for bilateral plantar fascia right more than left, patient still in pain, patient denies constitutional symptoms      The following portions of the patient's history were reviewed and updated as appropriate: allergies, current medications, past family history, past medical history, past social history, past surgical history and problem list     Review of Systems   Constitutional: Negative  Respiratory: Negative  Cardiovascular: Negative  Musculoskeletal: Negative  Skin: Negative                  Historical Information   Past Medical History:   Diagnosis Date   • Bipolar disorder ([facility] [address] )    • Depression    • History of wound infection     left ankle   • Psychiatric disorder     depression, anxiety   • Seasonal allergic reaction      Past Surgical History:   Procedure Laterality Date   • IR PICC LINE  2/8/2019   • [place] OPEN TREATMENT BIMALLEOLAR ANKLE FRACTURE Left 5/14/2018    Procedure: OPEN REDUCTION W/ INTERNAL FIXATION (ORIF) ANKLE;  Surgeon: Mallory Bonilla DO;  Location: [facility] MAIN OR;  Service: Orthopedics   • [place] REMOVAL DEEP IMPLANT Left 2/5/2019    Procedure: REMOVAL HARDWARE ANKLE;  Surgeon: Kenia Rocha DO;  Location: [facility];  Service: Orthopedics   • REDUCTION MAMMAPLASTY  2001    reduction     Social History   Social History     Substance and Sexual Activity   Alcohol Use Not Currently    Comment: social     Social History     Substance and Sexual Activity   Drug Use No     Social History     Tobacco Use   Smoking Status Every Day   • Packs/day: 1 00   • Years: 25 00   • Pack years: 25 00   • Types: Cigarettes   • Start date: 9/23/2000   Smokeless Tobacco Never     Family History:   Family History   Problem Relation Age of Onset   • No Known Problems Father    • Diabetes Mother    • Cancer Mother    • Asthma Mother    • Breast cancer Mother 54   • No Known Problems Brother    • No Known Problems Maternal Aunt    • No Known Problems Maternal Uncle    • No Known Problems Maternal Grandmother    • No Known Problems Maternal Grandfather    • No Known Problems Paternal Grandmother    • No Known Problems Paternal Grandfather    • No Known Problems Daughter    • ADD / ADHD Neg Hx    • Anesthesia problems Neg Hx    • Clotting disorder Neg Hx    • Collagen disease Neg Hx    • Dislocations Neg Hx    • Learning disabilities Neg Hx    • Neurological problems Neg Hx    • Osteoporosis Neg Hx    • Rheumatologic disease Neg Hx    • Scoliosis Neg Hx    • Vascular Disease Neg Hx        Meds/Allergies   all medications and allergies reviewed  Allergies   Allergen Reactions   • Toradol [Ketorolac Tromethamine]      GI UPSET   • Latex Rash   • Ultram [Tramadol] Rash       Objective:      /83   Resp 17   Ht 5' 6 5" (1 689 m)   Wt 114 kg (251 lb)   BMI 39 91 kg/m²              Physical Exam  Constitutional:       General: She is not in acute distress  Appearance: She is well-developed  She is not ill-appearing, toxic-appearing or diaphoretic     HENT:      Head: Normocephalic and atraumatic  Cardiovascular:      Pulses: Normal pulses  Dorsalis pedis pulses are 2+ on the right side and 2+ on the left side  Posterior tibial pulses are 2+ on the right side and 2+ on the left side  Comments: Palpable pedal pulse, CFT is less than 3 seconds, temperature gradient within normal limits, pedal hair present, no trophic skin changes  Pulmonary:      Effort: No respiratory distress  Musculoskeletal:         General: Normal range of motion  Comments: Pain on palpation to the medial calcaneal tuberosity and the insertion of the medial band of the plantar fascia right foot   Feet:      Right foot:      Protective Sensation: 10 sites tested  10 sites sensed  Skin integrity: No ulcer, blister, skin breakdown or erythema  Left foot:      Protective Sensation: 10 sites tested  10 sites sensed  Skin integrity: No ulcer, blister, skin breakdown or erythema  Skin:     Capillary Refill: Capillary refill takes 2 to 3 seconds  Coloration: Skin is not pale  Neurological:      Mental Status: She is alert and oriented to person, place, and time  Comments:  muscle power 5/5, protective sensations intact, no focal motor deficit  Foot Exam    Right Foot/Ankle     Inspection and Palpation  Skin Exam: no blister, no maceration, no ulcer and no erythema     Neurovascular  Dorsalis pedis: 2+  Posterior tibial: 2+      Left Foot/Ankle      Inspection and Palpation  Skin Exam: no blister, no maceration, no ulcer and no erythema     Neurovascular  Dorsalis pedis: 2+  Posterior tibial: 2+              Portions of the record may have been created with voice recognition software  Occasional wrong word or "sound a like" substitutions may have occurred due to the inherent limitations of voice recognition software  Read the chart carefully and recognize, using context, where substitutions have occurred

## 2023-01-06 ENCOUNTER — TELEPHONE (OUTPATIENT)
Dept: PSYCHIATRY | Facility: CLINIC | Age: 48
End: 2023-01-06

## 2023-01-06 NOTE — TELEPHONE ENCOUNTER
Left message for pt to return call to intake in regards to offering additional resources because of insurance

## 2023-01-09 ENCOUNTER — TELEPHONE (OUTPATIENT)
Dept: PSYCHIATRY | Facility: CLINIC | Age: 48
End: 2023-01-09

## 2023-01-09 NOTE — TELEPHONE ENCOUNTER
Reached out to pt in regards to interest in receiving services, the pt is still interested in services  Because of insurance she is unable to receive services at the [facility] office  I offered additional resources and pt accepted   A packet was sent via mail

## 2023-01-16 ENCOUNTER — OFFICE VISIT (OUTPATIENT)
Dept: FAMILY MEDICINE CLINIC | Facility: CLINIC | Age: 48
End: 2023-01-16

## 2023-01-16 VITALS
HEIGHT: 67 IN | RESPIRATION RATE: 16 BRPM | OXYGEN SATURATION: 95 % | BODY MASS INDEX: 40.49 KG/M2 | TEMPERATURE: 96.6 F | DIASTOLIC BLOOD PRESSURE: 80 MMHG | WEIGHT: 258 LBS | HEART RATE: 83 BPM | SYSTOLIC BLOOD PRESSURE: 118 MMHG

## 2023-01-16 DIAGNOSIS — Z11.4 SCREENING FOR HIV (HUMAN IMMUNODEFICIENCY VIRUS): ICD-10-CM

## 2023-01-16 DIAGNOSIS — G62.9 PERIPHERAL POLYNEUROPATHY: ICD-10-CM

## 2023-01-16 DIAGNOSIS — E66.01 MORBID OBESITY WITH BMI OF 40.0-44.9, ADULT (HCC): ICD-10-CM

## 2023-01-16 DIAGNOSIS — E78.2 MIXED HYPERLIPIDEMIA: ICD-10-CM

## 2023-01-16 DIAGNOSIS — Z76.89 ENCOUNTER TO ESTABLISH CARE: ICD-10-CM

## 2023-01-16 DIAGNOSIS — R63.1 POLYDIPSIA: ICD-10-CM

## 2023-01-16 DIAGNOSIS — F41.9 ANXIETY AND DEPRESSION: Primary | ICD-10-CM

## 2023-01-16 DIAGNOSIS — R53.83 OTHER FATIGUE: ICD-10-CM

## 2023-01-16 DIAGNOSIS — F32.A ANXIETY AND DEPRESSION: Primary | ICD-10-CM

## 2023-01-16 DIAGNOSIS — R73.03 PREDIABETES: ICD-10-CM

## 2023-01-16 DIAGNOSIS — E55.9 VITAMIN D DEFICIENCY: ICD-10-CM

## 2023-01-16 RX ORDER — HYDROXYZINE 50 MG/1
50 TABLET, FILM COATED ORAL 3 TIMES DAILY
COMMUNITY
Start: 2023-01-09

## 2023-01-16 NOTE — PROGRESS NOTES
Name: Jeremi Amaya      : 1975      MRN: [id number]  Encounter Provider: JORDAN Martins  Encounter Date: 2023   Encounter department: [address] [place] [address]  Anxiety and depression  Managed by psych  Continue same meds  Anticipatory guidance  Denies any suicidal ideation  Monitor    2  Mixed hyperlipidemia  Heart healthy diet  Will check labs  May need to resume statin  - CBC and differential; Future  - Comprehensive metabolic panel; Future  - Lipid panel; Future  -  3  Vitamin D deficiency  - CBC and differential; Future  - Comprehensive metabolic panel; Future  - Vitamin D 25 hydroxy; Future    4  Morbid obesity with BMI of 40 0-44 9, adult ([facility] [address] )  Weight loss is recommended to improve overall health  Dietary changes- limit carbohydrates, decrease overall caloric intake, reduce portion sizes, healthier snack choices, limit saturated fats, increase intake of fruits and vegetables, limit junk food  exercise to 3-5 times per week  Consider adding strength exercises to exercise routine    - TSH, 3rd generation; Future    5  Peripheral polyneuropathy  Based on prior records, pt with diagnosed with neuropathy  Suspect may be related to cervical disc issue but will check labs and then determine appropriate plan of tx    7  Encounter to establish care  Heart healthy, carbohydrate controlled diet- limit red meat, limit saturated fat, moderate salt intake, limit junk food, etc    Regular exercise  Stress management  Routine labwork and screenings as ordered  - CBC and differential; Future  - Comprehensive metabolic panel; Future  - Hemoglobin A1C; Future  - Lipid panel; Future  - TSH, 3rd generation; Future  - Vitamin D 25 hydroxy; Future  - HIV 1/2 AG/AB W REFLEX [facility] only; Future    8  Screening for HIV (human immunodeficiency virus)  - HIV 1/2 AG/AB W REFLEX [facility] only; Future    9  Prediabetes  Carb controlled diet     - CBC and differential; Future  - Comprehensive metabolic panel; Future  - Hemoglobin A1C; Future    10  Other fatigue  - TSH, 3rd generation; Future    11  Polydipsia  - Hemoglobin A1C; Future    Patient was counseled regarding instructions for management which included: impression/diagnosis, risk/benefits of treatment options, importance of compliance with treatment, risk factor reduction, and prognosis  I have reviewed the instructions with the patient answering all questions and patient verbalized understanding  BMI Counseling: Body mass index is 41 02 kg/m²  The BMI is above normal  Nutrition recommendations include reducing portion sizes, decreasing overall calorie intake, reducing intake of saturated fat and trans fat and reducing intake of cholesterol  Exercise recommendations include exercising 3-5 times per week  Depression Screening Follow-up Plan: Patient's depression screening was positive with a  PHQ-9 score of 11  Patient assessed for underlying major depression  They have no active suicidal ideations  Brief counseling provided and recommend additional follow-up/re-evaluation next office visit  Continue regular follow-up with their psychologist/therapist/psychiatrist who is managing their mental health condition(s)  Subjective      Pt here to establish care  PMH, meds, allergies, SH, FH reviewed  History: anxiety, depression, high chol  Surgeries: breast reduction , left ankle fracture  FH: mother- DM, breast cancer, lung cancer,   Father- alcoholic,   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, lives with fiance, smoker - 20+years/1 ppd, no recreational drugs, social etoh  Current medications: only taking meds for anxiety and depression, is supposed on chol meds but ran out about 2 months ago  Current issues include:   Sees psychiatrist who manages mental health care, monthly  No recent illness  Issues with back pain, neck pain, hand pain   Numbness into fingers  No injury  Thought may be carpal tunnel but was told symptoms may be coming from neck  Never treated   Has gained weight recently  Always tired  Thirsty all the time , urinates frequently     Review of Systems   Constitutional: Positive for fatigue and unexpected weight change (gained about 10 lbs over last few months)  HENT: Negative for sinus pressure, sinus pain and sore throat  Eyes: Negative for visual disturbance  Respiratory: Positive for cough (smokers cough)  Negative for shortness of breath and wheezing  Cardiovascular: Negative for chest pain (occ and thinks due to anxiety) and palpitations  Gastrointestinal: Positive for diarrhea (at times)  Negative for abdominal pain, constipation, nausea and vomiting  Endocrine: Positive for polydipsia  Genitourinary: Positive for frequency  Negative for dysuria  Musculoskeletal: Positive for back pain (no injury) and neck pain  Negative for arthralgias and myalgias  Skin: Negative for rash and wound  Allergic/Immunologic: Negative for immunocompromised state  Neurological: Positive for numbness (hand b/l intermittent)  Negative for dizziness and headaches  Hematological: Does not bruise/bleed easily  Psychiatric/Behavioral: Positive for dysphoric mood  Negative for self-injury and suicidal ideas  The patient is nervous/anxious          Current Outpatient Medications on File Prior to Visit   Medication Sig   • ARIPiprazole (ABILIFY) 15 mg tablet Take 15 mg by mouth in the morning     • clonazePAM (KlonoPIN) 1 mg tablet take 1 tablet by mouth twice a day if needed  1 TAB IN THE A M  SECOND TAB AS NEEDED   • FLUoxetine (PROzac) 20 mg capsule take 1 capsule by mouth every morning  AS DIRECTED   • FLUoxetine (PROzac) 40 MG capsule Take 40 mg by mouth every morning   • hydrOXYzine HCL (ATARAX) 50 mg tablet Take 50 mg by mouth 3 (three) times a day   • [DISCONTINUED] atorvastatin (LIPITOR) 20 mg tablet Take 1 tablet (20 mg total) by mouth daily (Patient not taking: Reported on 1/16/2023)   • [DISCONTINUED] gabapentin (Neurontin) 300 mg capsule Take 1 capsule (300 mg total) by mouth daily at bedtime (Patient not taking: Reported on 1/16/2023)   • [DISCONTINUED] meloxicam (MOBIC) 15 mg tablet Take 1 tablet (15 mg total) by mouth daily (Patient not taking: Reported on 1/16/2023)   • [DISCONTINUED] methylPREDNISolone 4 MG tablet therapy pack Use as directed on package   • [DISCONTINUED] naproxen (Naprosyn) 500 mg tablet Take 1 tablet (500 mg total) by mouth 2 (two) times a day with meals (Patient not taking: Reported on 1/16/2023)   • [DISCONTINUED] oxyCODONE-acetaminophen (Percocet) 5-325 mg per tablet Take 1 tablet by mouth every 4 (four) hours as needed for moderate pain Max Daily Amount: 6 tablets   • [DISCONTINUED] TiZANidine (ZANAFLEX) 2 MG capsule Take 2 capsules (4 mg total) by mouth daily at bedtime as needed for muscle spasms       Objective     /80 (BP Location: Right arm, Patient Position: Sitting, Cuff Size: Extra-Large)   Pulse 83   Temp (!) 96 6 °F (35 9 °C) (Temporal)   Resp 16   Ht 5' 6 5" (1 689 m)   Wt 117 kg (258 lb)   SpO2 95%   BMI 41 02 kg/m²     Physical Exam  Vitals reviewed  Constitutional:       General: She is not in acute distress  Appearance: She is obese  She is not ill-appearing  Eyes:      General: No scleral icterus  Neck:      Vascular: No carotid bruit  Cardiovascular:      Rate and Rhythm: Normal rate and regular rhythm  Pulmonary:      Effort: Pulmonary effort is normal  No respiratory distress  Breath sounds: Normal breath sounds  No wheezing or rales  Musculoskeletal:      Cervical back: Normal range of motion  Skin:     General: Skin is warm and dry  Coloration: Skin is not jaundiced or pale  Neurological:      General: No focal deficit present  Mental Status: She is alert and oriented to person, place, and time  Cranial Nerves: No cranial nerve deficit  Sensory: No sensory deficit  Psychiatric:         Mood and Affect: Mood normal          Behavior: Behavior normal          Thought Content:  Thought content normal          Judgment: Judgment normal        Dani Saltness

## 2023-01-17 ENCOUNTER — TELEPHONE (OUTPATIENT)
Dept: FAMILY MEDICINE CLINIC | Facility: CLINIC | Age: 48
End: 2023-01-17

## 2023-01-17 ENCOUNTER — OFFICE VISIT (OUTPATIENT)
Dept: FAMILY MEDICINE CLINIC | Facility: CLINIC | Age: 48
End: 2023-01-17

## 2023-01-17 VITALS
WEIGHT: 258 LBS | DIASTOLIC BLOOD PRESSURE: 80 MMHG | OXYGEN SATURATION: 98 % | HEART RATE: 87 BPM | RESPIRATION RATE: 16 BRPM | BODY MASS INDEX: 40.49 KG/M2 | SYSTOLIC BLOOD PRESSURE: 130 MMHG | HEIGHT: 67 IN | TEMPERATURE: 97.8 F

## 2023-01-17 DIAGNOSIS — E55.9 VITAMIN D DEFICIENCY: ICD-10-CM

## 2023-01-17 DIAGNOSIS — Z72.0 TOBACCO ABUSE: ICD-10-CM

## 2023-01-17 DIAGNOSIS — E11.65 TYPE 2 DIABETES MELLITUS WITH HYPERGLYCEMIA, WITHOUT LONG-TERM CURRENT USE OF INSULIN (HCC): Primary | ICD-10-CM

## 2023-01-17 DIAGNOSIS — E78.2 MIXED HYPERLIPIDEMIA: ICD-10-CM

## 2023-01-17 LAB
25(OH)D3+25(OH)D2 SERPL-MCNC: 14.2 NG/ML (ref 30–100)
ALBUMIN SERPL-MCNC: 4.6 G/DL (ref 3.8–4.8)
ALBUMIN/GLOB SERPL: 1.8 {RATIO} (ref 1.2–2.2)
ALP SERPL-CCNC: 82 IU/L (ref 44–121)
ALT SERPL-CCNC: 15 IU/L (ref 0–32)
AST SERPL-CCNC: 18 IU/L (ref 0–40)
BASOPHILS # BLD AUTO: 0 X10E3/UL (ref 0–0.2)
BASOPHILS NFR BLD AUTO: 1 %
BILIRUB SERPL-MCNC: 0.2 MG/DL (ref 0–1.2)
BUN SERPL-MCNC: 13 MG/DL (ref 6–24)
BUN/CREAT SERPL: 16 (ref 9–23)
CALCIUM SERPL-MCNC: 9.8 MG/DL (ref 8.7–10.2)
CHLORIDE SERPL-SCNC: 99 MMOL/L (ref 96–106)
CHOLEST SERPL-MCNC: 264 MG/DL (ref 100–199)
CHOLEST/HDLC SERPL: 5.5 RATIO (ref 0–4.4)
CO2 SERPL-SCNC: 22 MMOL/L (ref 20–29)
CREAT SERPL-MCNC: 0.81 MG/DL (ref 0.57–1)
EGFR: 91 ML/MIN/1.73
EOSINOPHIL # BLD AUTO: 0.2 X10E3/UL (ref 0–0.4)
EOSINOPHIL NFR BLD AUTO: 3 %
ERYTHROCYTE [DISTWIDTH] IN BLOOD BY AUTOMATED COUNT: 12.8 % (ref 11.7–15.4)
EST. AVERAGE GLUCOSE BLD GHB EST-MCNC: 209 MG/DL
GLOBULIN SER-MCNC: 2.5 G/DL (ref 1.5–4.5)
GLUCOSE SERPL-MCNC: 82 MG/DL (ref 70–99)
HBA1C MFR BLD: 8.9 % (ref 4.8–5.6)
HCT VFR BLD AUTO: 36.4 % (ref 34–46.6)
HDLC SERPL-MCNC: 48 MG/DL
HGB BLD-MCNC: 12.4 G/DL (ref 11.1–15.9)
HIV 1+2 AB+HIV1 P24 AG SERPL QL IA: NON REACTIVE
IMM GRANULOCYTES # BLD: 0 X10E3/UL (ref 0–0.1)
IMM GRANULOCYTES NFR BLD: 0 %
LDLC SERPL CALC-MCNC: 172 MG/DL (ref 0–99)
LYMPHOCYTES # BLD AUTO: 1.9 X10E3/UL (ref 0.7–3.1)
LYMPHOCYTES NFR BLD AUTO: 27 %
MCH RBC QN AUTO: 29.1 PG (ref 26.6–33)
MCHC RBC AUTO-ENTMCNC: 34.1 G/DL (ref 31.5–35.7)
MCV RBC AUTO: 85 FL (ref 79–97)
MONOCYTES # BLD AUTO: 0.6 X10E3/UL (ref 0.1–0.9)
MONOCYTES NFR BLD AUTO: 8 %
NEUTROPHILS # BLD AUTO: 4.3 X10E3/UL (ref 1.4–7)
NEUTROPHILS NFR BLD AUTO: 61 %
PLATELET # BLD AUTO: 283 X10E3/UL (ref 150–450)
POTASSIUM SERPL-SCNC: 4.1 MMOL/L (ref 3.5–5.2)
PROT SERPL-MCNC: 7.1 G/DL (ref 6–8.5)
RBC # BLD AUTO: 4.26 X10E6/UL (ref 3.77–5.28)
SL AMB VLDL CHOLESTEROL CALC: 44 MG/DL (ref 5–40)
SODIUM SERPL-SCNC: 138 MMOL/L (ref 134–144)
TRIGL SERPL-MCNC: 232 MG/DL (ref 0–149)
TSH SERPL DL<=0.005 MIU/L-ACNC: 3.23 UIU/ML (ref 0.45–4.5)
WBC # BLD AUTO: 7 X10E3/UL (ref 3.4–10.8)

## 2023-01-17 RX ORDER — NICOTINE 21 MG/24HR
1 PATCH, TRANSDERMAL 24 HOURS TRANSDERMAL EVERY 24 HOURS
Qty: 28 PATCH | Refills: 1 | Status: SHIPPED | OUTPATIENT
Start: 2023-01-17

## 2023-01-17 RX ORDER — MELATONIN
1000 DAILY
Qty: 90 TABLET | Refills: 1 | Status: SHIPPED | OUTPATIENT
Start: 2023-01-17

## 2023-01-17 RX ORDER — ROSUVASTATIN CALCIUM 10 MG/1
10 TABLET, COATED ORAL DAILY
Qty: 90 TABLET | Refills: 1 | Status: SHIPPED | OUTPATIENT
Start: 2023-01-17

## 2023-01-17 RX ORDER — METFORMIN HYDROCHLORIDE 750 MG/1
750 TABLET, EXTENDED RELEASE ORAL
Qty: 90 TABLET | Refills: 1 | Status: SHIPPED | OUTPATIENT
Start: 2023-01-17 | End: 2023-07-16

## 2023-01-17 NOTE — PATIENT INSTRUCTIONS
Start Metformin 750mg daily with breakfast (diabetes)  Start Rosuvastatin 10mg daily (cholesterol)  Start Vitamin D3 1000 units daily  Heart healthy, carbohydrate controlled diet   Check blood sugar daily  Bring diary of blood sugar readings to next appt     Nicotine patches for smoking cessation  Weight loss  Regular exercise  Continue all other medications

## 2023-01-17 NOTE — TELEPHONE ENCOUNTER
----- Message from [address] sent at 1/17/2023  8:13 AM EST -----  Please call pt and advise that labs were reviewed  Some concerning results including very high diabetic markers and chol numbers  Has appt scheduled for physical and lab review on 1/24  Please call pt and see if can come in sooner to review, make appt 30 minute lab review  TY    Lm for patient to call the office to see if she can come in sooner than 1/24 to discuss labs

## 2023-01-17 NOTE — PROGRESS NOTES
Name: Samantha Pickett      : 1975      MRN: [id number]  Encounter Provider: JORDAN Niño  Encounter Date: 2023   Encounter department: [address] [place] [address]  Type 2 diabetes mellitus with hyperglycemia, without long-term current use of insulin (HCC)  Start Metformin 750mg daily with breakfast (diabetes)  Start Rosuvastatin 10mg daily (cholesterol)  Heart healthy, carbohydrate controlled diet - written diabetic meal plan given  Check blood sugar daily  Bring diary of blood sugar readings to next appt  Weight loss  Regular exercise  Will check labs in 3 months  - Microalbumin / creatinine urine ratio  - Hemoglobin A1C; Future  - Comprehensive metabolic panel; Future  - Lipid Panel with Direct LDL reflex; Future  - metFORMIN (GLUCOPHAGE-XR) 750 mg 24 hr tablet; Take 1 tablet (750 mg total) by mouth daily with breakfast  Dispense: 90 tablet; Refill: 1  - rosuvastatin (CRESTOR) 10 MG tablet; Take 1 tablet (10 mg total) by mouth daily  Dispense: 90 tablet; Refill: 1    2  Mixed hyperlipidemia  Statin  Heart healthy diet  Weight loss  Regular exercise  - Comprehensive metabolic panel; Future  - Lipid Panel with Direct LDL reflex; Future  - rosuvastatin (CRESTOR) 10 MG tablet; Take 1 tablet (10 mg total) by mouth daily  Dispense: 90 tablet; Refill: 1    3  Vitamin D deficiency  - cholecalciferol (VITAMIN D3) 1,000 units tablet; Take 1 tablet (1,000 Units total) by mouth daily  Dispense: 90 tablet; Refill: 1    4  Tobacco abuse  Tobacco Cessation Counseling: Tobacco cessation counseling and education was provided  The patient is sincerely urged to quit consumption of tobacco  She is ready to quit tobacco  The numerous health risks of tobacco consumption were discussed  Prescribed the following medications: nicotine patch   - nicotine (NICODERM CQ) 21 mg/24 hr TD 24 hr patch; Place 1 patch on the skin over 24 hours every 24 hours  Dispense: 28 patch;  Refill: 1        Patient was counseled regarding instructions for management which included: impression/diagnosis, risk/benefits of treatment options, importance of compliance with treatment, risk factor reduction, and prognosis  I have reviewed the instructions with the patient answering all questions and patient verbalized understanding  Subjective      Here for follow up to review labs  Was on meds for high chol and stopped about 2 months ago when ran out  History of borderline blood sugar, Pre-diabetes  Never with actual dx of DM or meds for DM  Has been feeling tired , gaining weight  Admits poor diet, no exercise  (+) smoker  Willing to stop smoking  Sees psych for issues with anxiety and depression  Review of Systems   Constitutional: Positive for fatigue and unexpected weight change  Cardiovascular: Negative for palpitations  Endocrine: Negative for polydipsia and polyuria  Neurological: Negative for dizziness and headaches  Psychiatric/Behavioral: Positive for dysphoric mood  The patient is nervous/anxious          Current Outpatient Medications on File Prior to Visit   Medication Sig   • ARIPiprazole (ABILIFY) 15 mg tablet Take 15 mg by mouth in the morning     • clonazePAM (KlonoPIN) 1 mg tablet take 1 tablet by mouth twice a day if needed  1 TAB IN THE A M  SECOND TAB AS NEEDED   • FLUoxetine (PROzac) 20 mg capsule take 1 capsule by mouth every morning  AS DIRECTED   • FLUoxetine (PROzac) 40 MG capsule Take 40 mg by mouth every morning   • hydrOXYzine HCL (ATARAX) 50 mg tablet Take 50 mg by mouth 3 (three) times a day       Objective     Recent Results (from the past 672 hour(s))   CBC and differential    Collection Time: 01/16/23  4:45 PM   Result Value Ref Range    White Blood Cell Count 7 0 3 4 - 10 8 x10E3/uL    Red Blood Cell Count 4 26 3 77 - 5 28 x10E6/uL    Hemoglobin 12 4 11 1 - 15 9 g/dL    HCT 36 4 34 0 - 46 6 %    MCV 85 79 - 97 fL    MCH 29 1 26 6 - 33 0 pg    MCHC 34 1 31 5 - 35 7 g/dL    RDW 12 8 11 7 - 15 4 %    Platelet Count 866 028 - 450 x10E3/uL    Neutrophils 61 Not Estab  %    Lymphocytes 27 Not Estab  %    Monocytes 8 Not Estab  %    Eosinophils 3 Not Estab  %    Basophils PCT 1 Not Estab  %    Neutrophils (Absolute) 4 3 1 4 - 7 0 x10E3/uL    Lymphocytes (Absolute) 1 9 0 7 - 3 1 x10E3/uL    Monocytes (Absolute) 0 6 0 1 - 0 9 x10E3/uL    Eosinophils (Absolute) 0 2 0 0 - 0 4 x10E3/uL    Basophils ABS 0 0 0 0 - 0 2 x10E3/uL    Immature Granulocytes 0 Not Estab  %    Immature Granulocytes (Absolute) 0 0 0 0 - 0 1 x10E3/uL   Comprehensive metabolic panel    Collection Time: 01/16/23  4:45 PM   Result Value Ref Range    Glucose, Random 82 70 - 99 mg/dL    BUN 13 6 - 24 mg/dL    Creatinine 0 81 0 57 - 1 00 mg/dL    eGFR 91 >59 mL/min/1 73    SL AMB BUN/CREATININE RATIO 16 9 - 23    Sodium 138 134 - 144 mmol/L    Potassium 4 1 3 5 - 5 2 mmol/L    Chloride 99 96 - 106 mmol/L    CO2 22 20 - 29 mmol/L    CALCIUM 9 8 8 7 - 10 2 mg/dL    Protein, Total 7 1 6 0 - 8 5 g/dL    Albumin 4 6 3 8 - 4 8 g/dL    Globulin, Total 2 5 1 5 - 4 5 g/dL    Albumin/Globulin Ratio 1 8 1 2 - 2 2    TOTAL BILIRUBIN 0 2 0 0 - 1 2 mg/dL    Alk Phos Isoenzymes 82 44 - 121 IU/L    AST 18 0 - 40 IU/L    ALT 15 0 - 32 IU/L   Hemoglobin A1C    Collection Time: 01/16/23  4:45 PM   Result Value Ref Range    Hemoglobin A1C 8 9 (H) 4 8 - 5 6 %    Estimated Average Glucose 209 mg/dL   Lipid panel    Collection Time: 01/16/23  4:45 PM   Result Value Ref Range    Cholesterol, Total 264 (H) 100 - 199 mg/dL    Triglycerides 232 (H) 0 - 149 mg/dL    HDL 48 >39 mg/dL    VLDL Cholesterol Calculated 44 (H) 5 - 40 mg/dL    LDL Calculated 172 (H) 0 - 99 mg/dL    T   Chol/HDL Ratio 5 5 (H) 0 0 - 4 4 ratio   TSH, 3rd generation    Collection Time: 01/16/23  4:45 PM   Result Value Ref Range    TSH 3 230 0 450 - 4 500 uIU/mL   Vitamin D 25 hydroxy    Collection Time: 01/16/23  4:45 PM   Result Value Ref Range    25-HYDROXY VIT D 14 2 (L) 30 0 - 100 0 ng/mL   HIV 1/2 AG/AB W REFLEX [facility] only    Collection Time: 01/16/23  4:45 PM   Result Value Ref Range    HIV Screen 4th Generation wRflx Non Reactive Non Reactive     Reviewed lab/diagnostic results with pt including both normal and abnormal findings  In depth counseling and instructions given  All questions answered during visit  /80   Pulse 87   Temp 97 8 °F (36 6 °C) (Temporal)   Resp 16   Ht 5' 6 5" (1 689 m)   Wt 117 kg (258 lb)   SpO2 98%   BMI 41 02 kg/m²     Physical Exam  Vitals reviewed  Constitutional:       General: She is not in acute distress  Appearance: She is obese  She is not ill-appearing  Neck:      Vascular: No carotid bruit  Cardiovascular:      Rate and Rhythm: Normal rate  Pulses: no weak pulses          Dorsalis pedis pulses are 2+ on the right side and 2+ on the left side  Posterior tibial pulses are 2+ on the right side and 2+ on the left side  Pulmonary:      Effort: Pulmonary effort is normal    Musculoskeletal:      Cervical back: Normal range of motion  Feet:      Right foot:      Skin integrity: No ulcer, skin breakdown, erythema, warmth, callus or dry skin  Left foot:      Skin integrity: No ulcer, skin breakdown, erythema, warmth, callus or dry skin  Skin:     General: Skin is warm and dry  Coloration: Skin is not jaundiced or pale  Neurological:      General: No focal deficit present  Mental Status: She is alert and oriented to person, place, and time  Cranial Nerves: No cranial nerve deficit  Sensory: No sensory deficit  Psychiatric:         Mood and Affect: Mood normal          Behavior: Behavior normal          Thought Content: Thought content normal          Judgment: Judgment normal      Patient's shoes and socks removed  Right Foot/Ankle   Right Foot Inspection  Skin Exam: skin normal and skin intact   No dry skin, no warmth, no callus, no erythema, no maceration, no abnormal color, no pre-ulcer, no ulcer and no callus  Toe Exam: ROM and strength within normal limits  Sensory   Monofilament testing: intact    Vascular  Capillary refills: < 3 seconds  The right DP pulse is 2+  The right PT pulse is 2+  Left Foot/Ankle  Left Foot Inspection  Skin Exam: skin normal and skin intact  No dry skin, no warmth, no erythema, no maceration, normal color, no pre-ulcer, no ulcer and no callus  Toe Exam: ROM and strength within normal limits  Sensory   Monofilament testing: intact    Vascular  Capillary refills: < 3 seconds  The left DP pulse is 2+  The left PT pulse is 2+       Assign Risk Category  No deformity present  No loss of protective sensation  No weak pulses  Risk: Antonella 172, CRNP

## 2023-01-18 LAB
ALBUMIN/CREAT UR: 359 MG/G CREAT (ref 0–29)
CREAT UR-MCNC: 61.3 MG/DL
MICROALBUMIN UR-MCNC: 220.3 UG/ML

## 2023-01-26 ENCOUNTER — OFFICE VISIT (OUTPATIENT)
Dept: PODIATRY | Facility: CLINIC | Age: 48
End: 2023-01-26

## 2023-01-26 VITALS
BODY MASS INDEX: 40.49 KG/M2 | HEIGHT: 67 IN | DIASTOLIC BLOOD PRESSURE: 80 MMHG | RESPIRATION RATE: 16 BRPM | WEIGHT: 258 LBS | SYSTOLIC BLOOD PRESSURE: 130 MMHG

## 2023-01-26 DIAGNOSIS — M72.2 PLANTAR FASCIITIS OF RIGHT FOOT: Primary | ICD-10-CM

## 2023-01-26 DIAGNOSIS — M79.671 RIGHT FOOT PAIN: ICD-10-CM

## 2023-01-26 DIAGNOSIS — M72.2 PLANTAR FASCIITIS, BILATERAL: ICD-10-CM

## 2023-01-26 RX ORDER — METHYLPREDNISOLONE 4 MG/1
TABLET ORAL
Qty: 21 TABLET | Refills: 0 | Status: SHIPPED | OUTPATIENT
Start: 2023-01-26 | End: 2023-02-14 | Stop reason: ALTCHOICE

## 2023-01-26 RX ORDER — OXYCODONE HYDROCHLORIDE AND ACETAMINOPHEN 5; 325 MG/1; MG/1
1 TABLET ORAL EVERY 4 HOURS PRN
Qty: 18 TABLET | Refills: 0 | Status: SHIPPED | OUTPATIENT
Start: 2023-01-26 | End: 2023-02-14 | Stop reason: ALTCHOICE

## 2023-02-13 RX ORDER — BLOOD SUGAR DIAGNOSTIC
STRIP MISCELLANEOUS DAILY
COMMUNITY
Start: 2023-01-19

## 2023-02-13 RX ORDER — BLOOD-GLUCOSE METER
EACH MISCELLANEOUS
COMMUNITY
Start: 2023-01-18

## 2023-02-14 ENCOUNTER — OFFICE VISIT (OUTPATIENT)
Dept: FAMILY MEDICINE CLINIC | Facility: CLINIC | Age: 48
End: 2023-02-14

## 2023-02-14 VITALS
HEIGHT: 67 IN | DIASTOLIC BLOOD PRESSURE: 80 MMHG | OXYGEN SATURATION: 96 % | HEART RATE: 81 BPM | BODY MASS INDEX: 38.92 KG/M2 | TEMPERATURE: 96.6 F | RESPIRATION RATE: 16 BRPM | WEIGHT: 248 LBS | SYSTOLIC BLOOD PRESSURE: 112 MMHG

## 2023-02-14 DIAGNOSIS — J06.9 VIRAL URI: Primary | ICD-10-CM

## 2023-02-14 DIAGNOSIS — R05.1 ACUTE COUGH: ICD-10-CM

## 2023-02-14 LAB
SARS-COV-2 AG UPPER RESP QL IA: NEGATIVE
VALID CONTROL: NORMAL

## 2023-02-14 RX ORDER — DEXTROMETHORPHAN HYDROBROMIDE AND PROMETHAZINE HYDROCHLORIDE 15; 6.25 MG/5ML; MG/5ML
5 SOLUTION ORAL 4 TIMES DAILY PRN
Qty: 240 ML | Refills: 0 | Status: SHIPPED | OUTPATIENT
Start: 2023-02-14

## 2023-02-14 RX ORDER — PREDNISONE 20 MG/1
20 TABLET ORAL DAILY
Qty: 5 TABLET | Refills: 0 | Status: SHIPPED | OUTPATIENT
Start: 2023-02-14 | End: 2023-02-19

## 2023-02-14 NOTE — PROGRESS NOTES
Name: Miryam Molina      : 1975      MRN: [id number]  Encounter Provider: JORDAN Garcia  Encounter Date: 2023   Encounter department: [address] [place] [address]  Acute cough  - POCT Rapid Covid Ag- negative  - predniSONE 20 mg tablet; Take 1 tablet (20 mg total) by mouth daily for 5 days  Dispense: 5 tablet; Refill: 0  - Promethazine-DM (PHENERGAN-DM) 6 25-15 mg/5 mL oral syrup; Take 5 mL by mouth 4 (four) times a day as needed for cough  Dispense: 240 mL; Refill: 0    2  Viral URI  Fluids  Rest  Nasal saline  Supportive care for symptom management  Tylenol or ibuprofen as needed for fever or discomfort  Use a cool mist humidifier at bedtime  Promethazine DM as needed for cough/congestion  Prednisone 20mg daily with food for 5 days to decrease any inflammation in lungs  Antibiotics are not indicated at this time  Symptoms may persist for 7-14 days  Call or return to office if symptoms worsen or if new symptoms develop  - Promethazine-DM (PHENERGAN-DM) 6 25-15 mg/5 mL oral syrup; Take 5 mL by mouth 4 (four) times a day as needed for cough  Dispense: 240 mL; Refill: 0         Subjective      Cough and cold symptoms   Started 5 days ago  No fever  Did not take home covid test  Taking otc quyen seltzer cold med  (+) smoker  Reports cough getting worse, harsh    Review of Systems   Constitutional: Positive for fatigue  Negative for fever  HENT: Positive for congestion, postnasal drip and rhinorrhea  Negative for sinus pressure, sinus pain and sore throat  Respiratory: Positive for cough  Negative for shortness of breath  Gastrointestinal: Negative for diarrhea, nausea and vomiting  Musculoskeletal: Negative for myalgias  Skin: Negative for rash  Neurological: Negative for dizziness and headaches  Hematological: Negative for adenopathy         Current Outpatient Medications on File Prior to Visit   Medication Sig   • Accu-Chek Guide test strip daily Test blood sugar   • ARIPiprazole (ABILIFY) 15 mg tablet Take 15 mg by mouth in the morning     • Blood Glucose Monitoring Suppl (Accu-Chek Guide) w/Device KIT Use as directed   • clonazePAM (KlonoPIN) 1 mg tablet take 1 tablet by mouth twice a day if needed  1 TAB IN THE A M  SECOND TAB AS NEEDED   • FLUoxetine (PROzac) 20 mg capsule take 1 capsule by mouth every morning  AS DIRECTED   • FLUoxetine (PROzac) 40 MG capsule Take 40 mg by mouth every morning   • metFORMIN (GLUCOPHAGE-XR) 750 mg 24 hr tablet Take 1 tablet (750 mg total) by mouth daily with breakfast   • rosuvastatin (CRESTOR) 10 MG tablet Take 1 tablet (10 mg total) by mouth daily   • cholecalciferol (VITAMIN D3) 1,000 units tablet Take 1 tablet (1,000 Units total) by mouth daily (Patient not taking: Reported on 2/14/2023)   • hydrOXYzine HCL (ATARAX) 50 mg tablet Take 50 mg by mouth 3 (three) times a day (Patient not taking: Reported on 2/14/2023)   • methylPREDNISolone 4 MG tablet therapy pack Use as directed on package (Patient not taking: Reported on 2/14/2023)   • nicotine (NICODERM CQ) 21 mg/24 hr TD 24 hr patch Place 1 patch on the skin over 24 hours every 24 hours (Patient not taking: Reported on 2/14/2023)   • oxyCODONE-acetaminophen (Percocet) 5-325 mg per tablet Take 1 tablet by mouth every 4 (four) hours as needed for moderate pain Max Daily Amount: 6 tablets       Objective     /80   Pulse 81   Temp (!) 96 6 °F (35 9 °C) (Temporal)   Resp 16   Ht 5' 6 5" (1 689 m)   Wt 112 kg (248 lb)   SpO2 96%   BMI 39 43 kg/m²     Physical Exam  Vitals reviewed  Constitutional:       General: She is not in acute distress  Appearance: She is not ill-appearing  HENT:      Right Ear: Tympanic membrane and ear canal normal       Left Ear: Tympanic membrane and ear canal normal       Nose: Congestion present  Mouth/Throat:      Mouth: Mucous membranes are moist       Pharynx: Oropharynx is clear     Eyes:      General: Right eye: No discharge  Left eye: No discharge  Cardiovascular:      Rate and Rhythm: Normal rate and regular rhythm  Pulmonary:      Effort: Pulmonary effort is normal  No respiratory distress  Breath sounds: Normal breath sounds  No wheezing, rhonchi or rales  Musculoskeletal:      Cervical back: Normal range of motion and neck supple  Skin:     General: Skin is warm and dry  Coloration: Skin is not jaundiced or pale  Neurological:      General: No focal deficit present  Mental Status: She is alert and oriented to person, place, and time  Cranial Nerves: No cranial nerve deficit  Sensory: No sensory deficit  Psychiatric:         Mood and Affect: Mood normal          Behavior: Behavior normal          Thought Content:  Thought content normal          Judgment: Judgment normal        Venkat Chacko

## 2023-02-14 NOTE — PATIENT INSTRUCTIONS
Fluids  Rest  Nasal saline  Supportive care for symptom management  Tylenol or ibuprofen as needed for fever or discomfort  Use a cool mist humidifier at bedtime  Promethazine DM as needed for cough/congestion  Prednisone 20mg daily with food for 5 days to decrease any inflammation in lungs  Antibiotics are not indicated at this time  Symptoms may persist for 7-14 days  Call or return to office if symptoms worsen or if new symptoms develop

## 2023-03-02 NOTE — PROGRESS NOTES
Assessment/Plan:      Patient is not compliant with physical therapy, still report marked pain to bilateral lower extremity, no x-rays performed, patient also did not obtain MRI, patient states that she does not go to physical therapy yet, patient refused steroid injection this visit discussed with the patient surgical management, patient to obtain MRI for future planning        Diagnoses and all orders for this visit:    Plantar fasciitis of right foot  -     Discontinue: methylPREDNISolone 4 MG tablet therapy pack; Use as directed on package (Patient not taking: Reported on 2/14/2023)  -     MRI foot/forefoot toes right wo contrast; Future  -     Discontinue: oxyCODONE-acetaminophen (Percocet) 5-325 mg per tablet; Take 1 tablet by mouth every 4 (four) hours as needed for moderate pain Max Daily Amount: 6 tablets    Right foot pain    Plantar fasciitis, bilateral          Subjective:      Patient ID: Brianda Richards is a 41 y o  female  Return patient for bilateral plantar fascia right more than left, patient still in pain, patient denies constitutional symptoms      The following portions of the patient's history were reviewed and updated as appropriate: allergies, current medications, past family history, past medical history, past social history, past surgical history and problem list     Review of Systems   Constitutional: Negative  Respiratory: Negative  Cardiovascular: Negative  Musculoskeletal: Negative  Skin: Negative                  Historical Information   Past Medical History:   Diagnosis Date   • Bipolar disorder ([facility] [address] )    • Depression    • History of wound infection     left ankle   • Psychiatric disorder     depression, anxiety   • Seasonal allergic reaction      Past Surgical History:   Procedure Laterality Date   • IR PICC LINE  2/8/2019   • NM OPEN TREATMENT BIMALLEOLAR ANKLE FRACTURE Left 5/14/2018    Procedure: OPEN REDUCTION W/ INTERNAL FIXATION (ORIF) ANKLE;  Surgeon: Chet Bowie Maximo Ortiz DO;  Location: [facility] OR;  Service: Orthopedics   • PA REMOVAL IMPLANT DEEP Left 2/5/2019    Procedure: REMOVAL HARDWARE ANKLE;  Surgeon: Asif Carey DO;  [facility];  Service: Orthopedics   • REDUCTION MAMMAPLASTY  2001    reduction     Social History   Social History     Substance and Sexual Activity   Alcohol Use Yes   • Alcohol/week: 3 0 standard drinks   • Types: 3 Cans of beer per week    Comment: social     Social History     Substance and Sexual Activity   Drug Use Never     Social History     Tobacco Use   Smoking Status Every Day   • Packs/day: 1 00   • Years: 25 00   • Pack years: 25 00   • Types: Cigarettes   • Start date: 9/23/2000   Smokeless Tobacco Never     Family History:   Family History   Problem Relation Age of Onset   • Substance Abuse Mother    • Mental illness Mother    • Diabetes Mother    • Cancer Mother    • Asthma Mother    • Breast cancer Mother 54   • Substance Abuse Father    • No Known Problems Brother    • No Known Problems Daughter    • Substance Abuse Maternal Grandmother    • Mental illness Maternal Grandmother    • No Known Problems Maternal Grandfather    • No Known Problems Paternal Grandmother    • No Known Problems Paternal Grandfather    • No Known Problems Maternal Aunt    • No Known Problems Maternal Uncle    • ADD / ADHD Neg Hx    • Anesthesia problems Neg Hx    • Clotting disorder Neg Hx    • Collagen disease Neg Hx    • Dislocations Neg Hx    • Learning disabilities Neg Hx    • Neurological problems Neg Hx    • Osteoporosis Neg Hx    • Rheumatologic disease Neg Hx    • Scoliosis Neg Hx    • Vascular Disease Neg Hx        Meds/Allergies   all medications and allergies reviewed  Allergies   Allergen Reactions   • Latex Rash   • Toradol [Ketorolac Tromethamine] Itching     GI UPSET   • Ultram [Tramadol] Rash       Objective:      /80   Resp 16   Ht 5' 6 5" (1 689 m)   Wt 117 kg (258 lb)   BMI 41 02 kg/m²              Physical Exam  Constitutional:       General: She is not in acute distress  Appearance: She is well-developed  She is not ill-appearing, toxic-appearing or diaphoretic  HENT:      Head: Normocephalic and atraumatic  Cardiovascular:      Pulses: Normal pulses  Dorsalis pedis pulses are 2+ on the right side and 2+ on the left side  Posterior tibial pulses are 2+ on the right side and 2+ on the left side  Comments: Palpable pedal pulse, CFT is less than 3 seconds, temperature gradient within normal limits, pedal hair present, no trophic skin changes  Pulmonary:      Effort: No respiratory distress  Musculoskeletal:         General: Normal range of motion  Comments: Pain on palpation to the medial calcaneal tuberosity and the insertion of the medial band of the plantar fascia right foot   Feet:      Right foot:      Protective Sensation: 10 sites tested  10 sites sensed  Skin integrity: No ulcer, blister, skin breakdown or erythema  Left foot:      Protective Sensation: 10 sites tested  10 sites sensed  Skin integrity: No ulcer, blister, skin breakdown or erythema  Skin:     Capillary Refill: Capillary refill takes 2 to 3 seconds  Coloration: Skin is not pale  Neurological:      Mental Status: She is alert and oriented to person, place, and time  Comments:  muscle power 5/5, protective sensations intact, no focal motor deficit  Foot Exam    Right Foot/Ankle     Inspection and Palpation  Skin Exam: no blister, no maceration, no ulcer and no erythema     Neurovascular  Dorsalis pedis: 2+  Posterior tibial: 2+      Left Foot/Ankle      Inspection and Palpation  Skin Exam: no blister, no maceration, no ulcer and no erythema     Neurovascular  Dorsalis pedis: 2+  Posterior tibial: 2+              Portions of the record may have been created with voice recognition software   Occasional wrong word or "sound a like" substitutions may have occurred due to the inherent limitations of voice recognition software  Read the chart carefully and recognize, using context, where substitutions have occurred

## 2023-03-09 ENCOUNTER — OFFICE VISIT (OUTPATIENT)
Dept: PODIATRY | Facility: CLINIC | Age: 48
End: 2023-03-09

## 2023-03-09 VITALS
BODY MASS INDEX: 38.92 KG/M2 | RESPIRATION RATE: 16 BRPM | WEIGHT: 248 LBS | HEIGHT: 67 IN | DIASTOLIC BLOOD PRESSURE: 80 MMHG | SYSTOLIC BLOOD PRESSURE: 112 MMHG

## 2023-03-09 DIAGNOSIS — M72.2 PLANTAR FASCIITIS OF RIGHT FOOT: Primary | ICD-10-CM

## 2023-03-09 DIAGNOSIS — M79.671 RIGHT FOOT PAIN: ICD-10-CM

## 2023-03-09 RX ORDER — OXYCODONE HYDROCHLORIDE AND ACETAMINOPHEN 5; 325 MG/1; MG/1
1 TABLET ORAL EVERY 4 HOURS PRN
Qty: 12 TABLET | Refills: 0 | Status: SHIPPED | OUTPATIENT
Start: 2023-03-09

## 2023-03-09 NOTE — PROGRESS NOTES
Assessment/Plan:      Patient is not compliant with physical therapy, still report marked pain to bilateral lower extremity, no x-rays performed, patient also did not obtain MRI, patient states that she does not go to physical therapy yet, Trigger point injection to the right foot at the plantar fasciitis insertion, injection site prepped using alcohol, 2 cc of equal amount of Marcaine 0 5% plain and Kenalog 10 injected using 27 gauge needle, patient tolerated procedure well with no immediate complications  Diagnoses and all orders for this visit:    Plantar fasciitis of right foot    Right foot pain          Subjective:      Patient ID: David Jimenez is a 41 y o  female  Return patient for bilateral plantar fascia right more than left, patient still in pain, patient denies constitutional symptoms      The following portions of the patient's history were reviewed and updated as appropriate: allergies, current medications, past family history, past medical history, past social history, past surgical history and problem list     Review of Systems   Constitutional: Negative  Respiratory: Negative  Cardiovascular: Negative  Musculoskeletal: Negative  Skin: Negative                  Historical Information   Past Medical History:   Diagnosis Date   • [facility]    • Depression    • History of wound infection     left ankle   • Psychiatric disorder     depression, anxiety   • Seasonal allergic reaction      Past Surgical History:   Procedure Laterality Date   • IR PICC LINE  2/8/2019   • [place] OPEN TREATMENT BIMALLEOLAR ANKLE FRACTURE Left 5/14/2018    Procedure: OPEN REDUCTION W/ INTERNAL FIXATION (ORIF) ANKLE;  Surgeon: Tyree Bradley DO;  Location: [address] [place] [address];  Service: Orthopedics   • SD REMOVAL IMPLANT DEEP Left 2/5/2019    Procedure: REMOVAL HARDWARE ANKLE;  Surgeon: Tyree Bradley DO;  Location: [address] [place] [address];  Service: Orthopedics   • REDUCTION MAMMAPLASTY  2001    reduction     Social History   Social History     Substance and Sexual Activity   Alcohol Use Yes   • Alcohol/week: 3 0 standard drinks   • Types: 3 Cans of beer per week    Comment: social     Social History     Substance and Sexual Activity   Drug Use Never     Social History     Tobacco Use   Smoking Status Every Day   • Packs/day: 1 00   • Years: 25 00   • Pack years: 25 00   • Types: Cigarettes   • Start date: 9/23/2000   Smokeless Tobacco Never     Family History:   Family History   Problem Relation Age of Onset   • Substance Abuse Mother    • Mental illness Mother    • Diabetes Mother    • Cancer Mother    • Asthma Mother    • Breast cancer Mother 54   • Substance Abuse Father    • No Known Problems Brother    • No Known Problems Daughter    • Substance Abuse Maternal Grandmother    • Mental illness Maternal Grandmother    • No Known Problems Maternal Grandfather    • No Known Problems Paternal Grandmother    • No Known Problems Paternal Grandfather    • No Known Problems Maternal Aunt    • No Known Problems Maternal Uncle    • ADD / ADHD Neg Hx    • Anesthesia problems Neg Hx    • Clotting disorder Neg Hx    • Collagen disease Neg Hx    • Dislocations Neg Hx    • Learning disabilities Neg Hx    • Neurological problems Neg Hx    • Osteoporosis Neg Hx    • Rheumatologic disease Neg Hx    • Scoliosis Neg Hx    • Vascular Disease Neg Hx        Meds/Allergies   all medications and allergies reviewed  Allergies   Allergen Reactions   • Latex Rash   • Toradol [Ketorolac Tromethamine] Itching     GI UPSET   • Ultram [Tramadol] Rash       Objective:      /80   Resp 16   Ht 5' 6 5" (1 689 m)   Wt 112 kg (248 lb)   BMI 39 43 kg/m²              Physical Exam  Constitutional:       General: She is not in acute distress  Appearance: She is well-developed  She is not ill-appearing, toxic-appearing or diaphoretic  HENT:      Head: Normocephalic and atraumatic  Cardiovascular:      Pulses: Normal pulses             Dorsalis pedis pulses are 2+ on the right side and 2+ on the left side  Posterior tibial pulses are 2+ on the right side and 2+ on the left side  Comments: Palpable pedal pulse, CFT is less than 3 seconds, temperature gradient within normal limits, pedal hair present, no trophic skin changes  Pulmonary:      Effort: No respiratory distress  Musculoskeletal:         General: Normal range of motion  Comments: Pain on palpation to the medial calcaneal tuberosity and the insertion of the medial band of the plantar fascia right foot   Feet:      Right foot:      Protective Sensation: 10 sites tested  10 sites sensed  Skin integrity: No ulcer, blister, skin breakdown or erythema  Left foot:      Protective Sensation: 10 sites tested  10 sites sensed  Skin integrity: No ulcer, blister, skin breakdown or erythema  Skin:     Capillary Refill: Capillary refill takes 2 to 3 seconds  Coloration: Skin is not pale  Neurological:      Mental Status: She is alert and oriented to person, place, and time  Comments:  muscle power 5/5, protective sensations intact, no focal motor deficit  Foot Exam    Right Foot/Ankle     Inspection and Palpation  Skin Exam: no blister, no maceration, no ulcer and no erythema     Neurovascular  Dorsalis pedis: 2+  Posterior tibial: 2+      Left Foot/Ankle      Inspection and Palpation  Skin Exam: no blister, no maceration, no ulcer and no erythema     Neurovascular  Dorsalis pedis: 2+  Posterior tibial: 2+              Portions of the record may have been created with voice recognition software  Occasional wrong word or "sound a like" substitutions may have occurred due to the inherent limitations of voice recognition software  Read the chart carefully and recognize, using context, where substitutions have occurred

## 2023-04-25 LAB
ALBUMIN SERPL-MCNC: 4.5 G/DL (ref 3.8–4.8)
ALBUMIN/GLOB SERPL: 2 {RATIO} (ref 1.2–2.2)
ALP SERPL-CCNC: 71 IU/L (ref 44–121)
ALT SERPL-CCNC: 11 IU/L (ref 0–32)
AST SERPL-CCNC: 15 IU/L (ref 0–40)
BILIRUB SERPL-MCNC: 0.2 MG/DL (ref 0–1.2)
BUN SERPL-MCNC: 11 MG/DL (ref 6–24)
BUN/CREAT SERPL: 14 (ref 9–23)
CALCIUM SERPL-MCNC: 9.3 MG/DL (ref 8.7–10.2)
CHLORIDE SERPL-SCNC: 104 MMOL/L (ref 96–106)
CHOLEST SERPL-MCNC: 172 MG/DL (ref 100–199)
CO2 SERPL-SCNC: 23 MMOL/L (ref 20–29)
CREAT SERPL-MCNC: 0.8 MG/DL (ref 0.57–1)
EGFR: 93 ML/MIN/1.73
EST. AVERAGE GLUCOSE BLD GHB EST-MCNC: 117 MG/DL
GLOBULIN SER-MCNC: 2.2 G/DL (ref 1.5–4.5)
GLUCOSE SERPL-MCNC: 91 MG/DL (ref 70–99)
HBA1C MFR BLD: 5.7 % (ref 4.8–5.6)
HDLC SERPL-MCNC: 47 MG/DL
LDLC SERPL CALC-MCNC: 100 MG/DL (ref 0–99)
LDLC/HDLC SERPL: 2.1 RATIO (ref 0–3.2)
POTASSIUM SERPL-SCNC: 4.2 MMOL/L (ref 3.5–5.2)
PROT SERPL-MCNC: 6.7 G/DL (ref 6–8.5)
SL AMB VLDL CHOLESTEROL CALC: 25 MG/DL (ref 5–40)
SODIUM SERPL-SCNC: 140 MMOL/L (ref 134–144)
TRIGL SERPL-MCNC: 143 MG/DL (ref 0–149)

## 2023-04-27 ENCOUNTER — OFFICE VISIT (OUTPATIENT)
Dept: FAMILY MEDICINE CLINIC | Facility: CLINIC | Age: 48
End: 2023-04-27

## 2023-04-27 VITALS
BODY MASS INDEX: 38.89 KG/M2 | DIASTOLIC BLOOD PRESSURE: 80 MMHG | HEIGHT: 66 IN | SYSTOLIC BLOOD PRESSURE: 116 MMHG | OXYGEN SATURATION: 96 % | TEMPERATURE: 97.1 F | RESPIRATION RATE: 16 BRPM | HEART RATE: 84 BPM | WEIGHT: 242 LBS

## 2023-04-27 DIAGNOSIS — Z12.31 ENCOUNTER FOR SCREENING MAMMOGRAM FOR MALIGNANT NEOPLASM OF BREAST: ICD-10-CM

## 2023-04-27 DIAGNOSIS — Z72.0 TOBACCO ABUSE DISORDER: ICD-10-CM

## 2023-04-27 DIAGNOSIS — E66.01 CLASS 2 SEVERE OBESITY DUE TO EXCESS CALORIES WITH SERIOUS COMORBIDITY AND BODY MASS INDEX (BMI) OF 38.0 TO 38.9 IN ADULT (HCC): ICD-10-CM

## 2023-04-27 DIAGNOSIS — Z12.11 SCREENING FOR MALIGNANT NEOPLASM OF COLON: ICD-10-CM

## 2023-04-27 DIAGNOSIS — E11.65 TYPE 2 DIABETES MELLITUS WITH HYPERGLYCEMIA, WITHOUT LONG-TERM CURRENT USE OF INSULIN (HCC): Primary | ICD-10-CM

## 2023-04-27 DIAGNOSIS — Z00.00 ANNUAL PHYSICAL EXAM: ICD-10-CM

## 2023-04-27 NOTE — PROGRESS NOTES
FAMILY PRACTICE HEALTH MAINTENANCE OFFICE VISIT  [facility] - [facility]    NAME: Mendy Holm  AGE: 41 y o  SEX: female  : 1975     DATE: 2023    Assessment and Plan   1  Type 2 diabetes mellitus with hyperglycemia, without long-term current use of insulin (HCC)  Will decrease Metformin to 500mg daily  Carb controlled diet  Weight loss  Regular exercise  - Hemoglobin A1C; Future  - Comprehensive metabolic panel; Future  - - metFORMIN (GLUCOPHAGE) 500 mg tablet; Take 1 tablet (500 mg total) by mouth 2 (two) times a day with meals  Dispense: 90 tablet; Refill: 1    2  Encounter for screening mammogram for malignant neoplasm of breast  - Mammo screening bilateral w 3d & cad; Future    3  Class 2 severe obesity due to excess calories with serious comorbidity and body mass index (BMI) of 38 0 to 38 9 in adult [facility])  - Ambulatory Referral to Weight Management; Future  Weight loss is recommended to improve overall health  Dietary changes- limit carbohydrates, decrease overall caloric intake, reduce portion sizes, healthier snack choices, limit saturated fats, increase intake of fruits and vegetables, limit junk food  exercise to 3-5 times per week  Consider adding strength exercises to exercise routine  4  Screening for malignant neoplasm of colon  - Cologuard    5  Annual physical exam  Heart healthy, carbohydrate controlled diet- limit red meat, limit saturated fat, moderate salt intake, limit junk food, etc    Regular exercise  Stress management  Routine labwork and screenings as ordered  6  Tobacco abuse disorder  Smoking cessation counseling           · Patient Counseling:   · Nutrition: Stressed importance of a well balanced diet, moderation of sodium/saturated fat, caloric balance and sufficient intake of fiber  · Exercise: Stressed the importance of regular exercise with a goal of 150 minutes per week  · Dental Health: Discussed daily "flossing and brushing and regular dental visits   · Injury Prevention: Discussed Safety Belts, Safety Helmets, and Smoke Detectors    · Immunizations reviewed: Risks and Benefits discussed  · Discussed benefits of:  Colon Cancer Screening, Mammogram , Cervical Cancer screening and Screening labs  • BMI Counseling: Body mass index is 39 06 kg/m²  Discussed with patient's BMI with her  The BMI is above normal  Nutrition recommendations include reducing portion sizes, decreasing overall calorie intake and moderation in carbohydrate intake  Exercise recommendations include exercising 3-5 times per week  Patient referred to weight management due to patient being obese  Depression Screening Follow-up Plan: Patient's depression screening was positive   Tracy Lam Their PHQ-9 score was 15  Patient assessed for underlying major depression  They have no active suicidal ideations  Brief counseling provided and recommend additional follow-up/re-evaluation next office visit  Continue regular follow-up with their psychologist/therapist/psychiatrist who is managing their mental health condition(s)  Tobacco Cessation Counseling: Tobacco cessation counseling and education was provided  The patient is sincerely urged to quit consumption of tobacco  She is not ready to quit tobacco  The numerous health risks of tobacco consumption were discussed  If she decides to quit, there are a number of helpful adjunctive aids, and she can see me to discuss nicotine replacement therapy, chantix, or bupropion anytime in the future  Chief Complaint     Chief Complaint   Patient presents with   • Physical Exam       History of Present Illness     Here for fu DM and chronic issues, physical, and lab review  Has not been checking blood sugars at home  Feels \"pretty good\"  Would like to look into weight loss surgery options  Was started on metformin 750mg daily in January  A1C was 8 9 and now 5 7  Sees family guidance for anxiety/depression   " No suicidal ideation  Taking all meds as prescribed  Well Adult Physical   Patient here for a comprehensive physical exam       Diet and Physical Activity  Diet: poor diet; admits does not watch diet and does not follow low carb diet  Exercise: infrequently ; reports only exercise is walking dogs          General Health  Hearing: Normal:  bilateral  Vision: most recent eye exam >1 year  Dental: no dental visits for >1 year  Counseled on importance of routine dental care and eye exams  Reproductive Health  No issues ; overdue for gyn/pap  Encouraged to schedule  The following portions of the patient's history were reviewed and updated as appropriate: allergies, current medications, past family history, past medical history, past social history, past surgical history and problem list     Review of Systems     Review of Systems   Constitutional: Negative for chills, diaphoresis, fatigue and fever  HENT: Negative for congestion, ear pain, sinus pressure, sinus pain and sore throat  Eyes: Negative for visual disturbance  Respiratory: Negative for cough, chest tightness, shortness of breath and wheezing  Cardiovascular: Negative for chest pain, palpitations and leg swelling  Gastrointestinal: Negative for abdominal distention, abdominal pain, diarrhea and nausea  Endocrine: Negative for polydipsia  Genitourinary: Negative for dysuria, frequency and urgency  Musculoskeletal: Positive for arthralgias, back pain and myalgias  Chronic pain   Allergic/Immunologic: Negative for immunocompromised state  Neurological: Negative for dizziness, weakness and headaches  Hematological: Negative for adenopathy  Psychiatric/Behavioral: Negative for dysphoric mood  The patient is not nervous/anxious  All other systems reviewed and are negative        Past Medical History     Past Medical History:   Diagnosis Date   • Bipolar disorder ([facility] )    • Depression    • History of wound infection     left ankle   • Morbid obesity with BMI of 40 0-44 9, adult ([facility] ) 1/18/2018   • Psychiatric disorder     depression, anxiety   • Seasonal allergic reaction        Past Surgical History     Past Surgical History:   Procedure Laterality Date   • IR PICC LINE  2/8/2019   • CO OPEN TREATMENT BIMALLEOLAR ANKLE FRACTURE Left 5/14/2018    Procedure: OPEN REDUCTION W/ INTERNAL FIXATION (ORIF) ANKLE;  Surgeon: Gamal Hi DO;  Location: [address] [place] [address];  Service: Orthopedics   • CO REMOVAL IMPLANT DEEP Left 2/5/2019    Procedure: REMOVAL HARDWARE ANKLE;  Surgeon: Gamal Hi DO;  Location: [address] [place] [address] Road;  Service: Orthopedics   • REDUCTION MAMMAPLASTY  2001    reduction       Social History     Social History     Socioeconomic History   • Marital status: Single     Spouse name: None   • Number of children: 2   • Years of education: None   • Highest education level: 8th grade   Occupational History   • None   Tobacco Use   • Smoking status: Every Day     Packs/day: 1 00     Years: 25 00     Pack years: 25 00     Types: Cigarettes     Start date: 9/23/2000   • Smokeless tobacco: Never   Vaping Use   • Vaping Use: Former   Substance and Sexual Activity   • Alcohol use:  Yes     Alcohol/week: 3 0 standard drinks     Types: 3 Cans of beer per week     Comment: social   • Drug use: Never   • Sexual activity: Yes     Partners: Male   Other Topics Concern   • None   Social History Narrative   • None     Social Determinants of Health     Financial Resource Strain: Not on file   Food Insecurity: Not on file   Transportation Needs: Not on file   Physical Activity: Not on file   Stress: Not on file   Social Connections: Not on file   Intimate Partner Violence: Not on file   Housing Stability: Not on file       Family History     Family History   Problem Relation Age of Onset   • Substance Abuse Mother    • Mental illness Mother    • Diabetes Mother    • Cancer Mother    • Asthma Mother    • Breast cancer Mother 54   • Substance Abuse Father    • No Known Problems Brother    • No Known Problems Daughter    • Substance Abuse Maternal Grandmother    • Mental illness Maternal Grandmother    • No Known Problems Maternal Grandfather    • No Known Problems Paternal Grandmother    • No Known Problems Paternal Grandfather    • No Known Problems Maternal Aunt    • No Known Problems Maternal Uncle    • ADD / ADHD Neg Hx    • Anesthesia problems Neg Hx    • Clotting disorder Neg Hx    • Collagen disease Neg Hx    • Dislocations Neg Hx    • Learning disabilities Neg Hx    • Neurological problems Neg Hx    • Osteoporosis Neg Hx    • Rheumatologic disease Neg Hx    • Scoliosis Neg Hx    • Vascular Disease Neg Hx        Current Medications       Current Outpatient Medications:   •  Accu-Chek Guide test strip, daily Test blood sugar, Disp: , Rfl:   •  ARIPiprazole (ABILIFY) 15 mg tablet, Take 15 mg by mouth in the morning  , Disp: , Rfl:   •  Blood Glucose Monitoring Suppl (Accu-Chek Guide) w/Device KIT, Use as directed, Disp: , Rfl:   •  clonazePAM (KlonoPIN) 1 mg tablet, take 1 tablet by mouth twice a day if needed  1 TAB IN THE A M  SECOND TAB AS NEEDED, Disp: , Rfl:   •  FLUoxetine (PROzac) 20 mg capsule, take 1 capsule by mouth every morning  AS DIRECTED, Disp: , Rfl: 0  •  FLUoxetine (PROzac) 40 MG capsule, Take 40 mg by mouth every morning, Disp: , Rfl: 0  •  metFORMIN (GLUCOPHAGE) 500 mg tablet, Take 1 tablet (500 mg total) by mouth 2 (two) times a day with meals, Disp: 90 tablet, Rfl: 1  •  rosuvastatin (CRESTOR) 10 MG tablet, Take 1 tablet (10 mg total) by mouth daily, Disp: 90 tablet, Rfl: 1  •  cholecalciferol (VITAMIN D3) 1,000 units tablet, Take 1 tablet (1,000 Units total) by mouth daily (Patient not taking: Reported on 2/14/2023), Disp: 90 tablet, Rfl: 1  •  hydrOXYzine HCL (ATARAX) 50 mg tablet, Take 50 mg by mouth 3 (three) times a day (Patient not taking: Reported on 2/14/2023), Disp: , Rfl:      Allergies     Allergies "  Allergen Reactions   • Latex Rash   • Toradol [Ketorolac Tromethamine] Itching     GI UPSET   • Ultram [Tramadol] Rash       Objective     Recent Results (from the past 672 hour(s))   Hemoglobin A1C    Collection Time: 04/24/23 10:17 AM   Result Value Ref Range    Hemoglobin A1C 5 7 (H) 4 8 - 5 6 %    Estimated Average Glucose 117 mg/dL   Comprehensive metabolic panel    Collection Time: 04/24/23 10:17 AM   Result Value Ref Range    Glucose, Random 91 70 - 99 mg/dL    BUN 11 6 - 24 mg/dL    Creatinine 0 80 0 57 - 1 00 mg/dL    eGFR 93 >59 mL/min/1 73    SL AMB BUN/CREATININE RATIO 14 9 - 23    Sodium 140 134 - 144 mmol/L    Potassium 4 2 3 5 - 5 2 mmol/L    Chloride 104 96 - 106 mmol/L    CO2 23 20 - 29 mmol/L    CALCIUM 9 3 8 7 - 10 2 mg/dL    Protein, Total 6 7 6 0 - 8 5 g/dL    Albumin 4 5 3 8 - 4 8 g/dL    Globulin, Total 2 2 1 5 - 4 5 g/dL    Albumin/Globulin Ratio 2 0 1 2 - 2 2    TOTAL BILIRUBIN 0 2 0 0 - 1 2 mg/dL    Alk Phos Isoenzymes 71 44 - 121 IU/L    AST 15 0 - 40 IU/L    ALT 11 0 - 32 IU/L   Lipid Panel with Direct LDL reflex    Collection Time: 04/24/23 10:17 AM   Result Value Ref Range    Cholesterol, Total 172 100 - 199 mg/dL    Triglycerides 143 0 - 149 mg/dL    HDL 47 >39 mg/dL    VLDL Cholesterol Calculated 25 5 - 40 mg/dL    LDL Calculated 100 (H) 0 - 99 mg/dL    LDl/HDL Ratio 2 1 0 0 - 3 2 ratio     Reviewed lab/diagnostic results with pt including both normal and abnormal findings  In depth counseling and instructions given  All questions answered during visit  /80   Pulse 84   Temp (!) 97 1 °F (36 2 °C) (Temporal)   Resp 16   Ht 5' 6\" (1 676 m)   Wt 110 kg (242 lb)   SpO2 96%   BMI 39 06 kg/m²      Physical Exam  Constitutional:       General: She is not in acute distress  Appearance: She is well-developed  She is obese  She is not ill-appearing  HENT:      Head: Normocephalic and atraumatic        Right Ear: Tympanic membrane and ear canal normal       Left Ear: " Tympanic membrane and ear canal normal       Nose: Nose normal       Mouth/Throat:      Mouth: Mucous membranes are moist       Pharynx: Oropharynx is clear  Eyes:      General: No scleral icterus  Extraocular Movements: Extraocular movements intact  Pupils: Pupils are equal, round, and reactive to light  Neck:      Thyroid: No thyromegaly  Vascular: No carotid bruit  Cardiovascular:      Rate and Rhythm: Normal rate and regular rhythm  Heart sounds: Normal heart sounds  No murmur heard  Pulmonary:      Effort: Pulmonary effort is normal  No respiratory distress  Breath sounds: Normal breath sounds  No wheezing or rales  Abdominal:      General: Bowel sounds are normal  There is no distension  Palpations: Abdomen is soft  Tenderness: There is no abdominal tenderness  Musculoskeletal:         General: Normal range of motion  Cervical back: Normal range of motion and neck supple  Lymphadenopathy:      Cervical: No cervical adenopathy  Skin:     General: Skin is warm and dry  Coloration: Skin is not jaundiced or pale  Neurological:      General: No focal deficit present  Mental Status: She is alert and oriented to person, place, and time  Cranial Nerves: No cranial nerve deficit  Sensory: No sensory deficit  Psychiatric:         Mood and Affect: Mood normal          Behavior: Behavior normal          Thought Content:  Thought content normal          Judgment: Judgment normal            Vision Screening    Right eye Left eye Both eyes   Without correction 20/20 20/20 20/20   With correction      Comments: Color pass          Jose Canas

## 2023-04-27 NOTE — PATIENT INSTRUCTIONS
Decrease Metformin to 500mg daily  Heart healthy, carbohydrate controlled diet- limit red meat, limit saturated fat, moderate salt intake, limit junk food, etc    Regular exercise  Stress management  Routine labwork and screenings as ordered  Have labs done prior to 6 month follow up appt  Continue all other medications  Schedule appt with weight management as discussed  Mammogram to be scheduled

## 2023-05-24 ENCOUNTER — OFFICE VISIT (OUTPATIENT)
Dept: BARIATRICS | Facility: CLINIC | Age: 48
End: 2023-05-24

## 2023-05-24 VITALS
SYSTOLIC BLOOD PRESSURE: 114 MMHG | DIASTOLIC BLOOD PRESSURE: 78 MMHG | BODY MASS INDEX: 39.44 KG/M2 | HEIGHT: 66 IN | HEART RATE: 92 BPM | WEIGHT: 245.4 LBS

## 2023-05-24 DIAGNOSIS — F32.A ANXIETY AND DEPRESSION: ICD-10-CM

## 2023-05-24 DIAGNOSIS — F41.9 ANXIETY AND DEPRESSION: ICD-10-CM

## 2023-05-24 DIAGNOSIS — G62.9 PERIPHERAL POLYNEUROPATHY: ICD-10-CM

## 2023-05-24 DIAGNOSIS — E66.01 MORBID OBESITY (HCC): Primary | ICD-10-CM

## 2023-05-24 DIAGNOSIS — E11.65 TYPE 2 DIABETES MELLITUS WITH HYPERGLYCEMIA, WITHOUT LONG-TERM CURRENT USE OF INSULIN (HCC): ICD-10-CM

## 2023-05-24 DIAGNOSIS — E78.2 MIXED HYPERLIPIDEMIA: ICD-10-CM

## 2023-05-24 DIAGNOSIS — E66.01 CLASS 2 SEVERE OBESITY DUE TO EXCESS CALORIES WITH SERIOUS COMORBIDITY AND BODY MASS INDEX (BMI) OF 38.0 TO 38.9 IN ADULT (HCC): ICD-10-CM

## 2023-05-24 RX ORDER — QUETIAPINE FUMARATE 50 MG/1
50 TABLET, FILM COATED ORAL EVERY EVENING
COMMUNITY
Start: 2023-05-10

## 2023-05-24 NOTE — LETTER
May 24, 2023     Iliana Kruger [address] [address]    Patient: Mercedes Newton   YOB: 1975   Date of Visit: 5/24/2023       Dear Grazyna Andre: Thank you for referring Paty Stevens to me for evaluation for bariatric surgery  Below are my notes for this consultation  If you have questions, please do not hesitate to call me on my cell phone at [phone] or via [facility] Text  I look forward to following your patient along with you  Sincerely,    Tiffanie Nur MD, Lissa Packer, [facility]  Metabolic & Bariatric Surgery Director  [facility] - [facility]   5/24/2023  11:56 AM        CC: No Recipients    Jacki Conde MD  5/24/2023 11:55 AM  Sign when Signing Visit      BARIATRIC INITIAL CONSULT - [address] [place] [address] [place] 41 y o  female MRN: [id number]  Unit/Bed#:  Encounter: [id number]      HPI:  Mercedes Newton is a very pleasant 41 y o  female who presents with a longstanding history of morbid obesity and inability to sustain a meaningful weight loss  Here today to discuss bariatric options  She is a  at a group home  Body mass index is 39 44 kg/m²  ++Suffers from DM2, HLD, MDD, anxiety, GERD (diet)  S/p L ankle sx, breast redux  **current 1ppd smoker    Visit type: consultation     Symptoms: excess weight, weight increase, inability to loss weight and fatigue    Associated Symptoms: depressed mood and anxiety    Associated Conditions: glucose intolerance, hyperlipidemia, abdominal obesity and hypergycemia  Disease Complications: diabetes  Weight Loss Interest: high  Previous Diet Trials: low carb    Exercise Frequency:infrequency  Types of Exercise: walking      Review of Systems   Constitutional: Negative  Respiratory: Negative  Cardiovascular: Negative  Gastrointestinal: Negative  Musculoskeletal: Negative  Neurological: Negative      All other systems reviewed and are negative        Historical Information   Past Medical History:   Diagnosis Date   • B[facility] disorder ([facility] )    • Depression    • History of wound infection     left ankle   • Morbid obesity with BMI of 40 0-44 9, adult ([facility] ) 1/18/2018   • Psychiatric disorder     depression, anxiety   • Seasonal allergic reaction      Past Surgical History:   Procedure Laterality Date   • IR PICC LINE  2/8/2019   • AR OPEN TREATMENT BIMALLEOLAR ANKLE FRACTURE Left 5/14/2018    Procedure: OPEN REDUCTION W/ INTERNAL FIXATION (ORIF) ANKLE;  Surgeon: Guillermo Ribeiro DO;  Location: [address] [place] [address];  Service: Orthopedics   • AR REMOVAL IMPLANT DEEP Left 2/5/2019    Procedure: REMOVAL HARDWARE ANKLE;  Surgeon: Guillermo Ribeiro DO;  Location: [address] [place] [address];  Service: Orthopedics   • REDUCTION MAMMAPLASTY  2001    reduction     Social History   Social History     Substance and Sexual Activity   Alcohol Use Yes   • Alcohol/week: 3 0 standard drinks of alcohol   • Types: 3 Cans of beer per week    Comment: social     Social History     Substance and Sexual Activity   Drug Use Never     Social History     Tobacco Use   Smoking Status Every Day   • Packs/day: 1 00   • Years: 25 00   • Total pack years: 25 00   • Types: Cigarettes   • Start date: 9/23/2000   Smokeless Tobacco Never     Family History:   Family History   Problem Relation Age of Onset   • Substance Abuse Mother    • Mental illness Mother    • Diabetes Mother    • Cancer Mother    • Asthma Mother    • Breast cancer Mother 54   • Substance Abuse Father    • No Known Problems Brother    • No Known Problems Daughter    • Substance Abuse Maternal Grandmother    • Mental illness Maternal Grandmother    • No Known Problems Maternal Grandfather    • No Known Problems Paternal Grandmother    • No Known Problems Paternal Grandfather    • No Known Problems Maternal Aunt    • No Known Problems Maternal Uncle    • ADD / ADHD Neg Hx    • Anesthesia problems Neg Hx    • Clotting disorder Neg Hx    • Collagen "disease Neg Hx    • Dislocations Neg Hx    • Learning disabilities Neg Hx    • Neurological problems Neg Hx    • Osteoporosis Neg Hx    • Rheumatologic disease Neg Hx    • Scoliosis Neg Hx    • Vascular Disease Neg Hx        Meds/Allergies   all medications and allergies reviewed  Allergies   Allergen Reactions   • Latex Rash   • Toradol [Ketorolac Tromethamine] Itching     GI UPSET   • Ultram [Tramadol] Rash       Objective       Current Vitals:   /78 (BP Location: Right arm, Patient Position: Sitting, Cuff Size: Large)   Pulse 92   Ht 5' 6 14\" (1 68 m)   Wt 111 kg (245 lb 6 4 oz)   LMP 05/10/2023 (Approximate)   BMI 39 44 kg/m²       Physical Exam  Vitals reviewed  Constitutional:       Appearance: She is well-developed  HENT:      Head: Normocephalic  Eyes:      Extraocular Movements: Extraocular movements intact  Cardiovascular:      Rate and Rhythm: Normal rate  Pulmonary:      Effort: Pulmonary effort is normal    Abdominal:      General: There is no distension  Musculoskeletal:         General: Normal range of motion  Cervical back: Normal range of motion  Neurological:      Mental Status: She is alert and oriented to person, place, and time  Psychiatric:         Mood and Affect: Mood normal          Behavior: Behavior normal          Thought Content: Thought content normal          Judgment: Judgment normal          Lab Results: I have personally reviewed pertinent lab results  Imaging: I have personally reviewed pertinent reports  EKG, Pathology, and Other Studies: I have personally reviewed pertinent reports  Assessment/PLAN:    41 y o  yo female with a long standing h/o of obesity and inability to sustain any meaningful weight loss on her own despite several attempts  She is interested in the Laparoscopic sleeve gastrectomy      I have discussed with the patient that a percentage of patient's may experience new or worsened GERD and 18% risk of Rivera's " esophagitis requiring surveillance EGDs after a sleeve gastrectomy  The patient understands this fully and accepts the risks to undergo a sleeve gastrectomy  ++Suffers from DM2, HLD, MDD, anxiety, GERD (diet)  S/p L ankle sx, breast redux  **current 1ppd smoker    I have explained our Enhanced Recovery After Bariatric Surgery (ERABS) protocol and benefits including preoperative, intraoperative and postoperative elements  As a part of his pre op process, she will undergo evaluation appointments with out dietician, licensed care , and   After the evaluation, she may be referred to a cardiologist and for a sleep evaluation and consult  She needs an EGD to evaluate the anatomy of her GI tract prior to the operation  I have spent over 45 minutes with her face to face in the office today discussing her options and details of the surgery  We have seen an animation of the surgery on the computer that illustrates how the operation is done and how the anatomy will be altered with the procedure  Over 50% of this was coordinating care  She was given the opportunity to ask questions and I have answered all of them  I have discussed and educated the patient with regards to the components of our multidisciplinary program and the importance of compliance and follow up in the post operative period  The patient was also instructed with regards to the importance of behavior modification, nutritional counseling, support meeting attendance and lifestyle changes that are important to ensure success  Although there is a great statistical chance of improvement or even resolution of most of her associated comorbidities, the results vary from patient to patient and they largely depend on her commitment and compliance         Candido Barrios MD, FACS, [facility]  5/24/2023  11:34 AM

## 2023-05-24 NOTE — PROGRESS NOTES
BARIATRIC INITIAL CONSULT - BARIATRIC SURGERY    Melvi Larose 41 y o  female MRN: [id number]  Unit/Bed#:  Encounter: [id number]      HPI:  Melvi Larose is a very pleasant 41 y o  female who presents with a longstanding history of morbid obesity and inability to sustain a meaningful weight loss  Here today to discuss bariatric options  She is a  at a group home  Body mass index is 39 44 kg/m²  ++Suffers from DM2, HLD, MDD, anxiety, GERD (diet)  S/p L ankle sx, breast redux  **current 1ppd smoker    Visit type: consultation     Symptoms: excess weight, weight increase, inability to loss weight and fatigue    Associated Symptoms: depressed mood and anxiety    Associated Conditions: glucose intolerance, hyperlipidemia, abdominal obesity and hypergycemia  Disease Complications: diabetes  Weight Loss Interest: high  Previous Diet Trials: low carb    Exercise Frequency:infrequency  Types of Exercise: walking      Review of Systems   Constitutional: Negative  Respiratory: Negative  Cardiovascular: Negative  Gastrointestinal: Negative  Musculoskeletal: Negative  Neurological: Negative  All other systems reviewed and are negative        Historical Information   Past Medical History:   Diagnosis Date   • Bipolar disorder ([place] [address] )    • Depression    • History of wound infection     left ankle   • Morbid obesity with BMI of 40 0-44 9, adult ([facility] ) 1/18/2018   • Psychiatric disorder     depression, anxiety   • Seasonal allergic reaction      Past Surgical History:   Procedure Laterality Date   • IR PICC LINE  2/8/2019   • [place] OPEN TREATMENT BIMALLEOLAR ANKLE FRACTURE Left 5/14/2018    Procedure: OPEN REDUCTION W/ INTERNAL FIXATION (ORIF) ANKLE;  Surgeon: Ana Paula Robles DO;  Location: [address] [place] [address];  Service: Orthopedics   • [place] REMOVAL IMPLANT DEEP Left 2/5/2019    Procedure: REMOVAL HARDWARE ANKLE;  Surgeon: Ana Paula Robles DO;  Location: [address] [place] [address];  Service: Orthopedics   • "REDUCTION MAMMAPLASTY  2001    reduction     Social History   Social History     Substance and Sexual Activity   Alcohol Use Yes   • Alcohol/week: 3 0 standard drinks of alcohol   • Types: 3 Cans of beer per week    Comment: social     Social History     Substance and Sexual Activity   Drug Use Never     Social History     Tobacco Use   Smoking Status Every Day   • Packs/day: 1 00   • Years: 25 00   • Total pack years: 25 00   • Types: Cigarettes   • Start date: 9/23/2000   Smokeless Tobacco Never     Family History:   Family History   Problem Relation Age of Onset   • Substance Abuse Mother    • Mental illness Mother    • Diabetes Mother    • Cancer Mother    • Asthma Mother    • Breast cancer Mother 54   • Substance Abuse Father    • No Known Problems Brother    • No Known Problems Daughter    • Substance Abuse Maternal Grandmother    • Mental illness Maternal Grandmother    • No Known Problems Maternal Grandfather    • No Known Problems Paternal Grandmother    • No Known Problems Paternal Grandfather    • No Known Problems Maternal Aunt    • No Known Problems Maternal Uncle    • ADD / ADHD Neg Hx    • Anesthesia problems Neg Hx    • Clotting disorder Neg Hx    • Collagen disease Neg Hx    • Dislocations Neg Hx    • Learning disabilities Neg Hx    • Neurological problems Neg Hx    • Osteoporosis Neg Hx    • Rheumatologic disease Neg Hx    • Scoliosis Neg Hx    • Vascular Disease Neg Hx        Meds/Allergies   all medications and allergies reviewed  Allergies   Allergen Reactions   • Latex Rash   • Toradol [Ketorolac Tromethamine] Itching     GI UPSET   • Ultram [Tramadol] Rash       Objective       Current Vitals:   /78 (BP Location: Right arm, Patient Position: Sitting, Cuff Size: Large)   Pulse 92   Ht 5' 6 14\" (1 68 m)   Wt 111 kg (245 lb 6 4 oz)   LMP 05/10/2023 (Approximate)   BMI 39 44 kg/m²       Physical Exam  Vitals reviewed  Constitutional:       Appearance: She is well-developed     HENT:    " Head: Normocephalic  Eyes:      Extraocular Movements: Extraocular movements intact  Cardiovascular:      Rate and Rhythm: Normal rate  Pulmonary:      Effort: Pulmonary effort is normal    Abdominal:      General: There is no distension  Musculoskeletal:         General: Normal range of motion  Cervical back: Normal range of motion  Neurological:      Mental Status: She is alert and oriented to person, place, and time  Psychiatric:         Mood and Affect: Mood normal          Behavior: Behavior normal          Thought Content: Thought content normal          Judgment: Judgment normal          Lab Results: I have personally reviewed pertinent lab results  Imaging: I have personally reviewed pertinent reports  EKG, Pathology, and Other Studies: I have personally reviewed pertinent reports  Assessment/PLAN:    41 y o  yo female with a long standing h/o of obesity and inability to sustain any meaningful weight loss on her own despite several attempts  She is interested in the Laparoscopic sleeve gastrectomy  I have discussed with the patient that a percentage of patient's may experience new or worsened GERD and 18% risk of Rivera's esophagitis requiring surveillance EGDs after a sleeve gastrectomy  The patient understands this fully and accepts the risks to undergo a sleeve gastrectomy  ++Suffers from DM2, HLD, MDD, anxiety, GERD (diet)  S/p L ankle sx, breast redux  **current 1ppd smoker    I have explained our Enhanced Recovery After Bariatric Surgery (ERABS) protocol and benefits including preoperative, intraoperative and postoperative elements  As a part of his pre op process, she will undergo evaluation appointments with out dietician, licensed care , and   After the evaluation, she may be referred to a cardiologist and for a sleep evaluation and consult      She needs an EGD to evaluate the anatomy of her GI tract prior to the operation  I have spent over 45 minutes with her face to face in the office today discussing her options and details of the surgery  We have seen an animation of the surgery on the computer that illustrates how the operation is done and how the anatomy will be altered with the procedure  Over 50% of this was coordinating care  She was given the opportunity to ask questions and I have answered all of them  I have discussed and educated the patient with regards to the components of our multidisciplinary program and the importance of compliance and follow up in the post operative period  The patient was also instructed with regards to the importance of behavior modification, nutritional counseling, support meeting attendance and lifestyle changes that are important to ensure success  Although there is a great statistical chance of improvement or even resolution of most of her associated comorbidities, the results vary from patient to patient and they largely depend on her commitment and compliance         Chanda Peck MD, FACS, [facility]  5/24/2023  11:34 AM

## 2023-06-07 ENCOUNTER — OFFICE VISIT (OUTPATIENT)
Dept: BARIATRICS | Facility: CLINIC | Age: 48
End: 2023-06-07

## 2023-06-07 DIAGNOSIS — Z98.84 BARIATRIC SURGERY STATUS: Primary | ICD-10-CM

## 2023-06-07 PROCEDURE — RECHECK

## 2023-06-07 NOTE — PROGRESS NOTES
Telephone encounter with patient:   Spoke to patient about surgical process for bariatric surgery  Patient is interested in pursuing bariatric surgery  Answered all questions she had at time of phone call  Explained the entire bariatric surgical workup process  BMI can go to 35 with dx DM2, HLD  Pt is a current every day smoker, 1ppd  Reviewed smoking policy: Day 60 August 5, Day 90 September 4 (will retest within 30 days of surgery)  Discussed patches and gum having positive nicotine test results  We will hold her EGD until she presents with a negative nicotine test      Patient scheduled for 2 hour eval with RD/ FELTON on 6/ 15

## 2023-06-15 ENCOUNTER — OFFICE VISIT (OUTPATIENT)
Dept: BARIATRICS | Facility: CLINIC | Age: 48
End: 2023-06-15

## 2023-06-15 VITALS — BODY MASS INDEX: 39.73 KG/M2 | HEIGHT: 66 IN | WEIGHT: 247.2 LBS

## 2023-06-15 VITALS — WEIGHT: 247.2 LBS | BODY MASS INDEX: 39.78 KG/M2

## 2023-06-15 DIAGNOSIS — E66.9 OBESITY, CLASS II, BMI 35-39.9: Primary | ICD-10-CM

## 2023-06-15 DIAGNOSIS — E66.01 CLASS 2 SEVERE OBESITY DUE TO EXCESS CALORIES WITH SERIOUS COMORBIDITY AND BODY MASS INDEX (BMI) OF 38.0 TO 38.9 IN ADULT (HCC): Primary | ICD-10-CM

## 2023-06-15 PROCEDURE — RECHECK

## 2023-06-15 NOTE — PROGRESS NOTES
"Bariatric Nutrition Assessment Note    Type of surgery    Preop (6 months of wt checks)  Surgery Date: TBD--leaning toward the sleeve  Surgeon: Dr Riaz Jovel  41 y o   female     [place] with BMI of 25: 155 6#  Pre-Op Excess Wt: 90#  Ht 5' 6 1\" (1 679 m)   Wt 112 kg (247 lb 3 2 oz)   LMP 05/10/2023 (Approximate)   BMI 39 78 kg/m²     [place]  [place] Equation:    Estimated calories for weight loss 4045-7547 (1-2# per wk wt loss - sedentary )  Estimated protein needs 59-89gm (1 0-1 5 gms/kg IBW (59kg))   Estimated fluid needs 1770-2065ml (30-35 ml/kg IBW (59kg) )      Weight History   Onset of Obesity: Adult--started about 10 years ago  Family history of obesity: Yes--mom  Wt Loss Attempts: Exercise  High Protein/Low CHO diets (Atkins, Union, etc )  Meal Replacements (Medifast, Slim Fast, etc )  OTC meds/supplements  Self Created Diets (Portion Control, Healthy Food Choices, etc )  Patient has tried the above for 6 months or more with insufficient weight loss or weight regain, which is why patient has requested to be evaluated for weight loss surgery today  Maximum Wt Lost: -60lbs (hydroxycut)      Review of History and Medications   Newly dx DM this year  Past Medical History:   Diagnosis Date   • Bipolar disorder ([facility] [address] )    • Depression    • History of wound infection     left ankle   • Morbid obesity with BMI of 40 0-44 9, adult ([facility] [address] ) 1/18/2018   • Psychiatric disorder     depression, anxiety   • Seasonal allergic reaction      Past Surgical History:   Procedure Laterality Date   • IR PICC LINE  2/8/2019   • [place] OPEN TREATMENT BIMALLEOLAR ANKLE FRACTURE Left 5/14/2018    Procedure: OPEN REDUCTION W/ INTERNAL FIXATION (ORIF) ANKLE;  Surgeon: Carmela Dyer DO;  [place];  Service: Orthopedics   • [place] REMOVAL IMPLANT DEEP Left 2/5/2019    Procedure: REMOVAL HARDWARE ANKLE;  Surgeon: Carmela Dyer DO;  Location: [place] [facility]IN [place];  Service: Orthopedics   • " REDUCTION MAMMAPLASTY  2001    reduction     Social History     Socioeconomic History   • Marital status: Single     Spouse name: Not on file   • Number of children: 2   • Years of education: Not on file   • Highest education level: 8th grade   Occupational History   • Not on file   Tobacco Use   • Smoking status: Every Day     Packs/day: 1 00     Years: 25 00     Total pack years: 25 00     Types: Cigarettes     Start date: 9/23/2000   • Smokeless tobacco: Never   Vaping Use   • Vaping Use: Former   Substance and Sexual Activity   • Alcohol use: Yes     Alcohol/week: 3 0 standard drinks of alcohol     Types: 3 Cans of beer per week     Comment: social   • Drug use: Never   • Sexual activity: Yes     Partners: Male   Other Topics Concern   • Not on file   Social History Narrative   • Not on file     Social Determinants of Health     Financial Resource Strain: Low Risk  (10/21/2021)    Overall Financial Resource Strain (CARDIA)    • Difficulty of Paying Living Expenses: Not hard at all   Food Insecurity: No Food Insecurity (10/21/2021)    Hunger Vital Sign    • Worried About Running Out of Food in the Last Year: Never true    • Ran Out of Food in the Last Year: Never true   Transportation Needs: No Transportation Needs (10/21/2021)    PRAPARE - Transportation    • Lack of Transportation (Medical): No    • Lack of Transportation (Non-Medical): No   Physical Activity: Not on file   Stress: No Stress Concern Present (10/21/2021)    [address]    • Feeling of Stress : Only a little   Social Connections:  Moderately Isolated (10/21/2021)    Social Connection and Isolation Panel [NHANES]    • Frequency of Communication with Friends and Family: More than three times a week    • Frequency of Social Gatherings with Friends and Family: More than three times a week    • Attends Confucianist Services: Never    • Active Member of Clubs or Organizations: No    • Attends Club or Organization Meetings: Never    • Marital Status: Living with partner   Intimate Partner Violence: Not on file   Housing Stability: Unknown (10/21/2021)    Housing Stability Vital Sign    • Unable to Pay for Housing in the Last Year: No    • Number of Places Lived in the Last Year: Not on file    • Unstable Housing in the Last Year: No       Current Outpatient Medications:   •  Accu-Chek Guide test strip, daily Test blood sugar (Patient not taking: Reported on 5/24/2023), Disp: , Rfl:   •  ARIPiprazole (ABILIFY) 15 mg tablet, Take 15 mg by mouth in the morning  , Disp: , Rfl:   •  Blood Glucose Monitoring Suppl (Accu-Chek Guide) w/Device KIT, Use as directed (Patient not taking: Reported on 5/24/2023), Disp: , Rfl:   •  cholecalciferol (VITAMIN D3) 1,000 units tablet, Take 1 tablet (1,000 Units total) by mouth daily (Patient not taking: Reported on 2/14/2023), Disp: 90 tablet, Rfl: 1  •  clonazePAM (KlonoPIN) 1 mg tablet, take 1 tablet by mouth twice a day if needed  1 TAB IN THE A M  SECOND TAB AS NEEDED, Disp: , Rfl:   •  FLUoxetine (PROzac) 20 mg capsule, take 1 capsule by mouth every morning  AS DIRECTED, Disp: , Rfl: 0  •  FLUoxetine (PROzac) 40 MG capsule, Take 40 mg by mouth every morning, Disp: , Rfl: 0  •  hydrOXYzine HCL (ATARAX) 50 mg tablet, Take 50 mg by mouth 3 (three) times a day (Patient not taking: Reported on 2/14/2023), Disp: , Rfl:   •  metFORMIN (GLUCOPHAGE) 500 mg tablet, Take 1 tablet (500 mg total) by mouth 2 (two) times a day with meals, Disp: 90 tablet, Rfl: 1  •  QUEtiapine (SEROquel) 50 mg tablet, Take 50 mg by mouth every evening, Disp: , Rfl:   •  rosuvastatin (CRESTOR) 10 MG tablet, Take 1 tablet (10 mg total) by mouth daily, Disp: 90 tablet, Rfl: 1    Food Intake and Lifestyle Assessment   Food Intake Assessment completed via usual diet recall  Wake: 5am  Water with meds  Breakfast: 6am-banana or yogurt   Snack: -   Lunch: skips or will have salad with tuna or  salad or leftovers--3-4 days per week will eat lunch  Snack: sometime chips  Dinner: 5-6pm:  larger portions, usually seconds  6oz Steak, 1/2-3/4 cups potatoes/rice, 1/4 cup spinach OR chicken and veggies, sometimes with rice OR pasta   Snack: usually dessert--2 scrambled egg on white bread with wyatt or chips or ice cream or doughnuts or popcorn  Wakes in the middle of the night and will grab something--2x a week  *weakness: chinese and sweets    Beverage intake: water, regular soda, diet soda and coffee/tea  Protein supplement: not at this time  Estimated protein intake per day: <60gm  Estimated fluid intake per day: 16oz w/flavored creamer (1/4 cup), 64oz water, 1 bottle of regular soda + 3 bottles of diet soda  Meals eaten away from home: not too often--about 1 month ago had some chinese food  Typical meal pattern: 2-3 meals per day and 5-6 snacks per day  Eating Behaviors: Consumption of high calorie/ high fat foods, Consumption of high calorie beverages, Large portion sizes, Frequent snacking/ grazing, Mindless eating, Emotional eating, Craves sweet foods and Craves salty foods    Food allergies or intolerances: Allergies   Allergen Reactions   • Latex Rash   • Toradol [Ketorolac Tromethamine] Itching     GI UPSET   • Ultram [Tramadol] Rash     Cultural or Shinto considerations: -    Smoking a pack a day--is going to talk to PCP about possible med       Physical Assessment  Physical Activity  Types of exercise: Walking--walks the dog everyday but not too long, about 1-2 blocks  Current physical limitations: back pain    Psychosocial Assessment   Support systems: significant other friend(s) relative(s)  Socioeconomic factors: none  Lives with dai SantiagoSaqib--past pt)  Cresencio Nelson does the cooking and food shopping    Nutrition Diagnosis  Diagnosis: Overweight / Obesity (NC-3 3)  Related to: Physical inactivity and Excessive energy intake  As Evidenced by: BMI >25     Nutrition Prescription: Recommend the following "diet  Regular    Interventions and Teaching   Discussed pre-op and post-op nutrition guidelines  Patient educated and handouts provided  Surgical changes to stomach / GI  Capacity of post-surgery stomach  Diet progression  Adequate hydration  Sugar and fat restriction to decrease \"dumping syndrome\"  Fat restriction to decrease steatorrhea  Expected weight loss  Weight loss plateaus/ possibility of weight regain  Exercise  Suggestions for pre-op diet  Nutrition considerations after surgery  Protein supplements  Meal planning and preparation  Appropriate carbohydrate, protein, and fat intake, and food/fluid choices to maximize safe weight loss, nutrient intake, and tolerance   Dietary and lifestyle changes  Possible problems with poor eating habits  Intuitive eating  Techniques for self monitoring and keeping daily food journal  Potential for food intolerance after surgery, and ways to deal with them including: lactose intolerance, nausea, reflux, vomiting, diarrhea, food intolerance, appetite changes, gas  Vitamin / Mineral supplementation of Multivitamin with minerals and Vitamin D  Post-operative pregnancy guidelines    Patient is not currently pregnant and doesn't desire to become pregnant a minimum of one year post-op    Education provided to: patient    Barriers to learning: No barriers identified    Readiness to change: preparation    Prior research on procedure: books, internet, discussed with provider and friends or family    Comprehension: verbalizes understanding     Expected Compliance: good  Recommendations  Pt is an appropriate candidate for surgery   Yes    Evaluation / Monitoring  Dietitian to Monitor: Eating pattern as discussed Tolerance of nutrition prescription Body weight Lab values Physical activity    Pre-op weight goals:  • Do NOT Gain  • Can go to BMI of 35:   218#  • Encouraged weight loss w/ diet and lifestyle changes  • Will be started on a 2 week liver shrinking diet, possibly " shorter/longer as per the discretion of the surgeon/team, directly prior to surgery    Goals  · Eliminate sugar sweetened beverages--avoid the soda, decrease the carbonated beverages  · Food journal via [employer]  · Exercise 30 minutes 5 times per week--start with walking, 10 mins 3x/day  · Complete lession plans 1-6  · Eat 3 meals per day with protein at each meal  · Can use protein shake as a meal replacment  · Eliminate mindless snacking  · Quit smoking by 8/5, nicotine test by 9/4  · Will give blood work rx at time of nicotine test    Time Spent:   1 Hour

## 2023-06-15 NOTE — PROGRESS NOTES
Bariatric Behavioral Health Evaluation    Presenting Problem:  Justo Lin  is a 41 y o    female   :  1975   Patient has tried to lose weight on her own but she feels she can't obtain significant weight loss long term  Patient wants to be healthier, to stop smoking and is hoping surgery will help with this  She's tried OTC weight loss meds, Atkins, watching what she eats, Slim Fast diet, and some exercise in the past, she would lose a little bit of weight or feel too restricted and then regain  Is the patient seeking Bariatric Surgery Eval?  Yes   If yes, how long has patient been considering, researching and/or making changes for surgery? Patient has been considering surgery for the past year, she's learned about the surgery from TV, they have a friend who had the surgery and has seen his success  Patient's boyfriend      Realizes Post-Op Requirements? Yes      Pre-morbid level of function and history of present illness: Patient has diabetes chronic pain, hyperlipidemia     Living situation: Patient lives with boyfriend and their dog, she has two children who live outside the home, ages 34 and 25  She also has two grandchildren ages 10 and 1  Good living environment, normal stressors and conflict but overall a relaxing place to be, she feels safe in her home  Work: Patient works full time as a , working with people with intellectual disabilities  Work can be stressful, unpredictable and sometimes violent behaviors with clients  She feels she has good support with her coworkers and supervisors  She works a flex shift, sometimes overnights  Physical Activity: Patient walks the dogs daily 5-15 mins per day  She used to do aerobics videos and do more walking but wasn't able to maintain routine      Family History (medical, traditions, culture, rules/routines around food):   Obesity/Overweight: mom  Mental Health Diagnosis: mom, maternal grandmother  Substance Use Disorder/Alcoholism: parents - alcoholism, maternal grandmother - alcoholism  Tobacco use: parents, maternal grandmother  Family Cultural/Traditions: Growing up her mother didn't have any rules around food, she was able to choose what she wanted to eat and didn't have to clean plate  When in foster care she was required to finish her plate  Food was provided for her regularly, no food insecurity  Mental Health, Trauma and Substance use Assessment    Psychiatric/Psychological Treatment Diagnosis, History of Eating Disorders: Patient has a diagnosis of anxiety and depression, she is prescribed Abilifty, Klonopin, Prozac, Seroquel  She reports she started Naltrexone a month ago, prescribed by her psychiatrist, to help reduce heavy alcohol consumption  Patient reports she was having three tall beers or an entire bottle of wine a day and she has been able to reduce that  She has not used in the past three weeks, she did have cravings last week but was able to use effective coping skills to manage  Patient is not currently in therapy but is in the process of trying to connect to one  Outpatient Counselor No      Psychiatrist Yes, sees one at [facility] [place] [facility]    Have you had any [address] or Substance Use Inpatient Treatment? No    Drug and/or Alcohol use and treatment history: Patient has been three weeks alcohol free since starting Naltrexone  She says it has helped her with managing her cravings, she is not in D&A treatment at this time  Tobacco/Vaping History: current daily smoker    Recommendations: Patient is not appropriate for surgery at this time, patient reports recent excessive alcohol use and starting Naltrexone approximately four weeks ago  Recommendations are for patient to be at least one year alcohol and substance free before progressing to surgery, connecting with [facility] program to support weight management at this time       Note:  Patient has a diagnosis of anxiety and depression, she is prescribed Abilifty, Klonopin, Prozac, Seroquel by a psychiatrist, she is trying to connect to a therapist all at [address] [facility], [address] for [facility]  She reports as recent as four weeks ago she was having three tall cans of beer or an entire bottle of wine a day, she recognized this as a problem for herself and her psychiatrist recommended Naltrexone to help managing cravings  She has abstained from alcohol use for the past three weeks, cravings have subsided this past week  Due to patient's recent start of medicinal intervention to address excessive drinking, the impact of alcohol use after surgery and the risk factors of alcoholism post-op, It is recommended that patient obtain one year of sobriety before progressing to surgery  Information about [facility] was given to patient with recommendation to consider for nutrition and medical guidance with weight management and that she can return to surgical side after that one year of sobriety if she still qualifies for surgery    Erica Terry LCSW

## 2023-06-27 ENCOUNTER — OFFICE VISIT (OUTPATIENT)
Dept: FAMILY MEDICINE CLINIC | Facility: CLINIC | Age: 48
End: 2023-06-27
Payer: COMMERCIAL

## 2023-06-27 VITALS
WEIGHT: 251 LBS | HEIGHT: 66 IN | BODY MASS INDEX: 40.34 KG/M2 | SYSTOLIC BLOOD PRESSURE: 116 MMHG | HEART RATE: 101 BPM | RESPIRATION RATE: 16 BRPM | TEMPERATURE: 96 F | DIASTOLIC BLOOD PRESSURE: 78 MMHG | OXYGEN SATURATION: 96 %

## 2023-06-27 DIAGNOSIS — M79.10 MYALGIA: ICD-10-CM

## 2023-06-27 DIAGNOSIS — R51.9 ACUTE INTRACTABLE HEADACHE, UNSPECIFIED HEADACHE TYPE: ICD-10-CM

## 2023-06-27 DIAGNOSIS — J06.9 VIRAL URI WITH COUGH: Primary | ICD-10-CM

## 2023-06-27 LAB
SARS-COV-2 AG UPPER RESP QL IA: NEGATIVE
VALID CONTROL: NORMAL

## 2023-06-27 PROCEDURE — 87811 SARS-COV-2 COVID19 W/OPTIC: CPT | Performed by: NURSE PRACTITIONER

## 2023-06-27 PROCEDURE — 99214 OFFICE O/P EST MOD 30 MIN: CPT | Performed by: NURSE PRACTITIONER

## 2023-06-27 RX ORDER — FLUTICASONE PROPIONATE 50 MCG
1 SPRAY, SUSPENSION (ML) NASAL DAILY
Qty: 16 G | Refills: 0 | Status: SHIPPED | OUTPATIENT
Start: 2023-06-27

## 2023-06-27 RX ORDER — ALBUTEROL SULFATE 90 UG/1
2 AEROSOL, METERED RESPIRATORY (INHALATION) EVERY 6 HOURS PRN
Qty: 18 G | Refills: 0 | Status: SHIPPED | OUTPATIENT
Start: 2023-06-27

## 2023-06-27 RX ORDER — NALTREXONE HYDROCHLORIDE 50 MG/1
50 TABLET, FILM COATED ORAL DAILY
COMMUNITY
Start: 2023-06-08

## 2023-06-27 RX ORDER — DEXTROMETHORPHAN HYDROBROMIDE AND PROMETHAZINE HYDROCHLORIDE 15; 6.25 MG/5ML; MG/5ML
5 SOLUTION ORAL 4 TIMES DAILY PRN
Qty: 180 ML | Refills: 0 | Status: SHIPPED | OUTPATIENT
Start: 2023-06-27

## 2023-06-27 NOTE — LETTER
June 27, 2023     Patient: Jhony Sanchez  YOB: 1975  Date of Visit: 6/27/2023      To Whom it May Concern:    Maude Olea is under my professional care  Osmanvalery Dougherty was seen in my office on 6/27/2023  Osman Dougherty may return to work on 6/29/2023   She was unable to work 6/27/2023 and 6/28/2023 due to illness  If you have any questions or concerns, please don't hesitate to call           Sincerely,          JORDAN Thacker

## 2023-06-27 NOTE — PATIENT INSTRUCTIONS
Fluids  Rest  Nasal saline  Supportive care for symptom management  Tylenol or ibuprofen as needed for fever or discomfort  Use a cool mist humidifier at bedtime  Antibiotics are not indicated at this time  Symptoms may persist for 7-14 days  Rescue inhaler 2 puffs  every 4-6 hours as needed for shortness breath, chest tightness, bronchospasm, coughing fits  Promethazine DM as needed for cough/congestion  Flonase 1 spray each nostril twice daily for next 7 days

## 2023-06-27 NOTE — PROGRESS NOTES
Name: Johann Finley      : 1975      MRN: [id number]  Encounter Provider: JORDAN Duran  Encounter Date: 2023   Encounter department: [address] [place] [address]  Viral URI with cough  Fluids  Rest  Nasal saline  Supportive care for symptom management  Tylenol or ibuprofen as needed for fever or discomfort  Use a cool mist humidifier at bedtime  Antibiotics are not indicated at this time  Symptoms may persist for 7-14 days  Rescue inhaler 2 puffs  every 4-6 hours as needed for shortness breath, chest tightness, bronchospasm, coughing fits  Promethazine DM as needed for cough/congestion  Flonase 1 spray each nostril twice daily for next 7 days    - POCT Rapid Covid Ag- negative  - fluticasone (FLONASE) 50 mcg/act nasal spray; 1 spray into each nostril daily  Dispense: 16 g; Refill: 0  - Promethazine-DM (PHENERGAN-DM) 6 25-15 mg/5 mL oral syrup; Take 5 mL by mouth 4 (four) times a day as needed for cough  Dispense: 180 mL; Refill: 0  - albuterol (Ventolin HFA) 90 mcg/act inhaler; Inhale 2 puffs every 6 (six) hours as needed for wheezing  Dispense: 18 g; Refill: 0    2  Acute intractable headache, unspecified headache type  - POCT Rapid Covid Ag- neg    3  Myalgia  - POCT Rapid Covid Ag- neg         Subjective      Here for body aches, headache, fatigue, and ear pain  Symptoms since 2023  No documented fever but felt feverish last night  Chills  Loose cough, non productive  Has not tried any otc meds  Headache  Generalized Body Aches  Associated symptoms include congestion, ear pain, headaches, coughing and wheezing (at times)  Pertinent negatives include no fever  Earache   Associated symptoms include coughing and headaches  Review of Systems   Constitutional: Positive for chills  Negative for fever  HENT: Positive for congestion and ear pain  Respiratory: Positive for cough and wheezing (at times)      Musculoskeletal: Positive "for myalgias  Neurological: Positive for headaches  Current Outpatient Medications on File Prior to Visit   Medication Sig   • ARIPiprazole (ABILIFY) 15 mg tablet Take 15 mg by mouth in the morning     • clonazePAM (KlonoPIN) 1 mg tablet take 1 tablet by mouth twice a day if needed  1 TAB IN THE A M  SECOND TAB AS NEEDED   • FLUoxetine (PROzac) 20 mg capsule take 1 capsule by mouth every morning  AS DIRECTED   • FLUoxetine (PROzac) 40 MG capsule Take 40 mg by mouth every morning   • metFORMIN (GLUCOPHAGE) 500 mg tablet Take 1 tablet (500 mg total) by mouth 2 (two) times a day with meals (Patient taking differently: Take 500 mg by mouth daily with breakfast)   • naltrexone (REVIA) 50 mg tablet Take 50 mg by mouth daily   • QUEtiapine (SEROquel) 50 mg tablet Take 50 mg by mouth every evening   • rosuvastatin (CRESTOR) 10 MG tablet Take 1 tablet (10 mg total) by mouth daily   • Accu-Chek Guide test strip daily Test blood sugar (Patient not taking: Reported on 5/24/2023)   • Blood Glucose Monitoring Suppl (Accu-Chek Guide) w/Device KIT Use as directed (Patient not taking: Reported on 5/24/2023)   • cholecalciferol (VITAMIN D3) 1,000 units tablet Take 1 tablet (1,000 Units total) by mouth daily (Patient not taking: Reported on 2/14/2023)   • hydrOXYzine HCL (ATARAX) 50 mg tablet Take 50 mg by mouth 3 (three) times a day (Patient not taking: Reported on 2/14/2023)       Objective     /78   Pulse 101   Temp (!) 96 °F (35 6 °C) (Temporal)   Resp 16   Ht 5' 6 1\" (1 679 m)   Wt 114 kg (251 lb)   SpO2 96%   BMI 40 39 kg/m²     Physical Exam  Vitals reviewed  Constitutional:       General: She is not in acute distress  Appearance: She is ill-appearing     HENT:      Right Ear: Tympanic membrane and ear canal normal       Left Ear: Tympanic membrane and ear canal normal       Nose:      Comments: Turbinates inflamed  No pain with palpation of maxillary or frontal sinuses     Mouth/Throat:      Mouth: " Mucous membranes are moist       Pharynx: Oropharynx is clear  No oropharyngeal exudate or posterior oropharyngeal erythema  Cardiovascular:      Rate and Rhythm: Normal rate and regular rhythm  Pulmonary:      Effort: Pulmonary effort is normal  No respiratory distress  Breath sounds: Normal breath sounds  No wheezing  Musculoskeletal:      Cervical back: Normal range of motion  Lymphadenopathy:      Cervical: No cervical adenopathy  Skin:     General: Skin is warm and dry  Neurological:      General: No focal deficit present  Mental Status: She is alert and oriented to person, place, and time  Psychiatric:         Mood and Affect: Mood normal          Behavior: Behavior normal          Thought Content:  Thought content normal          Judgment: Judgment normal        Malika Nguyen

## 2023-07-03 ENCOUNTER — HOSPITAL ENCOUNTER (EMERGENCY)
Facility: HOSPITAL | Age: 48
Discharge: HOME | End: 2023-07-03
Payer: COMMERCIAL

## 2023-07-03 VITALS
TEMPERATURE: 98.3 F | DIASTOLIC BLOOD PRESSURE: 87 MMHG | HEIGHT: 66 IN | BODY MASS INDEX: 40.04 KG/M2 | HEART RATE: 88 BPM | WEIGHT: 249.12 LBS | OXYGEN SATURATION: 95 % | RESPIRATION RATE: 20 BRPM | SYSTOLIC BLOOD PRESSURE: 136 MMHG

## 2023-07-03 DIAGNOSIS — A69.20 DISSEMINATED LYME DISEASE: Primary | ICD-10-CM

## 2023-07-03 LAB — B BURGDOR IGG+IGM SER-ACNC: >8 AI

## 2023-07-03 PROCEDURE — 36415 COLL VENOUS BLD VENIPUNCTURE: CPT | Performed by: PHYSICIAN ASSISTANT

## 2023-07-03 PROCEDURE — 99283 EMERGENCY DEPT VISIT LOW MDM: CPT

## 2023-07-03 PROCEDURE — 86617 LYME DISEASE ANTIBODY: CPT | Performed by: PHYSICIAN ASSISTANT

## 2023-07-03 PROCEDURE — 86618 LYME DISEASE ANTIBODY: CPT | Performed by: PHYSICIAN ASSISTANT

## 2023-07-03 RX ORDER — DOXYCYCLINE HYCLATE 100 MG/1
100 CAPSULE ORAL 2 TIMES DAILY
Qty: 42 CAPSULE | Refills: 0 | Status: SHIPPED | OUTPATIENT
Start: 2023-07-03 | End: 2023-07-24

## 2023-07-03 NOTE — Clinical Note
Estephania Bunn was seen and treated in our emergency department on 7/3/2023. Diagnosis: Lyme Diseas    Mirlande  . She may return on this date: 07/05/2023         If you have any questions or concerns, please don't hesitate to call.       Zohra Lentz PA-C    ______________________________           _______________          _______________  Hospital Representative                              Date                                Time

## 2023-07-03 NOTE — ED PROVIDER NOTES
History  Chief Complaint   Patient presents with   • Rash     Pt states over a week ago she got bite on inner upper thigh by a tick and removed herself. Pt states 5 days after bite she started to have muscle aches,muscle spasms,  fatigue, nausea, and headaches. Pt reports seeing PCP who prescribed inhaler, and cough medication but PCP unaware of previous bite. Pt reports 3 days ago she started to break out in generalized rash described as hives or bruising. Besides rash pt only c/o at this time is fatigue. Patient is a 62-year-old female with past medical history significant for bipolar disorder who presents for evaluation of rash. Patient states approximately 10 days ago she was bit by a tick in her left groin. At that time she had surrounding erythema that resolved. Shortly after she experienced a flulike illness with headache, myalgias and low-grade fever. She was seen by her PCP who felt her symptoms were consistent with bronchitis and discharged with inhaler and cough medication. PCP was unaware of tick bite or associated lesion. Patient states she felt better for a while, but 3 days ago she broke out in a generalized rash on her torso, upper and lower extremities and back. She also states the site of the initial bite has become more erythematous. Patient also complains of associated headache, fatigue, joint pain and myalgias. She denies fever, shortness of breath, nausea, vomiting, diarrhea          Prior to Admission Medications   Prescriptions Last Dose Informant Patient Reported? Taking?    ARIPiprazole (ABILIFY) 15 mg tablet  Self Yes No   Sig: Take 15 mg by mouth in the morning     Accu-Chek Guide test strip  Self Yes No   Sig: daily Test blood sugar   Patient not taking: Reported on 5/24/2023   Blood Glucose Monitoring Suppl (Accu-Chek Guide) w/Device KIT  Self Yes No   Sig: Use as directed   Patient not taking: Reported on 5/24/2023   FLUoxetine (PROzac) 20 mg capsule  Self Yes No   Sig: take 1 capsule by mouth every morning  AS DIRECTED   FLUoxetine (PROzac) 40 MG capsule  Self Yes No   Sig: Take 40 mg by mouth every morning   Promethazine-DM (PHENERGAN-DM) 6.25-15 mg/5 mL oral syrup   No No   Sig: Take 5 mL by mouth 4 (four) times a day as needed for cough   QUEtiapine (SEROquel) 50 mg tablet  Self Yes No   Sig: Take 50 mg by mouth every evening   albuterol (Ventolin HFA) 90 mcg/act inhaler   No No   Sig: Inhale 2 puffs every 6 (six) hours as needed for wheezing   cholecalciferol (VITAMIN D3) 1,000 units tablet  Self No No   Sig: Take 1 tablet (1,000 Units total) by mouth daily   Patient not taking: Reported on 2023   clonazePAM (KlonoPIN) 1 mg tablet  Self Yes No   Sig: take 1 tablet by mouth twice a day if needed  1 TAB IN THE A.M. SECOND TAB AS NEEDED   fluticasone (FLONASE) 50 mcg/act nasal spray   No No   Si spray into each nostril daily   hydrOXYzine HCL (ATARAX) 50 mg tablet  Self Yes No   Sig: Take 50 mg by mouth 3 (three) times a day   Patient not taking: Reported on 2023   metFORMIN (GLUCOPHAGE) 500 mg tablet  Self No No   Sig: Take 1 tablet (500 mg total) by mouth 2 (two) times a day with meals   Patient taking differently: Take 500 mg by mouth daily with breakfast   naltrexone (REVIA) 50 mg tablet  Self Yes No   Sig: Take 50 mg by mouth daily   rosuvastatin (CRESTOR) 10 MG tablet  Self No No   Sig: Take 1 tablet (10 mg total) by mouth daily      Facility-Administered Medications: None       Past Medical History:   Diagnosis Date   • Bipolar disorder ([address])    • Depression    • History of wound infection     left ankle   • Morbid obesity with BMI of 40.0-44.9, adult ([address]) 2018   • Psychiatric disorder     depression, anxiety   • Seasonal allergic reaction        Past Surgical History:   Procedure Laterality Date   • IR PICC LINE  2019   • NC OPEN TREATMENT BIMALLEOLAR ANKLE FRACTURE Left 2018    Procedure: OPEN REDUCTION W/ INTERNAL FIXATION (ORIF) ANKLE; Surgeon: Nalini Arguelles DO;  Location: [facility];  Service: Orthopedics   • CO REMOVAL IMPLANT DEEP Left 2/5/2019    Procedure: REMOVAL HARDWARE ANKLE;  Surgeon: Nalini Arguelles DO;  Location: [facility];  Service: Orthopedics   • REDUCTION MAMMAPLASTY  2001    reduction       Family History   Problem Relation Age of Onset   • Substance Abuse Mother    • Mental illness Mother    • Diabetes Mother    • Cancer Mother    • Asthma Mother    • Breast cancer Mother 54   • Substance Abuse Father    • No Known Problems Brother    • No Known Problems Daughter    • Substance Abuse Maternal Grandmother    • Mental illness Maternal Grandmother    • No Known Problems Maternal Grandfather    • No Known Problems Paternal Grandmother    • No Known Problems Paternal Grandfather    • No Known Problems Maternal Aunt    • No Known Problems Maternal Uncle    • ADD / ADHD Neg Hx    • Anesthesia problems Neg Hx    • Clotting disorder Neg Hx    • Collagen disease Neg Hx    • Dislocations Neg Hx    • Learning disabilities Neg Hx    • Neurological problems Neg Hx    • Osteoporosis Neg Hx    • Rheumatologic disease Neg Hx    • Scoliosis Neg Hx    • Vascular Disease Neg Hx      I have reviewed and agree with the history as documented. E-Cigarette/Vaping   • E-Cigarette Use Former User      E-Cigarette/Vaping Substances   • Nicotine No    • THC No    • CBD No    • Flavoring No    • Other No    • Unknown No      Social History     Tobacco Use   • Smoking status: Every Day     Packs/day: 1.00     Years: 25.00     Total pack years: 25.00     Types: Cigarettes     Start date: 9/23/2000   • Smokeless tobacco: Never   Vaping Use   • Vaping Use: Former   Substance Use Topics   • Alcohol use: Yes     Alcohol/week: 3.0 standard drinks of alcohol     Types: 3 Cans of beer per week     Comment: social   • Drug use: Never       Review of Systems   Constitutional: Positive for fatigue. Negative for chills and fever.    HENT: Negative for ear pain and sore throat. Eyes: Negative for pain and visual disturbance. Respiratory: Negative for cough and shortness of breath. Cardiovascular: Negative for chest pain and palpitations. Gastrointestinal: Negative for abdominal pain and vomiting. Endocrine: Negative. Genitourinary: Negative for dysuria and hematuria. Musculoskeletal: Negative for arthralgias and back pain. Skin: Positive for rash. Negative for color change. Allergic/Immunologic: Negative. Neurological: Positive for headaches. Negative for seizures and syncope. Hematological: Negative. Psychiatric/Behavioral: Negative. Negative for confusion. All other systems reviewed and are negative. Physical Exam  Physical Exam  Vitals and nursing note reviewed. Constitutional:       General: She is not in acute distress. Appearance: Normal appearance. She is well-developed. She is not ill-appearing or diaphoretic. HENT:      Head: Normocephalic and atraumatic. Right Ear: Tympanic membrane, ear canal and external ear normal.      Left Ear: Tympanic membrane, ear canal and external ear normal.      Nose: Nose normal.      Mouth/Throat:      Mouth: Mucous membranes are moist.   Eyes:      General: No scleral icterus. Conjunctiva/sclera: Conjunctivae normal.      Pupils: Pupils are equal, round, and reactive to light. Cardiovascular:      Rate and Rhythm: Normal rate and regular rhythm. Pulses: Normal pulses. Heart sounds: Normal heart sounds. No murmur heard. Pulmonary:      Effort: Pulmonary effort is normal. No respiratory distress. Breath sounds: Normal breath sounds. Abdominal:      General: Abdomen is flat. Bowel sounds are normal.      Palpations: Abdomen is soft. Tenderness: There is no abdominal tenderness. There is no right CVA tenderness or left CVA tenderness. Musculoskeletal:         General: No swelling. Normal range of motion. Cervical back: Normal range of motion and neck supple. No rigidity. Right lower leg: No edema. Left lower leg: No edema. Comments: Left knee pain   Skin:     General: Skin is warm and dry. Capillary Refill: Capillary refill takes less than 2 seconds. Findings: Rash present. Comments: Multiple macular circular lesions, some with central clearing, on torso and extremities   Neurological:      General: No focal deficit present. Mental Status: She is alert and oriented to person, place, and time. Cranial Nerves: No cranial nerve deficit. Sensory: No sensory deficit. Motor: No weakness. Coordination: Coordination normal.      Gait: Gait normal.      Deep Tendon Reflexes: Reflexes normal.   Psychiatric:         Mood and Affect: Mood normal.         Vital Signs  ED Triage Vitals [07/03/23 1538]   Temperature Pulse Respirations Blood Pressure SpO2   98.3 °F (36.8 °C) 88 20 136/87 95 %      Temp Source Heart Rate Source Patient Position - Orthostatic VS BP Location FiO2 (%)   Oral Monitor Sitting Right arm --      Pain Score       No Pain           Vitals:    07/03/23 1538   BP: 136/87   Pulse: 88   Patient Position - Orthostatic VS: Sitting         Visual Acuity      ED Medications  Medications - No data to display    Diagnostic Studies  Results Reviewed     Procedure Component Value Units Date/Time    Lyme Antibody Profile with reflex to WB [[id number]]  (Abnormal) Collected: 07/03/23 1720    Lab Status: Final result Specimen: Blood from Arm, Right Updated: 07/03/23 2225     Lyme Total Antibodies >8.0 AI     Lyme Western Blot, Serum [[id number]] Collected: 07/03/23 1720    Lab Status:  In process Specimen: Blood Updated: 07/03/23 2225                 No orders to display              Procedures  Procedures         ED Course                                             Medical Decision Making  Disseminated Lyme disease: acute illness or injury     Details: Patient with presentation consistent with disseminated Lyme disease  No neurologic deficits or evidence of meningitis  No evidence of cardiac involvement at this time  Patient discharged with 21 days of doxycycline  Patient educated on red flag symptoms that would necessitate return to the ED  Patient to follow-up with her PCP  Amount and/or Complexity of Data Reviewed  External Data Reviewed: labs and notes. Labs: ordered. Decision-making details documented in ED Course. Details: Lyme panel      Risk  Prescription drug management. Disposition  Final diagnoses:   Disseminated Lyme disease     Time reflects when diagnosis was documented in both MDM as applicable and the Disposition within this note     Time User Action Codes Description Comment    7/3/2023  5:08 PM Ina Rodarte Add [A69.20] Disseminated Lyme disease       ED Disposition     ED Disposition   Discharge    Condition   Stable    Date/Time   Mon Jul 3, 2023  5:09 PM    [address] discharge to home/self care.                Follow-up Information     Follow up With Specialties Details Why Contact Info Additional [address], [address] [facility], Nurse Practitioner Call  As needed [address]       [address] Emergency Department Emergency Medicine Go to  If symptoms worsen [address] [address]  [address] [facility] Emergency Department, [address], [address]          Discharge Medication List as of 7/3/2023  5:12 PM      START taking these medications    Details   doxycycline hyclate (VIBRAMYCIN) 100 mg capsule Take 1 capsule (100 mg total) by mouth 2 (two) times a day for 21 days, Starting Mon 7/3/2023, Until Mon 7/24/2023, Normal         CONTINUE these medications which have NOT CHANGED    Details   Accu-Chek Guide test strip daily Test blood sugar, Starting Thu 1/19/2023, Historical Med      albuterol (Ventolin HFA) 90 mcg/act inhaler Inhale 2 puffs every 6 (six) hours as needed for wheezing, Starting Tue 6/27/2023, Normal      ARIPiprazole (ABILIFY) 15 mg tablet Take 15 mg by mouth in the morning  , Starting Tue 9/21/2021, Historical Med      Blood Glucose Monitoring Suppl (Accu-Chek Guide) w/Device KIT Use as directed, Starting Wed 1/18/2023, Historical Med      cholecalciferol (VITAMIN D3) 1,000 units tablet Take 1 tablet (1,000 Units total) by mouth daily, Starting Tue 1/17/2023, Normal      clonazePAM (KlonoPIN) 1 mg tablet take 1 tablet by mouth twice a day if needed  1 TAB IN THE A.M. SECOND TAB AS NEEDED, Historical Med      !! FLUoxetine (PROzac) 20 mg capsule take 1 capsule by mouth every morning  AS DIRECTED, Historical Med      !! FLUoxetine (PROzac) 40 MG capsule Take 40 mg by mouth every morning, Starting Wed 7/3/2019, Historical Med      fluticasone (FLONASE) 50 mcg/act nasal spray 1 spray into each nostril daily, Starting Tue 6/27/2023, Normal      hydrOXYzine HCL (ATARAX) 50 mg tablet Take 50 mg by mouth 3 (three) times a day, Starting Mon 1/9/2023, Historical Med      metFORMIN (GLUCOPHAGE) 500 mg tablet Take 1 tablet (500 mg total) by mouth 2 (two) times a day with meals, Starting Thu 4/27/2023, Normal      naltrexone (REVIA) 50 mg tablet Take 50 mg by mouth daily, Starting Thu 6/8/2023, Historical Med      Promethazine-DM (PHENERGAN-DM) 6.25-15 mg/5 mL oral syrup Take 5 mL by mouth 4 (four) times a day as needed for cough, Starting Tue 6/27/2023, Normal      QUEtiapine (SEROquel) 50 mg tablet Take 50 mg by mouth every evening, Starting Wed 5/10/2023, Historical Med      rosuvastatin (CRESTOR) 10 MG tablet Take 1 tablet (10 mg total) by mouth daily, Starting Tue 1/17/2023, Normal       !! - Potential duplicate medications found. Please discuss with provider. No discharge procedures on file.     PDMP Review     None          ED Provider  Electronically Signed by           Hola Huitron PA-C  07/04/23 1144

## 2023-07-03 NOTE — Clinical Note
Titus Slaughter was seen and treated in our emergency department on 7/3/2023. Diagnosis: Lyme Diseas    Mirlande  . She may return on this date: 07/05/2023         If you have any questions or concerns, please don't hesitate to call.       Fabienne Nunn PA-C    ______________________________           _______________          _______________  Hospital Representative                              Date                                Time

## 2023-07-05 DIAGNOSIS — E11.65 TYPE 2 DIABETES MELLITUS WITH HYPERGLYCEMIA, WITHOUT LONG-TERM CURRENT USE OF INSULIN (HCC): ICD-10-CM

## 2023-07-05 DIAGNOSIS — E78.2 MIXED HYPERLIPIDEMIA: ICD-10-CM

## 2023-07-05 LAB
B BURGDOR IGG PATRN SER IB-IMP: NEGATIVE
B BURGDOR IGM PATRN SER IB-IMP: POSITIVE
B BURGDOR18KD IGG SER QL IB: ABNORMAL
B BURGDOR23KD IGG SER QL IB: ABNORMAL
B BURGDOR23KD IGM SER QL IB: PRESENT
B BURGDOR28KD IGG SER QL IB: ABNORMAL
B BURGDOR30KD IGG SER QL IB: ABNORMAL
B BURGDOR39KD IGG SER QL IB: ABNORMAL
B BURGDOR39KD IGM SER QL IB: PRESENT
B BURGDOR41KD IGG SER QL IB: ABNORMAL
B BURGDOR41KD IGM SER QL IB: PRESENT
B BURGDOR45KD IGG SER QL IB: ABNORMAL
B BURGDOR58KD IGG SER QL IB: ABNORMAL
B BURGDOR66KD IGG SER QL IB: ABNORMAL
B BURGDOR93KD IGG SER QL IB: ABNORMAL

## 2023-07-05 RX ORDER — ROSUVASTATIN CALCIUM 10 MG/1
TABLET, COATED ORAL
Qty: 90 TABLET | Refills: 0 | Status: SHIPPED | OUTPATIENT
Start: 2023-07-05

## 2023-07-06 ENCOUNTER — OFFICE VISIT (OUTPATIENT)
Dept: FAMILY MEDICINE CLINIC | Facility: CLINIC | Age: 48
End: 2023-07-06
Payer: COMMERCIAL

## 2023-07-06 VITALS
HEART RATE: 81 BPM | SYSTOLIC BLOOD PRESSURE: 120 MMHG | RESPIRATION RATE: 18 BRPM | HEIGHT: 66 IN | WEIGHT: 252.4 LBS | TEMPERATURE: 97.8 F | DIASTOLIC BLOOD PRESSURE: 84 MMHG | OXYGEN SATURATION: 97 % | BODY MASS INDEX: 40.56 KG/M2

## 2023-07-06 DIAGNOSIS — R21 RASH: ICD-10-CM

## 2023-07-06 DIAGNOSIS — A69.20 LYME DISEASE: Primary | ICD-10-CM

## 2023-07-06 PROCEDURE — 99214 OFFICE O/P EST MOD 30 MIN: CPT | Performed by: NURSE PRACTITIONER

## 2023-07-06 NOTE — PATIENT INSTRUCTIONS
Finish Doxycycline as prescribed. Avoid sun exposure when taking doxycycline. Finish antibiotics as prescribed. Recommend taking antibiotics with food. Take probiotic while taking antibiotics to minimize gastrointestinal side effects. Call or return to office if symptoms worsen or if new symptoms develop.

## 2023-07-06 NOTE — PROGRESS NOTES
Name: Blayne Lucas      : 1975      MRN: [id number]  Encounter Provider: JORDAN Ross  Encounter Date: 2023   Encounter department: [address] [place] [address]   1. Lyme disease  Finish Doxycycline as prescribed. Avoid sun exposure when taking doxycycline. Finish antibiotics as prescribed. Recommend taking antibiotics with food. Take probiotic while taking antibiotics to minimize gastrointestinal side effects. Call or return to office if symptoms worsen or if new symptoms develop. 2. Rash  Resolving. Self limiting. Monitor         Subjective      Here for ED follow up  Diagnosed with lyme. Started on doxycycline. Tolerating with no side effects. Taking as prescribed. Had rash and body aches and went to ED. Rash is "mostly gone" with a few faint red spots. No fevers. No other issues or concerns. Review of Systems   Constitutional: Negative for fatigue and fever. Cardiovascular: Negative for chest pain and palpitations. Gastrointestinal: Negative for abdominal pain, diarrhea, nausea and vomiting. Musculoskeletal: Negative for arthralgias. Skin: Positive for rash (resolving). Neurological: Negative for dizziness and headaches.        Current Outpatient Medications on File Prior to Visit   Medication Sig   • albuterol (Ventolin HFA) 90 mcg/act inhaler Inhale 2 puffs every 6 (six) hours as needed for wheezing   • ARIPiprazole (ABILIFY) 15 mg tablet Take 15 mg by mouth in the morning     • clonazePAM (KlonoPIN) 1 mg tablet take 1 tablet by mouth twice a day if needed  1 TAB IN THE A.M. SECOND TAB AS NEEDED   • doxycycline hyclate (VIBRAMYCIN) 100 mg capsule Take 1 capsule (100 mg total) by mouth 2 (two) times a day for 21 days   • FLUoxetine (PROzac) 20 mg capsule take 1 capsule by mouth every morning  AS DIRECTED   • FLUoxetine (PROzac) 40 MG capsule Take 40 mg by mouth every morning   • fluticasone (FLONASE) 50 mcg/act nasal spray 1 spray into each nostril daily   • metFORMIN (GLUCOPHAGE) 500 mg tablet Take 1 tablet (500 mg total) by mouth 2 (two) times a day with meals   • naltrexone (REVIA) 50 mg tablet Take 50 mg by mouth daily   • QUEtiapine (SEROquel) 50 mg tablet Take 50 mg by mouth every evening   • rosuvastatin (CRESTOR) 10 MG tablet take 1 tablet by mouth once daily   • Accu-Chek Guide test strip daily Test blood sugar (Patient not taking: Reported on 5/24/2023)   • Blood Glucose Monitoring Suppl (Accu-Chek Guide) w/Device KIT Use as directed (Patient not taking: Reported on 5/24/2023)   • cholecalciferol (VITAMIN D3) 1,000 units tablet Take 1 tablet (1,000 Units total) by mouth daily (Patient not taking: Reported on 2/14/2023)   • hydrOXYzine HCL (ATARAX) 50 mg tablet Take 50 mg by mouth 3 (three) times a day (Patient not taking: Reported on 2/14/2023)   • Promethazine-DM (PHENERGAN-DM) 6.25-15 mg/5 mL oral syrup Take 5 mL by mouth 4 (four) times a day as needed for cough (Patient not taking: Reported on 7/6/2023)       Objective   ED records reviewed 7/3/2023, [facility]  Rash        Pt states over a week ago she got bite on inner upper thigh by a tick and removed herself. Pt states 5 days after bite she started to have muscle aches,muscle spasms,  fatigue, nausea, and headaches. Pt reports seeing PCP who prescribed inhaler, and cough medication but PCP unaware of previous bite. Pt reports 3 days ago she started to break out in generalized rash described as hives or bruising. Besides rash pt only c/o at this time is fatigue.       Patient is a 44-year-old female with past medical history significant for bipolar disorder who presents for evaluation of rash. Patient states approximately 10 days ago she was bit by a tick in her left groin. At that time she had surrounding erythema that resolved. Shortly after she experienced a flulike illness with headache, myalgias and low-grade fever.   She was seen by her PCP who felt her symptoms were consistent with bronchitis and discharged with inhaler and cough medication. PCP was unaware of tick bite or associated lesion. Patient states she felt better for a while, but 3 days ago she broke out in a generalized rash on her torso, upper and lower extremities and back. She also states the site of the initial bite has become more erythematous. Patient also complains of associated headache, fatigue, joint pain and myalgias. She denies fever, shortness of breath, nausea, vomiting, diarrhea  Skin:     General: Skin is warm and dry. Capillary Refill: Capillary refill takes less than 2 seconds. Findings: Rash present. Comments: Multiple macular circular lesions, some with central clearing, on torso and extremities   Lyme Antibody Profile with reflex to WB [[id number]]  (Abnormal) Collected: 07/03/23 1720      Lab Status: Final result Specimen: Blood from Arm, Right Updated: 07/03/23 2225       Lyme Total Antibodies >8.0       Medical Decision Making  Disseminated Lyme disease: acute illness or injury     Details: Patient with presentation consistent with disseminated Lyme disease  No neurologic deficits or evidence of meningitis  No evidence of cardiac involvement at this time  Patient discharged with 21 days of doxycycline  Patient educated on red flag symptoms that would necessitate return to the ED  Patient to follow-up with her PCP  doxycycline hyclate (VIBRAMYCIN) 100 mg capsule Take 1 capsule (100 mg total) by mouth 2 (two) times a day for 21 days, Starting Mon 7/3/2023, Until Mon 7/24/2023, Normal           /84   Pulse 81   Temp 97.8 °F (36.6 °C) (Temporal)   Resp 18   Ht 5' 6" (1.676 m)   Wt 114 kg (252 lb 6.4 oz)   SpO2 97%   BMI 40.74 kg/m²     Physical Exam  Vitals reviewed. Constitutional:       General: She is not in acute distress. Appearance: She is not ill-appearing. Cardiovascular:      Rate and Rhythm: Normal rate and regular rhythm.    Pulmonary:      Effort: Pulmonary effort is normal. No respiratory distress. Breath sounds: Normal breath sounds. No wheezing. Abdominal:      General: There is no distension. Palpations: Abdomen is soft. Musculoskeletal:      Cervical back: Normal range of motion. Right lower leg: No edema. Left lower leg: No edema. Lymphadenopathy:      Cervical: No cervical adenopathy. Skin:     General: Skin is warm and dry. Findings: Erythema (faint areas scattered on lower extremities) present. Neurological:      General: No focal deficit present. Mental Status: She is alert and oriented to person, place, and time. Cranial Nerves: No cranial nerve deficit. Sensory: No sensory deficit. Psychiatric:         Mood and Affect: Mood normal.         Behavior: Behavior normal.         Thought Content:  Thought content normal.         Judgment: Judgment normal.       Elias Wang

## 2023-07-21 DIAGNOSIS — E11.65 TYPE 2 DIABETES MELLITUS WITH HYPERGLYCEMIA, WITHOUT LONG-TERM CURRENT USE OF INSULIN (HCC): ICD-10-CM

## 2023-07-24 NOTE — ED PROVIDER NOTES
History  Chief Complaint   Patient presents with   • Chest Pain     Mid sternal chest pain intermittently for the past 20 minutes  Associated with sob  Patient is a 57-year-old white female with history of bipolar and depression who reports she was driving to work 72:64 a m  this morning when she felt a stabbing mid to right upper chest pain  This was followed by a  "fluttering"  Whole episode lasted 10 seconds  Reports she has had intermittent chest pain approximally twice a week for the last month  Pain is random and not clearly related to exertion  No shortness of breath or diaphoresis  No nausea vomiting  No radiation of pain to extremities, jaw or teeth or through to her back  She does smoke 1 pack per day  Denies illicit drug use  States she drinks once every week or 2  Prior to Admission Medications   Prescriptions Last Dose Informant Patient Reported? Taking?    ARIPiprazole (ABILIFY) 15 mg tablet   Yes No   Sig: Take 15 mg by mouth in the morning     FLUoxetine (PROzac) 20 mg capsule   Yes No   Sig: take 1 capsule by mouth every morning  AS DIRECTED   FLUoxetine (PROzac) 40 MG capsule   Yes No   Sig: Take 40 mg by mouth every morning   TiZANidine (ZANAFLEX) 2 MG capsule   No No   Sig: Take 2 capsules (4 mg total) by mouth daily at bedtime as needed for muscle spasms   atorvastatin (LIPITOR) 20 mg tablet   No No   Sig: Take 1 tablet (20 mg total) by mouth daily   clonazePAM (KlonoPIN) 1 mg tablet   Yes No   Sig: take 1 tablet by mouth twice a day if needed  1 TAB IN THE A M  SECOND TAB AS NEEDED   gabapentin (Neurontin) 300 mg capsule   No No   Sig: Take 1 capsule (300 mg total) by mouth daily at bedtime   methylPREDNISolone 4 MG tablet therapy pack   No No   Sig: Use as directed on package   naproxen (Naprosyn) 500 mg tablet   No No   Sig: Take 1 tablet (500 mg total) by mouth 2 (two) times a day with meals      Facility-Administered Medications: None       Past Medical History: Diagnosis Date   • Bipolar disorder ([facility] [address] )    • Depression    • History of wound infection     left ankle   • Psychiatric disorder     depression, anxiety   • Seasonal allergic reaction        Past Surgical History:   Procedure Laterality Date   • IR PICC LINE  2/8/2019   • RI OPEN TREATMENT BIMALLEOLAR ANKLE FRACTURE Left 5/14/2018    Procedure: OPEN REDUCTION W/ INTERNAL FIXATION (ORIF) ANKLE;  Surgeon: Armand Viveros DO;  Location: [address] [place] [address];  Service: Orthopedics   • RI REMOVAL DEEP IMPLANT Left 2/5/2019    Procedure: REMOVAL HARDWARE ANKLE;  Surgeon: Armand Viveros DO;  Location: [address] [place] [address];  Service: Orthopedics   • REDUCTION MAMMAPLASTY  2001    reduction       Family History   Problem Relation Age of Onset   • No Known Problems Father    • Diabetes Mother    • Cancer Mother    • Asthma Mother    • Breast cancer Mother 54   • No Known Problems Brother    • No Known Problems Maternal Aunt    • No Known Problems Maternal Uncle    • No Known Problems Maternal Grandmother    • No Known Problems Maternal Grandfather    • No Known Problems Paternal Grandmother    • No Known Problems Paternal Grandfather    • No Known Problems Daughter    • ADD / ADHD Neg Hx    • Anesthesia problems Neg Hx    • Clotting disorder Neg Hx    • Collagen disease Neg Hx    • Dislocations Neg Hx    • Learning disabilities Neg Hx    • Neurological problems Neg Hx    • Osteoporosis Neg Hx    • Rheumatologic disease Neg Hx    • Scoliosis Neg Hx    • Vascular Disease Neg Hx      I have reviewed and agree with the history as documented      E-Cigarette/Vaping   • E-Cigarette Use Former User      E-Cigarette/Vaping Substances     Social History     Tobacco Use   • Smoking status: Every Day     Packs/day: 1 00     Years: 25 00     Pack years: 25 00     Types: Cigarettes     Start date: 9/23/2000   • Smokeless tobacco: Never   Vaping Use   • Vaping Use: Former   Substance Use Topics   • Alcohol use: Not Currently     Comment: social   • Drug use: No       Review of Systems   Constitutional: Negative for chills and fever  HENT: Negative for ear pain and sore throat  Eyes: Negative for pain  Respiratory: Negative for cough and shortness of breath  Cardiovascular: Positive for chest pain  Negative for palpitations  Gastrointestinal: Negative for abdominal pain, nausea and vomiting  Genitourinary: Negative for dysuria and hematuria  Musculoskeletal: Negative for arthralgias and back pain  Skin: Negative for color change and rash  Neurological: Negative for syncope and headaches  All other systems reviewed and are negative  Physical Exam  Physical Exam  Vitals and nursing note reviewed  Constitutional:       Appearance: She is well-developed  She is not ill-appearing, toxic-appearing or diaphoretic  HENT:      Head: Normocephalic and atraumatic  Eyes:      Extraocular Movements: Extraocular movements intact  Pupils: Pupils are equal, round, and reactive to light  Cardiovascular:      Rate and Rhythm: Normal rate and regular rhythm  Heart sounds: Normal heart sounds  Pulmonary:      Effort: Pulmonary effort is normal       Breath sounds: Normal breath sounds  Abdominal:      General: Bowel sounds are normal       Palpations: Abdomen is soft  Musculoskeletal:         General: Normal range of motion  Cervical back: Normal range of motion and neck supple  Skin:     General: Skin is dry  Capillary Refill: Capillary refill takes less than 2 seconds  Neurological:      General: No focal deficit present  Mental Status: She is alert and oriented to person, place, and time           Vital Signs  ED Triage Vitals [12/01/22 1141]   Temperature Pulse Respirations Blood Pressure SpO2   97 5 °F (36 4 °C) 82 20 156/94 97 %      Temp src Heart Rate Source Patient Position - Orthostatic VS BP Location FiO2 (%)   -- -- -- -- --      Pain Score       5           Vitals:    12/01/22 1141 12/01/22 1200   BP: 156/94 123/83   Pulse: 82 86         Visual Acuity      ED Medications  Medications - No data to display    Diagnostic Studies  Results Reviewed     Procedure Component Value Units Date/Time    HS Troponin 0hr (reflex protocol) [[id number]]  (Normal) Collected: 12/01/22 1235    Lab Status: Final result Specimen: Blood from Arm, Right Updated: 12/01/22 1305     hs TnI 0hr <2 ng/L     Comprehensive metabolic panel [[id number]]  (Abnormal) Collected: 12/01/22 1235    Lab Status: Final result Specimen: Blood from Arm, Right Updated: 12/01/22 1300     Sodium 139 mmol/L      Potassium 3 9 mmol/L      Chloride 107 mmol/L      CO2 29 mmol/L      ANION GAP 3 mmol/L      BUN 12 mg/dL      Creatinine 0 77 mg/dL      Glucose 84 mg/dL      Calcium 8 5 mg/dL      Corrected Calcium 9 0 mg/dL      AST 19 U/L      ALT 23 U/L      Alkaline Phosphatase 69 U/L      Total Protein 7 1 g/dL      Albumin 3 4 g/dL      Total Bilirubin 0 21 mg/dL      eGFR 93 ml/min/1 73sq m     Narrative:      Courtney guidelines for Chronic Kidney Disease (CKD):   •  Stage 1 with normal or high GFR (GFR > 90 mL/min/1 73 square meters)  •  Stage 2 Mild CKD (GFR = 60-89 mL/min/1 73 square meters)  •  Stage 3A Moderate CKD (GFR = 45-59 mL/min/1 73 square meters)  •  Stage 3B Moderate CKD (GFR = 30-44 mL/min/1 73 square meters)  •  Stage 4 Severe CKD (GFR = 15-29 mL/min/1 73 square meters)  •  Stage 5 End Stage CKD (GFR <15 mL/min/1 73 square meters)  Note: GFR calculation is accurate only with a steady state creatinine    D-Dimer [[id number]]  (Normal) Collected: 12/01/22 1235    Lab Status: Final result Specimen: Blood from Arm, Right Updated: 12/01/22 1255     D-Dimer, Quant 0 35 ug/ml FEU     CBC and differential [[id number]] Collected: 12/01/22 1235    Lab Status: Final result Specimen: Blood from Arm, Right Updated: 12/01/22 1240     WBC 5 44 Thousand/uL      RBC 4 07 Million/uL      Hemoglobin 12 1 g/dL      Hematocrit 36 8 %      MCV 90 fL      MCH 29 7 pg      MCHC 32 9 g/dL      RDW 12 7 %      MPV 10 2 fL      Platelets 670 Thousands/uL      nRBC 0 /100 WBCs      Neutrophils Relative 60 %      Immat GRANS % 0 %      Lymphocytes Relative 28 %      Monocytes Relative 9 %      Eosinophils Relative 2 %      Basophils Relative 1 %      Neutrophils Absolute 3 26 Thousands/µL      Immature Grans Absolute 0 01 Thousand/uL      Lymphocytes Absolute 1 50 Thousands/µL      Monocytes Absolute 0 50 Thousand/µL      Eosinophils Absolute 0 12 Thousand/µL      Basophils Absolute 0 05 Thousands/µL                  XR chest 1 view portable    (Results Pending)              Procedures  ECG 12 Lead Documentation Only    Date/Time: 12/1/2022 3:39 PM  Performed by: Zoltan Bae PA-C  Authorized by: Zoltan Bae PA-C     Patient location:  ED  Previous ECG:     Previous ECG:  Unavailable  Interpretation:     Interpretation: normal    Rate:     ECG rate:  82    ECG rate assessment: normal    Rhythm:     Rhythm: sinus rhythm    Conduction:     Conduction: abnormal      Abnormal conduction: 1st degree    T waves:     T waves: non-specific               ED Course             HEART Risk Score    Flowsheet Row Most Recent Value   Heart Score Risk Calculator    History 0 Filed at: 12/01/2022 1541   ECG 1 Filed at: 12/01/2022 1541   Age 0 Filed at: 12/01/2022 1541   Risk Factors 1 Filed at: 12/01/2022 1541   Troponin 0 Filed at: 12/01/2022 1541   HEART Score 2 Filed at: 12/01/2022 1541                        SBIRT 20yo+    Flowsheet Row Most Recent Value   SBIRT (25 yo +)    In order to provide better care to our patients, we are screening all of our patients for alcohol and drug use  Would it be okay to ask you these screening questions? Yes Filed at: 12/01/2022 1238   Initial Alcohol Screen: US AUDIT-C     1  How often do you have a drink containing alcohol? 3 Filed at: 12/01/2022 1238   2   How many drinks containing alcohol do you have on a typical day you are drinking? 4 Filed at: 12/01/2022 1238   3b  FEMALE Any Age, or MALE 65+: How often do you have 4 or more drinks on one occassion? 5 Filed at: 12/01/2022 1238   Audit-C Score 12 Filed at: 12/01/2022 1238   Full Alcohol Screen: US AUDIT    4  How often during the last year have you found that you were not able to stop drinking once you had started? 0 Filed at: 12/01/2022 1238   5  How often during past year have you failed to do what was normally expected of you because of drinking? 3 Filed at: 12/01/2022 1238   6  How often in past year have you needed a first drink in the morning to get yourself going after a heavy drinking session? 3 Filed at: 12/01/2022 1238   7  How often in past year have you had feeling of guilt or remorse after drinking? 4 Filed at: 12/01/2022 1238   8  How often in past year have you been unable to remember what happened night before because you had been drinking? 4 Filed at: 12/01/2022 1238   9  Have you or someone else been injured as a result of your drinking? 2 Filed at: 12/01/2022 1238   10  Has a relative, friend, doctor or other health worker been concerned about your drinking and suggested you cut down? 4 Filed at: 12/01/2022 1238   AUDIT Total Score 32 Filed at: 12/01/2022 1238   QUIRINO: How many times in the past year have you    Used an illegal drug or used a prescription medication for non-medical reasons? Daily or Almost Daily  [years ago 2006] Filed at: 12/01/2022 1238                    MDM  Number of Diagnoses or Management Options  Chest pain, unspecified: new and requires workup  Diagnosis management comments: 44-year-old white female with transient mid to right upper chest discomfort while driving  Heart score 2  Troponin less than 2  Patient was advised follow-up with primary care provider and given referral for [facility] cardiology group    She was advised follow-up as an outpatient for consideration of stress test   Return precautions given including worsening chest pain or symptoms        Amount and/or Complexity of Data Reviewed  Clinical lab tests: reviewed  Tests in the radiology section of CPT®: reviewed  Tests in the medicine section of CPT®: reviewed  Decide to obtain previous medical records or to obtain history from someone other than the patient: yes  Review and summarize past medical records: yes  Discuss the patient with other providers: yes  Independent visualization of images, tracings, or specimens: yes    Risk of Complications, Morbidity, and/or Mortality  Presenting problems: moderate  Diagnostic procedures: moderate  Management options: moderate    Patient Progress  Patient progress: stable      Disposition  Final diagnoses:   Chest pain, unspecified     Time reflects when diagnosis was documented in both MDM as applicable and the Disposition within this note     Time User Action Codes Description Comment    12/1/2022  3:43 PM Néstor Montoya Add [R07 9] Chest pain, unspecified       ED Disposition     ED Disposition   Discharge    Condition   Stable    Date/Time   Thu Dec 1, 2022  3:43 PM    Comment   Angela Richard discharge to home/self care  Follow-up Information     Follow up With Specialties Details Why Contact Info Additional [facility]   [address] [place] [id number]  [phone] [facility], One [address], [address] [place] [address] Road,[facility]          Patient's Medications   Discharge Prescriptions    No medications on file       No discharge procedures on file      PDMP Review     None          ED Provider  Electronically Signed by           Marcie Cowden, PA-C  12/01/22 4807 None

## 2023-10-02 ENCOUNTER — OFFICE VISIT (OUTPATIENT)
Age: 48
End: 2023-10-02
Payer: COMMERCIAL

## 2023-10-02 VITALS — TEMPERATURE: 98.1 F | HEIGHT: 66 IN | BODY MASS INDEX: 40.5 KG/M2 | WEIGHT: 252 LBS

## 2023-10-02 DIAGNOSIS — M21.962 ACQUIRED DEFORMITY OF BOTH FEET: ICD-10-CM

## 2023-10-02 DIAGNOSIS — L60.0 INGROWN TOENAIL: ICD-10-CM

## 2023-10-02 DIAGNOSIS — M21.961 ACQUIRED DEFORMITY OF BOTH FEET: ICD-10-CM

## 2023-10-02 DIAGNOSIS — B35.1 ONYCHOMYCOSIS: ICD-10-CM

## 2023-10-02 DIAGNOSIS — M72.2 PLANTAR FASCIITIS, BILATERAL: ICD-10-CM

## 2023-10-02 DIAGNOSIS — M79.671 PAIN IN BOTH FEET: Primary | ICD-10-CM

## 2023-10-02 DIAGNOSIS — M79.672 PAIN IN BOTH FEET: Primary | ICD-10-CM

## 2023-10-02 PROCEDURE — 99213 OFFICE O/P EST LOW 20 MIN: CPT | Performed by: PODIATRIST

## 2023-10-02 RX ORDER — TERBINAFINE HYDROCHLORIDE 250 MG/1
250 TABLET ORAL DAILY
Qty: 30 TABLET | Refills: 0 | Status: SHIPPED | OUTPATIENT
Start: 2023-10-02 | End: 2023-11-01

## 2023-10-02 NOTE — PROGRESS NOTES
Assessment/Plan: Pain upon ambulation. History of Planter fasciitis. Acquired pes planus. Acquired deformity foot bilateral.  Mycosis hallux nail bilateral.    Plan. Chart reviewed. Patient examined. Patient advised on condition. Nails debrided without pain or complication. Patient be started on short course of Lamisil. Return for follow-up. Patient will stretch daily. Diagnoses and all orders for this visit:    Pain in both feet    Onychomycosis  -     terbinafine (LamISIL) 250 mg tablet; Take 1 tablet (250 mg total) by mouth daily    Ingrown toenail    Plantar fasciitis, bilateral    Acquired deformity of both feet          Subjective: Patient gets occasional pain in her heels. Her biggest concern today is her big toe toenails.     Allergies   Allergen Reactions   • Latex Rash   • Toradol [Ketorolac Tromethamine] Itching     GI UPSET   • Ultram [Tramadol] Rash         Current Outpatient Medications:   •  albuterol (Ventolin HFA) 90 mcg/act inhaler, Inhale 2 puffs every 6 (six) hours as needed for wheezing, Disp: 18 g, Rfl: 0  •  ARIPiprazole (ABILIFY) 15 mg tablet, Take 15 mg by mouth in the morning  , Disp: , Rfl:   •  clonazePAM (KlonoPIN) 1 mg tablet, take 1 tablet by mouth twice a day if needed  1 TAB IN THE A.M. SECOND TAB AS NEEDED, Disp: , Rfl:   •  FLUoxetine (PROzac) 20 mg capsule, take 1 capsule by mouth every morning  AS DIRECTED, Disp: , Rfl: 0  •  FLUoxetine (PROzac) 40 MG capsule, Take 40 mg by mouth every morning, Disp: , Rfl: 0  •  fluticasone (FLONASE) 50 mcg/act nasal spray, 1 spray into each nostril daily, Disp: 16 g, Rfl: 0  •  metFORMIN (GLUCOPHAGE) 500 mg tablet, take 1 tablet by mouth twice a day with meals, Disp: 90 tablet, Rfl: 1  •  naltrexone (REVIA) 50 mg tablet, Take 50 mg by mouth daily, Disp: , Rfl:   •  QUEtiapine (SEROquel) 50 mg tablet, Take 50 mg by mouth every evening, Disp: , Rfl:   •  rosuvastatin (CRESTOR) 10 MG tablet, take 1 tablet by mouth once daily, Disp: 90 tablet, Rfl: 0  •  terbinafine (LamISIL) 250 mg tablet, Take 1 tablet (250 mg total) by mouth daily, Disp: 30 tablet, Rfl: 0  •  Accu-Chek Guide test strip, daily Test blood sugar (Patient not taking: Reported on 5/24/2023), Disp: , Rfl:   •  Blood Glucose Monitoring Suppl (Accu-Chek Guide) w/Device KIT, Use as directed (Patient not taking: Reported on 5/24/2023), Disp: , Rfl:   •  cholecalciferol (VITAMIN D3) 1,000 units tablet, Take 1 tablet (1,000 Units total) by mouth daily (Patient not taking: Reported on 2/14/2023), Disp: 90 tablet, Rfl: 1  •  hydrOXYzine HCL (ATARAX) 50 mg tablet, Take 50 mg by mouth 3 (three) times a day (Patient not taking: Reported on 2/14/2023), Disp: , Rfl:   •  Promethazine-DM (PHENERGAN-DM) 6.25-15 mg/5 mL oral syrup, Take 5 mL by mouth 4 (four) times a day as needed for cough (Patient not taking: Reported on 7/6/2023), Disp: 180 mL, Rfl: 0    Patient Active Problem List   Diagnosis   • Anxiety and depression   • Trimalleolar fracture of ankle, closed, left, initial encounter   • Mass of soft tissue of right lower extremity   • Status post ORIF of fracture of ankle   • Lipoma of right lower extremity   • Lipoma of left lower extremity   • Tobacco abuse disorder   • Chronic pain of left ankle   • Chronic pain syndrome   • Bronchitis   • Painful orthopaedic hardware ([address])   • Infection of lower extremity associated with hardware (HCC)   • Myofascial pain dysfunction syndrome   • Mixed hyperlipidemia   • Vitamin D deficiency   • Peripheral neuropathy   • Type 2 diabetes mellitus with hyperglycemia, without long-term current use of insulin ([address])          Patient ID: Michelle Grider is a 57 y.o. female.     HPI    The following portions of the patient's history were reviewed and updated as appropriate:     family history includes Asthma in her mother; Breast cancer (age of onset: 54) in her mother; Cancer in her mother; Diabetes in her mother; Mental illness in her maternal grandmother and mother; No Known Problems in her brother, daughter, maternal aunt, maternal grandfather, maternal uncle, paternal grandfather, and paternal grandmother; Substance Abuse in her father, maternal grandmother, and mother. reports that she has been smoking cigarettes. She started smoking about 23 years ago. She has a 25.00 pack-year smoking history. She has never used smokeless tobacco. She reports current alcohol use of about 3.0 standard drinks of alcohol per week. She reports that she does not use drugs. Vitals:    10/02/23 1437   Temp: 98.1 °F (36.7 °C)       Review of Systems      Objective:  Patient's shoes and socks removed. Foot ExamPhysical Exam       Constitutional:       General: She is not in acute distress. Appearance: She is well-developed. She is not ill-appearing, toxic-appearing or diaphoretic. HENT:      Head: Normocephalic and atraumatic. Cardiovascular:      Pulses: Normal pulses. Dorsalis pedis pulses are 2+ on the right side and 2+ on the left side. Posterior tibial pulses are 2+ on the right side and 2+ on the left side. Comments: Palpable pedal pulse, CFT is less than 3 seconds, temperature gradient within normal limits, pedal hair present, no trophic skin changes. Pulmonary:      Effort: No respiratory distress. Musculoskeletal:         General: Normal range of motion. Comments: Pain on palpation to the medial calcaneal tuberosity and the insertion of the medial band of the plantar fascia right foot   Feet:      Right foot:      Protective Sensation: 10 sites tested. 10 sites sensed. Skin integrity: No ulcer, blister, skin breakdown or erythema. Left foot:      Protective Sensation: 10 sites tested. 10 sites sensed. Skin integrity: No ulcer, blister, skin breakdown or erythema. Skin:     Capillary Refill: Capillary refill takes 2 to 3 seconds. Coloration: Skin is not pale. All nails are dystrophic.   Hallux bilateral demonstrates mycosis of the distal aspect of the nail plate. Neurological:      Mental Status: She is alert and oriented to person, place, and time. Comments:  muscle power 5/5, protective sensations intact, no focal motor deficit. Foot Exam     Right Foot/Ankle      Inspection and Palpation  Skin Exam: no blister, no maceration, no ulcer and no erythema      Neurovascular  Dorsalis pedis: 2+  Posterior tibial: 2+        Left Foot/Ankle       Inspection and Palpation  Skin Exam: no blister, no maceration, no ulcer and no erythema      Neurovascular  Dorsalis pedis: 2+  Posterior tibial: 2+                    Portions of the record may have been created with voice recognition software. Occasional wrong word or "sound a like" substitutions may have occurred due to the inherent limitations of voice recognition software. Read the chart carefully and recognize, using context, where substitutions have occurred.

## 2023-10-05 ENCOUNTER — TELEPHONE (OUTPATIENT)
Age: 48
End: 2023-10-05

## 2023-10-05 ENCOUNTER — OFFICE VISIT (OUTPATIENT)
Dept: FAMILY MEDICINE CLINIC | Facility: CLINIC | Age: 48
End: 2023-10-05
Payer: COMMERCIAL

## 2023-10-05 VITALS
TEMPERATURE: 96.9 F | HEIGHT: 66 IN | OXYGEN SATURATION: 96 % | HEART RATE: 94 BPM | DIASTOLIC BLOOD PRESSURE: 80 MMHG | SYSTOLIC BLOOD PRESSURE: 128 MMHG | RESPIRATION RATE: 16 BRPM | BODY MASS INDEX: 42.11 KG/M2 | WEIGHT: 262 LBS

## 2023-10-05 DIAGNOSIS — R20.2 PARESTHESIA OF THUMB OF RIGHT HAND: ICD-10-CM

## 2023-10-05 DIAGNOSIS — M79.641 RIGHT HAND PAIN: Primary | ICD-10-CM

## 2023-10-05 PROCEDURE — 99214 OFFICE O/P EST MOD 30 MIN: CPT | Performed by: NURSE PRACTITIONER

## 2023-10-05 RX ORDER — PREDNISONE 20 MG/1
20 TABLET ORAL 2 TIMES DAILY WITH MEALS
Qty: 14 TABLET | Refills: 0 | Status: SHIPPED | OUTPATIENT
Start: 2023-10-05 | End: 2023-10-12

## 2023-10-05 NOTE — TELEPHONE ENCOUNTER
Patient is being referred to a orthopedics. Please schedule accordingly.     [address] [place] [address]   [phone]

## 2023-10-05 NOTE — PROGRESS NOTES
Name: Jimenez Huff      : 1975      MRN: [id number]  Encounter Provider: JORDAN Zelaya  Encounter Date: 10/5/2023   Encounter department: [facility] [place]   1. Right hand pain  Suspect possible CTS. Will refer to ortho hand specialist  Recommend use of wrist brace at bedtime. Short course prednisone   Re-eval in office in one week  - Ambulatory Referral to Orthopedic Surgery; Future  - Misc. Devices (Wrist Brace) MISC; Use daily at bedtime  Dispense: 1 each; Refill: 0  - predniSONE 20 mg tablet; Take 1 tablet (20 mg total) by mouth 2 (two) times a day with meals for 7 days  Dispense: 14 tablet; Refill: 0    2. Paresthesia of thumb of right hand  Suspect possible CTS. Will refer to ortho hand specialist  Recommend use of wrist brace at bedtime. Short course prednisone   Re-eval in office in one week  - Ambulatory Referral to Orthopedic Surgery; Future  - Misc. Devices (Wrist Brace) MISC; Use daily at bedtime  Dispense: 1 each; Refill: 0  - predniSONE 20 mg tablet; Take 1 tablet (20 mg total) by mouth 2 (two) times a day with meals for 7 days  Dispense: 14 tablet; Refill: 0      Patient was counseled regarding instructions for management which included: impression/diagnosis, risk/benefits of treatment options, importance of compliance with treatment, risk factor reduction, and prognosis. I have reviewed the instructions with the patient answering all questions and patient verbalized understanding. Subjective      Here pain and numbness right hand  "few years"  Was seen in past for same and was prescribed brace and didn't wear  No known injury  Pain across top of hand and all fingers, fingers numb at times, burning sensation into fingers. Has been taking fiance's gabapentin. States did not see orthopedics in past.   Pain is worse at night. Has neck issues. Review of Systems   Musculoskeletal: Positive for neck pain.         Pain right hand Skin: Negative for rash and wound. Neurological: Positive for numbness (fingers right hand intermittently).        Current Outpatient Medications on File Prior to Visit   Medication Sig   • albuterol (Ventolin HFA) 90 mcg/act inhaler Inhale 2 puffs every 6 (six) hours as needed for wheezing   • ARIPiprazole (ABILIFY) 15 mg tablet Take 15 mg by mouth in the morning     • clonazePAM (KlonoPIN) 1 mg tablet take 1 tablet by mouth twice a day if needed  1 TAB IN THE A.M. SECOND TAB AS NEEDED   • FLUoxetine (PROzac) 20 mg capsule take 1 capsule by mouth every morning  AS DIRECTED   • FLUoxetine (PROzac) 40 MG capsule Take 40 mg by mouth every morning   • metFORMIN (GLUCOPHAGE) 500 mg tablet take 1 tablet by mouth twice a day with meals   • naltrexone (REVIA) 50 mg tablet Take 50 mg by mouth daily   • QUEtiapine (SEROquel) 50 mg tablet Take 50 mg by mouth every evening   • rosuvastatin (CRESTOR) 10 MG tablet take 1 tablet by mouth once daily   • terbinafine (LamISIL) 250 mg tablet Take 1 tablet (250 mg total) by mouth daily   • [DISCONTINUED] Accu-Chek Guide test strip daily Test blood sugar (Patient not taking: Reported on 5/24/2023)   • [DISCONTINUED] Blood Glucose Monitoring Suppl (Accu-Chek Guide) w/Device KIT Use as directed (Patient not taking: Reported on 5/24/2023)   • [DISCONTINUED] cholecalciferol (VITAMIN D3) 1,000 units tablet Take 1 tablet (1,000 Units total) by mouth daily (Patient not taking: Reported on 2/14/2023)   • [DISCONTINUED] fluticasone (FLONASE) 50 mcg/act nasal spray 1 spray into each nostril daily (Patient not taking: Reported on 10/5/2023)   • [DISCONTINUED] hydrOXYzine HCL (ATARAX) 50 mg tablet Take 50 mg by mouth 3 (three) times a day (Patient not taking: Reported on 2/14/2023)   • [DISCONTINUED] Promethazine-DM (PHENERGAN-DM) 6.25-15 mg/5 mL oral syrup Take 5 mL by mouth 4 (four) times a day as needed for cough (Patient not taking: Reported on 7/6/2023)       Objective     /80 (BP Location: Right arm, Patient Position: Sitting, Cuff Size: Large)   Pulse 94   Temp (!) 96.9 °F (36.1 °C)   Resp 16   Ht 5' 6" (1.676 m)   Wt 119 kg (262 lb)   SpO2 96%   BMI 42.29 kg/m²     Physical Exam  Vitals reviewed. Constitutional:       General: She is not in acute distress. Appearance: She is not ill-appearing. Cardiovascular:      Rate and Rhythm: Normal rate. Pulmonary:      Effort: Pulmonary effort is normal.   Musculoskeletal:         General: No swelling, tenderness, deformity or signs of injury. Normal range of motion. Skin:     General: Skin is warm and dry. Findings: No lesion or rash. Neurological:      General: No focal deficit present. Mental Status: She is alert and oriented to person, place, and time. Motor: No weakness. Deep Tendon Reflexes: Reflexes normal.      Comments: (+) phalens, numbness 3rd, 4th, 5th fingers   Psychiatric:         Mood and Affect: Mood normal.         Behavior: Behavior normal.         Thought Content:  Thought content normal.         Judgment: Judgment normal.       Janay Otto

## 2023-10-05 NOTE — PATIENT INSTRUCTIONS
Schedule appt with orthopedics as discussed. Recommend that you wear wrist brace at night  Prednisone 20mg twice daily for one week with food.    Will re-evaluate in office in one week

## 2023-10-16 ENCOUNTER — TELEPHONE (OUTPATIENT)
Age: 48
End: 2023-10-16

## 2023-10-16 NOTE — TELEPHONE ENCOUNTER
Caller: Patient     Doctor: John Laws    Reason for call:     Patient missed appointment today, as we were looking to schedule her for testing the call dropped.     Call back#: n/a

## 2023-10-20 ENCOUNTER — OFFICE VISIT (OUTPATIENT)
Dept: FAMILY MEDICINE CLINIC | Facility: CLINIC | Age: 48
End: 2023-10-20
Payer: COMMERCIAL

## 2023-10-20 VITALS
OXYGEN SATURATION: 94 % | WEIGHT: 260 LBS | HEART RATE: 83 BPM | BODY MASS INDEX: 41.78 KG/M2 | SYSTOLIC BLOOD PRESSURE: 120 MMHG | DIASTOLIC BLOOD PRESSURE: 80 MMHG | HEIGHT: 66 IN | TEMPERATURE: 97.2 F | RESPIRATION RATE: 18 BRPM

## 2023-10-20 DIAGNOSIS — R20.2 PARESTHESIAS IN RIGHT HAND: ICD-10-CM

## 2023-10-20 DIAGNOSIS — M79.641 RIGHT HAND PAIN: Primary | ICD-10-CM

## 2023-10-20 PROCEDURE — 99213 OFFICE O/P EST LOW 20 MIN: CPT | Performed by: NURSE PRACTITIONER

## 2023-10-20 RX ORDER — NAPROXEN 500 MG/1
500 TABLET ORAL 2 TIMES DAILY WITH MEALS
Qty: 30 TABLET | Refills: 0 | Status: SHIPPED | OUTPATIENT
Start: 2023-10-20

## 2023-10-20 NOTE — PATIENT INSTRUCTIONS
Naprosyn 500mg twice daily with meals as needed. Can continue until seen by orthopedics  Recommend the use of wrist brace as previously prescribed. Follow up with orthopedics as scheduled.

## 2023-10-20 NOTE — PROGRESS NOTES
Name: René Monzon      : 1975      MRN: [id number]  Encounter Provider: JORDAN Colon  Encounter Date: 10/20/2023   Encounter department: [address] [place] [address]   1. Right hand pain  - naproxen (NAPROSYN) 500 mg tablet; Take 1 tablet (500 mg total) by mouth 2 (Naprosyn 500mg twice daily with meals as needed. Can continue until seen by orthopedics  Recommend the use of wrist brace as previously prescribed. Follow up with orthopedics as scheduled. two) times a day with meals  Dispense: 30 tablet; Refill: 0    2. Paresthesias in right hand  - naproxen (NAPROSYN) 500 mg tablet; Take 1 tablet (500 mg total) by mouth 2 (two) times a day with meals  Dispense: 30 tablet; Refill: 0     Patient was counseled regarding instructions for management which included: impression/diagnosis, risk/benefits of treatment options, importance of compliance with treatment, risk factor reduction, and prognosis. I have reviewed the instructions with the patient answering all questions and patient verbalized understanding. Subjective      Here for follow up on right hand pain and numbness into fingers  Seen in office on 10/5. Treated with short course of prednisone. Advised to use wrist brace. Referred to ortho. Appt is scheduled for 10/24. Did not start using wrist brace. Did see some improvement with prednisone but last few days did have some increased pain again  Still with numbness to "all" fingers at times. Reports allergy to toradol- rash. Is able to take other anti-inflammatory meds such as aleve, motrin, advil with no side effects or issues. Review of Systems   Musculoskeletal:         Pain right hand   Skin:  Negative for rash and wound. Neurological:  Positive for numbness (fingers right hand).        Current Outpatient Medications on File Prior to Visit   Medication Sig    albuterol (Ventolin HFA) 90 mcg/act inhaler Inhale 2 puffs every 6 (six) hours as needed for wheezing    ARIPiprazole (ABILIFY) 15 mg tablet Take 15 mg by mouth in the morning      clonazePAM (KlonoPIN) 1 mg tablet take 1 tablet by mouth twice a day if needed  1 TAB IN THE A.M. SECOND TAB AS NEEDED    FLUoxetine (PROzac) 20 mg capsule take 1 capsule by mouth every morning  AS DIRECTED    FLUoxetine (PROzac) 40 MG capsule Take 40 mg by mouth every morning    metFORMIN (GLUCOPHAGE) 500 mg tablet take 1 tablet by mouth twice a day with meals    Misc. Devices (Wrist Brace) MISC Use daily at bedtime    naltrexone (REVIA) 50 mg tablet Take 50 mg by mouth daily    QUEtiapine (SEROquel) 50 mg tablet Take 50 mg by mouth every evening    rosuvastatin (CRESTOR) 10 MG tablet take 1 tablet by mouth once daily    terbinafine (LamISIL) 250 mg tablet Take 1 tablet (250 mg total) by mouth daily       Objective     /80 (BP Location: Right arm, Patient Position: Sitting, Cuff Size: Large)   Pulse 83   Temp (!) 97.2 °F (36.2 °C)   Resp 18   Ht 5' 6" (1.676 m)   Wt 118 kg (260 lb)   SpO2 94%   BMI 41.97 kg/m²     Physical Exam  Vitals reviewed. Constitutional:       General: She is not in acute distress. Appearance: She is not ill-appearing. Cardiovascular:      Rate and Rhythm: Normal rate. Pulmonary:      Effort: Pulmonary effort is normal.   Musculoskeletal:         General: No swelling, tenderness or deformity. Normal range of motion. Skin:     Findings: No rash. Neurological:      Mental Status: She is alert and oriented to person, place, and time.    Psychiatric:         Mood and Affect: Mood normal.       JORDAN Rodgers

## 2023-10-24 ENCOUNTER — TELEPHONE (OUTPATIENT)
Dept: FAMILY MEDICINE CLINIC | Facility: CLINIC | Age: 48
End: 2023-10-24

## 2023-10-24 ENCOUNTER — OFFICE VISIT (OUTPATIENT)
Dept: OBGYN CLINIC | Facility: CLINIC | Age: 48
End: 2023-10-24
Payer: COMMERCIAL

## 2023-10-24 ENCOUNTER — APPOINTMENT (OUTPATIENT)
Dept: RADIOLOGY | Facility: CLINIC | Age: 48
End: 2023-10-24
Payer: COMMERCIAL

## 2023-10-24 VITALS
HEIGHT: 66 IN | BODY MASS INDEX: 41.78 KG/M2 | HEART RATE: 84 BPM | DIASTOLIC BLOOD PRESSURE: 84 MMHG | SYSTOLIC BLOOD PRESSURE: 128 MMHG | WEIGHT: 260 LBS

## 2023-10-24 DIAGNOSIS — G56.01 CARPAL TUNNEL SYNDROME, RIGHT: Primary | ICD-10-CM

## 2023-10-24 DIAGNOSIS — M79.641 RIGHT HAND PAIN: ICD-10-CM

## 2023-10-24 PROCEDURE — 99204 OFFICE O/P NEW MOD 45 MIN: CPT | Performed by: ORTHOPAEDIC SURGERY

## 2023-10-24 PROCEDURE — 73130 X-RAY EXAM OF HAND: CPT

## 2023-10-24 NOTE — TELEPHONE ENCOUNTER
Left message to return call. Pt needs labs for upcoming appt. Please relay message when pt call back.

## 2023-10-24 NOTE — PROGRESS NOTES
Assessment/Plan:  1. Carpal tunnel syndrome, right  XR hand 3+ vw right    US MSK limited    Brace          Shannan Leavitt has right-sided wrist pain consistent with carpal tunnel syndrome. She has minimal discomfort in the left wrist but the symptoms do sound similar. I recommended use of a cock-up wrist splint at night as well as further evaluation with a dedicated carpal tunnel ultrasound to assess forthe size of the median nerve. Based on the results I would likely have her see Dr. Serene Alejandro who is our hand surgeon to discuss carpal tunnel release if clinically indicated. Subjective:   Shekhar Moss is a 57 y.o. female who presents to the office for evaluation for right-sided hand discomfort. She has been feeling pain and numbness into her hands for the past several months. She denies any injury or trauma. She feels a numbing sensation into the first 3 digits of the right hand which seems to worsen at night. She has tried oral anti-inflammatories which have not seemingly helped. 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 She denies any localized area of pain in her hand. She has similar discomfort in the left hand but not as bad. Review of Systems   Constitutional:  Negative for chills, fever and unexpected weight change. HENT:  Negative for hearing loss, nosebleeds and sore throat. Eyes:  Negative for pain, redness and visual disturbance. Respiratory:  Negative for cough, shortness of breath and wheezing. Cardiovascular:  Negative for chest pain, palpitations and leg swelling. Gastrointestinal:  Negative for abdominal pain, nausea and vomiting. Endocrine: Negative for polydipsia and polyuria. Genitourinary:  Negative for dysuria and hematuria. Musculoskeletal:         See HPI   Skin:  Negative for rash and wound. Neurological:  Negative for dizziness, numbness and headaches.    Psychiatric/Behavioral:  Negative for decreased concentration and suicidal ideas. The patient is not nervous/anxious.           Past Medical History:   Diagnosis Date    Bipolar disorder ([address])     Depression     History of wound infection     left ankle    Morbid obesity with BMI of 40.0-44.9, adult ([address]) 1/18/2018    Psychiatric disorder     depression, anxiety    Seasonal allergic reaction        Past Surgical History:   Procedure Laterality Date    IR PICC LINE  2/8/2019    WY OPEN TREATMENT BIMALLEOLAR ANKLE FRACTURE Left 5/14/2018    Procedure: OPEN REDUCTION W/ INTERNAL FIXATION (ORIF) ANKLE;  Surgeon: Victorina Pina DO;  Location: [facility];  Service: Orthopedics    WY REMOVAL IMPLANT DEEP Left 2/5/2019    Procedure: REMOVAL HARDWARE ANKLE;  Surgeon: Victorina Pina DO;  Location: [facility];  Service: Orthopedics    REDUCTION MAMMAPLASTY  2001    reduction       Family History   Problem Relation Age of Onset    Substance Abuse Mother     Mental illness Mother     Diabetes Mother     Cancer Mother     Asthma Mother     Breast cancer Mother 54    Substance Abuse Father     No Known Problems Brother     No Known Problems Daughter     Substance Abuse Maternal Grandmother     Mental illness Maternal Grandmother     No Known Problems Maternal Grandfather     No Known Problems Paternal Grandmother     No Known Problems Paternal Grandfather     No Known Problems Maternal Aunt     No Known Problems Maternal Uncle     ADD / ADHD Neg Hx     Anesthesia problems Neg Hx     Clotting disorder Neg Hx     Collagen disease Neg Hx     Dislocations Neg Hx     Learning disabilities Neg Hx     Neurological problems Neg Hx     Osteoporosis Neg Hx     Rheumatologic disease Neg Hx     Scoliosis Neg Hx     Vascular Disease Neg Hx        Social History     Occupational History    Not on file   Tobacco Use    Smoking status: Every Day     Packs/day: 1.00     Years: 25.00     Total pack years: 25.00     Types: Cigarettes     Start date: 9/23/2000    Smokeless tobacco: Never   Vaping Use    Vaping Use: Former   Substance and Sexual Activity    Alcohol use: Yes     Alcohol/week: 3.0 standard drinks of alcohol     Types: 3 Cans of beer per week     Comment: social    Drug use: Never    Sexual activity: Yes     Partners: Male         Current Outpatient Medications:     albuterol (Ventolin HFA) 90 mcg/act inhaler, Inhale 2 puffs every 6 (six) hours as needed for wheezing, Disp: 18 g, Rfl: 0    ARIPiprazole (ABILIFY) 15 mg tablet, Take 15 mg by mouth in the morning  , Disp: , Rfl:     clonazePAM (KlonoPIN) 1 mg tablet, take 1 tablet by mouth twice a day if needed  1 TAB IN THE A.M. SECOND TAB AS NEEDED, Disp: , Rfl:     FLUoxetine (PROzac) 20 mg capsule, take 1 capsule by mouth every morning  AS DIRECTED, Disp: , Rfl: 0    FLUoxetine (PROzac) 40 MG capsule, Take 40 mg by mouth every morning, Disp: , Rfl: 0    metFORMIN (GLUCOPHAGE) 500 mg tablet, take 1 tablet by mouth twice a day with meals, Disp: 90 tablet, Rfl: 1    Misc. Devices (Wrist Brace) MISC, Use daily at bedtime, Disp: 1 each, Rfl: 0    naltrexone (REVIA) 50 mg tablet, Take 50 mg by mouth daily, Disp: , Rfl:     naproxen (NAPROSYN) 500 mg tablet, Take 1 tablet (500 mg total) by mouth 2 (two) times a day with meals, Disp: 30 tablet, Rfl: 0    QUEtiapine (SEROquel) 50 mg tablet, Take 50 mg by mouth every evening, Disp: , Rfl:     rosuvastatin (CRESTOR) 10 MG tablet, take 1 tablet by mouth once daily, Disp: 90 tablet, Rfl: 0    terbinafine (LamISIL) 250 mg tablet, Take 1 tablet (250 mg total) by mouth daily, Disp: 30 tablet, Rfl: 0    Allergies   Allergen Reactions    Latex Rash    Toradol [Ketorolac Tromethamine] Itching     GI UPSET    Ultram [Tramadol] Rash       Objective:  Vitals:    10/24/23 1559   BP: 128/84   Pulse: 84     Pain Score:   1      Right Hand Exam     Tenderness   The patient is experiencing tenderness in the palmar area.     Range of Motion   Wrist   Extension:  normal   Flexion:  normal   Pronation:  normal   Supination:  normal     Muscle Strength   Wrist extension: 5/5   Wrist flexion: 5/5   : 5/5     Tests   Tinel's sign (median nerve): positive    Other   Erythema: absent  Sensation: normal  Pulse: present    Comments:  Positive Durkan's test      Left Hand Exam     Tenderness   The patient is experiencing no tenderness. Range of Motion   Wrist   Extension:  normal   Flexion:  normal   Pronation:  normal   Supination:  normal     Muscle Strength   :  5/5     Tests   Tinel's sign (median nerve): positive    Comments:  Negative Durkan's test          Strength/Myotome Testing     Right Wrist/Hand   Wrist extension: 5  Wrist flexion: 5    Tests     Left Wrist/Hand   Positive Tinel's sign (medial nerve). Right Wrist/Hand   Positive Tinel's sign (medial nerve). Physical Exam  Vitals and nursing note reviewed. Constitutional:       Appearance: Normal appearance. She is well-developed. HENT:      Head: Normocephalic and atraumatic. Right Ear: External ear normal.      Left Ear: External ear normal.   Eyes:      General: No scleral icterus. Extraocular Movements: Extraocular movements intact. Conjunctiva/sclera: Conjunctivae normal.   Cardiovascular:      Rate and Rhythm: Normal rate. Pulmonary:      Effort: Pulmonary effort is normal. No respiratory distress. Musculoskeletal:      Cervical back: Normal range of motion and neck supple. Comments: See Ortho exam   Skin:     General: Skin is warm and dry. Neurological:      General: No focal deficit present. Mental Status: She is alert and oriented to person, place, and time. Psychiatric:         Behavior: Behavior normal.         I have personally reviewed pertinent films in PACS and my interpretation is as follows:  Right hand x-rays demonstrate no evidence of acute fracture or significant degenerative changes. This document was created using speech voice recognition software.    Grammatical errors, random word insertions, pronoun errors, and incomplete sentences are an occasional consequence of this system due to software limitations, ambient noise, and hardware issues. Any formal questions or concerns about content, text, or information contained within the body of this dictation should be directly addressed to the provider for clarification.

## 2023-10-25 LAB
ALBUMIN SERPL-MCNC: 4 G/DL (ref 3.9–4.9)
ALBUMIN/GLOB SERPL: 1.9 {RATIO} (ref 1.2–2.2)
ALP SERPL-CCNC: 71 IU/L (ref 44–121)
ALT SERPL-CCNC: 13 IU/L (ref 0–32)
AST SERPL-CCNC: 17 IU/L (ref 0–40)
BILIRUB SERPL-MCNC: <0.2 MG/DL (ref 0–1.2)
BUN SERPL-MCNC: 10 MG/DL (ref 6–24)
BUN/CREAT SERPL: 12 (ref 9–23)
CALCIUM SERPL-MCNC: 8.2 MG/DL (ref 8.7–10.2)
CHLORIDE SERPL-SCNC: 107 MMOL/L (ref 96–106)
CO2 SERPL-SCNC: 23 MMOL/L (ref 20–29)
CREAT SERPL-MCNC: 0.82 MG/DL (ref 0.57–1)
EGFR: 89 ML/MIN/1.73
EST. AVERAGE GLUCOSE BLD GHB EST-MCNC: 117 MG/DL
GLOBULIN SER-MCNC: 2.1 G/DL (ref 1.5–4.5)
GLUCOSE SERPL-MCNC: 84 MG/DL (ref 70–99)
HBA1C MFR BLD: 5.7 % (ref 4.8–5.6)
POTASSIUM SERPL-SCNC: 4.1 MMOL/L (ref 3.5–5.2)
PROT SERPL-MCNC: 6.1 G/DL (ref 6–8.5)
SODIUM SERPL-SCNC: 144 MMOL/L (ref 134–144)

## 2023-10-26 DIAGNOSIS — E11.65 TYPE 2 DIABETES MELLITUS WITH HYPERGLYCEMIA, WITHOUT LONG-TERM CURRENT USE OF INSULIN (HCC): ICD-10-CM

## 2023-10-31 ENCOUNTER — OFFICE VISIT (OUTPATIENT)
Dept: FAMILY MEDICINE CLINIC | Facility: CLINIC | Age: 48
End: 2023-10-31
Payer: COMMERCIAL

## 2023-10-31 VITALS
DIASTOLIC BLOOD PRESSURE: 86 MMHG | RESPIRATION RATE: 16 BRPM | TEMPERATURE: 97.2 F | SYSTOLIC BLOOD PRESSURE: 120 MMHG | HEART RATE: 80 BPM | WEIGHT: 270 LBS | HEIGHT: 66 IN | OXYGEN SATURATION: 94 % | BODY MASS INDEX: 43.39 KG/M2

## 2023-10-31 DIAGNOSIS — E83.51 HYPOCALCEMIA: ICD-10-CM

## 2023-10-31 DIAGNOSIS — E55.9 VITAMIN D DEFICIENCY: ICD-10-CM

## 2023-10-31 DIAGNOSIS — A69.20 LYME DISEASE: ICD-10-CM

## 2023-10-31 DIAGNOSIS — E11.65 TYPE 2 DIABETES MELLITUS WITH HYPERGLYCEMIA, WITHOUT LONG-TERM CURRENT USE OF INSULIN (HCC): Primary | ICD-10-CM

## 2023-10-31 DIAGNOSIS — R53.83 OTHER FATIGUE: ICD-10-CM

## 2023-10-31 DIAGNOSIS — R63.5 WEIGHT GAIN: ICD-10-CM

## 2023-10-31 DIAGNOSIS — E78.2 MIXED HYPERLIPIDEMIA: ICD-10-CM

## 2023-10-31 PROBLEM — F10.11 HISTORY OF ETOH ABUSE: Status: ACTIVE | Noted: 2023-10-31

## 2023-10-31 PROCEDURE — 99214 OFFICE O/P EST MOD 30 MIN: CPT | Performed by: NURSE PRACTITIONER

## 2023-10-31 RX ORDER — ROSUVASTATIN CALCIUM 10 MG/1
10 TABLET, COATED ORAL DAILY
Qty: 90 TABLET | Refills: 1 | Status: SHIPPED | OUTPATIENT
Start: 2023-10-31

## 2023-10-31 RX ORDER — PHENOL 1.4 %
600 AEROSOL, SPRAY (ML) MUCOUS MEMBRANE 2 TIMES DAILY WITH MEALS
Qty: 180 TABLET | Refills: 1 | Status: SHIPPED | OUTPATIENT
Start: 2023-10-31

## 2023-10-31 RX ORDER — MELATONIN
1000 DAILY
Qty: 90 TABLET | Refills: 1 | Status: SHIPPED | OUTPATIENT
Start: 2023-10-31

## 2023-10-31 NOTE — PROGRESS NOTES
Name: Karyna Billings      : 1975      MRN: [id number]  Encounter Provider: JORDAN Phelps  Encounter Date: 10/31/2023   Encounter department: [address] [place] [address]     1. Type 2 diabetes mellitus with hyperglycemia, without long-term current use of insulin (HCC)  Controlled. Low carb diet. Regular exercise. Will decrease metformin to 500mg daily with breakfast. Check labs in 6 months. - Albumin / creatinine urine ratio; Future  - Comprehensive metabolic panel; Future  - Hemoglobin A1C; Future  - Lipid Panel with Direct LDL reflex; Future  - rosuvastatin (CRESTOR) 10 MG tablet; Take 1 tablet (10 mg total) by mouth daily  Dispense: 90 tablet; Refill: 1    2. Mixed hyperlipidemia  Heart healthy diet. Statin. Regular exercise. Check labs in 6 months  - Comprehensive metabolic panel; Future  - Lipid Panel with Direct LDL reflex; Future  - rosuvastatin (CRESTOR) 10 MG tablet; Take 1 tablet (10 mg total) by mouth daily  Dispense: 90 tablet; Refill: 1    3. Vitamin D deficiency  - Comprehensive metabolic panel; Future  - Comprehensive metabolic panel  - Vitamin D 25 hydroxy; Future  - cholecalciferol (VITAMIN D3) 1,000 units tablet; Take 1 tablet (1,000 Units total) by mouth daily  Dispense: 90 tablet; Refill: 1  - calcium carbonate (OS-ARIELLA) 600 MG tablet; Take 1 tablet (600 mg total) by mouth 2 (two) times a day with meals  Dispense: 180 tablet; Refill: 1    4. Hypocalcemia  - calcium carbonate (OS-ARIELLA) 600 MG tablet; Take 1 tablet (600 mg total) by mouth 2 (two) times a day with meals  Dispense: 180 tablet; Refill: 1    5. Lyme disease  - Lyme Total AB W Reflex to IGM/IGG; Future    6. Other fatigue  - Lyme Total AB W Reflex to IGM/IGG; Future  - TSH, 3rd generation; Future    7. Weight gain  May be due to physical inactivity and dietary indiscretion but will check TSH with next routine lab draw. - TSH, 3rd generation;  Future       Patient was counseled regarding instructions for management which included: impression/diagnosis, risk/benefits of treatment options, importance of compliance with treatment, risk factor reduction, and prognosis. I have reviewed the instructions with the patient answering all questions and patient verbalized understanding. Subjective      Here for DM fu, med check  On metformin 500mg bid, admits usually only takes once daily  Has gained about 20 lbs over past few months. Does admit to dietary indiscretion. Does not exercise. Tired all the time. Had lyme disease about 6 months ago. Does not check blood sugars at home. History of etoh abuse but has been sober for over a year. Diabetes  Pertinent negatives for hypoglycemia include no dizziness or headaches. Associated symptoms include fatigue. Pertinent negatives for diabetes include no chest pain. Review of Systems   Constitutional:  Positive for fatigue and unexpected weight change. Respiratory:  Negative for chest tightness and shortness of breath. Cardiovascular:  Negative for chest pain and palpitations. Gastrointestinal:  Negative for abdominal pain. Skin:  Negative for rash and wound. Neurological:  Negative for dizziness and headaches. Current Outpatient Medications on File Prior to Visit   Medication Sig    albuterol (Ventolin HFA) 90 mcg/act inhaler Inhale 2 puffs every 6 (six) hours as needed for wheezing    ARIPiprazole (ABILIFY) 15 mg tablet Take 15 mg by mouth in the morning      clonazePAM (KlonoPIN) 1 mg tablet take 1 tablet by mouth twice a day if needed  1 TAB IN THE A.M. SECOND TAB AS NEEDED    FLUoxetine (PROzac) 20 mg capsule take 1 capsule by mouth every morning  AS DIRECTED    FLUoxetine (PROzac) 40 MG capsule Take 40 mg by mouth every morning    metFORMIN (GLUCOPHAGE) 500 mg tablet take 1 tablet by mouth twice a day with meals    Misc.  Devices (Wrist Brace) MISC Use daily at bedtime    naltrexone (REVIA) 50 mg tablet Take 50 mg by mouth daily    naproxen (NAPROSYN) 500 mg tablet Take 1 tablet (500 mg total) by mouth 2 (two) times a day with meals    QUEtiapine (SEROquel) 50 mg tablet Take 50 mg by mouth every evening    rosuvastatin (CRESTOR) 10 MG tablet take 1 tablet by mouth once daily    terbinafine (LamISIL) 250 mg tablet Take 1 tablet (250 mg total) by mouth daily       Objective     Recent Results (from the past 672 hour(s))   Hemoglobin A1C    Collection Time: 10/24/23 10:03 AM   Result Value Ref Range    Hemoglobin A1C 5.7 (H) 4.8 - 5.6 %    Estimated Average Glucose 117 mg/dL   Comprehensive metabolic panel    Collection Time: 10/24/23 10:03 AM   Result Value Ref Range    Glucose, Random 84 70 - 99 mg/dL    BUN 10 6 - 24 mg/dL    Creatinine 0.82 0.57 - 1.00 mg/dL    eGFR 89 >59 mL/min/1.73    SL AMB BUN/CREATININE RATIO 12 9 - 23    Sodium 144 134 - 144 mmol/L    Potassium 4.1 3.5 - 5.2 mmol/L    Chloride 107 (H) 96 - 106 mmol/L    CO2 23 20 - 29 mmol/L    CALCIUM 8.2 (L) 8.7 - 10.2 mg/dL    Protein, Total 6.1 6.0 - 8.5 g/dL    Albumin 4.0 3.9 - 4.9 g/dL    Globulin, Total 2.1 1.5 - 4.5 g/dL    Albumin/Globulin Ratio 1.9 1.2 - 2.2    TOTAL BILIRUBIN <0.2 0.0 - 1.2 mg/dL    Alk Phos Isoenzymes 71 44 - 121 IU/L    AST 17 0 - 40 IU/L    ALT 13 0 - 32 IU/L     Reviewed lab/diagnostic results with pt including both normal and abnormal findings. In depth counseling and instructions given. All questions answered during visit. /86 (BP Location: Right arm, Patient Position: Sitting, Cuff Size: Large)   Pulse 80   Temp (!) 97.2 °F (36.2 °C)   Resp 16   Ht 5' 6" (1.676 m)   Wt 122 kg (270 lb)   SpO2 94%   BMI 43.58 kg/m²     Physical Exam  Vitals reviewed. Constitutional:       General: She is not in acute distress. Appearance: She is obese. She is not ill-appearing. Neck:      Vascular: No carotid bruit. Cardiovascular:      Rate and Rhythm: Normal rate and regular rhythm.       Pulses: no weak pulses          Dorsalis pedis pulses are 2+ on the right side and 2+ on the left side. Posterior tibial pulses are 2+ on the right side and 2+ on the left side. Pulmonary:      Effort: Pulmonary effort is normal. No respiratory distress. Breath sounds: Normal breath sounds. No wheezing. Musculoskeletal:      Cervical back: Normal range of motion. Feet:      Right foot:      Skin integrity: No ulcer, skin breakdown, erythema, warmth, callus or dry skin. Left foot:      Skin integrity: No ulcer, skin breakdown, erythema, warmth, callus or dry skin. Skin:     General: Skin is warm and dry. Coloration: Skin is not jaundiced or pale. Neurological:      General: No focal deficit present. Mental Status: She is alert and oriented to person, place, and time. Cranial Nerves: No cranial nerve deficit. Sensory: No sensory deficit. Psychiatric:         Mood and Affect: Mood normal.         Behavior: Behavior normal.         Thought Content: Thought content normal.         Judgment: Judgment normal.     Patient's shoes and socks removed. Right Foot/Ankle   Right Foot Inspection  Skin Exam: skin normal and skin intact. No dry skin, no warmth, no callus, no erythema, no maceration, no abnormal color, no pre-ulcer, no ulcer and no callus. Toe Exam: ROM and strength within normal limits. Sensory   Monofilament testing: intact    Vascular  Capillary refills: < 3 seconds  The right DP pulse is 2+. The right PT pulse is 2+. Left Foot/Ankle  Left Foot Inspection  Skin Exam: skin normal and skin intact. No dry skin, no warmth, no erythema, no maceration, normal color, no pre-ulcer, no ulcer and no callus. Toe Exam: ROM and strength within normal limits. Sensory   Monofilament testing: intact    Vascular  Capillary refills: < 3 seconds  The left DP pulse is 2+. The left PT pulse is 2+.      Assign Risk Category  No deformity present  No loss of protective sensation  No weak pulses  Risk: [phone] [id number] [place] Wei

## 2023-10-31 NOTE — PATIENT INSTRUCTIONS
Decrease Metformin to 500mg daily with breakfast  Calcium Carbonate 600mg twice daily in divided doses, 1 tablet with breakfast and 1 tablet with dinner  Vitamin D3 1000 units daily  Continue all other medications. Heart healthy, carbohydrate controlled diet- limit red meat, limit saturated fat, moderate salt intake, limit junk food, etc.   Regular exercise  Stress management  Routine labwork and screenings as ordered. Will check labs in 6 months prior to next appt.

## 2023-11-13 ENCOUNTER — HOSPITAL ENCOUNTER (OUTPATIENT)
Dept: RADIOLOGY | Facility: HOSPITAL | Age: 48
Discharge: HOME | End: 2023-11-13
Payer: COMMERCIAL

## 2023-11-13 VITALS — BODY MASS INDEX: 43.39 KG/M2 | HEIGHT: 66 IN | WEIGHT: 270 LBS

## 2023-11-13 DIAGNOSIS — Z12.31 ENCOUNTER FOR SCREENING MAMMOGRAM FOR MALIGNANT NEOPLASM OF BREAST: ICD-10-CM

## 2023-11-13 PROCEDURE — 77067 SCR MAMMO BI INCL CAD: CPT

## 2023-11-13 PROCEDURE — 77063 BREAST TOMOSYNTHESIS BI: CPT

## 2023-12-14 ENCOUNTER — APPOINTMENT (EMERGENCY)
Dept: RADIOLOGY | Facility: HOSPITAL | Age: 48
End: 2023-12-14
Payer: OTHER MISCELLANEOUS

## 2023-12-14 ENCOUNTER — HOSPITAL ENCOUNTER (EMERGENCY)
Facility: HOSPITAL | Age: 48
Discharge: HOME | End: 2023-12-14
Attending: EMERGENCY MEDICINE
Payer: OTHER MISCELLANEOUS

## 2023-12-14 VITALS
BODY MASS INDEX: 46.1 KG/M2 | RESPIRATION RATE: 20 BRPM | DIASTOLIC BLOOD PRESSURE: 78 MMHG | HEIGHT: 64 IN | TEMPERATURE: 97.8 F | SYSTOLIC BLOOD PRESSURE: 150 MMHG | OXYGEN SATURATION: 97 % | WEIGHT: 270 LBS | HEART RATE: 77 BPM

## 2023-12-14 DIAGNOSIS — S20.219A CONTUSION OF CHEST WALL, UNSPECIFIED LATERALITY, INITIAL ENCOUNTER: ICD-10-CM
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
## 2023-12-14 DIAGNOSIS — T14.8XXA ABRASION: ICD-10-CM

## 2023-12-14 PROCEDURE — 73564 X-RAY EXAM KNEE 4 OR MORE: CPT

## 2023-12-14 PROCEDURE — 71045 X-RAY EXAM CHEST 1 VIEW: CPT

## 2023-12-14 PROCEDURE — 99284 EMERGENCY DEPT VISIT MOD MDM: CPT | Performed by: EMERGENCY MEDICINE

## 2023-12-14 PROCEDURE — 90715 TDAP VACCINE 7 YRS/> IM: CPT | Performed by: EMERGENCY MEDICINE

## 2023-12-14 PROCEDURE — 73130 X-RAY EXAM OF HAND: CPT

## 2023-12-14 PROCEDURE — 99284 EMERGENCY DEPT VISIT MOD MDM: CPT

## 2023-12-14 PROCEDURE — 90471 IMMUNIZATION ADMIN: CPT

## 2023-12-14 RX ORDER — OXYCODONE HYDROCHLORIDE AND ACETAMINOPHEN 5; 325 MG/1; MG/1
1 TABLET ORAL EVERY 4 HOURS PRN
Qty: 4 TABLET | Refills: 0 | Status: SHIPPED | OUTPATIENT
Start: 2023-12-14 | End: 2023-12-24

## 2023-12-14 RX ORDER — OXYCODONE HYDROCHLORIDE AND ACETAMINOPHEN 5; 325 MG/1; MG/1
1 TABLET ORAL ONCE
Status: COMPLETED | OUTPATIENT
Start: 2023-12-14 | End: 2023-12-14

## 2023-12-14 RX ADMIN — TETANUS TOXOID, REDUCED DIPHTHERIA TOXOID AND ACELLULAR PERTUSSIS VACCINE, ADSORBED 0.5 ML: 5; 2.5; 8; 8; 2.5 SUSPENSION INTRAMUSCULAR at 18:20

## 2023-12-14 RX ADMIN — OXYCODONE HYDROCHLORIDE AND ACETAMINOPHEN 1 TABLET: 5; 325 TABLET ORAL at 18:53

## 2023-12-14 NOTE — Clinical Note
Neto Clark was seen and treated in our emergency department on 12/14/2023. Diagnosis:     Vj Quinonez  may return to work on return date. She may return on this date: 12/18/2023         If you have any questions or concerns, please don't hesitate to call.       Shelly Phoenix, DO    ______________________________           _______________          _______________  Hospital Representative                              Date                                Time

## 2023-12-14 NOTE — ED PROVIDER NOTES
History  Chief Complaint   Patient presents with    Fall     Patient was crossing street when her knees gave out, causing her to fall onto her left knee, also feels like she pulled the left hamstring     44-year-old female states that she was crossing the street when her knees gave out on her she fell to the ground landing mainly on the left knee has a large abrasion to that knee. Some swelling. Patient then landed on her left hand which has abrasions also. And also landed on her chest.  She is complaining of pain in all those 3 areas. No loss of consciousness no head injury no neck pain not on any anticoagulation. Patient is awake and alert no other complaints        Prior to Admission Medications   Prescriptions Last Dose Informant Patient Reported? Taking? ARIPiprazole (ABILIFY) 15 mg tablet  Self Yes No   Sig: Take 15 mg by mouth in the morning     FLUoxetine (PROzac) 20 mg capsule  Self Yes No   Sig: take 1 capsule by mouth every morning  AS DIRECTED   FLUoxetine (PROzac) 40 MG capsule  Self Yes No   Sig: Take 40 mg by mouth every morning   Misc.  Devices (Wrist Brace) MISC   No No   Sig: Use daily at bedtime   QUEtiapine (SEROquel) 50 mg tablet  Self Yes No   Sig: Take 50 mg by mouth every evening   albuterol (Ventolin HFA) 90 mcg/act inhaler  Self No No   Sig: Inhale 2 puffs every 6 (six) hours as needed for wheezing   calcium carbonate (OS-ARIELLA) 600 MG tablet   No No   Sig: Take 1 tablet (600 mg total) by mouth 2 (two) times a day with meals   cholecalciferol (VITAMIN D3) 1,000 units tablet   No No   Sig: Take 1 tablet (1,000 Units total) by mouth daily   clonazePAM (KlonoPIN) 1 mg tablet  Self Yes No   Sig: take 1 tablet by mouth twice a day if needed  1 TAB IN THE A.M. SECOND TAB AS NEEDED   metFORMIN (GLUCOPHAGE) 500 mg tablet   No No   Sig: take 1 tablet by mouth twice a day with meals   naltrexone (REVIA) 50 mg tablet  Self Yes No   Sig: Take 50 mg by mouth daily   naproxen (NAPROSYN) 500 mg tablet No No   Sig: Take 1 tablet (500 mg total) by mouth 2 (two) times a day with meals   rosuvastatin (CRESTOR) 10 MG tablet   No No   Sig: Take 1 tablet (10 mg total) by mouth daily      Facility-Administered Medications: None       Past Medical History:   Diagnosis Date    Bipolar disorder ([address])     Depression     History of wound infection     left ankle    Morbid obesity with BMI of 40.0-44.9, adult ([address]) 1/18/2018    Psychiatric disorder     depression, anxiety    Seasonal allergic reaction        Past Surgical History:   Procedure Laterality Date    IR PICC LINE  2/8/2019    MA OPEN TREATMENT BIMALLEOLAR ANKLE FRACTURE Left 5/14/2018    Procedure: OPEN REDUCTION W/ INTERNAL FIXATION (ORIF) ANKLE;  Surgeon: Wen Arcos DO;  Location: [facility];  Service: Orthopedics    MA REMOVAL IMPLANT DEEP Left 2/5/2019    Procedure: REMOVAL HARDWARE ANKLE;  Surgeon: Wen Arcos DO;  Location: [facility];  Service: Orthopedics    REDUCTION MAMMAPLASTY  2001    reduction       Family History   Problem Relation Age of Onset    Substance Abuse Mother     Mental illness Mother     Diabetes Mother     Cancer Mother     Asthma Mother     Breast cancer Mother 54    Substance Abuse Father     No Known Problems Brother     No Known Problems Daughter     Substance Abuse Maternal Grandmother     Mental illness Maternal Grandmother     No Known Problems Maternal Grandfather     No Known Problems Paternal Grandmother     No Known Problems Paternal Grandfather     No Known Problems Maternal Aunt     No Known Problems Maternal Uncle     ADD / ADHD Neg Hx     Anesthesia problems Neg Hx     Clotting disorder Neg Hx     Collagen disease Neg Hx     Dislocations Neg Hx     Learning disabilities Neg Hx     Neurological problems Neg Hx     Osteoporosis Neg Hx     Rheumatologic disease Neg Hx     Scoliosis Neg Hx     Vascular Disease Neg Hx      I have reviewed and agree with the history as documented.     E-Cigarette/Vaping    E-Cigarette Use Former User      E-Cigarette/Vaping Substances    Nicotine No     THC No     CBD No     Flavoring No     Other No     Unknown No      Social History     Tobacco Use    Smoking status: Every Day     Current packs/day: 1.00     Average packs/day: 1 pack/day for 25.0 years (25.0 ttl pk-yrs)     Types: Cigarettes     Start date: 9/23/2000    Smokeless tobacco: Never   Vaping Use    Vaping status: Former   Substance Use Topics    Alcohol use: Yes     Alcohol/week: 3.0 standard drinks of alcohol     Types: 3 Cans of beer per week     Comment: social    Drug use: Never       Review of Systems   Constitutional:  Negative for activity change, chills, diaphoresis and fever. HENT:  Negative for congestion, ear pain, nosebleeds, sore throat, trouble swallowing and voice change. Eyes:  Negative for pain, discharge and redness. Respiratory:  Negative for apnea, cough, choking, shortness of breath, wheezing and stridor. Cardiovascular:  Negative for chest pain and palpitations. Gastrointestinal:  Negative for abdominal distention, abdominal pain, constipation, diarrhea, nausea and vomiting. Endocrine: Negative for polydipsia. Genitourinary:  Negative for difficulty urinating, dysuria, flank pain, frequency, hematuria and urgency. Musculoskeletal:  Positive for joint swelling. Negative for back pain, gait problem, myalgias, neck pain and neck stiffness. Skin:  Positive for wound. Negative for pallor and rash. Neurological:  Negative for dizziness, tremors, syncope, speech difficulty, weakness, numbness and headaches. Hematological:  Negative for adenopathy. Psychiatric/Behavioral:  Negative for confusion, hallucinations, self-injury and suicidal ideas. The patient is not nervous/anxious. Physical Exam  Physical Exam  Vitals and nursing note reviewed. Constitutional:       General: She is not in acute distress. Appearance: She is well-developed. She is not diaphoretic.    HENT:      Head: Normocephalic and atraumatic. Right Ear: External ear normal.      Left Ear: External ear normal.      Nose: Nose normal.   Eyes:      Conjunctiva/sclera: Conjunctivae normal.      Pupils: Pupils are equal, round, and reactive to light. Cardiovascular:      Rate and Rhythm: Normal rate and regular rhythm. Heart sounds: Normal heart sounds. Comments: Diffuse mild chest tenderness where she struck her chest on the ground. Pulmonary:      Effort: Pulmonary effort is normal.      Breath sounds: Normal breath sounds. Abdominal:      General: Bowel sounds are normal.      Palpations: Abdomen is soft. Tenderness: There is no abdominal tenderness. Comments: Diffuse tenderness   Musculoskeletal:         General: Tenderness and signs of injury present. Normal range of motion. Cervical back: Normal range of motion and neck supple. Comments: Abrasion to the left wrist and hand. Tenderness to the area   Skin:     General: Skin is warm and dry. Neurological:      Mental Status: She is alert and oriented to person, place, and time. Deep Tendon Reflexes: Reflexes are normal and symmetric.          Vital Signs  ED Triage Vitals   Temperature Pulse Respirations Blood Pressure SpO2   12/14/23 1811 12/14/23 1811 12/14/23 1811 12/14/23 1811 12/14/23 1811   97.8 °F (36.6 °C) 77 20 150/78 97 %      Temp Source Heart Rate Source Patient Position - Orthostatic VS BP Location FiO2 (%)   12/14/23 1811 12/14/23 1811 12/14/23 1811 12/14/23 1811 --   Oral Monitor Lying Right arm       Pain Score       12/14/23 1853       7           Vitals:    12/14/23 1811   BP: 150/78   Pulse: 77   Patient Position - Orthostatic VS: Lying         Visual Acuity      ED Medications  Medications   tetanus-diphtheria-acellular pertussis (BOOSTRIX) IM injection 0.5 mL (0.5 mL Intramuscular Given 12/14/23 1820)   oxyCODONE-acetaminophen (PERCOCET) 5-325 mg per tablet 1 tablet (1 tablet Oral Given 12/14/23 1853) Diagnostic Studies  Results Reviewed       None                   XR hand 3+ views LEFT    (Results Pending)   XR knee 4+ views left injury    (Results Pending)   XR chest 1 view portable    (Results Pending)              Procedures  Procedures         ED Course                               SBIRT 20yo+      Flowsheet Row Most Recent Value   Initial Alcohol Screen: US AUDIT-C     1. How often do you have a drink containing alcohol? 0 Filed at: 12/14/2023 1813   2. How many drinks containing alcohol do you have on a typical day you are drinking? 0 Filed at: 12/14/2023 1813   3b. FEMALE Any Age, or MALE 65+: How often do you have 4 or more drinks on one occassion? 0 Filed at: 12/14/2023 1813   Audit-C Score 0 Filed at: 12/14/2023 1813   QUIRINO: How many times in the past year have you. .. Used an illegal drug or used a prescription medication for non-medical reasons? Never Filed at: 12/14/2023 1813                      Medical Decision Making  Amount and/or Complexity of Data Reviewed  Radiology: ordered. Risk  Prescription drug management. Disposition  Final diagnoses:   Fall, initial encounter   Contusion of chest wall, unspecified laterality, initial encounter   Abrasion     Time reflects when diagnosis was documented in both MDM as applicable and the Disposition within this note       Time User Action Codes Description Comment    12/14/2023  7:00 PM Paulo Simpson Add [O75. XXXA] Fall, initial encounter     12/14/2023  7:01 PM Paulo Simpson Add [S20.219A] Contusion of chest wall, unspecified laterality, initial encounter     12/14/2023  7:01 PM Paulo Simpson Add [T14. 8XXA] Abrasion           ED Disposition       ED Disposition   Discharge    Condition   Stable    Date/Time   Thu Dec 14, 2023 [address] discharge to home/self care.                    Follow-up Information       Follow up With Specialties Details Why [address] [facility], Nurse Practitioner Schedule an appointment as soon as possible for a visit  As needed [address] [facility] [address]              Patient's Medications   Discharge Prescriptions    OXYCODONE-ACETAMINOPHEN (PERCOCET) 5-325 MG PER TABLET    Take 1 tablet by mouth every 4 (four) hours as needed for severe pain for up to 10 days Max Daily Amount: 6 tablets       Start Date: 12/14/2023End Date: 12/24/2023       Order Dose: 1 tablet       Quantity: 4 tablet    Refills: 0       No discharge procedures on file.     PDMP Review       None            ED Provider  Electronically Signed by             Polly Heaton, DO  12/14/23 [address] [place] [address],   12/14/23 [id number]

## 2023-12-26 ENCOUNTER — HOSPITAL ENCOUNTER (OUTPATIENT)
Dept: RADIOLOGY | Facility: HOSPITAL | Age: 48
Discharge: HOME | End: 2023-12-26
Attending: ORTHOPAEDIC SURGERY
Payer: COMMERCIAL

## 2023-12-26 DIAGNOSIS — G56.01 CARPAL TUNNEL SYNDROME, RIGHT: ICD-10-CM

## 2023-12-26 PROCEDURE — 76882 US LMTD JT/FCL EVL NVASC XTR: CPT

## 2023-12-28 ENCOUNTER — TELEPHONE (OUTPATIENT)
Dept: OBGYN CLINIC | Facility: CLINIC | Age: 48
End: 2023-12-28

## 2023-12-28 DIAGNOSIS — G56.01 CARPAL TUNNEL SYNDROME, RIGHT: Primary | ICD-10-CM

## 2023-12-28 NOTE — TELEPHONE ENCOUNTER
[place] and advised US MSK results are in, please schedule in new patient slot with Dr. Garcia for confirmed CTS        ----- Message from Jluis Greenfield PA-C sent at 12/28/2023 10:40 AM EST -----  Patient has confirmed carpal tunnel.  Please make an appointment with Dr. Garcia for her.      ----- Message -----  From: Interface, Radiology Results In  Sent: 12/26/2023  11:01 AM EST  To: Satish Pritchett, DO

## 2024-04-22 ENCOUNTER — TELEPHONE (OUTPATIENT)
Dept: FAMILY MEDICINE CLINIC | Facility: CLINIC | Age: 49
End: 2024-04-22

## 2024-04-22 NOTE — TELEPHONE ENCOUNTER
Spoke with PT. She was informed and acknowledged regarding labs to be completed. She inquired about prices  for appt in the office and lab work, since she does not currently have insurance. I was given instuctions for .

## 2024-04-22 NOTE — TELEPHONE ENCOUNTER
----- Message from JORDAN Cabello sent at 4/22/2024 11:33 AM EDT -----  Regarding: labs  Pt has upcoming appt for DM fu, lab review on 4/30. Needs to have labs done prior to appt. Orders in chart.   Please call pt to advise.

## 2024-04-26 ENCOUNTER — TELEPHONE (OUTPATIENT)
Dept: FAMILY MEDICINE CLINIC | Facility: CLINIC | Age: 49
End: 2024-04-26

## 2024-04-26 NOTE — TELEPHONE ENCOUNTER
Left another message for patient that there are labs in her chart that need to be done prior to 4/30 appt.

## 2024-04-26 NOTE — TELEPHONE ENCOUNTER
----- Message from JORDAN Cabello sent at 4/26/2024  7:29 AM EDT -----  Regarding: labs  Please call pt again. Labs still not done.   Pt has upcoming appt on 4/30 for DM fu. Needs to have labs done prior to appt. Orders in chart.

## 2024-04-29 ENCOUNTER — TELEPHONE (OUTPATIENT)
Dept: FAMILY MEDICINE CLINIC | Facility: CLINIC | Age: 49
End: 2024-04-29

## 2024-04-29 NOTE — TELEPHONE ENCOUNTER
Left message for patient with message from [employer].  Since patient doesn't have ins at this time labs can wait and she will do A1C fingerstick in the office.

## 2024-04-29 NOTE — TELEPHONE ENCOUNTER
----- Message from JORDAN Cabello sent at 4/29/2024  7:24 AM EDT -----  Regarding: appt/labs  Please call pt.   She is scheduled for DM fu tomorrow. Apparently she does not have insurance at this time so it does not appear that she has had labs done.   Please advise that it is okay if labs are not done. We will do A1C fingerstick in the office tomorrow and other labs can wait for now.

## 2024-05-05 ENCOUNTER — HOSPITAL ENCOUNTER (EMERGENCY)
Facility: HOSPITAL | Age: 49
Discharge: HOME | End: 2024-05-05
Attending: EMERGENCY MEDICINE
Payer: COMMERCIAL

## 2024-05-05 ENCOUNTER — APPOINTMENT (EMERGENCY)
Dept: RADIOLOGY | Facility: HOSPITAL | Age: 49
End: 2024-05-05
Payer: COMMERCIAL

## 2024-05-05 VITALS
TEMPERATURE: 97.3 F | WEIGHT: 270 LBS | DIASTOLIC BLOOD PRESSURE: 83 MMHG | RESPIRATION RATE: 18 BRPM | SYSTOLIC BLOOD PRESSURE: 125 MMHG | OXYGEN SATURATION: 96 % | HEART RATE: 61 BPM | BODY MASS INDEX: 47.08 KG/M2

## 2024-05-05 DIAGNOSIS — R82.71 BACTERIA IN URINE: ICD-10-CM

## 2024-05-05 DIAGNOSIS — N13.2 URETERAL STONE WITH HYDRONEPHROSIS: Primary | ICD-10-CM

## 2024-05-05 DIAGNOSIS — N80.03 UTERUS, ADENOMYOSIS: ICD-10-CM

## 2024-05-05 LAB
ALBUMIN SERPL BCP-MCNC: 4.1 G/DL (ref 3.5–5)
ALP SERPL-CCNC: 57 U/L (ref 34–104)
ALT SERPL W P-5'-P-CCNC: 10 U/L (ref 7–52)
ANION GAP SERPL CALCULATED.3IONS-SCNC: 6 MMOL/L (ref 4–13)
AST SERPL W P-5'-P-CCNC: 13 U/L (ref 13–39)
BACTERIA UR QL AUTO: ABNORMAL /HPF
BASOPHILS # BLD AUTO: 0.04 THOUSANDS/ÂΜL (ref 0–0.1)
BASOPHILS NFR BLD AUTO: 1 % (ref 0–1)
BILIRUB SERPL-MCNC: 0.32 MG/DL (ref 0.2–1)
BILIRUB UR QL STRIP: NEGATIVE
BUN SERPL-MCNC: 14 MG/DL (ref 5–25)
CALCIUM SERPL-MCNC: 8.7 MG/DL (ref 8.4–10.2)
CHLORIDE SERPL-SCNC: 103 MMOL/L (ref 96–108)
CLARITY UR: ABNORMAL
CO2 SERPL-SCNC: 25 MMOL/L (ref 21–32)
COLOR UR: COLORLESS
CREAT SERPL-MCNC: 0.77 MG/DL (ref 0.6–1.3)
EOSINOPHIL # BLD AUTO: 0.17 THOUSAND/ÂΜL (ref 0–0.61)
EOSINOPHIL NFR BLD AUTO: 3 % (ref 0–6)
ERYTHROCYTE [DISTWIDTH] IN BLOOD BY AUTOMATED COUNT: 13.9 % (ref 11.6–15.1)
EXT PREGNANCY TEST URINE: NEGATIVE
EXT. CONTROL: NORMAL
GFR SERPL CREATININE-BSD FRML MDRD: 92 ML/MIN/1.73SQ M
GLUCOSE SERPL-MCNC: 92 MG/DL (ref 65–140)
GLUCOSE UR STRIP-MCNC: NEGATIVE MG/DL
HCT VFR BLD AUTO: 35.6 % (ref 34.8–46.1)
HGB BLD-MCNC: 11.8 G/DL (ref 11.5–15.4)
HGB UR QL STRIP.AUTO: ABNORMAL
IMM GRANULOCYTES # BLD AUTO: 0.01 THOUSAND/UL (ref 0–0.2)
IMM GRANULOCYTES NFR BLD AUTO: 0 % (ref 0–2)
KETONES UR STRIP-MCNC: NEGATIVE MG/DL
LEUKOCYTE ESTERASE UR QL STRIP: ABNORMAL
LIPASE SERPL-CCNC: 48 U/L (ref 11–82)
LYMPHOCYTES # BLD AUTO: 2.09 THOUSANDS/ÂΜL (ref 0.6–4.47)
LYMPHOCYTES NFR BLD AUTO: 39 % (ref 14–44)
MCH RBC QN AUTO: 28.2 PG (ref 26.8–34.3)
MCHC RBC AUTO-ENTMCNC: 33.1 G/DL (ref 31.4–37.4)
MCV RBC AUTO: 85 FL (ref 82–98)
MONOCYTES # BLD AUTO: 0.54 THOUSAND/ÂΜL (ref 0.17–1.22)
MONOCYTES NFR BLD AUTO: 10 % (ref 4–12)
NEUTROPHILS # BLD AUTO: 2.55 THOUSANDS/ÂΜL (ref 1.85–7.62)
NEUTS SEG NFR BLD AUTO: 47 % (ref 43–75)
NITRITE UR QL STRIP: NEGATIVE
NON-SQ EPI CELLS URNS QL MICRO: ABNORMAL /HPF
NRBC BLD AUTO-RTO: 0 /100 WBCS
PH UR STRIP.AUTO: 5.5 [PH]
PLATELET # BLD AUTO: 246 THOUSANDS/UL (ref 149–390)
PMV BLD AUTO: 10.5 FL (ref 8.9–12.7)
POTASSIUM SERPL-SCNC: 3.9 MMOL/L (ref 3.5–5.3)
PROT SERPL-MCNC: 6.9 G/DL (ref 6.4–8.4)
PROT UR STRIP-MCNC: NEGATIVE MG/DL
RBC # BLD AUTO: 4.19 MILLION/UL (ref 3.81–5.12)
RBC #/AREA URNS AUTO: ABNORMAL /HPF
SODIUM SERPL-SCNC: 134 MMOL/L (ref 135–147)
SP GR UR STRIP.AUTO: 1.01 (ref 1–1.03)
UROBILINOGEN UR STRIP-ACNC: <2 MG/DL
WBC # BLD AUTO: 5.4 THOUSAND/UL (ref 4.31–10.16)
WBC #/AREA URNS AUTO: ABNORMAL /HPF

## 2024-05-05 PROCEDURE — 87086 URINE CULTURE/COLONY COUNT: CPT | Performed by: PHYSICIAN ASSISTANT

## 2024-05-05 PROCEDURE — 81001 URINALYSIS AUTO W/SCOPE: CPT

## 2024-05-05 PROCEDURE — 74176 CT ABD & PELVIS W/O CONTRAST: CPT

## 2024-05-05 PROCEDURE — 80053 COMPREHEN METABOLIC PANEL: CPT

## 2024-05-05 PROCEDURE — 96360 HYDRATION IV INFUSION INIT: CPT

## 2024-05-05 PROCEDURE — 99285 EMERGENCY DEPT VISIT HI MDM: CPT

## 2024-05-05 PROCEDURE — 99284 EMERGENCY DEPT VISIT MOD MDM: CPT

## 2024-05-05 PROCEDURE — 85025 COMPLETE CBC W/AUTO DIFF WBC: CPT

## 2024-05-05 PROCEDURE — 81025 URINE PREGNANCY TEST: CPT | Performed by: PHYSICIAN ASSISTANT

## 2024-05-05 PROCEDURE — 96361 HYDRATE IV INFUSION ADD-ON: CPT

## 2024-05-05 PROCEDURE — 83690 ASSAY OF LIPASE: CPT

## 2024-05-05 PROCEDURE — 36415 COLL VENOUS BLD VENIPUNCTURE: CPT

## 2024-05-05 RX ORDER — TAMSULOSIN HYDROCHLORIDE 0.4 MG/1
0.4 CAPSULE ORAL
Qty: 5 CAPSULE | Refills: 0 | Status: SHIPPED | OUTPATIENT
Start: 2024-05-05 | End: 2024-05-10

## 2024-05-05 RX ORDER — CEPHALEXIN 500 MG/1
500 CAPSULE ORAL EVERY 12 HOURS SCHEDULED
Qty: 14 CAPSULE | Refills: 0 | Status: SHIPPED | OUTPATIENT
Start: 2024-05-05 | End: 2024-05-12

## 2024-05-05 RX ORDER — ONDANSETRON 4 MG/1
4 TABLET, FILM COATED ORAL EVERY 8 HOURS PRN
Qty: 12 TABLET | Refills: 0 | Status: SHIPPED | OUTPATIENT
Start: 2024-05-05

## 2024-05-05 RX ORDER — OXYCODONE HYDROCHLORIDE AND ACETAMINOPHEN 5; 325 MG/1; MG/1
1 TABLET ORAL EVERY 6 HOURS PRN
Qty: 8 TABLET | Refills: 0 | Status: SHIPPED | OUTPATIENT
Start: 2024-05-05 | End: 2024-05-07

## 2024-05-05 RX ADMIN — SODIUM CHLORIDE 1000 ML: 0.9 INJECTION, SOLUTION INTRAVENOUS at 10:30

## 2024-05-05 NOTE — DISCHARGE INSTRUCTIONS
Don't drive or operate heavy machinery while taking percocet for pain relief. Start tamsulosin for ureter stone and keflex for urinary tract infection. Follow up with Urology in 1-2 days for re-evaluation of the ureter stone. Follow up with ob-gyn for incidental finding of adenomyosis.

## 2024-05-05 NOTE — ED PROVIDER NOTES
History  Chief Complaint   Patient presents with    Flank Pain     Left flank pain that radiates to the left abdomen     48 year old female presenting with left sided flank pain x 2 days with associated nausea with no vomiting that started earlier this morning. Describes pain waxes and wanes randomly lasting about a minute each time with minimal radiation to the left groin. Pain unchanged with position, movement or activity. Pain currently mild. Patient reports history of UTIs in the past, but states this pain is different. Patient is currently menstruating. Denies any use of OTC medication for pain or nausea relief, pregnancy, changes in bowel movements or urination, history of ovarian cysts or kidney stones.       Flank Pain  Associated symptoms: nausea    Associated symptoms: no diarrhea, no dysuria, no fatigue, no fever and no vomiting        Prior to Admission Medications   Prescriptions Last Dose Informant Patient Reported? Taking?   ARIPiprazole (ABILIFY) 15 mg tablet  Self Yes No   Sig: Take 15 mg by mouth in the morning     FLUoxetine (PROzac) 20 mg capsule  Self Yes No   Sig: take 1 capsule by mouth every morning  AS DIRECTED   FLUoxetine (PROzac) 40 MG capsule  Self Yes No   Sig: Take 40 mg by mouth every morning   Misc. Devices (Wrist Brace) MISC   No No   Sig: Use daily at bedtime   QUEtiapine (SEROquel) 50 mg tablet  Self Yes No   Sig: Take 50 mg by mouth every evening   albuterol (Ventolin HFA) 90 mcg/act inhaler  Self No No   Sig: Inhale 2 puffs every 6 (six) hours as needed for wheezing   calcium carbonate (OS-ARIELLA) 600 MG tablet   No No   Sig: Take 1 tablet (600 mg total) by mouth 2 (two) times a day with meals   cholecalciferol (VITAMIN D3) 1,000 units tablet   No No   Sig: Take 1 tablet (1,000 Units total) by mouth daily   clonazePAM (KlonoPIN) 1 mg tablet  Self Yes No   Sig: take 1 tablet by mouth twice a day if needed  1 TAB IN THE A.M. SECOND TAB AS NEEDED   metFORMIN (GLUCOPHAGE) 500 mg tablet    No No   Sig: take 1 tablet by mouth twice a day with meals   naltrexone (REVIA) 50 mg tablet  Self Yes No   Sig: Take 50 mg by mouth daily   naproxen (NAPROSYN) 500 mg tablet   No No   Sig: Take 1 tablet (500 mg total) by mouth 2 (two) times a day with meals   rosuvastatin (CRESTOR) 10 MG tablet   No No   Sig: Take 1 tablet (10 mg total) by mouth daily      Facility-Administered Medications: None       Past Medical History:   Diagnosis Date    Bipolar disorder ([facility])     Depression     History of wound infection     left ankle    Morbid obesity with BMI of 40.0-44.9, adult ([facility]) 1/18/2018    Psychiatric disorder     depression, anxiety    Seasonal allergic reaction        Past Surgical History:   Procedure Laterality Date    IR PICC LINE  2/8/2019    SD OPEN TREATMENT BIMALLEOLAR ANKLE FRACTURE Left 5/14/2018    Procedure: OPEN REDUCTION W/ INTERNAL FIXATION (ORIF) ANKLE;  Surgeon: Sam Bauman DO;  Location: [facility];  Service: Orthopedics    SD REMOVAL IMPLANT DEEP Left 2/5/2019    Procedure: REMOVAL HARDWARE ANKLE;  Surgeon: Sam Bauman DO;  Location: [facility];  Service: Orthopedics    REDUCTION MAMMAPLASTY  2001    reduction       Family History   Problem Relation Age of Onset    Substance Abuse Mother     Mental illness Mother     Diabetes Mother     Cancer Mother     Asthma Mother     Breast cancer Mother 55    Substance Abuse Father     No Known Problems Brother     No Known Problems Daughter     Substance Abuse Maternal Grandmother     Mental illness Maternal Grandmother     No Known Problems Maternal Grandfather     No Known Problems Paternal Grandmother     No Known Problems Paternal Grandfather     No Known Problems Maternal Aunt     No Known Problems Maternal Uncle     ADD / ADHD Neg Hx     Anesthesia problems Neg Hx     Clotting disorder Neg Hx     Collagen disease Neg Hx     Dislocations Neg Hx     Learning disabilities Neg Hx     Neurological problems Neg Hx     Osteoporosis Neg Hx      Rheumatologic disease Neg Hx     Scoliosis Neg Hx     Vascular Disease Neg Hx      I have reviewed and agree with the history as documented.    E-Cigarette/Vaping    E-Cigarette Use Former User      E-Cigarette/Vaping Substances    Nicotine No     THC No     CBD No     Flavoring No     Other No     Unknown No      Social History     Tobacco Use    Smoking status: Every Day     Current packs/day: 1.00     Average packs/day: 1 pack/day for 25.0 years (25.0 ttl pk-yrs)     Types: Cigarettes     Start date: 9/23/2000    Smokeless tobacco: Never   Vaping Use    Vaping status: Former   Substance Use Topics    Alcohol use: Yes     Alcohol/week: 3.0 standard drinks of alcohol     Types: 3 Cans of beer per week     Comment: social    Drug use: Never       Review of Systems   Constitutional: Negative.  Negative for fatigue and fever.   HENT: Negative.     Eyes: Negative.    Respiratory: Negative.     Cardiovascular: Negative.    Gastrointestinal:  Positive for nausea. Negative for abdominal pain, blood in stool, diarrhea and vomiting.   Genitourinary:  Positive for flank pain. Negative for difficulty urinating, dysuria, pelvic pain and urgency.   Musculoskeletal:  Positive for back pain.   Skin: Negative.    Neurological: Negative.    Psychiatric/Behavioral: Negative.         Physical Exam  Physical Exam  Vitals and nursing note reviewed.   Constitutional:       General: She is not in acute distress.     Appearance: Normal appearance. She is well-developed. She is not ill-appearing.   HENT:      Head: Normocephalic.      Nose: Nose normal.   Eyes:      Conjunctiva/sclera: Conjunctivae normal.   Cardiovascular:      Rate and Rhythm: Normal rate and regular rhythm.      Heart sounds: Normal heart sounds.   Pulmonary:      Effort: Pulmonary effort is normal. No respiratory distress.      Breath sounds: Normal breath sounds.   Abdominal:      General: Abdomen is flat. Bowel sounds are normal. There is no distension.       Palpations: Abdomen is soft.      Tenderness: There is no abdominal tenderness. There is no right CVA tenderness, left CVA tenderness or guarding.   Musculoskeletal:         General: Normal range of motion.        Arms:       Cervical back: Normal range of motion and neck supple.   Skin:     General: Skin is warm and dry.   Neurological:      Mental Status: She is alert.         Vital Signs  ED Triage Vitals   Temperature Pulse Respirations Blood Pressure SpO2   05/05/24 1002 05/05/24 1002 05/05/24 1002 05/05/24 1002 05/05/24 1002   (!) 97.3 °F (36.3 °C) 69 18 147/85 95 %      Temp src Heart Rate Source Patient Position - Orthostatic VS BP Location FiO2 (%)   -- 05/05/24 1313 -- -- --    Monitor         Pain Score       05/05/24 1002       6           Vitals:    05/05/24 1002 05/05/24 1313   BP: 147/85 125/83   Pulse: 69 61         Visual Acuity      ED Medications  Medications   sodium chloride 0.9 % bolus 1,000 mL (0 mL Intravenous Stopped 5/5/24 1314)       Diagnostic Studies  Results Reviewed       Procedure Component Value Units Date/Time    Urine culture [[id number]] Collected: 05/05/24 1029    Lab Status: In process Specimen: Urine, Clean Catch Updated: 05/05/24 1327    Comprehensive metabolic panel [[id number]]  (Abnormal) Collected: 05/05/24 1029    Lab Status: Final result Specimen: Blood from Arm, Left Updated: 05/05/24 1052     Sodium 134 mmol/L      Potassium 3.9 mmol/L      Chloride 103 mmol/L      CO2 25 mmol/L      ANION GAP 6 mmol/L      BUN 14 mg/dL      Creatinine 0.77 mg/dL      Glucose 92 mg/dL      Calcium 8.7 mg/dL      AST 13 U/L      ALT 10 U/L      Alkaline Phosphatase 57 U/L      Total Protein 6.9 g/dL      Albumin 4.1 g/dL      Total Bilirubin 0.32 mg/dL      eGFR 92 ml/min/1.73sq m     Narrative:      National Kid[employer] Disease Foundation guidelines for Chronic Kidney Disease (CKD):     Stage 1 with normal or high GFR (GFR > 90 mL/min/1.73 square meters)    Stage 2 Mild CKD (GFR = 60-89  mL/min/1.73 square meters)    Stage 3A Moderate CKD (GFR = 45-59 mL/min/1.73 square meters)    Stage 3B Moderate CKD (GFR = 30-44 mL/min/1.73 square meters)    Stage 4 Severe CKD (GFR = 15-29 mL/min/1.73 square meters)    Stage 5 End Stage CKD (GFR <15 mL/min/1.73 square meters)  Note: GFR calculation is accurate only with a steady state creatinine    Lipase [[id number]]  (Normal) Collected: 05/05/24 1029    Lab Status: Final result Specimen: Blood from Arm, Left Updated: 05/05/24 1052     Lipase 48 u/L     Urine Microscopic [[id number]]  (Abnormal) Collected: 05/05/24 1029    Lab Status: Final result Specimen: Urine, Clean Catch Updated: 05/05/24 1045     RBC, UA 2-4 /hpf      WBC, UA 1-2 /hpf      Epithelial Cells Occasional /hpf      Bacteria, UA Moderate /hpf     UA (URINE) with reflex to Scope [[id number]]  (Abnormal) Collected: 05/05/24 1029    Lab Status: Final result Specimen: Urine, Clean Catch Updated: 05/05/24 1037     Color, UA Colorless     Clarity, UA Slightly Cloudy     Specific Gravity, UA 1.010     pH, UA 5.5     Leukocytes, UA Small     Nitrite, UA Negative     Protein, UA Negative mg/dl      Glucose, UA Negative mg/dl      Ketones, UA Negative mg/dl      Urobilinogen, UA <2.0 mg/dl      Bilirubin, UA Negative     Occult Blood, UA Moderate    CBC and differential [[id number]] Collected: 05/05/24 1029    Lab Status: Final result Specimen: Blood from Arm, Left Updated: 05/05/24 1037     WBC 5.40 Thousand/uL      RBC 4.19 Million/uL      Hemoglobin 11.8 g/dL      Hematocrit 35.6 %      MCV 85 fL      MCH 28.2 pg      MCHC 33.1 g/dL      RDW 13.9 %      MPV 10.5 fL      Platelets 246 Thousands/uL      nRBC 0 /100 WBCs      Segmented % 47 %      Immature Grans % 0 %      Lymphocytes % 39 %      Monocytes % 10 %      Eosinophils Relative 3 %      Basophils Relative 1 %      Absolute Neutrophils 2.55 Thousands/µL      Absolute Immature Grans 0.01 Thousand/uL      Absolute Lymphocytes 2.09 Thousands/µL       Absolute Monocytes 0.54 Thousand/µL      Eosinophils Absolute 0.17 Thousand/µL      Basophils Absolute 0.04 Thousands/µL     POCT pregnancy, urine [[id number]]  (Normal) Resulted: 05/05/24 1031    Lab Status: Final result Updated: 05/05/24 1031     EXT Preg Test, Ur Negative     Control Valid                   CT renal stone study abdomen pelvis without contrast   Final Result by Roshan Gregory MD (05/05 1219)   1.  2 mm pelvic calcification is more likely a distal left ureteral calculus than a phlebolith. Mild left hydronephrosis.   2.  Possible uterine adenomyosis.            Workstation performed: [id number]                    Procedures  Procedures         ED Course                               SBIRT 20yo+      Flowsheet Row Most Recent Value   Initial Alcohol Screen: US AUDIT-C     1. How often do you have a drink containing alcohol? 0 Filed at: 05/05/2024 1005   2. How many drinks containing alcohol do you have on a typical day you are drinking?  0 Filed at: 05/05/2024 1005   3a. Male UNDER 65: How often do you have five or more drinks on one occasion? 0 Filed at: 05/05/2024 1005   3b. FEMALE Any Age, or MALE 65+: How often do you have 4 or more drinks on one occassion? 0 Filed at: 05/05/2024 1005   Audit-C Score 0 Filed at: 05/05/2024 1005                      Medical Decision Making  Differential diagnosis includes UTI vs kidney stone vs pyelonephritis vs musculoskeletal pain. Discussed with patient CT findings of 2 mm pelvic calcification and mild left hydronephrosis and incidental finding of possible uterine adenomyosis. Discussed tamsulosin for aid in passing of ureter stone, pain management with Tylenol for mild pain or percocet x 2 days for moderate to severe pain, and encouraged increased fluid intake. Of note, patient has allergy to toradol. Antibiotics for concurrent UTI prescribed. Discussed patient's findings and treatment plan with the attending, Dr. Luna, who agrees with plan and disposition.  Patient will follow up with urology and ob-gyn for further management. Patient expressed understanding of findings and is agreeable to plan. Return precautions provided including, but not limited to, high fever, severe, persistent pain and nausea. Given urine strainer.        Amount and/or Complexity of Data Reviewed  Labs: ordered. Decision-making details documented in ED Course.  Radiology: ordered. Decision-making details documented in ED Course.    Risk  Prescription drug management.             Disposition  Final diagnoses:   Ureteral stone with hydronephrosis   Bacteria in urine   Uterus, adenomyosis     Time reflects when diagnosis was documented in both MDM as applicable and the Disposition within this note       Time User Action Codes Description Comment    5/5/2024 12:50 PM Bhatri Bright Add [N13.2] Ureteral stone with hydronephrosis     5/5/2024 12:50 PM Bharti Bright Add [R82.71] Bacteria in urine     5/5/2024 12:50 PM Bharti Bright Add [N80.03] Uterus, adenomyosis           ED Disposition       ED Disposition   Discharge    Condition   Stable    Date/Time   Sun May 5, 2024 1250    Comment   [place] discharge to home/self care.                   Follow-up Information       Follow up With Specialties Details Why Contact Info Additional Information    [facility] Emergency Department Emergency Medicine  If symptoms worsen such as fever, vomiting or persistent pain. [address] [facility] [place] [address]  [phone] [facility] Emergency Department, [address] [place], [place], [address]    Calin Villeda MD Urology Schedule an appointment as soon as possible for a visit in 1 day For re-evaluation of your ureteral stone [address] [facility] [id number] [phone]       [facility] Schedule an appointment as soon as possible for a visit in 2 days for ureter fibroid (adenomyosis)  and ob-gyn needs. [address] [id number] [phone] [facility] [place], [address] [address], [place], [place], [phone]            Discharge Medication List as of 5/5/2024  1:06 PM        START taking these medications    Details   cephalexin (KEFLEX) 500 mg capsule Take 1 capsule (500 mg total) by mouth every 12 (twelve) hours for 7 days, Starting Sun 5/5/2024, Until Sun 5/12/2024, Normal      ondansetron (ZOFRAN) 4 mg tablet Take 1 tablet (4 mg total) by mouth every 8 (eight) hours as needed for nausea or vomiting, Starting Sun 5/5/2024, Normal      oxyCODONE-acetaminophen (Percocet) 5-325 mg per tablet Take 1 tablet by mouth every 6 (six) hours as needed for moderate pain for up to 2 days Max Daily Amount: 4 tablets, Starting Sun 5/5/2024, Until Tue 5/7/2024 at 2359, Normal      tamsulosin (FLOMAX) 0.4 mg Take 1 capsule (0.4 mg total) by mouth daily with dinner for 5 days, Starting Sun 5/5/2024, Until Fri 5/10/2024, Normal           CONTINUE these medications which have NOT CHANGED    Details   albuterol (Ventolin HFA) 90 mcg/act inhaler Inhale 2 puffs every 6 (six) hours as needed for wheezing, Starting Tue 6/27/2023, Normal      ARIPiprazole (ABILIFY) 15 mg tablet Take 15 mg by mouth in the morning  , Starting Tue 9/21/2021, Historical Med      calcium carbonate (OS-ARIELLA) 600 MG tablet Take 1 tablet (600 mg total) by mouth 2 (two) times a day with meals, Starting Tue 10/31/2023, Normal      cholecalciferol (VITAMIN D3) 1,000 units tablet Take 1 tablet (1,000 Units total) by mouth daily, Starting Tue 10/31/2023, Normal      clonazePAM (KlonoPIN) 1 mg tablet take 1 tablet by mouth twice a day if needed  1 TAB IN THE A.M. SECOND TAB AS NEEDED, Historical Med      !! FLUoxetine (PROzac) 20 mg capsule take 1 capsule by mouth every morning  AS DIRECTED, Historical Med      !! FLUoxetine (PROzac) 40 MG capsule Take 40 mg by  mouth every morning, Starting Wed 7/3/2019, Historical Med      metFORMIN (GLUCOPHAGE) 500 mg tablet take 1 tablet by mouth twice a day with meals, Starting Thu 10/26/2023, Normal      Misc. Devices (Wrist Brace) MISC Use daily at bedtime, Starting Thu 10/5/2023, Print      naltrexone (REVIA) 50 mg tablet Take 50 mg by mouth daily, Starting Thu 6/8/2023, Historical Med      naproxen (NAPROSYN) 500 mg tablet Take 1 tablet (500 mg total) by mouth 2 (two) times a day with meals, Starting Fri 10/20/2023, Normal      QUEtiapine (SEROquel) 50 mg tablet Take 50 mg by mouth every evening, Starting Wed 5/10/2023, Historical Med      rosuvastatin (CRESTOR) 10 MG tablet Take 1 tablet (10 mg total) by mouth daily, Starting Tue 10/31/2023, Normal       !! - Potential duplicate medications found. Please discuss with provider.          No discharge procedures on file.    PDMP Review       None            ED Provider  Electronically Signed by             Bharti Bright PA-C  05/05/24 5722

## 2024-05-06 ENCOUNTER — OFFICE VISIT (OUTPATIENT)
Dept: FAMILY MEDICINE CLINIC | Facility: CLINIC | Age: 49
End: 2024-05-06

## 2024-05-06 VITALS
HEIGHT: 64 IN | DIASTOLIC BLOOD PRESSURE: 84 MMHG | TEMPERATURE: 98.5 F | SYSTOLIC BLOOD PRESSURE: 132 MMHG | RESPIRATION RATE: 16 BRPM | HEART RATE: 77 BPM | WEIGHT: 253 LBS | OXYGEN SATURATION: 96 % | BODY MASS INDEX: 43.19 KG/M2

## 2024-05-06 DIAGNOSIS — E11.9 TYPE 2 DIABETES MELLITUS WITHOUT COMPLICATION, WITHOUT LONG-TERM CURRENT USE OF INSULIN (HCC): Primary | ICD-10-CM

## 2024-05-06 DIAGNOSIS — N20.1 LEFT URETERAL CALCULUS: ICD-10-CM

## 2024-05-06 DIAGNOSIS — Z72.0 TOBACCO ABUSE DISORDER: ICD-10-CM

## 2024-05-06 DIAGNOSIS — N39.0 URINARY TRACT INFECTION WITH HEMATURIA, SITE UNSPECIFIED: ICD-10-CM

## 2024-05-06 DIAGNOSIS — F32.A ANXIETY AND DEPRESSION: ICD-10-CM

## 2024-05-06 DIAGNOSIS — E78.2 MIXED HYPERLIPIDEMIA: ICD-10-CM

## 2024-05-06 DIAGNOSIS — R31.9 URINARY TRACT INFECTION WITH HEMATURIA, SITE UNSPECIFIED: ICD-10-CM

## 2024-05-06 DIAGNOSIS — F41.9 ANXIETY AND DEPRESSION: ICD-10-CM

## 2024-05-06 PROBLEM — J40 BRONCHITIS: Status: RESOLVED | Noted: 2019-01-22 | Resolved: 2024-05-06

## 2024-05-06 LAB
CREAT UR-MCNC: 18.4 MG/DL
MICROALBUMIN UR-MCNC: <7 MG/L
MICROALBUMIN/CREAT 24H UR: <38 MG/G CREATININE (ref 0–30)
SL AMB POCT HEMOGLOBIN AIC: 5.57 (ref ?–6.5)

## 2024-05-06 PROCEDURE — 82570 ASSAY OF URINE CREATININE: CPT | Performed by: NURSE PRACTITIONER

## 2024-05-06 PROCEDURE — 99214 OFFICE O/P EST MOD 30 MIN: CPT | Performed by: NURSE PRACTITIONER

## 2024-05-06 PROCEDURE — 82043 UR ALBUMIN QUANTITATIVE: CPT | Performed by: NURSE PRACTITIONER

## 2024-05-06 PROCEDURE — 83036 HEMOGLOBIN GLYCOSYLATED A1C: CPT | Performed by: NURSE PRACTITIONER

## 2024-05-06 RX ORDER — ROSUVASTATIN CALCIUM 10 MG/1
10 TABLET, COATED ORAL DAILY
Qty: 90 TABLET | Refills: 1 | Status: SHIPPED | OUTPATIENT
Start: 2024-05-06

## 2024-05-06 NOTE — PROGRESS NOTES
Name: Mirlande Mesa      : 1975      MRN: [id number]  Encounter Provider: JORDAN Cabello  Encounter Date: 2024   Encounter department: [facility]    Assessment & Plan   1. Type 2 diabetes mellitus without complication, without long-term current use of insulin (HCC)  Stable. Continue metformin. Carb controlled diet. Monitor.   - POCT hemoglobin A1c-5.6  - Albumin / creatinine urine ratio  - rosuvastatin (CRESTOR) 10 MG tablet; Take 1 tablet (10 mg total) by mouth daily  Dispense: 90 tablet; Refill: 1  - metFORMIN (GLUCOPHAGE) 500 mg tablet; Take 1 tablet (500 mg total) by mouth 2 (two) times a day with meals  Dispense: 90 tablet; Refill: 1    2. Anxiety and depression  Managed by psych.   Stress management. Activities to divert attention when possible.   Conscious breathing techniques as discussed.   Coping mechanisms and strategies vary from person to person so try to utilize strategies that you think may work for you (such as meditation, music, etc. ).    3. Tobacco abuse disorder  Tobacco Cessation Counseling: Tobacco cessation counseling and education was provided. The patient is sincerely urged to quit consumption of tobacco. She is not ready to quit tobacco. The numerous health risks of tobacco consumption were discussed. If she decides to quit, there are a number of helpful adjunctive aids, and she can see me to discuss nicotine replacement therapy, chantix, or bupropion anytime in the future.    4. Left ureteral calculus  Continue flomax. Push fluids. Follow up with urology    5. Urinary tract infection with hematuria, site unspecified  Keflex. Push fluids. monitor    6. Mixed hyperlipidemia  Heart healthy diet. Statin. Monitor.   - rosuvastatin (CRESTOR) 10 MG tablet; Take 1 tablet (10 mg total) by mouth daily  Dispense: 90 tablet; Refill: 1    Depression Screening Follow-up Plan: Patient's depression screening was positive  . Their PHQ-9 score was 10. Patient  assessed for underlying major depression. They have no active suicidal ideations. Brief counseling provided and recommend additional follow-up/re-evaluation next office visit. Continue regular follow-up with their psychologist/therapist/psychiatrist who is managing their mental health condition(s).    Subjective      Here for DM fu and ED follow up. Lost insurance and should get insurance in November. Was not able to get labs done due to not having coverage.   Currently on metformin 500mg bid   Was in ED yesterday for kidney stone. Currently on flomax to assist with passing stone and keflex for uti.   Urinating without issue currently. Taking meds as prescribed. No pain currently. Did not scheduled appt with urology  Sees psych regularly. Issues with work and stress but managing okay. Feels like mood is controlled currently.         Diabetes  Hypoglycemia symptoms include nervousness/anxiousness. Pertinent negatives for hypoglycemia include no dizziness or headaches. Pertinent negatives for diabetes include no chest pain.     Review of Systems   Constitutional:  Negative for unexpected weight change.   Respiratory:  Negative for chest tightness and shortness of breath.    Cardiovascular:  Negative for chest pain and palpitations.   Gastrointestinal:  Negative for abdominal pain, diarrhea, nausea and vomiting.   Genitourinary:  Negative for dysuria, flank pain and urgency.   Musculoskeletal:  Negative for back pain.   Skin:  Negative for rash and wound.   Allergic/Immunologic: Negative for immunocompromised state.   Neurological:  Negative for dizziness and headaches.   Psychiatric/Behavioral:  Positive for dysphoric mood. Negative for self-injury and suicidal ideas. The patient is nervous/anxious.        Current Outpatient Medications on File Prior to Visit   Medication Sig    albuterol (Ventolin HFA) 90 mcg/act inhaler Inhale 2 puffs every 6 (six) hours as needed for wheezing    ARIPiprazole (ABILIFY) 15 mg tablet  Take 15 mg by mouth in the morning      cephalexin (KEFLEX) 500 mg capsule Take 1 capsule (500 mg total) by mouth every 12 (twelve) hours for 7 days    cholecalciferol (VITAMIN D3) 1,000 units tablet Take 1 tablet (1,000 Units total) by mouth daily    clonazePAM (KlonoPIN) 1 mg tablet take 1 tablet by mouth twice a day if needed  1 TAB IN THE A.M. SECOND TAB AS NEEDED    FLUoxetine (PROzac) 20 mg capsule take 1 capsule by mouth every morning  AS DIRECTED    FLUoxetine (PROzac) 40 MG capsule Take 40 mg by mouth every morning    Misc. Devices (Wrist Brace) MISC Use daily at bedtime    naltrexone (REVIA) 50 mg tablet Take 50 mg by mouth daily    ondansetron (ZOFRAN) 4 mg tablet Take 1 tablet (4 mg total) by mouth every 8 (eight) hours as needed for nausea or vomiting    oxyCODONE-acetaminophen (Percocet) 5-325 mg per tablet Take 1 tablet by mouth every 6 (six) hours as needed for moderate pain for up to 2 days Max Daily Amount: 4 tablets    QUEtiapine (SEROquel) 50 mg tablet Take 50 mg by mouth every evening    tamsulosin (FLOMAX) 0.4 mg Take 1 capsule (0.4 mg total) by mouth daily with dinner for 5 days    [DISCONTINUED] metFORMIN (GLUCOPHAGE) 500 mg tablet take 1 tablet by mouth twice a day with meals    [DISCONTINUED] rosuvastatin (CRESTOR) 10 MG tablet Take 1 tablet (10 mg total) by mouth daily    calcium carbonate (OS-ARIELLA) 600 MG tablet Take 1 tablet (600 mg total) by mouth 2 (two) times a day with meals (Patient not taking: Reported on 5/6/2024)    naproxen (NAPROSYN) 500 mg tablet Take 1 tablet (500 mg total) by mouth 2 (two) times a day with meals (Patient not taking: Reported on 5/6/2024)       Objective     ED records reviewed. VANESSA 5/5/2024  Chief Complaint   Patient presents with    Flank Pain       Left flank pain that radiates to the left abdomen      48 year old female presenting with left sided flank pain x 2 days with associated nausea with no vomiting that started earlier this morning. Describes pain  "waxes and wanes randomly lasting about a minute each time with minimal radiation to the left groin. Pain unchanged with position, movement or activity. Pain currently mild. Patient reports history of UTIs in the past, but states this pain is different. Patient is currently menstruating. Denies any use of OTC medication for pain or nausea relief, pregnancy, changes in bowel movements or urination, history of ovarian cysts or kidney stones.   UA: moderate blood, moderate bacteria  CT renal stone study abdomen pelvis without contrast   Final Result by Roshan Gregory MD (05/05 1219)   1.  2 mm pelvic calcification is more likely a distal left ureteral calculus than a phlebolith. Mild left hydronephrosis.   2.  Possible uterine adenomyosis.       HgbA1C 5.6    /84   Pulse 77   Temp 98.5 °F (36.9 °C)   Resp 16   Ht 5' 3.5\" (1.613 m)   Wt 115 kg (253 lb)   SpO2 96%   BMI 44.11 kg/m²     Physical Exam  Vitals reviewed.   Constitutional:       General: She is not in acute distress.     Appearance: She is obese. She is not ill-appearing.   Neck:      Vascular: No carotid bruit.   Cardiovascular:      Rate and Rhythm: Normal rate and regular rhythm.   Pulmonary:      Effort: Pulmonary effort is normal. No respiratory distress.      Breath sounds: Normal breath sounds. No wheezing or rales.   Abdominal:      General: Bowel sounds are normal. There is no distension.      Palpations: Abdomen is soft.      Tenderness: There is no abdominal tenderness. There is no right CVA tenderness or left CVA tenderness.   Musculoskeletal:      Cervical back: Normal range of motion.      Right lower leg: No edema.      Left lower leg: No edema.   Skin:     General: Skin is warm and dry.      Coloration: Skin is not jaundiced or pale.   Neurological:      General: No focal deficit present.      Mental Status: She is alert and oriented to person, place, and time.      Cranial Nerves: No cranial nerve deficit.      Sensory: No " sensory deficit.   Psychiatric:         Mood and Affect: Mood normal.         Behavior: Behavior normal.         Thought Content: Thought content normal.         Judgment: Judgment normal.       JORDAN Cabello

## 2024-05-06 NOTE — PATIENT INSTRUCTIONS
Heart healthy, carbohydrate controlled diet- limit red meat, limit saturated fat, moderate salt intake, limit junk food, etc.   Regular exercise  Continue same medications  Stress management

## 2024-05-07 LAB — BACTERIA UR CULT: NORMAL

## 2024-06-04 NOTE — PROGRESS NOTES
"Limited Exam    Saman Kovacs Boudreaux    " Assessment/Plan:      Will order Medrol Dosepak, and gabapentin for symptomatic relief, patient to obtain x-rays to evaluate possible surgical management, will order physical therapy, patient educated on RI CE therapy, patient educated on stretching exercises and proper shoe gear and the use of orthotics  Diagnoses and all orders for this visit:    Plantar fasciitis, bilateral  -     methylPREDNISolone 4 MG tablet therapy pack; Use as directed on package  -     Ambulatory referral to Physical Therapy; Future  -     gabapentin (Neurontin) 300 mg capsule; Take 1 capsule (300 mg total) by mouth daily at bedtime  -     X-ray foot left 3+ views; Future  -     X-ray foot right 3+ views; Future          Subjective:      Patient ID: Divya Pelaez is a 41 y o  female  Return patient for bilateral plantar fascia right more than left, patient still in pain, patient denies constitutional symptoms      The following portions of the patient's history were reviewed and updated as appropriate: allergies, current medications, past family history, past medical history, past social history, past surgical history and problem list     Review of Systems   Constitutional: Negative  Respiratory: Negative  Cardiovascular: Negative  Musculoskeletal: Negative  Skin: Negative                  Historical Information   Past Medical History:   Diagnosis Date    Bipolar disorder (RUST[facility] 75 )     Depression     History of wound infection     left ankle    Psychiatric disorder     depression, anxiety    Seasonal allergic reaction      Past Surgical History:   Procedure Laterality Date    IR PICC LINE  2/8/2019    SD OPEN TREATMENT BIMALLEOLAR ANKLE FRACTURE Left 5/14/2018    Procedure: OPEN REDUCTION W/ INTERNAL FIXATION (ORIF) ANKLE;  Surgeon: Savannah Bal DO;  [facility];  Service: Orthopedics    SD REMOVAL DEEP IMPLANT Left 2/5/2019    Procedure: REMOVAL HARDWARE ANKLE;  Surgeon: Savannah Bal DO;  Location[facility] MAIN OR;  Service: Orthopedics    REDUCTION MAMMAPLASTY  2001    reduction     Social History   Social History     Substance and Sexual Activity   Alcohol Use Not Currently    Comment: social     Social History     Substance and Sexual Activity   Drug Use No     Social History     Tobacco Use   Smoking Status Current Every Day Smoker    Packs/day: 1 00    Years: 25 00    Pack years: 25 00    Types: Cigarettes    Start date: 9/23/2000   Smokeless Tobacco Never Used     Family History:   Family History   Problem Relation Age of Onset    No Known Problems Father     Diabetes Mother     Cancer Mother     Asthma Mother     Breast cancer Mother 54    No Known Problems Brother     No Known Problems Maternal Aunt     No Known Problems Maternal Uncle     No Known Problems Maternal Grandmother     No Known Problems Maternal Grandfather     No Known Problems Paternal Grandmother     No Known Problems Paternal Grandfather     No Known Problems Daughter     ADD / ADHD Neg Hx     Anesthesia problems Neg Hx     Clotting disorder Neg Hx     Collagen disease Neg Hx     Dislocations Neg Hx     Learning disabilities Neg Hx     Neurological problems Neg Hx     Osteoporosis Neg Hx     Rheumatologic disease Neg Hx     Scoliosis Neg Hx     Vascular Disease Neg Hx        Meds/Allergies   all medications and allergies reviewed  Allergies   Allergen Reactions    Toradol [Ketorolac Tromethamine]      GI UPSET    Latex Rash    Ultram [Tramadol] Rash       Objective:      /88   Pulse 81   Resp 18   Ht 5' 6 5" (1 689 m)   Wt 113 kg (249 lb)   BMI 39 59 kg/m²              Physical Exam  Constitutional:       General: She is not in acute distress  Appearance: She is well-developed  She is not ill-appearing, toxic-appearing or diaphoretic  HENT:      Head: Normocephalic and atraumatic  Cardiovascular:      Pulses: Normal pulses             Dorsalis pedis pulses are 2+ on the right side and 2+ on the left side  Posterior tibial pulses are 2+ on the right side and 2+ on the left side  Comments: Palpable pedal pulse, CFT is less than 3 seconds, temperature gradient within normal limits, pedal hair present, no trophic skin changes  Pulmonary:      Effort: No respiratory distress  Musculoskeletal:         General: Normal range of motion  Comments: Pain on palpation to the medial calcaneal tuberosity and the insertion of the medial band of the plantar fascia right foot   Feet:      Right foot:      Protective Sensation: 10 sites tested  10 sites sensed  Skin integrity: No ulcer, blister, skin breakdown or erythema  Left foot:      Protective Sensation: 10 sites tested  10 sites sensed  Skin integrity: No ulcer, blister, skin breakdown or erythema  Skin:     Capillary Refill: Capillary refill takes 2 to 3 seconds  Coloration: Skin is not pale  Neurological:      Mental Status: She is alert and oriented to person, place, and time  Comments:  muscle power 5/5, protective sensations intact, no focal motor deficit  Foot Exam    Right Foot/Ankle     Inspection and Palpation  Skin Exam: no blister, no maceration, no ulcer and no erythema     Neurovascular  Dorsalis pedis: 2+  Posterior tibial: 2+      Left Foot/Ankle      Inspection and Palpation  Skin Exam: no blister, no maceration, no ulcer and no erythema     Neurovascular  Dorsalis pedis: 2+  Posterior tibial: 2+              Portions of the record may have been created with voice recognition software  Occasional wrong word or "sound a like" substitutions may have occurred due to the inherent limitations of voice recognition software  Read the chart carefully and recognize, using context, where substitutions have occurred

## 2024-09-01 ENCOUNTER — HOSPITAL ENCOUNTER (EMERGENCY)
Facility: HOSPITAL | Age: 49
Discharge: HOME | End: 2024-09-01
Attending: EMERGENCY MEDICINE
Payer: COMMERCIAL

## 2024-09-01 VITALS
TEMPERATURE: 97.8 F | HEART RATE: 72 BPM | RESPIRATION RATE: 18 BRPM | DIASTOLIC BLOOD PRESSURE: 85 MMHG | OXYGEN SATURATION: 95 % | SYSTOLIC BLOOD PRESSURE: 143 MMHG
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
## 2024-09-01 PROCEDURE — 99284 EMERGENCY DEPT VISIT MOD MDM: CPT | Performed by: EMERGENCY MEDICINE

## 2024-09-01 PROCEDURE — 99282 EMERGENCY DEPT VISIT SF MDM: CPT

## 2024-09-01 RX ORDER — HYDROXYZINE HYDROCHLORIDE 25 MG/1
25 TABLET, FILM COATED ORAL EVERY 6 HOURS PRN
Qty: 12 TABLET | Refills: 0 | Status: SHIPPED | OUTPATIENT
Start: 2024-09-01

## 2024-09-01 RX ORDER — HYDROXYZINE HYDROCHLORIDE 25 MG/1
25 TABLET, FILM COATED ORAL ONCE
Status: COMPLETED | OUTPATIENT
Start: 2024-09-01 | End: 2024-09-01

## 2024-09-01 RX ADMIN — HYDROXYZINE HYDROCHLORIDE 25 MG: 25 TABLET ORAL at 19:58

## 2024-09-01 NOTE — DISCHARGE INSTRUCTIONS
Return to the ER for further concerns or worsening symptoms  Follow up with your primary care physician in 1-2 days  Discontinue use of the steroid cream

## 2024-09-01 NOTE — ED PROVIDER NOTES
History  Chief Complaint   Patient presents with    Rash     Pt c/o of increasing number red bumps on arms legs and abdom. Pt using boyfriends steroid cream to treat w/o effect. Ankle pain and swelling.     Patient is a 49-year-old female with a history of bipolar disorder that presents emergency department with complaint of multiple bites to her legs for a few days.  She denies sick contacts.  She lives with a boyfriend, who is asymptomatic.  Patient has been applying a steroid cream without resolution.  The lesions are pruritic.      History provided by:  Patient   used: No    Rash  Associated symptoms: no abdominal pain, no diarrhea, no fever, no nausea, no shortness of breath and not vomiting        Prior to Admission Medications   Prescriptions Last Dose Informant Patient Reported? Taking?   ARIPiprazole (ABILIFY) 15 mg tablet  Self Yes No   Sig: Take 15 mg by mouth in the morning     FLUoxetine (PROzac) 20 mg capsule  Self Yes No   Sig: take 1 capsule by mouth every morning  AS DIRECTED   FLUoxetine (PROzac) 40 MG capsule  Self Yes No   Sig: Take 40 mg by mouth every morning   Misc. Devices (Wrist Brace) MISC   No No   Sig: Use daily at bedtime   QUEtiapine (SEROquel) 50 mg tablet  Self Yes No   Sig: Take 50 mg by mouth every evening   albuterol (Ventolin HFA) 90 mcg/act inhaler  Self No No   Sig: Inhale 2 puffs every 6 (six) hours as needed for wheezing   calcium carbonate (OS-ARIELLA) 600 MG tablet   No No   Sig: Take 1 tablet (600 mg total) by mouth 2 (two) times a day with meals   Patient not taking: Reported on 5/6/2024   cholecalciferol (VITAMIN D3) 1,000 units tablet   No No   Sig: Take 1 tablet (1,000 Units total) by mouth daily   clonazePAM (KlonoPIN) 1 mg tablet  Self Yes No   Sig: take 1 tablet by mouth twice a day if needed  1 TAB IN THE A.M. SECOND TAB AS NEEDED   metFORMIN (GLUCOPHAGE) 500 mg tablet   No No   Sig: Take 1 tablet (500 mg total) by mouth 2 (two) times a day with  meals   naltrexone (REVIA) 50 mg tablet  Self Yes No   Sig: Take 50 mg by mouth daily   naproxen (NAPROSYN) 500 mg tablet   No No   Sig: Take 1 tablet (500 mg total) by mouth 2 (two) times a day with meals   Patient not taking: Reported on 5/6/2024   ondansetron (ZOFRAN) 4 mg tablet   No No   Sig: Take 1 tablet (4 mg total) by mouth every 8 (eight) hours as needed for nausea or vomiting   rosuvastatin (CRESTOR) 10 MG tablet   No No   Sig: Take 1 tablet (10 mg total) by mouth daily   tamsulosin (FLOMAX) 0.4 mg   No No   Sig: Take 1 capsule (0.4 mg total) by mouth daily with dinner for 5 days      Facility-Administered Medications: None       Past Medical History:   Diagnosis Date    Bipolar disorder ([facility])     Bronchitis 01/22/2019    Depression     History of wound infection     left ankle    Morbid obesity with BMI of 40.0-44.9, adult ([facility]) 01/18/2018    Psychiatric disorder     depression, anxiety    Seasonal allergic reaction        Past Surgical History:   Procedure Laterality Date    IR PICC LINE  2/8/2019    [place] OPEN TREATMENT BIMALLEOLAR ANKLE FRACTURE Left 5/14/2018    Procedure: OPEN REDUCTION W/ INTERNAL FIXATION (ORIF) ANKLE;  Surgeon: Sam Bauman DO;  Location: [facility];  Service: Orthopedics    [place] REMOVAL IMPLANT DEEP Left 2/5/2019    Procedure: REMOVAL HARDWARE ANKLE;  Surgeon: Sam Bauman DO;  Location: [facility];  Service: Orthopedics    REDUCTION MAMMAPLASTY  2001    reduction       Family History   Problem Relation Age of Onset    Substance Abuse Mother     Mental illness Mother     Diabetes Mother     Cancer Mother     Asthma Mother     Breast cancer Mother 55    Substance Abuse Father     No Known Problems Brother     No Known Problems Daughter     Substance Abuse Maternal Grandmother     Mental illness Maternal Grandmother     No Known Problems Maternal Grandfather     No Known Problems Paternal Grandmother     No Known Problems Paternal Grandfather     No Known Problems Maternal Aunt      No Known Problems Maternal Uncle     ADD / ADHD Neg Hx     Anesthesia problems Neg Hx     Clotting disorder Neg Hx     Collagen disease Neg Hx     Dislocations Neg Hx     Learning disabilities Neg Hx     Neurological problems Neg Hx     Osteoporosis Neg Hx     Rheumatologic disease Neg Hx     Scoliosis Neg Hx     Vascular Disease Neg Hx      I have reviewed and agree with the history as documented.    E-Cigarette/Vaping    E-Cigarette Use Former User      E-Cigarette/Vaping Substances    Nicotine No     THC No     CBD No     Flavoring No     Other No     Unknown No      Social History     Tobacco Use    Smoking status: Every Day     Current packs/day: 1.00     Average packs/day: 1 pack/day for 25.3 years (25.3 ttl pk-yrs)     Types: Cigarettes     Start date: 9/23/2000    Smokeless tobacco: Never   Vaping Use    Vaping status: Former   Substance Use Topics    Alcohol use: Yes     Alcohol/week: 3.0 standard drinks of alcohol     Types: 3 Cans of beer per week     Comment: social    Drug use: Never       Review of Systems   Constitutional:  Negative for chills and fever.   Respiratory:  Negative for cough, chest tightness and shortness of breath.    Gastrointestinal:  Negative for abdominal pain, diarrhea, nausea and vomiting.   Genitourinary:  Negative for dysuria, frequency, hematuria and urgency.   Musculoskeletal:  Negative for back pain, neck pain and neck stiffness.   Skin:  Positive for rash. Negative for color change, pallor and wound.   All other systems reviewed and are negative.      Physical Exam  Physical Exam  Vitals and nursing note reviewed.   Constitutional:       General: She is not in acute distress.     Appearance: She is well-developed. She is not diaphoretic.   HENT:      Head: Normocephalic and atraumatic.   Eyes:      Extraocular Movements: Extraocular movements intact.      Conjunctiva/sclera: Conjunctivae normal.      Pupils: Pupils are equal, round, and reactive to light.   Cardiovascular:       Heart sounds: No murmur heard.  Pulmonary:      Effort: Pulmonary effort is normal. No respiratory distress.   Musculoskeletal:         General: No deformity. Normal range of motion.      Cervical back: Normal range of motion and neck supple.   Skin:     General: Skin is warm and dry.      Capillary Refill: Capillary refill takes less than 2 seconds.      Coloration: Skin is not pale.      Findings: No rash.      Comments: Scattered erythematous, papular lesions to bilateral lower extremities   Neurological:      General: No focal deficit present.      Mental Status: She is alert and oriented to person, place, and time.      Cranial Nerves: No cranial nerve deficit.   Psychiatric:         Mood and Affect: Mood is anxious.         Behavior: Behavior normal.         Vital Signs  ED Triage Vitals [09/01/24 1930]   Temperature Pulse Respirations Blood Pressure SpO2   97.8 °F (36.6 °C) 72 18 143/85 95 %      Temp Source Heart Rate Source Patient Position - Orthostatic VS BP Location FiO2 (%)   Temporal Monitor Sitting Right arm --      Pain Score       --           Vitals:    09/01/24 1930   BP: 143/85   Pulse: 72   Patient Position - Orthostatic VS: Sitting         Visual Acuity      ED Medications  Medications   hydrOXYzine HCL (ATARAX) tablet 25 mg (25 mg Oral Given 9/1/24 1958)       Diagnostic Studies  Results Reviewed       None                   No orders to display              Procedures  Procedures         ED Course                                 SBIRT 20yo+      Flowsheet Row Most Recent Value   Initial Alcohol Screen: US AUDIT-C     1. How often do you have a drink containing alcohol? 0 Filed at: 09/01/2024 1934   2. How many drinks containing alcohol do you have on a typical day you are drinking?  0 Filed at: 09/01/2024 1934   3a. Male UNDER 65: How often do you have five or more drinks on one occasion? 0 Filed at: 09/01/2024 1934   3b. FEMALE Any Age, or MALE 65+: How often do you have 4 or more  drinks on one occassion? 0 Filed at: 09/01/2024 1934   Audit-C Score 0 Filed at: 09/01/2024 1934   QUIRINO: How many times in the past year have you...    Used an illegal drug or used a prescription medication for non-medical reasons? Never Filed at: 09/01/2024 1934                      Medical Decision Making  49-year-old female with a scattered rash consistent with insect bites.  No concern for an acute infection at this time.  Will prescribe Atarax as needed for pruritus.  Patient advised to discontinue steroid cream    Risk  Prescription drug management.                 Disposition  Final diagnoses:   Multiple insect bites     Time reflects when diagnosis was documented in both MDM as applicable and the Disposition within this note       Time User Action Codes Description Comment    9/1/2024  7:51 PM Oyesanmi, Olubusola O Add [Z91.038] Allergic to insect bites     9/1/2024  7:52 PM Oyesanmi, Olubusola O Remove [Z91.038] Allergic to insect bites     9/1/2024  7:52 PM Oyesanmi, Olubusola O Add [W57.XXXA] Multiple insect bites           ED Disposition       ED Disposition   Discharge    Condition   Stable    Date/Time   Sun Sep 1, 2024 1951    Comment   Mirlande Mesa discharge to home/self care.                   Follow-up Information       Follow up With Specialties Details Why Contact Info    [facility], Nurse Practitioner Schedule an appointment as soon as possible for a visit in 1 day for follow up [address] [place] [id number]  [phone]              Discharge Medication List as of 9/1/2024  7:55 PM        START taking these medications    Details   hydrOXYzine HCL (ATARAX) 25 mg tablet Take 1 tablet (25 mg total) by mouth every 6 (six) hours as needed for itching, Starting Sun 9/1/2024, Normal           CONTINUE these medications which have NOT CHANGED    Details   albuterol (Ventolin HFA) 90 mcg/act inhaler Inhale 2 puffs every 6 (six) hours as needed for  wheezing, Starting Tue 6/27/2023, Normal      ARIPiprazole (ABILIFY) 15 mg tablet Take 15 mg by mouth in the morning  , Starting Tue 9/21/2021, Historical Med      calcium carbonate (OS-ARIELLA) 600 MG tablet Take 1 tablet (600 mg total) by mouth 2 (two) times a day with meals, Starting Tue 10/31/2023, Normal      cholecalciferol (VITAMIN D3) 1,000 units tablet Take 1 tablet (1,000 Units total) by mouth daily, Starting Tue 10/31/2023, Normal      clonazePAM (KlonoPIN) 1 mg tablet take 1 tablet by mouth twice a day if needed  1 TAB IN THE A.M. SECOND TAB AS NEEDED, Historical Med      !! FLUoxetine (PROzac) 20 mg capsule take 1 capsule by mouth every morning  AS DIRECTED, Historical Med      !! FLUoxetine (PROzac) 40 MG capsule Take 40 mg by mouth every morning, Starting Wed 7/3/2019, Historical Med      metFORMIN (GLUCOPHAGE) 500 mg tablet Take 1 tablet (500 mg total) by mouth 2 (two) times a day with meals, Starting Mon 5/6/2024, Normal      Misc. Devices (Wrist Brace) MISC Use daily at bedtime, Starting Thu 10/5/2023, Print      naltrexone (REVIA) 50 mg tablet Take 50 mg by mouth daily, Starting Thu 6/8/2023, Historical Med      naproxen (NAPROSYN) 500 mg tablet Take 1 tablet (500 mg total) by mouth 2 (two) times a day with meals, Starting Fri 10/20/2023, Normal      ondansetron (ZOFRAN) 4 mg tablet Take 1 tablet (4 mg total) by mouth every 8 (eight) hours as needed for nausea or vomiting, Starting Sun 5/5/2024, Normal      QUEtiapine (SEROquel) 50 mg tablet Take 50 mg by mouth every evening, Starting Wed 5/10/2023, Historical Med      rosuvastatin (CRESTOR) 10 MG tablet Take 1 tablet (10 mg total) by mouth daily, Starting Mon 5/6/2024, Normal      tamsulosin (FLOMAX) 0.4 mg Take 1 capsule (0.4 mg total) by mouth daily with dinner for 5 days, Starting Sun 5/5/2024, Until Fri 5/10/2024, Normal       !! - Potential duplicate medications found. Please discuss with provider.          No discharge procedures on  file.    PDMP Review       None            ED Provider  Electronically Signed by             Noy Sabillon DO  09/01/24 2023

## 2024-10-04 ENCOUNTER — OFFICE VISIT (OUTPATIENT)
Dept: FAMILY MEDICINE CLINIC | Facility: CLINIC | Age: 49
End: 2024-10-04

## 2024-10-04 VITALS
HEART RATE: 86 BPM | SYSTOLIC BLOOD PRESSURE: 132 MMHG | TEMPERATURE: 97.8 F | DIASTOLIC BLOOD PRESSURE: 84 MMHG | BODY MASS INDEX: 36.16 KG/M2 | WEIGHT: 207.4 LBS | RESPIRATION RATE: 18 BRPM

## 2024-10-04 DIAGNOSIS — F31.9 BIPOLAR DEPRESSION (HCC): Primary | ICD-10-CM

## 2024-10-04 DIAGNOSIS — F32.A ANXIETY AND DEPRESSION: ICD-10-CM

## 2024-10-04 DIAGNOSIS — F41.9 ANXIETY AND DEPRESSION: ICD-10-CM

## 2024-10-04 DIAGNOSIS — F10.11 HISTORY OF ETOH ABUSE: ICD-10-CM

## 2024-10-04 PROCEDURE — 99214 OFFICE O/P EST MOD 30 MIN: CPT | Performed by: NURSE PRACTITIONER

## 2024-10-04 NOTE — ASSESSMENT & PLAN NOTE
Recommend daily AA meetings. Anticipatory guidance. Monitor.        Patient was counseled regarding instructions for management which included: impression/diagnosis, risk/benefits of treatment options, importance of compliance with treatment, risk factor reduction, and prognosis.   I have reviewed the instructions with the patient answering all questions and patient verbalized understanding.

## 2024-10-04 NOTE — PROGRESS NOTES
Ambulatory Visit  Name: Mirlande Mesa      : 1975      MRN: [id number]  Encounter Provider: JORDAN Cabello  Encounter Date: 10/4/2024   Encounter department: [facility]    Assessment & Plan  Bipolar depression (HCC)  Management by psych  Continue current meds.   Recommend increase therapy/counseling to at least weekly  Coping strategies discussed  Anticipatory guidance. Monitor.        Anxiety and depression  Increase counseling therapy sessions weekly.   Continue current medications.   Stress management. Activities to divert attention when possible.   Conscious breathing techniques as discussed.   Coping mechanisms and strategies vary from person to person so try to utilize strategies that you think may work for you (such as meditation, music, etc. ).  Depression Screening Follow-up Plan: Patient's depression screening was positive with a PHQ-9 score of 15. Patient assessed for underlying major depression. They have no active suicidal ideations. Brief counseling provided and recommend additional follow-up/re-evaluation next office visit. Continue regular follow-up with their psychologist/therapist/psychiatrist who is managing their mental health condition(s).         History of ETOH abuse  Recommend daily AA meetings. Anticipatory guidance. Monitor.        Patient was counseled regarding instructions for management which included: impression/diagnosis, risk/benefits of treatment options, importance of compliance with treatment, risk factor reduction, and prognosis.   I have reviewed the instructions with the patient answering all questions and patient verbalized understanding.       History of Present Illness     Here to discuss FMLA  Being treated for bipolar, anxiety/depression  Currently out of work. First day out of 2024, PTO from -10/7.   To start FMLA 10/8/2024  Sees psych, nurse practitioner, managing medications but would not do FMLA paperwork  Very  depressed recently  Job very stressful. 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   Was demoted due to med errors about 2 weeks ago.   Having a hard time emotionally. No motivation, crying all the time. Sleeping a lot but not sleeping well at night.   No panic attacks but anxious.   No suicidal thoughts  Desire to drink. History of etoh abuse. Last drink one year ago.   Seeing counselor once monthly.   Treatment plan- therapy, medications  AA meetings - went to a meeting a week ago but plan is to attend  meeting daily. Currently does not have a sponsor.   NP from psych believes she needs at least a month to get more intense counseling and attend AA meetings.             Review of Systems   Constitutional:  Positive for fatigue.   Respiratory:  Negative for shortness of breath.    Cardiovascular:  Negative for palpitations.   Gastrointestinal:  Negative for abdominal pain.   Allergic/Immunologic: Negative for immunocompromised state.   Neurological:  Negative for dizziness.   Psychiatric/Behavioral:  Positive for dysphoric mood and sleep disturbance. Negative for self-injury and suicidal ideas. The patient is nervous/anxious.            Objective     /84   Pulse 86   Temp 97.8 °F (36.6 °C)   Resp 18   Wt 94.1 kg (207 lb 6.4 oz)   LMP 09/14/2024 (Approximate)   BMI 36.16 kg/m²     Physical Exam  Vitals reviewed.   Constitutional:       General: She is not in acute distress.     Appearance: She is not ill-appearing.   Cardiovascular:      Rate and Rhythm: Normal rate.   Pulmonary:      Effort: Pulmonary effort is normal.   Skin:     General: Skin is warm and dry.      Coloration: Skin is not jaundiced or pale.   Neurological:      General: No focal deficit present.      Mental Status: She is alert and oriented to person, place, and time.      Cranial Nerves: No cranial nerve deficit.      Sensory: No sensory deficit.   Psychiatric:         Mood and Affect: Mood normal.          Behavior: Behavior normal.         Thought Content: Thought content normal.         Judgment: Judgment normal.      Comments: Well groomed. Dressed appropriately for the weather.   Calm. Pleasant . Cooperative.   Good eye contact.   Converses freely and appropriately.

## 2024-10-04 NOTE — ASSESSMENT & PLAN NOTE
Increase counseling therapy sessions weekly.   Continue current medications.   Stress management. Activities to divert attention when possible.   Conscious breathing techniques as discussed.   Coping mechanisms and strategies vary from person to person so try to utilize strategies that you think may work for you (such as meditation, music, etc. ).  Depression Screening Follow-up Plan: Patient's depression screening was positive with a PHQ-9 score of 15. Patient assessed for underlying major depression. They have no active suicidal ideations. Brief counseling provided and recommend additional follow-up/re-evaluation next office visit. Continue regular follow-up with their psychologist/therapist/psychiatrist who is managing their mental health condition(s).

## 2024-10-04 NOTE — PATIENT INSTRUCTIONS
Recommend daily AA meetings  Increase counseling therapy sessions weekly.   Continue current medications.   Stress management. Activities to divert attention when possible.   Conscious breathing techniques as discussed.   Coping mechanisms and strategies vary from person to person so try to utilize strategies that you think may work for you (such as meditation, music, etc. ).    
Face Mask

## 2024-10-07 ENCOUNTER — TELEPHONE (OUTPATIENT)
Dept: FAMILY MEDICINE CLINIC | Facility: CLINIC | Age: 49
End: 2024-10-07

## 2024-10-14 ENCOUNTER — HOSPITAL ENCOUNTER (EMERGENCY)
Facility: HOSPITAL | Age: 49
Discharge: HOME | End: 2024-10-14
Attending: EMERGENCY MEDICINE
Payer: COMMERCIAL

## 2024-10-14 ENCOUNTER — APPOINTMENT (EMERGENCY)
Dept: RADIOLOGY | Facility: HOSPITAL | Age: 49
End: 2024-10-14
Payer: COMMERCIAL

## 2024-10-14 VITALS
WEIGHT: 205 LBS | SYSTOLIC BLOOD PRESSURE: 139 MMHG | HEIGHT: 64 IN | TEMPERATURE: 97.6 F | DIASTOLIC BLOOD PRESSURE: 80 MMHG | BODY MASS INDEX: 35 KG/M2 | OXYGEN SATURATION: 96 % | RESPIRATION RATE: 18 BRPM | HEART RATE: 93 BPM

## 2024-10-14 DIAGNOSIS — J06.9 VIRAL URI WITH COUGH: Primary | ICD-10-CM

## 2024-10-14 LAB
FLUAV AG UPPER RESP QL IA.RAPID: NEGATIVE
FLUBV AG UPPER RESP QL IA.RAPID: NEGATIVE
SARS-COV+SARS-COV-2 AG RESP QL IA.RAPID: NEGATIVE

## 2024-10-14 PROCEDURE — 87804 INFLUENZA ASSAY W/OPTIC: CPT | Performed by: PHYSICIAN ASSISTANT

## 2024-10-14 PROCEDURE — 87811 SARS-COV-2 COVID19 W/OPTIC: CPT | Performed by: PHYSICIAN ASSISTANT

## 2024-10-14 PROCEDURE — 71046 X-RAY EXAM CHEST 2 VIEWS: CPT

## 2024-10-14 PROCEDURE — 99284 EMERGENCY DEPT VISIT MOD MDM: CPT | Performed by: PHYSICIAN ASSISTANT

## 2024-10-14 PROCEDURE — 99284 EMERGENCY DEPT VISIT MOD MDM: CPT

## 2024-10-14 RX ORDER — BENZONATATE 100 MG/1
100 CAPSULE ORAL EVERY 8 HOURS
Qty: 21 CAPSULE | Refills: 0 | Status: SHIPPED | OUTPATIENT
Start: 2024-10-14

## 2024-10-14 NOTE — TELEPHONE ENCOUNTER
The patient needs an appointment with [employer] to fill out a return to work form, please contact the patient.

## 2024-10-14 NOTE — Clinical Note
Mirlande Mesa was seen and treated in our emergency department on 10/14/2024.                Diagnosis:     Mirlande  .    She may return on this date:     Mirlande Mesa is excused from work Monday October 14     If you have any questions or concerns, please don't hesitate to call.      Eliecer Morirson PA-C    ______________________________           _______________          _______________  Hospital Representative                              Date                                Time

## 2024-10-14 NOTE — DISCHARGE INSTRUCTIONS
Tessalon perles for cough    You are encouraged to stop smoking    Follow up with your primary care provider for any persistent concerns    Return to ED for fever, increased work of breathing, worsening symptoms

## 2024-10-14 NOTE — ED PROVIDER NOTES
Time reflects when diagnosis was documented in both MDM as applicable and the Disposition within this note       Time User Action Codes Description Comment    10/14/2024  4:10 PM Eliecer Morrison Add [J06.9] Viral URI with cough           ED Disposition       ED Disposition   Discharge    Condition   Stable    Date/Time   Mon Oct 14, 2024  4:10 PM    Comment   Mirlande Mesa discharge to home/self care.                   Assessment & Plan       Medical Decision Making  Differential dx includes covid, influenza, pneumonia, bronchitis. Nasal swab is negative for covid/influenza. I ordered CXR. I interpreted as no acute cardiopulmonary abnormality. Patient is well appearing and non toxic. Pulse ox 96% room air indicating adequate oxygenation. Plan will be Rx for tessalon perles. I do not feel there is indication for antibiotics at this time given no fever and no infiltrate on imaging. Advised follow up with PCP for further evaluation. Return precautions reviewed.     Amount and/or Complexity of Data Reviewed  Labs: ordered.  Radiology: ordered.    Risk  Prescription drug management.             Medications - No data to display    ED Risk Strat Scores                           SBIRT 20yo+      Flowsheet Row Most Recent Value   Initial Alcohol Screen: US AUDIT-C     1. How often do you have a drink containing alcohol? 0 Filed at: 10/14/2024 1343   2. How many drinks containing alcohol do you have on a typical day you are drinking?  0 Filed at: 10/14/2024 1343   3a. Male UNDER 65: How often do you have five or more drinks on one occasion? 0 Filed at: 10/14/2024 1343   3b. FEMALE Any Age, or MALE 65+: How often do you have 4 or more drinks on one occassion? 0 Filed at: 10/14/2024 1343   Audit-C Score 0 Filed at: 10/14/2024 1343   QUIRINO: How many times in the past year have you...    Used an illegal drug or used a prescription medication for non-medical reasons? Never Filed at: 10/14/2024 1343                             History of Present Illness       Chief Complaint   Patient presents with    Cold Like Symptoms     Cough, congestion, body aches since saturday       Past Medical History:   Diagnosis Date    Bipolar disorder ([facility])     Bronchitis 01/22/2019    Depression     History of wound infection     left ankle    Morbid obesity with BMI of 40.0-44.9, adult ([facility]) 01/18/2018    Psychiatric disorder     depression, anxiety    Seasonal allergic reaction       Past Surgical History:   Procedure Laterality Date    IR PICC LINE  2/8/2019    CO OPEN TREATMENT BIMALLEOLAR ANKLE FRACTURE Left 5/14/2018    Procedure: OPEN REDUCTION W/ INTERNAL FIXATION (ORIF) ANKLE;  Surgeon: aSm Bauman DO;  Location: [facility];  Service: Orthopedics    CO REMOVAL IMPLANT DEEP Left 2/5/2019    Procedure: REMOVAL HARDWARE ANKLE;  Surgeon: Sam Bauman DO;  Location: [facility];  Service: Orthopedics    REDUCTION MAMMAPLASTY  2001    reduction      Family History   Problem Relation Age of Onset    Substance Abuse Mother     Mental illness Mother     Diabetes Mother     Cancer Mother     Asthma Mother     Breast cancer Mother 55    Substance Abuse Father     No Known Problems Brother     No Known Problems Daughter     Substance Abuse Maternal Grandmother     Mental illness Maternal Grandmother     No Known Problems Maternal Grandfather     No Known Problems Paternal Grandmother     No Known Problems Paternal Grandfather     No Known Problems Maternal Aunt     No Known Problems Maternal Uncle     ADD / ADHD Neg Hx     Anesthesia problems Neg Hx     Clotting disorder Neg Hx     Collagen disease Neg Hx     Dislocations Neg Hx     Learning disabilities Neg Hx     Neurological problems Neg Hx     Osteoporosis Neg Hx     Rheumatologic disease Neg Hx     Scoliosis Neg Hx     Vascular Disease Neg Hx       Social History     Tobacco Use    Smoking status: Every Day     Current packs/day: 1.00     Average packs/day: 1 pack/day for 25.5 years (25.5 ttl  pk-yrs)     Types: Cigarettes     Start date: 9/23/2000     Passive exposure: Current    Smokeless tobacco: Never   Vaping Use    Vaping status: Former    Substances: Nicotine, CBD   Substance Use Topics    Alcohol use: Yes     Alcohol/week: 3.0 standard drinks of alcohol     Types: 3 Cans of beer per week     Comment: social    Drug use: Never      E-Cigarette/Vaping    E-Cigarette Use Former User       E-Cigarette/Vaping Substances    Nicotine Yes     THC No     CBD Yes     Flavoring No     Other No     Unknown No       I have reviewed and agree with the history as documented.     Patient is a 48 yo  female with h/o bipolar, depression who reports onset 9 days ago of cough, runny nose, body aches, headache. States symptoms are improving. Smokes 1 ppd. Missed work all last week and presents requesting clearance to return to work. States she continues to have cough but other symptoms have improved. No fever or shaking chills. No cp. No abd pain. No vomiting or diarrhea. No other complaints          Review of Systems   Constitutional:  Negative for chills and fever.   HENT:  Positive for congestion. Negative for sore throat, trouble swallowing and voice change.    Respiratory:  Positive for cough. Negative for shortness of breath and wheezing.    Cardiovascular:  Negative for chest pain.   Gastrointestinal:  Negative for abdominal pain, diarrhea, nausea and vomiting.   Neurological:  Negative for headaches.           Objective       ED Triage Vitals   Temperature Pulse Blood Pressure Respirations SpO2 Patient Position - Orthostatic VS   10/14/24 1344 10/14/24 1345 10/14/24 1345 10/14/24 1344 10/14/24 1345 --   97.6 °F (36.4 °C) 93 139/80 18 96 %       Temp src Heart Rate Source BP Location FiO2 (%) Pain Score    -- -- -- -- 10/14/24 1344        4      Vitals      Date and Time Temp Pulse SpO2 Resp BP Pain Score FACES Pain Rating User   10/14/24 1345 -- 93 96 % -- 139/80 -- -- ISABEL   10/14/24 1344 97.6 °F (36.4  °C) -- -- 18 -- 4 -- ISABEL            Physical Exam  Vitals and nursing note reviewed.   Constitutional:       General: She is not in acute distress.     Appearance: Normal appearance. She is not ill-appearing, toxic-appearing or diaphoretic.   HENT:      Head: Normocephalic and atraumatic.      Right Ear: Tympanic membrane, ear canal and external ear normal.      Left Ear: Tympanic membrane, ear canal and external ear normal.      Nose: Nose normal.      Mouth/Throat:      Mouth: Mucous membranes are moist.      Pharynx: Oropharynx is clear.   Eyes:      Extraocular Movements: Extraocular movements intact.      Conjunctiva/sclera: Conjunctivae normal.      Pupils: Pupils are equal, round, and reactive to light.   Cardiovascular:      Rate and Rhythm: Normal rate and regular rhythm.      Pulses: Normal pulses.      Heart sounds: Normal heart sounds.   Pulmonary:      Effort: Pulmonary effort is normal.      Breath sounds: Normal breath sounds.   Abdominal:      General: Abdomen is flat. Bowel sounds are normal.      Palpations: Abdomen is soft.      Tenderness: There is no abdominal tenderness. There is no right CVA tenderness, left CVA tenderness, guarding or rebound.   Musculoskeletal:         General: Normal range of motion.      Cervical back: Normal range of motion and neck supple.   Skin:     General: Skin is warm and dry.      Capillary Refill: Capillary refill takes less than 2 seconds.   Neurological:      General: No focal deficit present.      Mental Status: She is alert and oriented to person, place, and time. Mental status is at baseline.         Results Reviewed       Procedure Component Value Units Date/Time    FLU/COVID Rapid Antigen (30 min. TAT) - Preferred screening test in ED [[id number]]  (Normal) Collected: 10/14/24 1438    Lab Status: Final result Specimen: Nares from Nose Updated: 10/14/24 1513     SARS COV Rapid Antigen Negative     Influenza A Rapid Antigen Negative     Influenza B Rapid  Antigen Negative    Narrative:      This test has been performed using the Quidel Brandy 2 FLU+SARS Antigen test under the Emergency Use Authorization (EUA). This test has been validated by the  and verified by the performing laboratory. The Brandy uses lateral flow immunofluorescent sandwich assay to detect SARS-COV, Influenza A and Influenza B Antigen.     The Quidel Brandy 2 SARS Antigen test does not differentiate between SARS-CoV and SARS-CoV-2.     Negative results are presumptive and may be confirmed with a molecular assay, if necessary, for patient management. Negative results do not rule out SARS-CoV-2 or influenza infection and should not be used as the sole basis for treatment or patient management decisions. A negative test result may occur if the level of antigen in a sample is below the limit of detection of this test.     Positive results are indicative of the presence of viral antigens, but do not rule out bacterial infection or co-infection with other viruses.     All test results should be used as an adjunct to clinical observations and other information available to the provider.    FOR PEDIATRIC PATIENTS - copy/paste COVID Guidelines URL to browser: [email or web address]            XR chest 2 views   Final Interpretation by Bashir Weinstein MD (10/14 1632)      No acute cardiopulmonary disease.            Workstation performed: [id number]             Procedures    ED Medication and Procedure Management   Prior to Admission Medications   Prescriptions Last Dose Informant Patient Reported? Taking?   ARIPiprazole (ABILIFY) 15 mg tablet  Self Yes No   Sig: Take 20 mg by mouth in the morning   FLUoxetine (PROzac) 20 mg capsule  Self Yes No   Sig: take 1 capsule by mouth every morning  AS DIRECTED   FLUoxetine (PROzac) 40 MG capsule  Self Yes No   Sig: Take 40 mg by mouth every morning   QUEtiapine (SEROquel) 50 mg tablet  Self Yes No   Sig: Take  50 mg by mouth if needed   albuterol (Ventolin HFA) 90 mcg/act inhaler  Self No No   Sig: Inhale 2 puffs every 6 (six) hours as needed for wheezing   cholecalciferol (VITAMIN D3) 1,000 units tablet   No No   Sig: Take 1 tablet (1,000 Units total) by mouth daily   clonazePAM (KlonoPIN) 1 mg tablet  Self Yes No   Sig: take 1 tablet by mouth twice a day if needed  1 TAB IN THE A.M. SECOND TAB AS NEEDED   metFORMIN (GLUCOPHAGE) 500 mg tablet   No No   Sig: Take 1 tablet (500 mg total) by mouth 2 (two) times a day with meals   naltrexone (REVIA) 50 mg tablet  Self Yes No   Sig: Take 50 mg by mouth daily   rosuvastatin (CRESTOR) 10 MG tablet   No No   Sig: Take 1 tablet (10 mg total) by mouth daily      Facility-Administered Medications: None     Discharge Medication List as of 10/14/2024  4:13 PM        START taking these medications    Details   benzonatate (TESSALON PERLES) 100 mg capsule Take 1 capsule (100 mg total) by mouth every 8 (eight) hours, Starting Mon 10/14/2024, Normal           CONTINUE these medications which have NOT CHANGED    Details   albuterol (Ventolin HFA) 90 mcg/act inhaler Inhale 2 puffs every 6 (six) hours as needed for wheezing, Starting Tue 6/27/2023, Normal      ARIPiprazole (ABILIFY) 15 mg tablet Take 20 mg by mouth in the morning, Starting Tue 9/21/2021, Historical Med      cholecalciferol (VITAMIN D3) 1,000 units tablet Take 1 tablet (1,000 Units total) by mouth daily, Starting Tue 10/31/2023, Normal      clonazePAM (KlonoPIN) 1 mg tablet take 1 tablet by mouth twice a day if needed  1 TAB IN THE A.M. SECOND TAB AS NEEDED, Historical Med      !! FLUoxetine (PROzac) 20 mg capsule take 1 capsule by mouth every morning  AS DIRECTED, Historical Med      !! FLUoxetine (PROzac) 40 MG capsule Take 40 mg by mouth every morning, Starting Wed 7/3/2019, Historical Med      metFORMIN (GLUCOPHAGE) 500 mg tablet Take 1 tablet (500 mg total) by mouth 2 (two) times a day with meals, Starting Mon 5/6/2024,  Normal      naltrexone (REVIA) 50 mg tablet Take 50 mg by mouth daily, Starting Thu 6/8/2023, Historical Med      QUEtiapine (SEROquel) 50 mg tablet Take 50 mg by mouth if needed, Starting Wed 5/10/2023, Historical Med      rosuvastatin (CRESTOR) 10 MG tablet Take 1 tablet (10 mg total) by mouth daily, Starting Mon 5/6/2024, Normal       !! - Potential duplicate medications found. Please discuss with provider.        No discharge procedures on file.  ED SEPSIS DOCUMENTATION   Time reflects when diagnosis was documented in both MDM as applicable and the Disposition within this note       Time User Action Codes Description Comment    10/14/2024  4:10 PM Eliecer Morrison Add [J06.9] Viral URI with cough                  Eliecer Morrison PA-C  10/14/24 5989

## 2024-10-15 NOTE — TELEPHONE ENCOUNTER
"Called and spoke to patient   Patient is currently out on FMLA, states she hasn't been paid yet and took a 2nd job, Patient states she became sick at her second job was out sick for 4 days and they handed her a \"disability claim\" needing to be filled out by her PCP. I explained to her that [facility] was out of office this week. I  tried to make an appointment for her next week. She said she couldn't wait that long and will be leaving [facility] and find a new PCP     "

## 2024-10-18 ENCOUNTER — TELEPHONE (OUTPATIENT)
Age: 49
End: 2024-10-18

## 2024-10-18 ENCOUNTER — OFFICE VISIT (OUTPATIENT)
Age: 49
End: 2024-10-18

## 2024-10-18 VITALS
RESPIRATION RATE: 18 BRPM | DIASTOLIC BLOOD PRESSURE: 83 MMHG | HEIGHT: 67 IN | SYSTOLIC BLOOD PRESSURE: 120 MMHG | BODY MASS INDEX: 38.61 KG/M2 | WEIGHT: 246 LBS | TEMPERATURE: 98 F | OXYGEN SATURATION: 95 % | HEART RATE: 85 BPM

## 2024-10-18 DIAGNOSIS — J06.9 VIRAL URI WITH COUGH: Primary | ICD-10-CM

## 2024-10-18 DIAGNOSIS — E11.9 TYPE 2 DIABETES MELLITUS WITHOUT COMPLICATION, WITHOUT LONG-TERM CURRENT USE OF INSULIN (HCC): ICD-10-CM

## 2024-10-18 PROCEDURE — 99202 OFFICE O/P NEW SF 15 MIN: CPT | Performed by: FAMILY MEDICINE

## 2024-10-18 NOTE — TELEPHONE ENCOUNTER
Patient brought form in.  Original not scanned into encounter.    Copy signed and given back to patient.      Signed copy scanned into encounter.

## 2024-10-18 NOTE — PROGRESS NOTES
Ambulatory Visit  Name: Mirlande Mesa      : 1975      MRN: [id number]  Encounter Provider: Daniel Chavez MD  Encounter Date: 10/18/2024   Encounter department: [facility]    Assessment & Plan  Viral URI with cough  Patient presents to office requesting return to work forms be filled out, as she had a leave of absence due to viral URI symptoms. She was out of work for ~10 days.   She was seen at [facility] on 10/14/24, Flu/covid/RSV testing negative, CXR showed no abnormalities.   She reports she is feeling better overall, denies any usage of prescription medications given to her in ED, and feels confident in returning to normal duties of work.     Plan:   -Forms filled out in office, scanned into chart  -Encouraged to monitor symptoms and inform office of any changes or need for medication arises  -Return to office within next 4-6 weeks for annual physical       Type 2 diabetes mellitus without complication, without long-term current use of insulin ([facility])    Lab Results   Component Value Date    HGBA1C 5.57 2024       Orders:    [facility] Diabetic eye exam       History of Present Illness     Patient presents to office as new patient, requesting forms be filled out for her return to work.   She states she was out of work for ~10 days for cold symptoms. These symptoms started on 10/5/24. She presented to [facility] ED on 10/14/24 and was prescribed Tessalon pearls for cough. She reports that her symptoms have resolved since and she feels better. She denies any chest pain, worsening SOB, worsening productive cough, or fever. She only reports persistence of mild cough for which she feels comfortable not taking her Tessalon pearls.   She reports she is feeling well otherwise and denies any other acute urgent complaints.           Review of Systems   Constitutional:  Negative for chills and fever.   HENT:  Negative for ear pain and sore throat.    Eyes:  Negative for pain and  "visual disturbance.   Respiratory:  Positive for cough. Negative for shortness of breath.    Cardiovascular:  Negative for chest pain and palpitations.   Gastrointestinal:  Negative for abdominal pain, constipation, diarrhea and vomiting.   Genitourinary:  Negative for dysuria and hematuria.   Musculoskeletal:  Negative for arthralgias, back pain and myalgias.   Skin:  Negative for color change and rash.   Neurological:  Negative for seizures, syncope, light-headedness and headaches.   All other systems reviewed and are negative.          Objective     /83 (BP Location: Left arm, Patient Position: Sitting, Cuff Size: Adult)   Pulse 85   Temp 98 °F (36.7 °C) (Tympanic)   Resp 18   Ht 5' 6.5\" (1.689 m)   Wt 112 kg (246 lb)   LMP 09/14/2024 (Approximate)   SpO2 95%   BMI 39.11 kg/m²     Physical Exam  Vitals reviewed.   Constitutional:       Appearance: Normal appearance.   HENT:      Head: Normocephalic and atraumatic.      Right Ear: Tympanic membrane normal.      Left Ear: Tympanic membrane normal.      Nose: Nose normal.      Mouth/Throat:      Mouth: Mucous membranes are moist.      Pharynx: Oropharynx is clear.   Eyes:      Extraocular Movements: Extraocular movements intact.      Conjunctiva/sclera: Conjunctivae normal.      Pupils: Pupils are equal, round, and reactive to light.   Cardiovascular:      Rate and Rhythm: Normal rate and regular rhythm.   Pulmonary:      Effort: Pulmonary effort is normal.      Breath sounds: Wheezing (occasional wheezes bilaterally) present.   Abdominal:      General: Abdomen is flat.      Palpations: Abdomen is soft.   Musculoskeletal:         General: Normal range of motion.      Cervical back: Normal range of motion.   Skin:     General: Skin is warm and dry.   Neurological:      Mental Status: She is alert and oriented to person, place, and time.   Psychiatric:         Mood and Affect: Mood normal.         "

## 2024-10-19 ENCOUNTER — APPOINTMENT (EMERGENCY)
Dept: RADIOLOGY | Facility: HOSPITAL | Age: 49
End: 2024-10-19
Payer: COMMERCIAL

## 2024-10-19 ENCOUNTER — HOSPITAL ENCOUNTER (EMERGENCY)
Facility: HOSPITAL | Age: 49
Discharge: HOME | End: 2024-10-19
Attending: EMERGENCY MEDICINE
Payer: COMMERCIAL

## 2024-10-19 VITALS
SYSTOLIC BLOOD PRESSURE: 110 MMHG | OXYGEN SATURATION: 97 % | RESPIRATION RATE: 20 BRPM | HEART RATE: 75 BPM | TEMPERATURE: 97.9 F | DIASTOLIC BLOOD PRESSURE: 71 MMHG

## 2024-10-19 DIAGNOSIS — M54.9 BACK PAIN: ICD-10-CM

## 2024-10-19 DIAGNOSIS — M51.369 DEGENERATIVE DISC DISEASE, LUMBAR: ICD-10-CM

## 2024-10-19 DIAGNOSIS — D25.9 UTERINE FIBROID: ICD-10-CM

## 2024-10-19 DIAGNOSIS — N39.0 UTI (URINARY TRACT INFECTION): Primary | ICD-10-CM

## 2024-10-19 LAB
ALBUMIN SERPL BCG-MCNC: 4.3 G/DL (ref 3.5–5)
ALP SERPL-CCNC: 63 U/L (ref 34–104)
ALT SERPL W P-5'-P-CCNC: 11 U/L (ref 7–52)
ANION GAP SERPL CALCULATED.3IONS-SCNC: 8 MMOL/L (ref 4–13)
AST SERPL W P-5'-P-CCNC: 15 U/L (ref 13–39)
BACTERIA UR QL AUTO: ABNORMAL /HPF
BASOPHILS # BLD AUTO: 0.04 THOUSANDS/ΜL (ref 0–0.1)
BASOPHILS NFR BLD AUTO: 1 % (ref 0–1)
BILIRUB SERPL-MCNC: 0.3 MG/DL (ref 0.2–1)
BILIRUB UR QL STRIP: NEGATIVE
BUN SERPL-MCNC: 13 MG/DL (ref 5–25)
CALCIUM SERPL-MCNC: 9 MG/DL (ref 8.4–10.2)
CHLORIDE SERPL-SCNC: 102 MMOL/L (ref 96–108)
CLARITY UR: ABNORMAL
CO2 SERPL-SCNC: 26 MMOL/L (ref 21–32)
COLOR UR: ABNORMAL
CREAT SERPL-MCNC: 0.83 MG/DL (ref 0.6–1.3)
EOSINOPHIL # BLD AUTO: 0.22 THOUSAND/ΜL (ref 0–0.61)
EOSINOPHIL NFR BLD AUTO: 3 % (ref 0–6)
ERYTHROCYTE [DISTWIDTH] IN BLOOD BY AUTOMATED COUNT: 13.7 % (ref 11.6–15.1)
EXT PREGNANCY TEST URINE: NEGATIVE
EXT. CONTROL: NORMAL
GFR SERPL CREATININE-BSD FRML MDRD: 84 ML/MIN/1.73SQ M
GLUCOSE SERPL-MCNC: 94 MG/DL (ref 65–140)
GLUCOSE UR STRIP-MCNC: NEGATIVE MG/DL
HCT VFR BLD AUTO: 37.1 % (ref 34.8–46.1)
HGB BLD-MCNC: 12.1 G/DL (ref 11.5–15.4)
HGB UR QL STRIP.AUTO: ABNORMAL
IMM GRANULOCYTES # BLD AUTO: 0.02 THOUSAND/UL (ref 0–0.2)
IMM GRANULOCYTES NFR BLD AUTO: 0 % (ref 0–2)
KETONES UR STRIP-MCNC: NEGATIVE MG/DL
LEFT EYE DIABETIC RETINOPATHY: ABNORMAL
LEFT EYE IMAGE QUALITY: ABNORMAL
LEFT EYE MACULAR EDEMA: ABNORMAL
LEFT EYE OTHER RETINOPATHY: ABNORMAL
LEUKOCYTE ESTERASE UR QL STRIP: ABNORMAL
LYMPHOCYTES # BLD AUTO: 1.78 THOUSANDS/ΜL (ref 0.6–4.47)
LYMPHOCYTES NFR BLD AUTO: 27 % (ref 14–44)
MAGNESIUM SERPL-MCNC: 2 MG/DL (ref 1.9–2.7)
MCH RBC QN AUTO: 28.2 PG (ref 26.8–34.3)
MCHC RBC AUTO-ENTMCNC: 32.6 G/DL (ref 31.4–37.4)
MCV RBC AUTO: 87 FL (ref 82–98)
MONOCYTES # BLD AUTO: 0.53 THOUSAND/ΜL (ref 0.17–1.22)
MONOCYTES NFR BLD AUTO: 8 % (ref 4–12)
NEUTROPHILS # BLD AUTO: 4.03 THOUSANDS/ΜL (ref 1.85–7.62)
NEUTS SEG NFR BLD AUTO: 61 % (ref 43–75)
NITRITE UR QL STRIP: NEGATIVE
NON-SQ EPI CELLS URNS QL MICRO: ABNORMAL /HPF
NRBC BLD AUTO-RTO: 0 /100 WBCS
PH UR STRIP.AUTO: 6 [PH]
PLATELET # BLD AUTO: 285 THOUSANDS/UL (ref 149–390)
PMV BLD AUTO: 10.1 FL (ref 8.9–12.7)
POTASSIUM SERPL-SCNC: 4 MMOL/L (ref 3.5–5.3)
PROT SERPL-MCNC: 7.2 G/DL (ref 6.4–8.4)
PROT UR STRIP-MCNC: NEGATIVE MG/DL
RBC # BLD AUTO: 4.29 MILLION/UL (ref 3.81–5.12)
RBC #/AREA URNS AUTO: ABNORMAL /HPF
RIGHT EYE DIABETIC RETINOPATHY: ABNORMAL
RIGHT EYE IMAGE QUALITY: ABNORMAL
RIGHT EYE MACULAR EDEMA: ABNORMAL
RIGHT EYE OTHER RETINOPATHY: ABNORMAL
SEVERITY (EYE EXAM): ABNORMAL
SODIUM SERPL-SCNC: 136 MMOL/L (ref 135–147)
SP GR UR STRIP.AUTO: 1.01 (ref 1–1.03)
UROBILINOGEN UR STRIP-ACNC: <2 MG/DL
WBC # BLD AUTO: 6.62 THOUSAND/UL (ref 4.31–10.16)
WBC #/AREA URNS AUTO: ABNORMAL /HPF

## 2024-10-19 PROCEDURE — 99284 EMERGENCY DEPT VISIT MOD MDM: CPT

## 2024-10-19 PROCEDURE — 87086 URINE CULTURE/COLONY COUNT: CPT | Performed by: EMERGENCY MEDICINE

## 2024-10-19 PROCEDURE — 96365 THER/PROPH/DIAG IV INF INIT: CPT

## 2024-10-19 PROCEDURE — 83735 ASSAY OF MAGNESIUM: CPT | Performed by: EMERGENCY MEDICINE

## 2024-10-19 PROCEDURE — 71260 CT THORAX DX C+: CPT

## 2024-10-19 PROCEDURE — 80053 COMPREHEN METABOLIC PANEL: CPT | Performed by: EMERGENCY MEDICINE

## 2024-10-19 PROCEDURE — 99284 EMERGENCY DEPT VISIT MOD MDM: CPT | Performed by: EMERGENCY MEDICINE

## 2024-10-19 PROCEDURE — 96367 TX/PROPH/DG ADDL SEQ IV INF: CPT

## 2024-10-19 PROCEDURE — 81025 URINE PREGNANCY TEST: CPT | Performed by: EMERGENCY MEDICINE

## 2024-10-19 PROCEDURE — 81001 URINALYSIS AUTO W/SCOPE: CPT | Performed by: EMERGENCY MEDICINE

## 2024-10-19 PROCEDURE — 96375 TX/PRO/DX INJ NEW DRUG ADDON: CPT

## 2024-10-19 PROCEDURE — 85025 COMPLETE CBC W/AUTO DIFF WBC: CPT | Performed by: EMERGENCY MEDICINE

## 2024-10-19 PROCEDURE — 36415 COLL VENOUS BLD VENIPUNCTURE: CPT | Performed by: EMERGENCY MEDICINE

## 2024-10-19 PROCEDURE — 74177 CT ABD & PELVIS W/CONTRAST: CPT

## 2024-10-19 RX ORDER — ACETAMINOPHEN 10 MG/ML
1000 INJECTION, SOLUTION INTRAVENOUS ONCE
Status: COMPLETED | OUTPATIENT
Start: 2024-10-19 | End: 2024-10-19

## 2024-10-19 RX ORDER — OXYCODONE AND ACETAMINOPHEN 5; 325 MG/1; MG/1
1 TABLET ORAL EVERY 6 HOURS PRN
Qty: 10 TABLET | Refills: 0 | Status: SHIPPED | OUTPATIENT
Start: 2024-10-19 | End: 2024-10-29

## 2024-10-19 RX ORDER — CEFTRIAXONE 1 G/50ML
1000 INJECTION, SOLUTION INTRAVENOUS ONCE
Status: COMPLETED | OUTPATIENT
Start: 2024-10-19 | End: 2024-10-19

## 2024-10-19 RX ORDER — MAGNESIUM SULFATE HEPTAHYDRATE 40 MG/ML
2 INJECTION, SOLUTION INTRAVENOUS ONCE
Status: COMPLETED | OUTPATIENT
Start: 2024-10-19 | End: 2024-10-19

## 2024-10-19 RX ORDER — LIDOCAINE 50 MG/G
1 PATCH TOPICAL DAILY
Qty: 30 PATCH | Refills: 0 | Status: SHIPPED | OUTPATIENT
Start: 2024-10-19

## 2024-10-19 RX ORDER — CEPHALEXIN 500 MG/1
500 CAPSULE ORAL EVERY 12 HOURS SCHEDULED
Qty: 10 CAPSULE | Refills: 0 | Status: SHIPPED | OUTPATIENT
Start: 2024-10-19 | End: 2024-10-24

## 2024-10-19 RX ORDER — HYDROMORPHONE HCL/PF 1 MG/ML
1 SYRINGE (ML) INJECTION ONCE
Status: COMPLETED | OUTPATIENT
Start: 2024-10-19 | End: 2024-10-19

## 2024-10-19 RX ADMIN — SODIUM CHLORIDE 1000 ML: 0.9 INJECTION, SOLUTION INTRAVENOUS at 07:59

## 2024-10-19 RX ADMIN — HYDROMORPHONE HYDROCHLORIDE 1 MG: 1 INJECTION, SOLUTION INTRAMUSCULAR; INTRAVENOUS; SUBCUTANEOUS at 08:39

## 2024-10-19 RX ADMIN — IOHEXOL 100 ML: 350 INJECTION, SOLUTION INTRAVENOUS at 09:12

## 2024-10-19 RX ADMIN — MAGNESIUM SULFATE HEPTAHYDRATE 2 G: 40 INJECTION, SOLUTION INTRAVENOUS at 08:37

## 2024-10-19 RX ADMIN — CEFTRIAXONE 1000 MG: 1 INJECTION, SOLUTION INTRAVENOUS at 10:03

## 2024-10-19 RX ADMIN — ACETAMINOPHEN 1000 MG: 10 INJECTION INTRAVENOUS at 08:10

## 2024-10-19 NOTE — ED PROVIDER NOTES
Time reflects when diagnosis was documented in both MDM as applicable and the Disposition within this note       Time User Action Codes Description Comment    10/19/2024 10:23 AM Ferdous Komaira Add [N39.0] UTI (urinary tract infection)     10/19/2024 10:23 AM Ferdous Komaira Add [D25.9] Uterine fibroid     10/19/2024 10:23 AM Ferdous Komaira Add [M51.369] Degenerative disc disease, lumbar     10/19/2024 10:46 AM Ferdous Komaira Add [M54.9] Back pain           ED Disposition       ED Disposition   Discharge    Condition   Stable    Date/Time   Sat Oct 19, 2024 10:23 AM    Comment   Mirlande Mesa discharge to home/self care.                   Assessment & Plan       Medical Decision Making  Obtain blood work, UA, CT chest/abdomen/pelvis  IV fluids, pain medication continue to monitor patient for any worsening symptoms    Patient's lab work showed concern for UTI.  CT scan shows concern for uterine fibroid as well as degenerative disc disease to lower lumbar spine.  Patient felt better with medications given in the ED.  At this time patient placed on antibiotics as well as pain medication and discharged home with follow-up to PCP, OB/GYN as well as orthopedic surgery.  Patient referred to ambulatory spine program.  Close return instructions given to return to the ER for any worsening symptoms.  Patient agrees with discharge plan.  Patient well appearing at time of discharge.    Please Note: Fluency Direct voice recognition software may have been used in the creation of this document. Wrong words or sound a like substitutions may have occurred due to the inherent limitations of the voice software.         Amount and/or Complexity of Data Reviewed  Labs: ordered.  Radiology: ordered.    Risk  Prescription drug management.        ED Course as of 10/19/24 1048   Sat Oct 19, 2024   0754 LMP 10/13/24   0920 Patient ambulated to the bathroom without any difficulty.       Medications   sodium chloride 0.9 % bolus 1,000  mL (0 mL Intravenous Stopped 10/19/24 1003)   acetaminophen (Ofirmev) injection 1,000 mg (0 mg Intravenous Stopped 10/19/24 0835)   HYDROmorphone (DILAUDID) injection 1 mg (1 mg Intravenous Given 10/19/24 0839)   magnesium sulfate 2 g/50 mL IVPB (premix) 2 g (0 g Intravenous Stopped 10/19/24 1003)   iohexol (OMNIPAQUE) 350 MG/ML injection (MULTI-DOSE) 100 mL (100 mL Intravenous Given 10/19/24 0912)   cefTRIAXone (ROCEPHIN) IVPB (premix in dextrose) 1,000 mg 50 mL (0 mg Intravenous Stopped 10/19/24 1043)       ED Risk Strat Scores                           SBIRT 22yo+      Flowsheet Row Most Recent Value   Initial Alcohol Screen: US AUDIT-C     1. How often do you have a drink containing alcohol? 0 Filed at: 10/19/2024 0805   2. How many drinks containing alcohol do you have on a typical day you are drinking?  0 Filed at: 10/19/2024 0805   3b. FEMALE Any Age, or MALE 65+: How often do you have 4 or more drinks on one occassion? 0 Filed at: 10/19/2024 0805   Audit-C Score 0 Filed at: 10/19/2024 0805   QUIRINO: How many times in the past year have you...    Used an illegal drug or used a prescription medication for non-medical reasons? Never Filed at: 10/19/2024 0805                            History of Present Illness       Chief Complaint   Patient presents with    Back Pain    Pelvic Pain     Pt reports of pelvic pain for about a week, yesterday pt reports of upper back pain as well.       Past Medical History:   Diagnosis Date    Bipolar disorder (HCC)     Bronchitis 01/22/2019    Depression     History of wound infection     left ankle    Morbid obesity with BMI of 40.0-44.9, adult ([facility]) 01/18/2018    Psychiatric disorder     depression, anxiety    Seasonal allergic reaction       Past Surgical History:   Procedure Laterality Date    IR PICC LINE  2/8/2019    AK OPEN TREATMENT BIMALLEOLAR ANKLE FRACTURE Left 5/14/2018    Procedure: OPEN REDUCTION W/ INTERNAL FIXATION (ORIF) ANKLE;  Surgeon: Sam Bauman DO;   Location: [facility];  Service: Orthopedics    AZ REMOVAL IMPLANT DEEP Left 2/5/2019    Procedure: REMOVAL HARDWARE ANKLE;  Surgeon: Sam Bauman DO;  Location: [facility];  Service: Orthopedics    REDUCTION MAMMAPLASTY  2001    reduction      Family History   Problem Relation Age of Onset    Substance Abuse Mother     Mental illness Mother     Diabetes Mother     Cancer Mother     Asthma Mother     Breast cancer Mother 55    Substance Abuse Father     No Known Problems Brother     No Known Problems Daughter     Substance Abuse Maternal Grandmother     Mental illness Maternal Grandmother     No Known Problems Maternal Grandfather     No Known Problems Paternal Grandmother     No Known Problems Paternal Grandfather     No Known Problems Maternal Aunt     No Known Problems Maternal Uncle     ADD / ADHD Neg Hx     Anesthesia problems Neg Hx     Clotting disorder Neg Hx     Collagen disease Neg Hx     Dislocations Neg Hx     Learning disabilities Neg Hx     Neurological problems Neg Hx     Osteoporosis Neg Hx     Rheumatologic disease Neg Hx     Scoliosis Neg Hx     Vascular Disease Neg Hx       Social History     Tobacco Use    Smoking status: Every Day     Current packs/day: 1.00     Average packs/day: 1 pack/day for 25.5 years (25.5 ttl pk-yrs)     Types: Cigarettes     Start date: 9/23/2000     Passive exposure: Current    Smokeless tobacco: Never   Vaping Use    Vaping status: Former    Substances: Nicotine, CBD   Substance Use Topics    Alcohol use: Not Currently     Comment: Quit drinking 2nd    Drug use: Never      E-Cigarette/Vaping    E-Cigarette Use Former User       E-Cigarette/Vaping Substances    Nicotine Yes     THC No     CBD Yes     Flavoring No     Other No     Unknown No       I have reviewed and agree with the history as documented.     49-year-old female with past history of depression, alcohol abuse, anxiety, bipolar disorder, morbid obesity, diabetes, bronchitis, seasonal allergies, presents to  the ED for evaluation of of pain across her lower abdomen over the past week.  Patient is now having pain to the mid back region that goes across as well.  Patient states that she had similar pain last time when she was diagnosed with kidney stone.  Patient has been taking Tylenol with no relief of her pain.  At worst pain is 8 out of 10.  Pain comes in waves.  Patient denies any fevers or chills.  Patient denies any diarrhea or dysuria.  Patient denies any saddle anesthesia.  Patient denies any bowel/bladder retention or incontinence.  Patient came to the ED for evaluation of her ongoing symptoms.      History provided by:  Patient  Back Pain  Associated symptoms: pelvic pain    Associated symptoms: no abdominal pain, no chest pain, no dysuria and no fever    Pelvic Pain  Associated symptoms: no abdominal pain, no chest pain, no cough, no ear pain, no fever, no rash, no shortness of breath, no sore throat and no vomiting        Review of Systems   Constitutional:  Negative for chills and fever.   HENT:  Negative for ear pain and sore throat.    Eyes:  Negative for pain and visual disturbance.   Respiratory:  Negative for cough and shortness of breath.    Cardiovascular:  Negative for chest pain and palpitations.   Gastrointestinal:  Negative for abdominal pain and vomiting.   Genitourinary:  Positive for pelvic pain. Negative for dysuria and hematuria.   Musculoskeletal:  Positive for back pain. Negative for arthralgias.   Skin:  Negative for color change and rash.   Neurological:  Negative for seizures and syncope.   All other systems reviewed and are negative.          Objective       ED Triage Vitals   Temperature Pulse Blood Pressure Respirations SpO2 Patient Position - Orthostatic VS   10/19/24 0746 10/19/24 0746 10/19/24 0746 10/19/24 0746 10/19/24 0746 --   (!) 97.1 °F (36.2 °C) 82 126/75 20 98 %       Temp Source Heart Rate Source BP Location FiO2 (%) Pain Score    10/19/24 1029 10/19/24 0746 10/19/24 1029  -- 10/19/24 0759    Oral Monitor Right arm  8      Vitals      Date and Time Temp Pulse SpO2 Resp BP Pain Score FACES Pain Rating User   10/19/24 1029 97.9 °F (36.6 °C) 75 97 % 20 110/71 -- -- LG   10/19/24 0845 -- 80 98 % -- -- -- -- DU   10/19/24 0759 -- -- -- -- -- 8 -- LANI   10/19/24 0746 97.1 °F (36.2 °C) 82 98 % 20 126/75 -- -- LANI            Physical Exam  Vitals and nursing note reviewed.   Constitutional:       General: She is not in acute distress.     Appearance: She is well-developed.   HENT:      Head: Normocephalic and atraumatic.   Eyes:      Conjunctiva/sclera: Conjunctivae normal.   Cardiovascular:      Rate and Rhythm: Normal rate and regular rhythm.      Heart sounds: No murmur heard.  Pulmonary:      Effort: Pulmonary effort is normal. No respiratory distress.      Breath sounds: Normal breath sounds.   Abdominal:      General: Abdomen is flat. Bowel sounds are normal. There is no distension.      Palpations: Abdomen is soft.      Tenderness: There is abdominal tenderness.      Comments: Abdomen is soft, nondistended, with bowel sound present to all 4 quadrant.  Tenderness noted across lower abdomen to palpation.   Musculoskeletal:         General: No swelling.      Cervical back: Neck supple.      Comments: Mid spinous and bilateral paraspinous tenderness to palpation noted to the lower thoracic and upper lumbar spine.   Skin:     General: Skin is warm and dry.      Capillary Refill: Capillary refill takes less than 2 seconds.   Neurological:      Mental Status: She is alert.   Psychiatric:         Mood and Affect: Mood normal.         Results Reviewed       Procedure Component Value Units Date/Time    Urine Microscopic [[id number]]  (Abnormal) Collected: 10/19/24 0754    Lab Status: Final result Specimen: Urine, Clean Catch Updated: 10/19/24 0824     RBC, UA 1-2 /hpf      WBC, UA 10-20 /hpf      Epithelial Cells Moderate /hpf      Bacteria, UA Moderate /hpf     Urine culture [[id number]] Collected:  10/19/24 0754    Lab Status: In process Specimen: Urine, Clean Catch Updated: 10/19/24 0824    Comprehensive metabolic panel [[id number]] Collected: 10/19/24 0758    Lab Status: Final result Specimen: Blood from Arm, Left Updated: 10/19/24 0820     Sodium 136 mmol/L      Potassium 4.0 mmol/L      Chloride 102 mmol/L      CO2 26 mmol/L      ANION GAP 8 mmol/L      BUN 13 mg/dL      Creatinine 0.83 mg/dL      Glucose 94 mg/dL      Calcium 9.0 mg/dL      AST 15 U/L      ALT 11 U/L      Alkaline Phosphatase 63 U/L      Total Protein 7.2 g/dL      Albumin 4.3 g/dL      Total Bilirubin 0.30 mg/dL      eGFR 84 ml/min/1.73sq m     Narrative:      National Kidney Disease Foundation guidelines for Chronic Kidney Disease (CKD):     Stage 1 with normal or high GFR (GFR > 90 mL/min/1.73 square meters)    Stage 2 Mild CKD (GFR = 60-89 mL/min/1.73 square meters)    Stage 3A Moderate CKD (GFR = 45-59 mL/min/1.73 square meters)    Stage 3B Moderate CKD (GFR = 30-44 mL/min/1.73 square meters)    Stage 4 Severe CKD (GFR = 15-29 mL/min/1.73 square meters)    Stage 5 End Stage CKD (GFR <15 mL/min/1.73 square meters)  Note: GFR calculation is accurate only with a steady state creatinine    Magnesium [[id number]]  (Normal) Collected: 10/19/24 0758    Lab Status: Final result Specimen: Blood from Arm, Left Updated: 10/19/24 0820     Magnesium 2.0 mg/dL     UA w Reflex to Microscopic w Reflex to Culture [[id number]]  (Abnormal) Collected: 10/19/24 0754    Lab Status: Final result Specimen: Urine, Clean Catch Updated: 10/19/24 0809     Color, UA Light Yellow     Clarity, UA Slightly Cloudy     Specific Gravity, UA 1.010     pH, UA 6.0     Leukocytes, UA Moderate     Nitrite, UA Negative     Protein, UA Negative mg/dl      Glucose, UA Negative mg/dl      Ketones, UA Negative mg/dl      Urobilinogen, UA <2.0 mg/dl      Bilirubin, UA Negative     Occult Blood, UA Moderate    POCT pregnancy, urine [[id number]]  (Normal) Resulted: 10/19/24 0804     Lab Status: Final result Updated: 10/19/24 0806     EXT Preg Test, Ur Negative     Control Valid    CBC and differential [[id number]] Collected: 10/19/24 0758    Lab Status: Final result Specimen: Blood from Arm, Left Updated: 10/19/24 0805     WBC 6.62 Thousand/uL      RBC 4.29 Million/uL      Hemoglobin 12.1 g/dL      Hematocrit 37.1 %      MCV 87 fL      MCH 28.2 pg      MCHC 32.6 g/dL      RDW 13.7 %      MPV 10.1 fL      Platelets 285 Thousands/uL      nRBC 0 /100 WBCs      Segmented % 61 %      Immature Grans % 0 %      Lymphocytes % 27 %      Monocytes % 8 %      Eosinophils Relative 3 %      Basophils Relative 1 %      Absolute Neutrophils 4.03 Thousands/µL      Absolute Immature Grans 0.02 Thousand/uL      Absolute Lymphocytes 1.78 Thousands/µL      Absolute Monocytes 0.53 Thousand/µL      Eosinophils Absolute 0.22 Thousand/µL      Basophils Absolute 0.04 Thousands/µL             CT chest abdomen pelvis w contrast   Final Interpretation by Russell Albarran MD (10/19 0949)      Enlarged bulky uterus with a large heterogeneous area measuring 9.4 x 7.7 cm. This may represent a fibroid. Correlate with menstrual cycle history. Ultrasound characterization and GYN consultation recommended.      Degenerative disc disease with grade 1 anterolisthesis of L5 over S1 secondary to spondylolysis.         Workstation performed: [id number]             Procedures    ED Medication and Procedure Management   Prior to Admission Medications   Prescriptions Last Dose Informant Patient Reported? Taking?   ARIPiprazole (ABILIFY) 15 mg tablet  Self Yes No   Sig: Take 20 mg by mouth in the morning   FLUoxetine (PROzac) 20 mg capsule  Self Yes No   Sig: take 1 capsule by mouth every morning  AS DIRECTED   FLUoxetine (PROzac) 40 MG capsule  Self Yes No   Sig: Take 40 mg by mouth every morning   QUEtiapine (SEROquel) 50 mg tablet  Self Yes No   Sig: Take 50 mg by mouth if needed   albuterol (Ventolin HFA) 90 mcg/act inhaler  Self  No No   Sig: Inhale 2 puffs every 6 (six) hours as needed for wheezing   benzonatate (TESSALON PERLES) 100 mg capsule   No No   Sig: Take 1 capsule (100 mg total) by mouth every 8 (eight) hours   cholecalciferol (VITAMIN D3) 1,000 units tablet   No No   Sig: Take 1 tablet (1,000 Units total) by mouth daily   clonazePAM (KlonoPIN) 1 mg tablet  Self Yes No   Sig: take 1 tablet by mouth twice a day if needed  1 TAB IN THE A.M. SECOND TAB AS NEEDED   metFORMIN (GLUCOPHAGE) 500 mg tablet   No No   Sig: Take 1 tablet (500 mg total) by mouth 2 (two) times a day with meals   Patient taking differently: Take 500 mg by mouth daily with breakfast   naltrexone (REVIA) 50 mg tablet  Self Yes No   Sig: Take 50 mg by mouth daily   rosuvastatin (CRESTOR) 10 MG tablet   No No   Sig: Take 1 tablet (10 mg total) by mouth daily      Facility-Administered Medications: None     Patient's Medications   Discharge Prescriptions    CEPHALEXIN (KEFLEX) 500 MG CAPSULE    Take 1 capsule (500 mg total) by mouth every 12 (twelve) hours for 5 days       Start Date: 10/19/2024End Date: 10/24/2024       Order Dose: 500 mg       Quantity: 10 capsule    Refills: 0    LIDOCAINE (LIDODERM) 5 %    Apply 1 patch topically over 12 hours daily Remove & Discard patch within 12 hours or as directed by MD       Start Date: 10/19/2024End Date: --       Order Dose: 1 patch       Quantity: 30 patch    Refills: 0    OXYCODONE-ACETAMINOPHEN (PERCOCET) 5-325 MG PER TABLET    Take 1 tablet by mouth every 6 (six) hours as needed for moderate pain for up to 10 days Max Daily Amount: 4 tablets       Start Date: 10/19/2024End Date: 10/29/2024       Order Dose: 1 tablet       Quantity: 10 tablet    Refills: 0       ED SEPSIS DOCUMENTATION   Time reflects when diagnosis was documented in both MDM as applicable and the Disposition within this note       Time User Action Codes Description Comment    10/19/2024 10:23 AM Tree Aguilera Add [N39.0] UTI (urinary tract  infection)     10/19/2024 10:23 AM Tree Aguilera [D25.9] Uterine fibroid     10/19/2024 10:23 AM Tree Aguilera [M51.369] Degenerative disc disease, lumbar     10/19/2024 10:46 AM Tree Aguilera [M54.9] Back pain                  Tree Aguilera DO  10/19/24 1048

## 2024-10-20 LAB — BACTERIA UR CULT: NORMAL

## 2024-10-21 ENCOUNTER — TELEPHONE (OUTPATIENT)
Dept: PHYSICAL THERAPY | Facility: OTHER | Age: 49
End: 2024-10-21

## 2024-10-21 DIAGNOSIS — E11.65 TYPE 2 DIABETES MELLITUS WITH HYPERGLYCEMIA, WITHOUT LONG-TERM CURRENT USE OF INSULIN (HCC): Primary | ICD-10-CM

## 2024-10-21 NOTE — PROGRESS NOTES
Patient was called to discuss results of diabetic IRIS eye exam conducted in office at most recent visit.  Patient did not  phone, left voicemail, and sent Sevencehart message.  Informed that IRIS exam showed some images that were not agreeable, likely because image quality was obscured.  Report recommended that patient follow-up with up optometry or ophthalmology within 3 months for additional follow-up and investigations.  Referral for ophthalmology services placed, details noted as part of referral.   Patient instructed to call the office with any questions or concerns.

## 2024-10-21 NOTE — TELEPHONE ENCOUNTER
Call placed to the patient per [facility] referral.    Call went straight to voicemail.    V/M left for patient to call back. Phone number and hours of business provided.    This is the 1st attempt to reach the patient. Will defer referral per protocol.    Healthcare assistance (pending) ??

## 2024-10-22 ENCOUNTER — TELEPHONE (OUTPATIENT)
Age: 49
End: 2024-10-22

## 2024-10-22 ENCOUNTER — TELEPHONE (OUTPATIENT)
Dept: FAMILY MEDICINE CLINIC | Facility: CLINIC | Age: 49
End: 2024-10-22

## 2024-10-22 NOTE — TELEPHONE ENCOUNTER
Julio Chavez,    Patient contacted us, regarding a new job she is going to go back to.  Can you please write a letter that patient is cleared to go back to work at [employer], please specify if there are restrictions. She mentioned that she may need to use FML intermittently.     She can be reached at:  [phone].    Please advise.

## 2024-10-22 NOTE — TELEPHONE ENCOUNTER
Spoke with patient and explained that since she has a new PCP her paperwork would need to be completed by current PCP.  Patient is getting clarification before we cancel her appt with [facility] this Thursday. Please transfer pt to clerical at our office.

## 2024-10-23 NOTE — TELEPHONE ENCOUNTER
Pt escalated to manager due to back and forth of provider who put her out of work refusing to fill out forms to send her back to work.     Reviewed chart & checked with Medical Director. In agreeance that provider that puts a patient out of work, should be the one to clear them for return to work, due to liability reasons.     Omar has never seen patient Bipolar depression- for which FMLA was granted by [employer]. Pt only seen by our office once last week due to URI.    Reaching out to admin of [employer].

## 2024-10-23 NOTE — TELEPHONE ENCOUNTER
Hi -    Just following up on the below message. Patient is stating that this is imperative that the letter be put together, she would like to start work asap if possible.    Please advise.    [phone]

## 2024-10-23 NOTE — TELEPHONE ENCOUNTER
Spoke with [facility] admin. We agreed [facility] FP should be filling out FMLA paperwork to return to work. This is not an extension, just a relase. [facility] FP has never seen pt and only came to [facility] when she could not get in at [facility]. Admin explained provider was out sick all last week- which was why pt came to [place].     Pt confirmed staying with [facility]. Changing PCP back to Apurva Navarro.     Admin informed provider not in office on Wed, will tell pt to call [facility] tomorrow to speak about [place] form for release. Admin will speak with office and/or Apurva tomorrow to let them know situation.     Called pt to inform her to contact [facility] tomorrow.

## 2024-10-28 ENCOUNTER — OFFICE VISIT (OUTPATIENT)
Dept: FAMILY MEDICINE CLINIC | Facility: CLINIC | Age: 49
End: 2024-10-28

## 2024-10-28 VITALS
BODY MASS INDEX: 38.99 KG/M2 | HEART RATE: 90 BPM | DIASTOLIC BLOOD PRESSURE: 78 MMHG | WEIGHT: 248.4 LBS | SYSTOLIC BLOOD PRESSURE: 108 MMHG | HEIGHT: 67 IN | RESPIRATION RATE: 18 BRPM | TEMPERATURE: 97.8 F

## 2024-10-28 DIAGNOSIS — F10.11 HISTORY OF ETOH ABUSE: ICD-10-CM

## 2024-10-28 DIAGNOSIS — Z72.0 TOBACCO ABUSE DISORDER: ICD-10-CM

## 2024-10-28 DIAGNOSIS — F32.A ANXIETY AND DEPRESSION: Primary | ICD-10-CM

## 2024-10-28 DIAGNOSIS — F41.9 ANXIETY AND DEPRESSION: Primary | ICD-10-CM

## 2024-10-28 DIAGNOSIS — E11.65 TYPE 2 DIABETES MELLITUS WITH HYPERGLYCEMIA, WITHOUT LONG-TERM CURRENT USE OF INSULIN (HCC): ICD-10-CM

## 2024-10-28 DIAGNOSIS — F31.9 BIPOLAR AFFECTIVE DISORDER, REMISSION STATUS UNSPECIFIED (HCC): ICD-10-CM

## 2024-10-28 LAB — SL AMB POCT HEMOGLOBIN AIC: 5.69 (ref ?–6.5)

## 2024-10-28 PROCEDURE — 99214 OFFICE O/P EST MOD 30 MIN: CPT | Performed by: NURSE PRACTITIONER

## 2024-10-28 PROCEDURE — 83036 HEMOGLOBIN GLYCOSYLATED A1C: CPT | Performed by: NURSE PRACTITIONER

## 2024-10-28 NOTE — ASSESSMENT & PLAN NOTE
A1C 5.69  Lab Results   Component Value Date    HGBA1C 5.57 05/06/2024   Well controlled. Low carb diet. Regular exercise.   Orders:    POCT hemoglobin A1c

## 2024-10-28 NOTE — LETTER
October 28, 2024     Patient: Mirlande Mesa  YOB: 1975  Date of Visit: 10/28/2024      To Whom it May Concern:    Mirlande Mesa is under my professional care. Mirlande was seen in my office on 10/28/2024. Mirlande may return to work on 10/29/2024 with no restrictions.  .    If you have any questions or concerns, please don't hesitate to call.         Sincerely,          JORDAN Cabello

## 2024-10-28 NOTE — ASSESSMENT & PLAN NOTE
Tobacco Cessation Counseling: Tobacco cessation counseling and education was provided. The patient is sincerely urged to quit consumption of tobacco. She is not ready to quit tobacco. The numerous health risks of tobacco consumption were discussed. If she decides to quit, there are a number of helpful adjunctive aids, and she can see me to discuss nicotine replacement therapy, chantix, or bupropion anytime in the future.. I spent 2 minutes on Tobacco Cessation counseling during today's visit.        Patient was counseled regarding instructions for management which included: impression/diagnosis, risk/benefits of treatment options, importance of compliance with treatment, risk factor reduction, and prognosis.   I have reviewed the instructions with the patient answering all questions and patient verbalized understanding.

## 2024-10-28 NOTE — ASSESSMENT & PLAN NOTE
Managed by psych. Continue current meds  Stress management. Activities to divert attention when possible.   Conscious breathing techniques as discussed.   Coping mechanisms and strategies vary from person to person so try to utilize strategies that you think may work for you (such as meditation, music, etc. ).  Consider counseling as discussed.

## 2024-10-28 NOTE — PROGRESS NOTES
Ambulatory Visit  Name: Mirlande Mesa      : 1975      MRN: [id number]  Encounter Provider: JORDAN Cabello  Encounter Date: 10/28/2024   Encounter department: [facility]    Assessment & Plan  Anxiety and depression  Managed by psych. Continue current meds  Stress management. Activities to divert attention when possible.   Conscious breathing techniques as discussed.   Coping mechanisms and strategies vary from person to person so try to utilize strategies that you think may work for you (such as meditation, music, etc. ).  Consider counseling as discussed.        Bipolar affective disorder, remission status unspecified (HCC)  Managed by psych.   No change to current meds.   Counseling highly encouraged.        Type 2 diabetes mellitus with hyperglycemia, without long-term current use of insulin ([facility])  A1C 5.69  Lab Results   Component Value Date    HGBA1C 5.57 2024   Well controlled. Low carb diet. Regular exercise.   Orders:    POCT hemoglobin A1c    History of ETOH abuse  Continue AA. Anticipatory guidance. Monitor.      Tobacco abuse disorder  Tobacco Cessation Counseling: Tobacco cessation counseling and education was provided. The patient is sincerely urged to quit consumption of tobacco. She is not ready to quit tobacco. The numerous health risks of tobacco consumption were discussed. If she decides to quit, there are a number of helpful adjunctive aids, and she can see me to discuss nicotine replacement therapy, chantix, or bupropion anytime in the future.. I spent 2 minutes on Tobacco Cessation counseling during today's visit.        Patient was counseled regarding instructions for management which included: impression/diagnosis, risk/benefits of treatment options, importance of compliance with treatment, risk factor reduction, and prognosis.   I have reviewed the instructions with the patient answering all questions and patient verbalized understanding.      "  History of Present Illness     Here to get rtw note   Wants to rtw tomorrow  Seen in office on 10/8/2024 for mental health issues and requested FMLA .  Paperwork completed at that time. Taken out of work from 10/8 to current date with FMLA completed for intermittent FMLA through 4/8/2025  Ongoing psych issues including bipolar, depression, anxiety, etoh abuse  Follows with psych. On abilfy, klonopin, seroquel, and fluoxetine. Sees monthly.   Doing well. Has been going to AA meeting almost daily. No etoh use. Waiting to hear back from family guidance for therapist.   DM on metformin. Has been some adjustments to diet to limit carbs.               Review of Systems   Constitutional:  Negative for fatigue and unexpected weight change.   Respiratory:  Negative for chest tightness and shortness of breath.    Cardiovascular:  Negative for chest pain and palpitations.   Gastrointestinal:  Negative for abdominal pain.   Musculoskeletal:  Negative for arthralgias and myalgias.   Skin:  Negative for rash and wound.   Psychiatric/Behavioral:  Positive for dysphoric mood. Negative for self-injury and suicidal ideas. The patient is nervous/anxious.            Objective     /78   Pulse 90   Temp 97.8 °F (36.6 °C)   Resp 18   Ht 5' 6.5\" (1.689 m)   Wt 113 kg (248 lb 6.4 oz)   LMP 09/14/2024 (Approximate)   BMI 39.49 kg/m²     Physical Exam  Vitals reviewed.   Constitutional:       General: She is not in acute distress.     Appearance: She is not ill-appearing.   Cardiovascular:      Rate and Rhythm: Normal rate and regular rhythm.      Pulses: no weak pulses.           Dorsalis pedis pulses are 2+ on the right side and 2+ on the left side.        Posterior tibial pulses are 2+ on the right side and 2+ on the left side.   Pulmonary:      Effort: Pulmonary effort is normal. No respiratory distress.      Breath sounds: Normal breath sounds. No wheezing or rales.   Feet:      Right foot:      Skin integrity: No ulcer, " skin breakdown, erythema, warmth, callus or dry skin.      Left foot:      Skin integrity: No ulcer, skin breakdown, erythema, warmth, callus or dry skin.   Skin:     General: Skin is warm and dry.      Coloration: Skin is not jaundiced or pale.   Neurological:      General: No focal deficit present.      Mental Status: She is alert and oriented to person, place, and time.      Cranial Nerves: No cranial nerve deficit.      Sensory: No sensory deficit.   Psychiatric:         Mood and Affect: Mood normal.         Behavior: Behavior normal.         Thought Content: Thought content normal.         Judgment: Judgment normal.      Comments: Well groomed. Dressed appropriately for the weather.   Calm. Pleasant . Cooperative.   Good eye contact.   Converses freely and appropriately.          Patient's shoes and socks removed.    Right Foot/Ankle   Right Foot Inspection  Skin Exam: skin normal and skin intact. No dry skin, no warmth, no callus, no erythema, no maceration, no abnormal color, no pre-ulcer, no ulcer and no callus.     Toe Exam: ROM and strength within normal limits.     Sensory   Monofilament testing: intact    Vascular  Capillary refills: < 3 seconds  The right DP pulse is 2+. The right PT pulse is 2+.     Left Foot/Ankle  Left Foot Inspection  Skin Exam: skin normal and skin intact. No dry skin, no warmth, no erythema, no maceration, normal color, no pre-ulcer, no ulcer and no callus.     Toe Exam: ROM and strength within normal limits.     Sensory   Monofilament testing: intact    Vascular  Capillary refills: < 3 seconds  The left DP pulse is 2+. The left PT pulse is 2+.     Assign Risk Category  No deformity present  No loss of protective sensation  No weak pulses  Risk: 0

## 2024-10-30 DIAGNOSIS — E78.2 MIXED HYPERLIPIDEMIA: ICD-10-CM

## 2024-10-30 DIAGNOSIS — E11.9 TYPE 2 DIABETES MELLITUS WITHOUT COMPLICATION, WITHOUT LONG-TERM CURRENT USE OF INSULIN (HCC): ICD-10-CM

## 2024-10-30 NOTE — TELEPHONE ENCOUNTER
Call placed to the patient per [facility] referral.      V/M left for patient to call back. Phone number and hours of business provided.     This is the 2nd attempt to reach the patient. Will defer referral per protocol.     Healthcare assistance (pending) ??

## 2024-10-31 RX ORDER — ROSUVASTATIN CALCIUM 10 MG/1
10 TABLET, COATED ORAL DAILY
Qty: 30 TABLET | Refills: 0 | Status: SHIPPED | OUTPATIENT
Start: 2024-10-31

## 2024-11-02 ENCOUNTER — PATIENT MESSAGE (OUTPATIENT)
Dept: FAMILY MEDICINE CLINIC | Facility: CLINIC | Age: 49
End: 2024-11-02

## 2024-11-04 NOTE — TELEPHONE ENCOUNTER
Please call pt.   I absolutely do not prescribe any narcotic pain meds for tooth pain.   It is very rare that I will prescribe narcotics for anything.   Otc ibuprofen, aleve, advil.

## 2024-11-05 ENCOUNTER — HOSPITAL ENCOUNTER (EMERGENCY)
Facility: HOSPITAL | Age: 49
Discharge: HOME | End: 2024-11-05
Attending: EMERGENCY MEDICINE
Payer: COMMERCIAL

## 2024-11-05 VITALS
TEMPERATURE: 98.2 F | HEART RATE: 74 BPM | DIASTOLIC BLOOD PRESSURE: 75 MMHG | SYSTOLIC BLOOD PRESSURE: 114 MMHG | RESPIRATION RATE: 18 BRPM | OXYGEN SATURATION: 97 %

## 2024-11-05 DIAGNOSIS — M54.41 ACUTE RIGHT-SIDED LOW BACK PAIN WITH RIGHT-SIDED SCIATICA: Primary | ICD-10-CM

## 2024-11-05 PROCEDURE — 99283 EMERGENCY DEPT VISIT LOW MDM: CPT

## 2024-11-05 PROCEDURE — 99284 EMERGENCY DEPT VISIT MOD MDM: CPT | Performed by: EMERGENCY MEDICINE

## 2024-11-05 RX ORDER — METHOCARBAMOL 500 MG/1
500 TABLET, FILM COATED ORAL 2 TIMES DAILY
Qty: 20 TABLET | Refills: 0 | Status: SHIPPED | OUTPATIENT
Start: 2024-11-05

## 2024-11-05 RX ORDER — LIDOCAINE 50 MG/G
1 PATCH TOPICAL ONCE
Status: DISCONTINUED | OUTPATIENT
Start: 2024-11-05 | End: 2024-11-05 | Stop reason: HOSPADM

## 2024-11-05 RX ORDER — OXYCODONE AND ACETAMINOPHEN 5; 325 MG/1; MG/1
1 TABLET ORAL ONCE
Status: COMPLETED | OUTPATIENT
Start: 2024-11-05 | End: 2024-11-05

## 2024-11-05 RX ORDER — OXYCODONE AND ACETAMINOPHEN 5; 325 MG/1; MG/1
1 TABLET ORAL EVERY 6 HOURS PRN
Qty: 4 TABLET | Refills: 0 | Status: SHIPPED | OUTPATIENT
Start: 2024-11-05 | End: 2024-11-15

## 2024-11-05 RX ORDER — PREDNISONE 20 MG/1
60 TABLET ORAL DAILY
Qty: 15 TABLET | Refills: 0 | Status: SHIPPED | OUTPATIENT
Start: 2024-11-05 | End: 2024-11-10

## 2024-11-05 RX ORDER — METHOCARBAMOL 500 MG/1
500 TABLET, FILM COATED ORAL ONCE
Status: COMPLETED | OUTPATIENT
Start: 2024-11-05 | End: 2024-11-05

## 2024-11-05 RX ADMIN — OXYCODONE HYDROCHLORIDE AND ACETAMINOPHEN 1 TABLET: 5; 325 TABLET ORAL at 04:28

## 2024-11-05 RX ADMIN — METHOCARBAMOL TABLETS 500 MG: 500 TABLET, COATED ORAL at 04:28

## 2024-11-05 RX ADMIN — LIDOCAINE 1 PATCH: 50 PATCH CUTANEOUS at 04:28

## 2024-11-05 NOTE — DISCHARGE INSTRUCTIONS
You likely have sciatica of your back.  Please continue 600 mg of ibuprofen and 650 mg of Tylenol every 6 hours.  I have also sent you a few pills of Percocet however these medications have not been shown to change her back pain as much as Tylenol and ibuprofen.  I have also sent you a muscle relaxer called Robaxin which you can take 1 tablet twice a day.  I have also put you on prednisone which you can take 3 tablets daily for the next 5 days which may help decrease any inflammation which is causing her back pain.

## 2024-11-05 NOTE — Clinical Note
Mirlande Mesa was seen and treated in our emergency department on 11/5/2024.                Diagnosis: Acute lower back pain    Mirlande  may return to work on return date.    She may return on this date: 11/07/2024         If you have any questions or concerns, please don't hesitate to call.      Sean Lao MD    ______________________________           _______________          _______________  Hospital Representative                              Date                                Time

## 2024-11-07 ENCOUNTER — TELEPHONE (OUTPATIENT)
Age: 49
End: 2024-11-07

## 2024-11-07 NOTE — TELEPHONE ENCOUNTER
Attempted Contacting Patient regarding recent ED Visit on 11/5/24          Left message asking Patient to call the office to schedule Follow up appointment. Provided office hours and phone number.       ED:[facility]  CC:Back Pain  DX: Acute right sided low back pain      Appointment Scheduled to [facility] 11/15/24

## 2024-11-13 ENCOUNTER — HOSPITAL ENCOUNTER (EMERGENCY)
Facility: HOSPITAL | Age: 49
Discharge: HOME | End: 2024-11-13
Attending: EMERGENCY MEDICINE
Payer: COMMERCIAL

## 2024-11-13 VITALS
HEART RATE: 80 BPM | TEMPERATURE: 98.5 F | RESPIRATION RATE: 18 BRPM | OXYGEN SATURATION: 98 % | SYSTOLIC BLOOD PRESSURE: 150 MMHG | DIASTOLIC BLOOD PRESSURE: 93 MMHG

## 2024-11-13 DIAGNOSIS — K08.89 DENTALGIA: Primary | ICD-10-CM

## 2024-11-13 PROCEDURE — 99284 EMERGENCY DEPT VISIT MOD MDM: CPT | Performed by: EMERGENCY MEDICINE

## 2024-11-13 PROCEDURE — 99285 EMERGENCY DEPT VISIT HI MDM: CPT

## 2024-11-13 RX ORDER — NAPROXEN 500 MG/1
500 TABLET ORAL ONCE
Status: COMPLETED | OUTPATIENT
Start: 2024-11-13 | End: 2024-11-13

## 2024-11-13 RX ORDER — MORPHINE SULFATE 15 MG/1
15 TABLET ORAL ONCE
Refills: 0 | Status: COMPLETED | OUTPATIENT
Start: 2024-11-13 | End: 2024-11-13

## 2024-11-13 RX ORDER — AMOXICILLIN 500 MG/1
500 CAPSULE ORAL 3 TIMES DAILY
Qty: 15 CAPSULE | Refills: 0 | Status: SHIPPED | OUTPATIENT
Start: 2024-11-13 | End: 2024-11-18

## 2024-11-13 RX ORDER — BUPIVACAINE HYDROCHLORIDE 2.5 MG/ML
10 INJECTION, SOLUTION EPIDURAL; INFILTRATION; INTRACAUDAL ONCE
Status: COMPLETED | OUTPATIENT
Start: 2024-11-13 | End: 2024-11-13

## 2024-11-13 RX ORDER — AMOXICILLIN 250 MG/1
500 CAPSULE ORAL ONCE
Status: COMPLETED | OUTPATIENT
Start: 2024-11-13 | End: 2024-11-13

## 2024-11-13 RX ORDER — CHLORHEXIDINE GLUCONATE ORAL RINSE 1.2 MG/ML
15 SOLUTION DENTAL 2 TIMES DAILY
Qty: 120 ML | Refills: 0 | Status: SHIPPED | OUTPATIENT
Start: 2024-11-13

## 2024-11-13 RX ORDER — NAPROXEN 500 MG/1
500 TABLET ORAL 2 TIMES DAILY WITH MEALS
Qty: 30 TABLET | Refills: 0 | Status: SHIPPED | OUTPATIENT
Start: 2024-11-13

## 2024-11-13 RX ADMIN — MORPHINE SULFATE 15 MG: 15 TABLET ORAL at 03:50

## 2024-11-13 RX ADMIN — BUPIVACAINE HYDROCHLORIDE 10 ML: 2.5 INJECTION, SOLUTION EPIDURAL; INFILTRATION; INTRACAUDAL; PERINEURAL at 03:50

## 2024-11-13 RX ADMIN — NAPROXEN 500 MG: 500 TABLET ORAL at 03:48

## 2024-11-13 RX ADMIN — AMOXICILLIN 500 MG: 250 CAPSULE ORAL at 03:48

## 2024-11-13 NOTE — ED PROVIDER NOTES
"Final Diagnosis:  1. Dentalgia        Chief Complaint   Patient presents with    Dental Pain     Pt states for 2 weeks the right side of her teeth have been painful.  Goes across over to her forehead and to the left side.   Unknown if theres an abscess or cavity     Chest Pain     Pt states on the way here she had a sharpness in her left chest.  Pt states the pain resolved and no longer has it       HPI  Pt pres w/ dental pain.   2 weeks, right side upper and lower. No insurance so having trouble w/ dentistry. Tried tylenol no relief. No trismus. No facial swelling. No drainage.     No fever chills. Well appearing.       She declines chest pain w/u to me. Feels like she was \"just anxious\". Doesn't even want EKG.     EMS additionally reports:     - Previous charting underwent limited review with attention to last ED visits, labs, ekgs, and prior imaging.  Chart review reveals :     Office Visit on 10/28/2024   Component Date Value Ref Range Status    Hemoglobin A1C 10/28/2024 5.69  <=6.5 Final       - No language barrier.   - History obtained from patient    - Discuss patient's care, with patient permission or by chart review, with      PMH:   has a past medical history of Bipolar disorder ([facility]), Bronchitis (01/22/2019), Depression, History of wound infection, Morbid obesity with BMI of 40.0-44.9, adult ([facility]) (01/18/2018), Psychiatric disorder, and Seasonal allergic reaction.    PSH:   has a past surgical history that includes pr open treatment bimalleolar ankle fracture (Left, 5/14/2018); pr removal implant deep (Left, 2/5/2019); IR PICC line (2/8/2019); and Reduction mammaplasty (2001).     Social History:  Tobacco Use: High Risk (11/13/2024)    Patient History     Smoking Tobacco Use: Every Day     Smokeless Tobacco Use: Never     Passive Exposure: Current     Alcohol Use: Alcohol Misuse (10/21/2021)    AUDIT-C     Frequency of Alcohol Consumption: 4 or more times a week     Average Number of Drinks: 3 or 4     " Frequency of Binge Drinking: Daily or almost daily     No illicit use       ROS:  Pertinent positives/negatives: .     Some ROS may be present in the HPI and would take precedent over these standard questions asked below.   Review of Systems   Constitutional:  Negative for chills and fever.   HENT:  Positive for dental problem. Negative for facial swelling.    Cardiovascular:  Positive for chest pain.        CONSTITUTIONAL:  No lethargy. No unexpected weight loss. No change in behavior.  EYES:  No pain, redness, or discharge. No loss of vision. No orbital trauma or pain.   ENT:  No tinnitus or decreased hearing. No epistaxis/purulent rhinorrhea. No voice change, airway closing, trismus.   CARDIOVASCULAR:  No chest pain. No skin mottling or pallor. No change in exertional capacity  RESPIRATORY:  No hemoptysis. No paroxysmal nocturnal dyspnea. No stridor. No apnea or bluing.   GASTROINTESTINAL:  No vomiting, diarrhea. No distension. No melena. No hematochezia.   GENITOURINARY:  No nocturia. No hematuria or foul smelling or cloudy urine. No discharge. No sores/adenopathy.   MUSCULOSKELETAL:  No contracture.  No new deformity.   INTEGUMENTARY:  No swelling. No unexpected contusions. No abrasions. No lymphangitis.  NEUROLOGIC:  No meningismus. No new numbness of the extremities. No new focal weakness. No postural instability  PSYCHIATRIC:  No SI HI AVH  HEMATOLOGICAL:  No bleeding. No petechiae. No bruising.  ALLERGIES:  No urticaria. No sudden abd cramping. No stridor.    PE:     Physical exam highlights:   Physical Exam       Vitals:    11/13/24 0326   BP: 150/93   BP Location: Right arm   Pulse: 80   Resp: 18   Temp: 98.5 °F (36.9 °C)   TempSrc: Oral   SpO2: 98%     Vitals reviewed by me.   Nursing note reviewed  Chaperone present for all sensitive exam.  Const: No acute distress. Alert. Nontoxic. Not diaphoretic.    HEENT: External ears normal. No protrusion drainage swelling. Nose normal. No drainage/traumatic  deformity. MM. Mouth with baseline/symmetric movement. No trismus.  Right dental pain in molars. No facial swellng.   Eyes: No squinting. No icterus. No tearing/swelling/drainage. Tracks through the room with normal EOM.   Neck: ROM normal. No rigidity. No meningismus.  Cards: Rate as per vitals Compared to monitor sinus unless documented. Regular Well perfused.  Pulm: Effort and excursion normal. No distress. No audible wheezing/no stridor. Normal resp rate without retraction or change in work of breathing.  Abd: No distension beyond baseline. No fluctuant wave. Patient without peritoneal pain with shifting/bumping the bed.   MSK: ROM normal baseline. No deformity. No contractures from baseline.   Skin: No new rashes visible. Well perfused. No wounds visualized on exposed skin  Neuro: Nonfocal. Baseline. CN grossly intact. Moving all four with coordination.   Psych: Normal behavior and affect.        A:  - Nursing note reviewed.    Ddx and MDM  Considered diagnoses    Dental infection?  Dental caries?  Perform dental block for comfort    Then amox  Pain control  Dental f/u        Dispo decision       My conversation with consultant reveals:        Decision rules:                           My read of the XR/CT scan reveals:     No orders to display       Orders Placed This Encounter   Procedures    Nerve block     Labs Reviewed - No data to display    *Each of these labs was reviewed. Particular standout labs will be noted in the ED Course above     Final Diagnosis:  1. Dentalgia          P:  - hospital tx includes   Medications   bupivacaine (PF) (MARCAINE) 0.25 % injection 10 mL (10 mL Infiltration Given 11/13/24 0350)   naproxen (NAPROSYN) tablet 500 mg (500 mg Oral Given 11/13/24 0348)   morphine (MSIR) IR tablet 15 mg (15 mg Oral Given 11/13/24 0350)   amoxicillin (AMOXIL) capsule 500 mg (500 mg Oral Given 11/13/24 0348)         - disposition  Time reflects when diagnosis was documented in both MDM as  applicable and the Disposition within this note       Time User Action Codes Description Comment    11/13/2024  3:42 AM Nahun Bishop Poly [K08.89] Dentalgia           ED Disposition       ED Disposition   Discharge    Condition   Stable    Date/Time   Wed Nov 13, 2024  3:42 AM    Comment   Mirlande Mesa discharge to home/self care.                   Follow-up Information    None         - patient will call their PCP to let them know they were in the emergency department. We discuss return precautions and patient is agreeable with plan and aformentioned disposition.       - additional treatment intended, if consistent with primary provider:  - patient to follow with :      Discharge Medication List as of 11/13/2024  3:43 AM        START taking these medications    Details   amoxicillin (AMOXIL) 500 mg capsule Take 1 capsule (500 mg total) by mouth 3 (three) times a day for 5 days, Starting Wed 11/13/2024, Until Mon 11/18/2024, Normal      chlorhexidine (PERIDEX) 0.12 % solution Apply 15 mL to the mouth or throat 2 (two) times a day, Starting Wed 11/13/2024, Normal      naproxen (Naprosyn) 500 mg tablet Take 1 tablet (500 mg total) by mouth 2 (two) times a day with meals, Starting Wed 11/13/2024, Normal           CONTINUE these medications which have NOT CHANGED    Details   albuterol (Ventolin HFA) 90 mcg/act inhaler Inhale 2 puffs every 6 (six) hours as needed for wheezing, Starting Tue 6/27/2023, Normal      ARIPiprazole (ABILIFY) 15 mg tablet Take 20 mg by mouth in the morning, Starting Tue 9/21/2021, Historical Med      cholecalciferol (VITAMIN D3) 1,000 units tablet Take 1 tablet (1,000 Units total) by mouth daily, Starting Tue 10/31/2023, Normal      clonazePAM (KlonoPIN) 1 mg tablet take 1 tablet by mouth twice a day if needed  1 TAB IN THE A.M. SECOND TAB AS NEEDED, Historical Med      !! FLUoxetine (PROzac) 20 mg capsule take 1 capsule by mouth every morning  AS DIRECTED, Historical Med      !!  FLUoxetine (PROzac) 40 MG capsule Take 40 mg by mouth every morning, Starting Wed 7/3/2019, Historical Med      metFORMIN (GLUCOPHAGE) 500 mg tablet Take 1 tablet (500 mg total) by mouth 2 (two) times a day with meals, Starting Mon 5/6/2024, Normal      methocarbamol (ROBAXIN) 500 mg tablet Take 1 tablet (500 mg total) by mouth 2 (two) times a day, Starting Tue 11/5/2024, Normal      naltrexone (REVIA) 50 mg tablet Take 50 mg by mouth daily, Starting Thu 6/8/2023, Historical Med      oxyCODONE-acetaminophen (Percocet) 5-325 mg per tablet Take 1 tablet by mouth every 6 (six) hours as needed for moderate pain for up to 10 days Max Daily Amount: 4 tablets, Starting Tue 11/5/2024, Until Fri 11/15/2024 at 2359, Normal      QUEtiapine (SEROquel) 50 mg tablet Take 50 mg by mouth if needed, Starting Wed 5/10/2023, Historical Med      rosuvastatin (CRESTOR) 10 MG tablet take 1 tablet by mouth once daily, Starting Thu 10/31/2024, Normal       !! - Potential duplicate medications found. Please discuss with provider.        No discharge procedures on file.  Prior to Admission Medications   Prescriptions Last Dose Informant Patient Reported? Taking?   ARIPiprazole (ABILIFY) 15 mg tablet  Self Yes No   Sig: Take 20 mg by mouth in the morning   FLUoxetine (PROzac) 20 mg capsule  Self Yes No   Sig: take 1 capsule by mouth every morning  AS DIRECTED   FLUoxetine (PROzac) 40 MG capsule  Self Yes No   Sig: Take 40 mg by mouth every morning   QUEtiapine (SEROquel) 50 mg tablet  Self Yes No   Sig: Take 50 mg by mouth if needed   albuterol (Ventolin HFA) 90 mcg/act inhaler  Self No No   Sig: Inhale 2 puffs every 6 (six) hours as needed for wheezing   cholecalciferol (VITAMIN D3) 1,000 units tablet   No No   Sig: Take 1 tablet (1,000 Units total) by mouth daily   clonazePAM (KlonoPIN) 1 mg tablet  Self Yes No   Sig: take 1 tablet by mouth twice a day if needed  1 TAB IN THE A.M. SECOND TAB AS NEEDED   metFORMIN (GLUCOPHAGE) 500 mg tablet   " No No   Sig: Take 1 tablet (500 mg total) by mouth 2 (two) times a day with meals   Patient taking differently: Take 500 mg by mouth daily with breakfast   methocarbamol (ROBAXIN) 500 mg tablet   No No   Sig: Take 1 tablet (500 mg total) by mouth 2 (two) times a day   naltrexone (REVIA) 50 mg tablet  Self Yes No   Sig: Take 50 mg by mouth daily   oxyCODONE-acetaminophen (Percocet) 5-325 mg per tablet   No No   Sig: Take 1 tablet by mouth every 6 (six) hours as needed for moderate pain for up to 10 days Max Daily Amount: 4 tablets   rosuvastatin (CRESTOR) 10 MG tablet   No No   Sig: take 1 tablet by mouth once daily      Facility-Administered Medications: None       Portions of the record may have been created with voice recognition software. Occasional wrong word or \"sound a like\" substitutions may have occurred due to the inherent limitations of voice recognition software. Read the chart carefully and recognize, using context, where substitutions have occurred.    Electronically signed by:  Bishop Garnica MD  '     Bishop Garnica MD  11/14/24 0701    "

## 2024-11-13 NOTE — ED PROCEDURE NOTE
PROCEDURE  Nerve block    Date/Time: 11/13/2024 3:44 AM    Performed by: Bishop Garnica MD  Authorized by: Bishop Garnica MD    Patient location:  ED  [place] Protocol:  Consent: Verbal consent obtained.  Consent given by: patient  Patient understanding: patient states understanding of the procedure being performed  Patient consent: the patient's understanding of the procedure matches consent given  Patient identity confirmed: verbally with patient, arm band and hospital-assigned identification number    Indications:     Indications:  Pain relief  Location:     Body area:  Head    Laterality:  Right  Procedure details (see MAR for exact dosages):     Block needle gauge:  25 G    Anesthetic injected:  Bupivacaine 0.25% w/o epi    Injection procedure:  Anatomic landmarks identified, anatomic landmarks palpated, incremental injection and negative aspiration for blood  Post-procedure details:     Outcome:  Pain relieved    Patient tolerance of procedure:  Tolerated well, no immediate complications       Bishop Garnica MD  11/14/24 0703

## 2025-01-20 ENCOUNTER — TELEPHONE (OUTPATIENT)
Age: 50
End: 2025-01-20

## 2025-01-20 NOTE — TELEPHONE ENCOUNTER
Pt called in stating she has symptoms of a yeast infections and she would like Apurva to call in a prescription for Diflucan to [facility] in [place]. She says she has a new pt appt with GYN so they may not prescribe this medication yet.

## 2025-01-21 ENCOUNTER — OFFICE VISIT (OUTPATIENT)
Dept: FAMILY MEDICINE CLINIC | Facility: CLINIC | Age: 50
End: 2025-01-21
Payer: COMMERCIAL

## 2025-01-21 VITALS
HEART RATE: 98 BPM | SYSTOLIC BLOOD PRESSURE: 134 MMHG | BODY MASS INDEX: 39.43 KG/M2 | RESPIRATION RATE: 18 BRPM | HEIGHT: 67 IN | TEMPERATURE: 97.8 F | DIASTOLIC BLOOD PRESSURE: 80 MMHG | WEIGHT: 251.2 LBS | OXYGEN SATURATION: 98 %

## 2025-01-21 DIAGNOSIS — N76.0 ACUTE VAGINITIS: Primary | ICD-10-CM

## 2025-01-21 DIAGNOSIS — F31.9 BIPOLAR AFFECTIVE DISORDER, REMISSION STATUS UNSPECIFIED (HCC): ICD-10-CM

## 2025-01-21 DIAGNOSIS — E11.65 TYPE 2 DIABETES MELLITUS WITH HYPERGLYCEMIA, WITHOUT LONG-TERM CURRENT USE OF INSULIN (HCC): ICD-10-CM

## 2025-01-21 DIAGNOSIS — F10.11 HISTORY OF ETOH ABUSE: ICD-10-CM

## 2025-01-21 DIAGNOSIS — E11.9 TYPE 2 DIABETES MELLITUS WITHOUT COMPLICATION, WITHOUT LONG-TERM CURRENT USE OF INSULIN (HCC): ICD-10-CM

## 2025-01-21 DIAGNOSIS — E78.2 MIXED HYPERLIPIDEMIA: ICD-10-CM

## 2025-01-21 LAB
SL AMB  POCT GLUCOSE, UA: NORMAL
SL AMB LEUKOCYTE ESTERASE,UA: 75
SL AMB POCT BILIRUBIN,UA: NORMAL
SL AMB POCT BLOOD,UA: NORMAL
SL AMB POCT CLARITY,UA: CLEAR
SL AMB POCT COLOR,UA: NORMAL
SL AMB POCT KETONES,UA: NORMAL
SL AMB POCT NITRITE,UA: NORMAL
SL AMB POCT PH,UA: 6.5
SL AMB POCT SPECIFIC GRAVITY,UA: 1.01

## 2025-01-21 PROCEDURE — 81000 URINALYSIS NONAUTO W/SCOPE: CPT | Performed by: NURSE PRACTITIONER

## 2025-01-21 PROCEDURE — 99214 OFFICE O/P EST MOD 30 MIN: CPT | Performed by: NURSE PRACTITIONER

## 2025-01-21 RX ORDER — ROSUVASTATIN CALCIUM 10 MG/1
10 TABLET, COATED ORAL DAILY
Qty: 90 TABLET | Refills: 1 | Status: SHIPPED | OUTPATIENT
Start: 2025-01-21

## 2025-01-21 RX ORDER — FLUCONAZOLE 150 MG/1
150 TABLET ORAL ONCE
Qty: 1 TABLET | Refills: 0 | Status: SHIPPED | OUTPATIENT
Start: 2025-01-21 | End: 2025-01-21

## 2025-01-21 NOTE — ASSESSMENT & PLAN NOTE
Carb controlled diet. Regular exercise. Monitor blood sugar. Regular exercise. Check labs   Lab Results   Component Value Date    HGBA1C 5.69 10/28/2024   Orders:    Albumin / creatinine urine ratio; Future    Comprehensive metabolic panel; Future    Hemoglobin A1C; Future    CBC and Platelet; Future    Lipid Panel with Direct LDL reflex; Future

## 2025-01-21 NOTE — PATIENT INSTRUCTIONS
Urine test in the office today was negative.   Diflucan 150mg as one time dose today.   Call or return to office if symptoms worsen or if new symptoms develop.

## 2025-01-21 NOTE — ASSESSMENT & PLAN NOTE
Managed by psych. Continue current meds. Mood appears stable. Monitor. Anticipatory guidance.

## 2025-01-21 NOTE — ASSESSMENT & PLAN NOTE
Encouraged to continue with AA. Get sponsor. No etoh.   Also discussed effect of etoh on blood sugars in DM.   Anticipatory guidance.

## 2025-01-21 NOTE — ASSESSMENT & PLAN NOTE
Heart healthy diet. Statin.   Orders:    Comprehensive metabolic panel; Future    CBC and Platelet; Future    Lipid Panel with Direct LDL reflex; Future    rosuvastatin (CRESTOR) 10 MG tablet; Take 1 tablet (10 mg total) by mouth daily

## 2025-01-21 NOTE — PROGRESS NOTES
Name: Mirlande Mesa      : 1975      MRN: [id number]  Encounter Provider: JORDAN Cabello  Encounter Date: 2025   Encounter department: [facility]  :  Assessment & Plan  Acute vaginitis  Diflucan 150mg x one dose today  Orders:    POCT urine dip non-auto scope    fluconazole (DIFLUCAN) 150 mg tablet; Take 1 tablet (150 mg total) by mouth once for 1 dose    Bipolar affective disorder, remission status unspecified (HCC)  Managed by psych. Continue current meds. Mood appears stable. Monitor. Anticipatory guidance.        Type 2 diabetes mellitus with hyperglycemia, without long-term current use of insulin (HCC)  Carb controlled diet. Regular exercise. Monitor blood sugar. Regular exercise. Check labs   Lab Results   Component Value Date    HGBA1C 5.69 10/28/2024   Orders:    Albumin / creatinine urine ratio; Future    Comprehensive metabolic panel; Future    Hemoglobin A1C; Future    CBC and Platelet; Future    Lipid Panel with Direct LDL reflex; Future    Mixed hyperlipidemia  Heart healthy diet. Statin.   Orders:    Comprehensive metabolic panel; Future    CBC and Platelet; Future    Lipid Panel with Direct LDL reflex; Future    rosuvastatin (CRESTOR) 10 MG tablet; Take 1 tablet (10 mg total) by mouth daily    Type 2 diabetes mellitus without complication, without long-term current use of insulin (HCC)  Carb controlled diet. Regular exercise. Monitor blood sugar. Regular exercise. Check labs   Lab Results   Component Value Date    HGBA1C 5.69 10/28/2024   Orders:    metFORMIN (GLUCOPHAGE) 500 mg tablet; Take 1 tablet (500 mg total) by mouth 2 (two) times a day with meals    rosuvastatin (CRESTOR) 10 MG tablet; Take 1 tablet (10 mg total) by mouth daily    History of ETOH abuse  Encouraged to continue with AA. Get sponsor. No etoh.   Also discussed effect of etoh on blood sugars in DM.   Anticipatory guidance.        Patient was counseled regarding instructions for  "management which included: impression/diagnosis, risk/benefits of treatment options, importance of compliance with treatment, risk factor reduction, and prognosis.   I have reviewed the instructions with the patient answering all questions and patient verbalized understanding.         History of Present Illness   Here for possible yeast infection  Symptoms started 2 days ago.   Burning and itching. White/yellow odorless discharge.   DM on metformin. Has been only taking once daily . Rx is for bid.   Has not been checking sugars.   Admits has not been good with diet and has gained weight.   Taking all meds as prescribed, but has not been going to counselor.   Etoh issue. Relapsed recently. Had 60 days sober and then started drinking again. Currently 8 days sober. Has been doing ZO[employer] meetings for AA. Will be looking for new sponsor.       Review of Systems   Constitutional:  Negative for fever.   Respiratory:  Negative for chest tightness and shortness of breath.    Cardiovascular:  Negative for chest pain and palpitations.   Gastrointestinal:  Negative for abdominal pain.   Genitourinary:  Positive for vaginal discharge. Negative for dysuria.   Skin:  Negative for rash and wound.   Neurological:  Negative for dizziness and headaches.       Objective   /80   Pulse 98   Temp 97.8 °F (36.6 °C)   Resp 18   Ht 5' 6.5\" (1.689 m)   Wt 114 kg (251 lb 3.2 oz)   LMP 01/13/2025 (Approximate)   SpO2 98%   BMI 39.94 kg/m²      Physical Exam  Vitals reviewed.   Constitutional:       General: She is not in acute distress.     Appearance: She is not ill-appearing.   Cardiovascular:      Rate and Rhythm: Normal rate and regular rhythm.   Pulmonary:      Effort: Pulmonary effort is normal. No respiratory distress.      Breath sounds: Normal breath sounds. No wheezing.   Abdominal:      General: Bowel sounds are normal. There is no distension.      Palpations: Abdomen is soft.      Tenderness: There is no abdominal " tenderness.   Genitourinary:     Vagina: Vaginal discharge (scant white, odorless) present.      Comments: Poct UA- neg leuk, neg blood, neg nitrites  Faint erythema labia minora.     Skin:     General: Skin is warm and dry.      Coloration: Skin is not jaundiced or pale.   Neurological:      General: No focal deficit present.      Mental Status: She is alert and oriented to person, place, and time.      Cranial Nerves: No cranial nerve deficit.      Sensory: No sensory deficit.   Psychiatric:         Mood and Affect: Mood normal.         Behavior: Behavior normal.         Thought Content: Thought content normal.         Judgment: Judgment normal.

## 2025-01-24 ENCOUNTER — PATIENT MESSAGE (OUTPATIENT)
Dept: FAMILY MEDICINE CLINIC | Facility: CLINIC | Age: 50
End: 2025-01-24

## 2025-02-05 ENCOUNTER — TELEPHONE (OUTPATIENT)
Age: 50
End: 2025-02-05

## 2025-02-05 NOTE — TELEPHONE ENCOUNTER
Patient calling to r/s her appt from 2/6/2025, warm transfer to CTS as patient is a new patient and appt is for Uterine Fibroids. Unsuccessful transfer please call patient back.

## 2025-02-06 ENCOUNTER — TELEPHONE (OUTPATIENT)
Dept: OBGYN CLINIC | Facility: CLINIC | Age: 50
End: 2025-02-06

## 2025-02-07 ENCOUNTER — TELEPHONE (OUTPATIENT)
Dept: OBGYN CLINIC | Facility: CLINIC | Age: 50
End: 2025-02-07

## 2025-02-13 ENCOUNTER — TELEPHONE (OUTPATIENT)
Dept: OBGYN CLINIC | Facility: CLINIC | Age: 50
End: 2025-02-13

## 2025-02-13 NOTE — TELEPHONE ENCOUNTER
LMOM for pt to call office back to confirm new appt scheduled for 3/18 at 4:00 in our [place] office. Pt's appt originally scheduled for 3/17 needed to be rescheduled due to the appt needing to be scheduled with an MD. Need pt to confirm appt now scheduled for 3/18 in [place] at 4:00 scheduled with MD.

## 2025-04-03 ENCOUNTER — TELEPHONE (OUTPATIENT)
Dept: FAMILY MEDICINE CLINIC | Facility: CLINIC | Age: 50
End: 2025-04-03

## 2025-04-03 NOTE — TELEPHONE ENCOUNTER
LMOM for patient reminding her that labs in chart need to be completed and resulted prior to her 4/7 appt.

## 2025-04-07 ENCOUNTER — TELEPHONE (OUTPATIENT)
Dept: FAMILY MEDICINE CLINIC | Facility: CLINIC | Age: 50
End: 2025-04-07

## 2025-04-07 NOTE — TELEPHONE ENCOUNTER
LMOM for patient to call us back, Labs haven't been completed for todays DM/lab review appointment. When patient calls back please remind patient to go for her labs and to reschedule her appointment

## 2025-04-08 ENCOUNTER — TELEPHONE (OUTPATIENT)
Dept: FAMILY MEDICINE CLINIC | Facility: CLINIC | Age: 50
End: 2025-04-08

## 2025-04-08 NOTE — TELEPHONE ENCOUNTER
LMOM for patient to call us back so we can reschedule her DM follow up appt that was missed yesterday.

## 2025-04-09 ENCOUNTER — TELEPHONE (OUTPATIENT)
Dept: FAMILY MEDICINE CLINIC | Facility: CLINIC | Age: 50
End: 2025-04-09

## 2025-04-09 NOTE — TELEPHONE ENCOUNTER
LMOM asking if patient has completed her labs for upcoming appt tomorrow. Stated her follow up appt requires labs to be completed and resulted prior to appt. Asked patient to call back and reschedule

## 2025-04-22 ENCOUNTER — TELEPHONE (OUTPATIENT)
Dept: FAMILY MEDICINE CLINIC | Facility: CLINIC | Age: 50
End: 2025-04-22

## 2025-04-22 NOTE — TELEPHONE ENCOUNTER
Pt has upcoming appt for DM fu and lab review on 4/29.  Needs to have labs done prior to appt. Orders in chart.   Please call pt to advise.

## 2025-04-23 ENCOUNTER — HOSPITAL ENCOUNTER (EMERGENCY)
Facility: HOSPITAL | Age: 50
Discharge: HOME | End: 2025-04-23
Payer: COMMERCIAL

## 2025-04-23 ENCOUNTER — APPOINTMENT (EMERGENCY)
Dept: RADIOLOGY | Facility: HOSPITAL | Age: 50
End: 2025-04-23
Payer: COMMERCIAL

## 2025-04-23 VITALS
DIASTOLIC BLOOD PRESSURE: 85 MMHG | OXYGEN SATURATION: 98 % | TEMPERATURE: 98.7 F | RESPIRATION RATE: 18 BRPM | SYSTOLIC BLOOD PRESSURE: 145 MMHG | HEART RATE: 82 BPM

## 2025-04-23 DIAGNOSIS — K52.9 ENTERITIS: ICD-10-CM

## 2025-04-23 DIAGNOSIS — D25.9 UTERINE FIBROID: ICD-10-CM

## 2025-04-23 DIAGNOSIS — M54.9 MUSCULOSKELETAL BACK PAIN: Primary | ICD-10-CM

## 2025-04-23 LAB
ALBUMIN SERPL BCG-MCNC: 4 G/DL (ref 3.5–5)
ALP SERPL-CCNC: 53 U/L (ref 34–104)
ALT SERPL W P-5'-P-CCNC: 10 U/L (ref 7–52)
ANION GAP SERPL CALCULATED.3IONS-SCNC: 9 MMOL/L (ref 4–13)
AST SERPL W P-5'-P-CCNC: 14 U/L (ref 13–39)
ATRIAL RATE: 72 BPM
BACTERIA UR QL AUTO: ABNORMAL /HPF
BASOPHILS # BLD AUTO: 0.03 THOUSANDS/ÂΜL (ref 0–0.1)
BASOPHILS NFR BLD AUTO: 1 % (ref 0–1)
BILIRUB SERPL-MCNC: 0.4 MG/DL (ref 0.2–1)
BILIRUB UR QL STRIP: NEGATIVE
BUN SERPL-MCNC: 16 MG/DL (ref 5–25)
CALCIUM SERPL-MCNC: 8.6 MG/DL (ref 8.4–10.2)
CARDIAC TROPONIN I PNL SERPL HS: <2 NG/L (ref ?–50)
CHLORIDE SERPL-SCNC: 105 MMOL/L (ref 96–108)
CLARITY UR: ABNORMAL
CO2 SERPL-SCNC: 23 MMOL/L (ref 21–32)
COLOR UR: YELLOW
CREAT SERPL-MCNC: 0.77 MG/DL (ref 0.6–1.3)
EOSINOPHIL # BLD AUTO: 0.14 THOUSAND/ÂΜL (ref 0–0.61)
EOSINOPHIL NFR BLD AUTO: 3 % (ref 0–6)
ERYTHROCYTE [DISTWIDTH] IN BLOOD BY AUTOMATED COUNT: 14.1 % (ref 11.6–15.1)
EXT PREGNANCY TEST URINE: NEGATIVE
EXT. CONTROL: NORMAL
GFR SERPL CREATININE-BSD FRML MDRD: 92 ML/MIN/1.73SQ M
GLUCOSE SERPL-MCNC: 89 MG/DL (ref 65–140)
GLUCOSE UR STRIP-MCNC: NEGATIVE MG/DL
HCT VFR BLD AUTO: 34.9 % (ref 34.8–46.1)
HGB BLD-MCNC: 11.2 G/DL (ref 11.5–15.4)
HGB UR QL STRIP.AUTO: ABNORMAL
IMM GRANULOCYTES # BLD AUTO: 0.01 THOUSAND/UL (ref 0–0.2)
IMM GRANULOCYTES NFR BLD AUTO: 0 % (ref 0–2)
KETONES UR STRIP-MCNC: NEGATIVE MG/DL
LEUKOCYTE ESTERASE UR QL STRIP: ABNORMAL
LIPASE SERPL-CCNC: 38 U/L (ref 11–82)
LYMPHOCYTES # BLD AUTO: 1.52 THOUSANDS/ÂΜL (ref 0.6–4.47)
LYMPHOCYTES NFR BLD AUTO: 32 % (ref 14–44)
MCH RBC QN AUTO: 27.4 PG (ref 26.8–34.3)
MCHC RBC AUTO-ENTMCNC: 32.1 G/DL (ref 31.4–37.4)
MCV RBC AUTO: 85 FL (ref 82–98)
MONOCYTES # BLD AUTO: 0.48 THOUSAND/ÂΜL (ref 0.17–1.22)
MONOCYTES NFR BLD AUTO: 10 % (ref 4–12)
NEUTROPHILS # BLD AUTO: 2.6 THOUSANDS/ÂΜL (ref 1.85–7.62)
NEUTS SEG NFR BLD AUTO: 54 % (ref 43–75)
NITRITE UR QL STRIP: NEGATIVE
NON-SQ EPI CELLS URNS QL MICRO: ABNORMAL /HPF
NRBC BLD AUTO-RTO: 0 /100 WBCS
P AXIS: 43 DEGREES
PH UR STRIP.AUTO: 6 [PH]
PLATELET # BLD AUTO: 233 THOUSANDS/UL (ref 149–390)
PMV BLD AUTO: 10.8 FL (ref 8.9–12.7)
POTASSIUM SERPL-SCNC: 3.9 MMOL/L (ref 3.5–5.3)
PR INTERVAL: 206 MS
PROT SERPL-MCNC: 6.6 G/DL (ref 6.4–8.4)
PROT UR STRIP-MCNC: ABNORMAL MG/DL
QRS AXIS: 39 DEGREES
QRSD INTERVAL: 86 MS
QT INTERVAL: 462 MS
QTC INTERVAL: 505 MS
RBC # BLD AUTO: 4.09 MILLION/UL (ref 3.81–5.12)
RBC #/AREA URNS AUTO: ABNORMAL /HPF
SODIUM SERPL-SCNC: 137 MMOL/L (ref 135–147)
SP GR UR STRIP.AUTO: >=1.03 (ref 1–1.03)
T WAVE AXIS: 50 DEGREES
UROBILINOGEN UR STRIP-ACNC: <2 MG/DL
VENTRICULAR RATE: 72 BPM
WBC # BLD AUTO: 4.78 THOUSAND/UL (ref 4.31–10.16)
WBC #/AREA URNS AUTO: ABNORMAL /HPF

## 2025-04-23 PROCEDURE — 84484 ASSAY OF TROPONIN QUANT: CPT

## 2025-04-23 PROCEDURE — 81001 URINALYSIS AUTO W/SCOPE: CPT

## 2025-04-23 PROCEDURE — 93005 ELECTROCARDIOGRAM TRACING: CPT

## 2025-04-23 PROCEDURE — 36415 COLL VENOUS BLD VENIPUNCTURE: CPT

## 2025-04-23 PROCEDURE — 99284 EMERGENCY DEPT VISIT MOD MDM: CPT

## 2025-04-23 PROCEDURE — 80053 COMPREHEN METABOLIC PANEL: CPT

## 2025-04-23 PROCEDURE — 85025 COMPLETE CBC W/AUTO DIFF WBC: CPT

## 2025-04-23 PROCEDURE — 96374 THER/PROPH/DIAG INJ IV PUSH: CPT

## 2025-04-23 PROCEDURE — 99285 EMERGENCY DEPT VISIT HI MDM: CPT

## 2025-04-23 PROCEDURE — 81025 URINE PREGNANCY TEST: CPT

## 2025-04-23 PROCEDURE — 83690 ASSAY OF LIPASE: CPT

## 2025-04-23 PROCEDURE — 96375 TX/PRO/DX INJ NEW DRUG ADDON: CPT

## 2025-04-23 PROCEDURE — 74176 CT ABD & PELVIS W/O CONTRAST: CPT

## 2025-04-23 PROCEDURE — 93010 ELECTROCARDIOGRAM REPORT: CPT | Performed by: INTERNAL MEDICINE

## 2025-04-23 RX ORDER — DIAZEPAM 10 MG/2ML
5 INJECTION, SOLUTION INTRAMUSCULAR; INTRAVENOUS ONCE
Status: COMPLETED | OUTPATIENT
Start: 2025-04-23 | End: 2025-04-23

## 2025-04-23 RX ORDER — OXYCODONE AND ACETAMINOPHEN 5; 325 MG/1; MG/1
1 TABLET ORAL EVERY 8 HOURS PRN
Qty: 5 TABLET | Refills: 0 | Status: SHIPPED | OUTPATIENT
Start: 2025-04-23

## 2025-04-23 RX ORDER — LIDOCAINE 50 MG/G
1 PATCH TOPICAL ONCE
Status: DISCONTINUED | OUTPATIENT
Start: 2025-04-23 | End: 2025-04-23 | Stop reason: HOSPADM

## 2025-04-23 RX ORDER — ACETAMINOPHEN 325 MG/1
975 TABLET ORAL ONCE
Status: COMPLETED | OUTPATIENT
Start: 2025-04-23 | End: 2025-04-23

## 2025-04-23 RX ORDER — LIDOCAINE 50 MG/G
1 PATCH TOPICAL DAILY
Qty: 14 PATCH | Refills: 0 | Status: SHIPPED | OUTPATIENT
Start: 2025-04-23

## 2025-04-23 RX ORDER — HYDROMORPHONE HCL/PF 1 MG/ML
0.2 SYRINGE (ML) INJECTION ONCE
Status: COMPLETED | OUTPATIENT
Start: 2025-04-23 | End: 2025-04-23

## 2025-04-23 RX ADMIN — ACETAMINOPHEN 975 MG: 325 TABLET, FILM COATED ORAL at 13:50

## 2025-04-23 RX ADMIN — LIDOCAINE 1 PATCH: 700 PATCH TOPICAL at 12:03

## 2025-04-23 RX ADMIN — DIAZEPAM 5 MG: 10 INJECTION, SOLUTION INTRAMUSCULAR; INTRAVENOUS at 12:02

## 2025-04-23 RX ADMIN — HYDROMORPHONE HYDROCHLORIDE 0.2 MG: 1 INJECTION, SOLUTION INTRAMUSCULAR; INTRAVENOUS; SUBCUTANEOUS at 13:50

## 2025-04-23 NOTE — ED PROVIDER NOTES
Time reflects when diagnosis was documented in both MDM as applicable and the Disposition within this note       Time User Action Codes Description Comment    4/23/2025  2:37 PM Simone Larose [M54.9] Musculoskeletal back pain     4/23/2025  2:37 PM Simone Larose [K52.9] Enteritis     4/23/2025  2:37 PM Simone Larose [D25.9] Uterine fibroid           ED Disposition       ED Disposition   Discharge    Condition   Stable    Date/Time   Wed Apr 23, 2025  2:37 PM    Comment   Mirlande Mesa discharge to home/self care.                   Assessment & Plan       Medical Decision Making  Amount and/or Complexity of Data Reviewed  Labs: ordered. Decision-making details documented in ED Course.  Radiology: ordered.    Risk  OTC drugs.  Prescription drug management.      48 y/o F with pmh kidney stone presents for evaluation of radiating back pain. Pt states feels like lower back spasms however today more radiating around the front b/l to epigastric area. Pt reports history of kidney stone although notes this doesn't quite feel similar. Denies hematuria or dysuria. No fever, chills, n/v. Pt does report ongoing hx of intermittent diarrhea, non bloody. On questioning, pt also reports she has intermittent episodes of substernal chest pain, last episode yesterday.  VSS  Pt in mild discomfort with difficult to reproduce low thoracic back pain worse b/l paraspinals. Abd generally mildly tender soft no r/g.  Plan for abd labs, cardiac w/u, analgesia. Will wait to image pending UA and lab results.   Pt with large blood, likely 2/2 menstrual period however with hx of kidney stones will obtain CT imaging.   Labs overall unremarkable, cardiac w/u neg, EKG nonischemic, trop neg  CT imaging with enteritis, uterine fibroid. Pt aware of fibroid and has upcoming gyn appointment. D/w pt enteritis nonspecific and should be followed up with by PCP/GI. Pt requesting narcotic pain medication for back pain. She reports upcoming  appointment with spine team. Sent a few doses of oxycodone while pt pends these appointments.   Pt remains stable for discharge.         ED Course as of 04/23/25 1542   Wed Apr 23, 2025   1215 Blood, UA(!): Large   1229 RBC Urine(!): Innumerable   1342 Pt is on menstrual period       Medications   lidocaine (LIDODERM) 5 % patch 1 patch (1 patch Topical Medication Applied 4/23/25 1203)   diazepam (VALIUM) injection 5 mg (5 mg Intravenous Given 4/23/25 1202)   HYDROmorphone (DILAUDID) injection 0.2 mg (0.2 mg Intravenous Given 4/23/25 1350)   acetaminophen (TYLENOL) tablet 975 mg (975 mg Oral Given 4/23/25 1350)       ED Risk Strat Scores                    No data recorded                            History of Present Illness       Chief Complaint   Patient presents with    Back Pain     Pt reports lower back pain and spasms for past four days. Took tylenol with no relief. Hx of back pain.        Past Medical History:   Diagnosis Date    Bipolar disorder ([facility])     Bronchitis 01/22/2019    Depression     History of wound infection     left ankle    Morbid obesity with BMI of 40.0-44.9, adult ([facility]) 01/18/2018    Psychiatric disorder     depression, anxiety    Seasonal allergic reaction       Past Surgical History:   Procedure Laterality Date    IR PICC LINE  2/8/2019    [place] OPEN TREATMENT BIMALLEOLAR ANKLE FRACTURE Left 5/14/2018    Procedure: OPEN REDUCTION W/ INTERNAL FIXATION (ORIF) ANKLE;  Surgeon: Sam Bauman DO;  Location: [facility];  Service: Orthopedics    KY REMOVAL IMPLANT DEEP Left 2/5/2019    Procedure: REMOVAL HARDWARE ANKLE;  Surgeon: Sam Bauman DO;  Location: [facility];  Service: Orthopedics    REDUCTION MAMMAPLASTY  2001    reduction      Family History   Problem Relation Age of Onset    Substance Abuse Mother     Mental illness Mother     Diabetes Mother     Cancer Mother     Asthma Mother     Breast cancer Mother 55    Substance Abuse Father     No Known Problems Brother     No Known Problems  Daughter     Substance Abuse Maternal Grandmother     Mental illness Maternal Grandmother     No Known Problems Maternal Grandfather     No Known Problems Paternal Grandmother     No Known Problems Paternal Grandfather     No Known Problems Maternal Aunt     No Known Problems Maternal Uncle     ADD / ADHD Neg Hx     Anesthesia problems Neg Hx     Clotting disorder Neg Hx     Collagen disease Neg Hx     Dislocations Neg Hx     Learning disabilities Neg Hx     Neurological problems Neg Hx     Osteoporosis Neg Hx     Rheumatologic disease Neg Hx     Scoliosis Neg Hx     Vascular Disease Neg Hx       Social History     Tobacco Use    Smoking status: Every Day     Current packs/day: 1.00     Average packs/day: 1 pack/day for 34.8 years (34.8 ttl pk-yrs)     Types: Cigarettes     Start date: 7/23/1990     Passive exposure: Current    Smokeless tobacco: Never   Vaping Use    Vaping status: Former    Substances: Nicotine, CBD   Substance Use Topics    Alcohol use: Not Currently     Comment: Quit drinking 2nd    Drug use: Never      E-Cigarette/Vaping    E-Cigarette Use Former User     Comments on/off with vaping       E-Cigarette/Vaping Substances    Nicotine Yes     THC No     CBD Yes     Flavoring No     Other No     Unknown No       I have reviewed and agree with the history as documented.     HPI  See mdm  Review of Systems   Constitutional:  Negative for chills and fever.   HENT:  Negative for ear pain and sore throat.    Eyes:  Negative for pain and visual disturbance.   Respiratory:  Negative for cough and shortness of breath.    Cardiovascular:  Positive for chest pain. Negative for palpitations.   Gastrointestinal:  Positive for abdominal pain and diarrhea. Negative for vomiting.   Genitourinary:  Negative for dysuria and hematuria.   Musculoskeletal:  Positive for back pain. Negative for arthralgias.   Skin:  Negative for color change and rash.   Neurological:  Negative for seizures and syncope.   All other  systems reviewed and are negative.          Objective       ED Triage Vitals   Temperature Pulse Blood Pressure Respirations SpO2 Patient Position - Orthostatic VS   04/23/25 1122 04/23/25 1122 04/23/25 1122 04/23/25 1122 04/23/25 1122 04/23/25 1122   98.7 °F (37.1 °C) 82 145/85 18 98 % Sitting      Temp Source Heart Rate Source BP Location FiO2 (%) Pain Score    04/23/25 1122 04/23/25 1122 04/23/25 1122 -- 04/23/25 1350    Oral Monitor Right arm  7      Vitals      Date and Time Temp Pulse SpO2 Resp BP Pain Score FACES Pain Rating User   04/23/25 1350 -- -- -- -- -- 7 -- JH   04/23/25 1122 98.7 °F (37.1 °C) 82 98 % 18 145/85 -- --             Physical Exam  Vitals and nursing note reviewed.   Constitutional:       General: She is not in acute distress.  HENT:      Head: Normocephalic and atraumatic.      Right Ear: External ear normal.      Left Ear: External ear normal.      Nose: Nose normal.      Mouth/Throat:      Pharynx: Oropharynx is clear.   Eyes:      Extraocular Movements: Extraocular movements intact.      Pupils: Pupils are equal, round, and reactive to light.   Cardiovascular:      Rate and Rhythm: Normal rate and regular rhythm.      Pulses: Normal pulses.      Heart sounds: Normal heart sounds. No murmur heard.     No friction rub. No gallop.   Pulmonary:      Effort: Pulmonary effort is normal. No respiratory distress.      Breath sounds: Normal breath sounds. No wheezing, rhonchi or rales.   Abdominal:      General: Abdomen is flat. There is no distension.      Palpations: Abdomen is soft.      Tenderness: There is abdominal tenderness. There is no guarding or rebound.   Musculoskeletal:         General: Tenderness present. No deformity. Normal range of motion.      Cervical back: Normal range of motion.      Right lower leg: No edema.      Left lower leg: No edema.      Comments: Very mild ttp low thoracic b/l paraspinals   Skin:     General: Skin is warm and dry.      Capillary Refill:  Capillary refill takes less than 2 seconds.      Findings: No rash.   Neurological:      General: No focal deficit present.      Mental Status: She is alert and oriented to person, place, and time.      Gait: Gait normal.   Psychiatric:         Mood and Affect: Mood normal.         Results Reviewed       Procedure Component Value Units Date/Time    HS Troponin 0hr (reflex protocol) [[id number]]  (Normal) Collected: 04/23/25 1156    Lab Status: Final result Specimen: Blood from Arm, Left Updated: 04/23/25 1232     hs TnI 0hr <2 ng/L     Comprehensive metabolic panel [[id number]] Collected: 04/23/25 1156    Lab Status: Final result Specimen: Blood from Arm, Left Updated: 04/23/25 1226     Sodium 137 mmol/L      Potassium 3.9 mmol/L      Chloride 105 mmol/L      CO2 23 mmol/L      ANION GAP 9 mmol/L      BUN 16 mg/dL      Creatinine 0.77 mg/dL      Glucose 89 mg/dL      Calcium 8.6 mg/dL      AST 14 U/L      ALT 10 U/L      Alkaline Phosphatase 53 U/L      Total Protein 6.6 g/dL      Albumin 4.0 g/dL      Total Bilirubin 0.40 mg/dL      eGFR 92 ml/min/1.73sq m     Narrative:      National Kidney Disease Foundation guidelines for Chronic Kidney Disease (CKD):     Stage 1 with normal or high GFR (GFR > 90 mL/min/1.73 square meters)    Stage 2 Mild CKD (GFR = 60-89 mL/min/1.73 square meters)    Stage 3A Moderate CKD (GFR = 45-59 mL/min/1.73 square meters)    Stage 3B Moderate CKD (GFR = 30-44 mL/min/1.73 square meters)    Stage 4 Severe CKD (GFR = 15-29 mL/min/1.73 square meters)    Stage 5 End Stage CKD (GFR <15 mL/min/1.73 square meters)  Note: GFR calculation is accurate only with a steady state creatinine    Lipase [[id number]]  (Normal) Collected: 04/23/25 1156    Lab Status: Final result Specimen: Blood from Arm, Left Updated: 04/23/25 1226     Lipase 38 u/L     Urine Microscopic [[id number]]  (Abnormal) Collected: 04/23/25 1201    Lab Status: Final result Specimen: Urine, Clean Catch Updated: 04/23/25 1220     RBC, UA  Innumerable /hpf      WBC, UA       Field obscured, unable to enumerate     /hpf     Epithelial Cells Occasional /hpf      Bacteria, UA Occasional /hpf     UA (URINE) with reflex to Scope [[id number]]  (Abnormal) Collected: 04/23/25 1201    Lab Status: Final result Specimen: Urine, Clean Catch Updated: 04/23/25 1211     Color, UA Yellow     Clarity, UA Cloudy     Specific Gravity, UA >=1.030     pH, UA 6.0     Leukocytes, UA Trace     Nitrite, UA Negative     Protein, UA Trace mg/dl      Glucose, UA Negative mg/dl      Ketones, UA Negative mg/dl      Urobilinogen, UA <2.0 mg/dl      Bilirubin, UA Negative     Occult Blood, UA Large    CBC and differential [[id number]]  (Abnormal) Collected: 04/23/25 1156    Lab Status: Final result Specimen: Blood from Arm, Left Updated: 04/23/25 1210     WBC 4.78 Thousand/uL      RBC 4.09 Million/uL      Hemoglobin 11.2 g/dL      Hematocrit 34.9 %      MCV 85 fL      MCH 27.4 pg      MCHC 32.1 g/dL      RDW 14.1 %      MPV 10.8 fL      Platelets 233 Thousands/uL      nRBC 0 /100 WBCs      Segmented % 54 %      Immature Grans % 0 %      Lymphocytes % 32 %      Monocytes % 10 %      Eosinophils Relative 3 %      Basophils Relative 1 %      Absolute Neutrophils 2.60 Thousands/µL      Absolute Immature Grans 0.01 Thousand/uL      Absolute Lymphocytes 1.52 Thousands/µL      Absolute Monocytes 0.48 Thousand/µL      Eosinophils Absolute 0.14 Thousand/µL      Basophils Absolute 0.03 Thousands/µL     POCT pregnancy, urine [[id number]]  (Normal) Collected: 04/23/25 1201    Lab Status: Final result Updated: 04/23/25 1201     EXT Preg Test, Ur Negative     Control Valid            CT abdomen pelvis wo contrast   Final Interpretation by Rajesh Leavitt MD (04/23 1417)      1.  Mildly thickened loops of small bowel in the left upper quadrant, which could be seen in the setting of enteritis.   2.  Enlarged and likely multi fibroid uterus. Recommend further evaluation with a pelvic ultrasound, if not  previously performed.   3.  No hydronephrosis or nephrolithiasis.      Workstation performed: [id number]             Procedures    ED Medication and Procedure Management   Prior to Admission Medications   Prescriptions Last Dose Informant Patient Reported? Taking?   ARIPiprazole (ABILIFY) 15 mg tablet  Self Yes No   Sig: Take 20 mg by mouth in the morning   FLUoxetine (PROzac) 20 mg capsule  Self Yes No   Sig: take 1 capsule by mouth every morning  AS DIRECTED   FLUoxetine (PROzac) 40 MG capsule  Self Yes No   Sig: Take 40 mg by mouth every morning   QUEtiapine (SEROquel) 50 mg tablet  Self Yes No   Sig: Take 50 mg by mouth if needed   albuterol (Ventolin HFA) 90 mcg/act inhaler  Self No No   Sig: Inhale 2 puffs every 6 (six) hours as needed for wheezing   chlorhexidine (PERIDEX) 0.12 % solution   No No   Sig: Apply 15 mL to the mouth or throat 2 (two) times a day   Patient not taking: Reported on 1/21/2025   cholecalciferol (VITAMIN D3) 1,000 units tablet   No No   Sig: Take 1 tablet (1,000 Units total) by mouth daily   clonazePAM (KlonoPIN) 1 mg tablet  Self Yes No   Sig: take 1 tablet by mouth twice a day if needed  1 TAB IN THE A.M. SECOND TAB AS NEEDED   metFORMIN (GLUCOPHAGE) 500 mg tablet   No No   Sig: Take 1 tablet (500 mg total) by mouth 2 (two) times a day with meals   methocarbamol (ROBAXIN) 500 mg tablet   No No   Sig: Take 1 tablet (500 mg total) by mouth 2 (two) times a day   naltrexone (REVIA) 50 mg tablet  Self Yes No   Sig: Take 50 mg by mouth daily   Patient not taking: Reported on 1/21/2025   naproxen (Naprosyn) 500 mg tablet   No No   Sig: Take 1 tablet (500 mg total) by mouth 2 (two) times a day with meals   Patient not taking: Reported on 1/21/2025   rosuvastatin (CRESTOR) 10 MG tablet   No No   Sig: Take 1 tablet (10 mg total) by mouth daily      Facility-Administered Medications: None     Discharge Medication List as of 4/23/2025  2:39 PM        START taking these medications    Details    lidocaine (Lidoderm) 5 % Apply 1 patch topically over 12 hours daily Remove & Discard patch within 12 hours or as directed by MD, Starting Wed 4/23/2025, Normal      oxyCODONE-acetaminophen (Percocet) 5-325 mg per tablet Take 1 tablet by mouth every 8 (eight) hours as needed for severe pain for up to 5 doses Max Daily Amount: 3 tablets, Starting Wed 4/23/2025, Normal           CONTINUE these medications which have NOT CHANGED    Details   albuterol (Ventolin HFA) 90 mcg/act inhaler Inhale 2 puffs every 6 (six) hours as needed for wheezing, Starting Tue 6/27/2023, Normal      ARIPiprazole (ABILIFY) 15 mg tablet Take 20 mg by mouth in the morning, Starting Tue 9/21/2021, Historical Med      chlorhexidine (PERIDEX) 0.12 % solution Apply 15 mL to the mouth or throat 2 (two) times a day, Starting Wed 11/13/2024, Normal      cholecalciferol (VITAMIN D3) 1,000 units tablet Take 1 tablet (1,000 Units total) by mouth daily, Starting Tue 10/31/2023, Normal      clonazePAM (KlonoPIN) 1 mg tablet take 1 tablet by mouth twice a day if needed  1 TAB IN THE A.M. SECOND TAB AS NEEDED, Historical Med      !! FLUoxetine (PROzac) 20 mg capsule take 1 capsule by mouth every morning  AS DIRECTED, Historical Med      !! FLUoxetine (PROzac) 40 MG capsule Take 40 mg by mouth every morning, Starting Wed 7/3/2019, Historical Med      metFORMIN (GLUCOPHAGE) 500 mg tablet Take 1 tablet (500 mg total) by mouth 2 (two) times a day with meals, Starting Tue 1/21/2025, Normal      methocarbamol (ROBAXIN) 500 mg tablet Take 1 tablet (500 mg total) by mouth 2 (two) times a day, Starting Tue 11/5/2024, Normal      naltrexone (REVIA) 50 mg tablet Take 50 mg by mouth daily, Starting Thu 6/8/2023, Historical Med      naproxen (Naprosyn) 500 mg tablet Take 1 tablet (500 mg total) by mouth 2 (two) times a day with meals, Starting Wed 11/13/2024, Normal      QUEtiapine (SEROquel) 50 mg tablet Take 50 mg by mouth if needed, Starting Wed 5/10/2023,  Historical Med      rosuvastatin (CRESTOR) 10 MG tablet Take 1 tablet (10 mg total) by mouth daily, Starting Tue 1/21/2025, Normal       !! - Potential duplicate medications found. Please discuss with provider.        No discharge procedures on file.  ED SEPSIS DOCUMENTATION   Time reflects when diagnosis was documented in both MDM as applicable and the Disposition within this note       Time User Action Codes Description Comment    4/23/2025  2:37 PM Simone Larose [M54.9] Musculoskeletal back pain     4/23/2025  2:37 PM Simone Larose [K52.9] Enteritis     4/23/2025  2:37 PM Simone Larose [D25.9] Uterine fibroid                  Simone Larose MD  04/23/25 0546

## 2025-04-25 ENCOUNTER — TELEPHONE (OUTPATIENT)
Dept: FAMILY MEDICINE CLINIC | Facility: CLINIC | Age: 50
End: 2025-04-25

## 2025-04-25 NOTE — TELEPHONE ENCOUNTER
LMOM for patient asking her to call us back to let us know if she will be able to get labs done and resulted prior to her 4/29 appt.

## 2025-04-25 NOTE — TELEPHONE ENCOUNTER
Pt has upcoming appt for DM fu and med check on 4/29. Labs are still not done.  Needs to have labs done prior to appt. Orders in chart.   Please call pt to advise.

## 2025-04-25 NOTE — TELEPHONE ENCOUNTER
Pt called and is going to get her labs done tomorrow and would like if pcp can add an order for Lyme disease for her to have done because she did test positive 2  years ago and wanted to double check on it.

## 2025-04-28 ENCOUNTER — TELEPHONE (OUTPATIENT)
Dept: FAMILY MEDICINE CLINIC | Facility: CLINIC | Age: 50
End: 2025-04-28

## 2025-04-28 LAB
ALBUMIN SERPL-MCNC: 4.2 G/DL (ref 3.9–4.9)
ALBUMIN/CREAT UR: 25 MG/G CREAT (ref 0–29)
ALP SERPL-CCNC: 76 IU/L (ref 44–121)
ALT SERPL-CCNC: 8 IU/L (ref 0–32)
AST SERPL-CCNC: 15 IU/L (ref 0–40)
BILIRUB SERPL-MCNC: <0.2 MG/DL (ref 0–1.2)
BUN SERPL-MCNC: 18 MG/DL (ref 6–24)
BUN/CREAT SERPL: 18 (ref 9–23)
CALCIUM SERPL-MCNC: 8.9 MG/DL (ref 8.7–10.2)
CHLORIDE SERPL-SCNC: 102 MMOL/L (ref 96–106)
CHOLEST SERPL-MCNC: 245 MG/DL (ref 100–199)
CO2 SERPL-SCNC: 21 MMOL/L (ref 20–29)
CREAT SERPL-MCNC: 1 MG/DL (ref 0.57–1)
CREAT UR-MCNC: 62.3 MG/DL
EGFR: 70 ML/MIN/1.73
ERYTHROCYTE [DISTWIDTH] IN BLOOD BY AUTOMATED COUNT: 14 % (ref 11.7–15.4)
EST. AVERAGE GLUCOSE BLD GHB EST-MCNC: 117 MG/DL
GLOBULIN SER-MCNC: 2.3 G/DL (ref 1.5–4.5)
GLUCOSE SERPL-MCNC: 93 MG/DL (ref 70–99)
HBA1C MFR BLD: 5.7 % (ref 4.8–5.6)
HCT VFR BLD AUTO: 35.4 % (ref 34–46.6)
HDLC SERPL-MCNC: 33 MG/DL
HGB BLD-MCNC: 11.3 G/DL (ref 11.1–15.9)
LDLC SERPL CALC-MCNC: 122 MG/DL (ref 0–99)
LDLC/HDLC SERPL: 3.7 RATIO (ref 0–3.2)
MCH RBC QN AUTO: 27.2 PG (ref 26.6–33)
MCHC RBC AUTO-ENTMCNC: 31.9 G/DL (ref 31.5–35.7)
MCV RBC AUTO: 85 FL (ref 79–97)
MICROALBUMIN UR-MCNC: 15.5 UG/ML
PLATELET # BLD AUTO: 260 X10E3/UL (ref 150–450)
POTASSIUM SERPL-SCNC: 4.4 MMOL/L (ref 3.5–5.2)
PROT SERPL-MCNC: 6.5 G/DL (ref 6–8.5)
RBC # BLD AUTO: 4.15 X10E6/UL (ref 3.77–5.28)
SL AMB VLDL CHOLESTEROL CALC: 90 MG/DL (ref 5–40)
SODIUM SERPL-SCNC: 138 MMOL/L (ref 134–144)
TRIGL SERPL-MCNC: 503 MG/DL (ref 0–149)
WBC # BLD AUTO: 5.1 X10E3/UL (ref 3.4–10.8)

## 2025-04-28 NOTE — TELEPHONE ENCOUNTER
"Labs that I ordered were from January and are needed for appt.   I do not see \"final\" as per above notes.   Cannot do DM appt with labs. "

## 2025-04-28 NOTE — TELEPHONE ENCOUNTER
Patient returned call and stated she went to [facility] and had her labs drawn on Saturday at [facility].  Per patients chart, labs are final in chart.

## 2025-04-28 NOTE — TELEPHONE ENCOUNTER
Pt has upcoming appt tomorrow for DM fu and lab review. Labs still not done. Needs to have labs done prior to appt. Orders in chart.   Please call pt to advise.

## 2025-04-28 NOTE — TELEPHONE ENCOUNTER
LMOM for patient to call the office back regarding her appt for tomorrow.  Labs weren't completed.

## 2025-04-29 ENCOUNTER — OFFICE VISIT (OUTPATIENT)
Dept: FAMILY MEDICINE CLINIC | Facility: CLINIC | Age: 50
End: 2025-04-29
Payer: COMMERCIAL

## 2025-04-29 VITALS
TEMPERATURE: 97.6 F | SYSTOLIC BLOOD PRESSURE: 122 MMHG | HEIGHT: 67 IN | OXYGEN SATURATION: 98 % | HEART RATE: 98 BPM | BODY MASS INDEX: 39.62 KG/M2 | WEIGHT: 252.4 LBS | DIASTOLIC BLOOD PRESSURE: 78 MMHG | RESPIRATION RATE: 18 BRPM

## 2025-04-29 DIAGNOSIS — F10.11 HISTORY OF ETOH ABUSE: ICD-10-CM

## 2025-04-29 DIAGNOSIS — E55.9 VITAMIN D DEFICIENCY: ICD-10-CM

## 2025-04-29 DIAGNOSIS — A69.20 LYME DISEASE: ICD-10-CM

## 2025-04-29 DIAGNOSIS — E11.9 TYPE 2 DIABETES MELLITUS WITHOUT COMPLICATION, WITHOUT LONG-TERM CURRENT USE OF INSULIN (HCC): ICD-10-CM

## 2025-04-29 DIAGNOSIS — F31.70 BIPOLAR DISORDER IN FULL REMISSION, MOST RECENT EPISODE UNSPECIFIED TYPE (HCC): ICD-10-CM

## 2025-04-29 DIAGNOSIS — J06.9 VIRAL URI WITH COUGH: ICD-10-CM

## 2025-04-29 DIAGNOSIS — R53.83 OTHER FATIGUE: ICD-10-CM

## 2025-04-29 DIAGNOSIS — E78.2 MIXED HYPERLIPIDEMIA: ICD-10-CM

## 2025-04-29 DIAGNOSIS — Z00.00 ANNUAL PHYSICAL EXAM: ICD-10-CM

## 2025-04-29 DIAGNOSIS — Z72.0 TOBACCO ABUSE DISORDER: ICD-10-CM

## 2025-04-29 DIAGNOSIS — E78.1 HYPERTRIGLYCERIDEMIA: ICD-10-CM

## 2025-04-29 DIAGNOSIS — E66.01 MORBIDLY OBESE (HCC): ICD-10-CM

## 2025-04-29 DIAGNOSIS — E11.65 TYPE 2 DIABETES MELLITUS WITH HYPERGLYCEMIA, WITHOUT LONG-TERM CURRENT USE OF INSULIN (HCC): Primary | ICD-10-CM

## 2025-04-29 PROCEDURE — 99396 PREV VISIT EST AGE 40-64: CPT | Performed by: NURSE PRACTITIONER

## 2025-04-29 PROCEDURE — 99214 OFFICE O/P EST MOD 30 MIN: CPT | Performed by: NURSE PRACTITIONER

## 2025-04-29 RX ORDER — ROSUVASTATIN CALCIUM 10 MG/1
10 TABLET, COATED ORAL DAILY
Qty: 90 TABLET | Refills: 1 | Status: SHIPPED | OUTPATIENT
Start: 2025-04-29

## 2025-04-29 RX ORDER — FENOFIBRATE 145 MG/1
145 TABLET, FILM COATED ORAL DAILY
Qty: 90 TABLET | Refills: 3 | Status: SHIPPED | OUTPATIENT
Start: 2025-04-29

## 2025-04-29 RX ORDER — CHOLECALCIFEROL (VITAMIN D3) 25 MCG
1000 TABLET ORAL DAILY
Qty: 90 TABLET | Refills: 1 | Status: SHIPPED | OUTPATIENT
Start: 2025-04-29

## 2025-04-29 RX ORDER — ALBUTEROL SULFATE 90 UG/1
2 INHALANT RESPIRATORY (INHALATION) EVERY 6 HOURS PRN
Qty: 18 G | Refills: 0 | Status: SHIPPED | OUTPATIENT
Start: 2025-04-29

## 2025-04-29 NOTE — ASSESSMENT & PLAN NOTE
Continue counseling. AA meetings. Anticipatory guidance  Orders:    Comprehensive metabolic panel; Future    CBC and Platelet; Future

## 2025-04-29 NOTE — PROGRESS NOTES
Adult Annual Physical  Name: Mirlande Mesa      : 1975      MRN: [id number]  Encounter Provider: JORDAN Cabello  Encounter Date: 2025   Encounter department: [facility]    :  Assessment & Plan  Type 2 diabetes mellitus with hyperglycemia, without long-term current use of insulin (HCC)  Continue metformin bid  Carb controlled diet.   Lab Results   Component Value Date    HGBA1C 5.7 (H) 2025   Orders:    Comprehensive metabolic panel; Future    Tobacco abuse disorder  Tobacco Cessation Counseling: Tobacco cessation counseling and education was provided. The patient is sincerely urged to quit consumption of tobacco. She is not ready to quit tobacco. The numerous health risks of tobacco consumption were discussed. If she decides to quit, there are a number of helpful adjunctive aids, and she can see me to discuss nicotine replacement therapy, chantix, or bupropion anytime in the future.. I spent 5 minutes on Tobacco Cessation counseling during today's visit.   Orders:    CBC and Platelet; Future    History of ETOH abuse  Continue counseling. AA meetings. Anticipatory guidance  Orders:    Comprehensive metabolic panel; Future    CBC and Platelet; Future    Bipolar disorder in full remission, most recent episode unspecified type (HCC)  Managed by psych. Monitor. No change to current tx plan.   Mixed hyperlipidemia  Statin. Add fenofibrate. Heart healthy diet. Decrease ETOH. Monitor labs.   Orders:    fenofibrate (TRICOR) 145 mg tablet; Take 1 tablet (145 mg total) by mouth daily    rosuvastatin (CRESTOR) 10 MG tablet; Take 1 tablet (10 mg total) by mouth daily    Comprehensive metabolic panel; Future    CBC and Platelet; Future    Lipid Panel with Direct LDL reflex; Future    Vitamin D deficiency  Orders:    cholecalciferol (VITAMIN D3) 1,000 units tablet; Take 1 tablet (1,000 Units total) by mouth daily    Annual physical exam  Heart healthy, carbohydrate controlled  diet- limit red meat, limit saturated fat, moderate salt intake, limit junk food, etc.   Regular exercise  Stress management  Routine labwork and screenings as ordered.          Type 2 diabetes mellitus without complication, without long-term current use of insulin (HCC)  Controlled. Continue metformin. Weight loss. Regular exercise. Monitor.   Lab Results   Component Value Date    HGBA1C 5.7 (H) 04/26/2025   Orders:    rosuvastatin (CRESTOR) 10 MG tablet; Take 1 tablet (10 mg total) by mouth daily    metFORMIN (GLUCOPHAGE) 500 mg tablet; Take 1 tablet (500 mg total) by mouth 2 (two) times a day with meals    Hemoglobin A1C; Future    Comprehensive metabolic panel; Future    CBC and Platelet; Future    Lipid Panel with Direct LDL reflex; Future    Viral URI with cough  Orders:    albuterol (Ventolin HFA) 90 mcg/act inhaler; Inhale 2 puffs every 6 (six) hours as needed for wheezing    Hypertriglyceridemia  Add fenofibrate. Statin. Heart healthy diet. Decrease etoh intake.   Orders:    fenofibrate (TRICOR) 145 mg tablet; Take 1 tablet (145 mg total) by mouth daily    CBC and Platelet; Future    Lipid Panel with Direct LDL reflex; Future    Morbidly obese (HCC)  Weight loss is recommended to improve overall health.   Dietary changes- limit carbohydrates, decrease overall caloric intake, reduce portion sizes, healthier snack choices, limit saturated fats, increase intake of fruits and vegetables, limit junk food.   exercise to 3-5 times per week. Consider adding strength exercises to exercise routine.   Orders:    Ambulatory Referral to Weight Management; Future    Other fatigue  Orders:    Lyme Total AB W Reflex to IGM/IGG; Future    TSH, 3rd generation; Future    Lyme disease  Orders:    Lyme Total AB W Reflex to IGM/IGG; Future        Preventive Screenings:  - Diabetes Screening: has diabetes, risks/benefits discussed and screening up-to-date  - Cholesterol Screening: has hyperlipidemia, risks/benefits discussed and  screening up-to-date   - Hepatitis C screening: screening up-to-date   - HIV screening: screening up-to-date   - Cervical cancer screening: risks/benefits discussed   - Breast cancer screening: screening up-to-date   - Colon cancer screening: risks/benefits discussed and orders placed   - Lung cancer screening: screening not indicated     Immunizations:  - Immunizations due: Influenza and Prevnar 20    Counseling/Anticipatory Guidance:  - Alcohol: discussed moderation in alcohol intake and recommendations for healthy alcohol use.   - Tobacco use: discussed harms of tobacco use and management options for quitting.   - Diet: discussed recommendations for a healthy/well-balanced diet.   - Exercise: the importance of regular exercise/physical activity was discussed. Recommend exercise 3-5 times per week for at least 30 minutes.        BMI Counseling: Body mass index is 40.13 kg/m². The BMI is above normal. Nutrition recommendations include reducing portion sizes, decreasing overall calorie intake, moderation in carbohydrate intake, reducing intake of saturated fat and trans fat, and reducing intake of cholesterol. Exercise recommendations include exercising 3-5 times per week.  Depression Screening Follow-up Plan: Patient's depression screening was positive with a PHQ-9 score of 11. Patient with underlying depression and was advised to continue current medications as prescribed. Patient assessed for underlying major depression. They have no active suicidal ideations. Brief counseling provided and recommend additional follow-up/re-evaluation next office visit. Continue regular follow-up with their psychologist/therapist/psychiatrist who is managing their mental health condition(s).    History of Present Illness     Adult Annual Physical:  Patient presents for annual physical. Here for DM/chronic fu, lab review, med check, and physical  Has not been taking statin. Ran out.   Dm on metformin. Supposed to be taking bid ,  but often forgets second dose.   History of etoh. Was sober for 3 months. Since Easter holiday, has had a few episodes of drinking. Last night drank 6 pack  Smoker- still   Mental health history includes anxiety, depression and bipolar. Follows with psych monthly.   Has been going to AA intermittently.   .     Diet and Physical Activity:  - Diet/Nutrition: no special diet.  - Exercise: walking, 5-7 times a week on average and 30-60 minutes on average.    Depression Screening:    - PHQ-9 Score: 11    General Health:  - Sleep: sleeps poorly, 4-6 hours of sleep on average and 7-8 hours of sleep on average.  - Hearing: normal hearing bilateral ears and tinnitus.  - Vision: vision problems and most recent eye exam < 1 year ago.  - Dental: regular dental visits, brushes teeth twice daily and does not floss.    /GYN Health:  - Follows with GYN: yes.   - Menopause: perimenopausal.   - Last menstrual cycle: 4/20/2025.   - History of STDs: no    Advanced Care Planning:  - Has an advanced directive?: no    - Has a durable medical POA?: no    - ACP document given to patient?: yes          Review of Systems   Constitutional:  Negative for chills, diaphoresis, fatigue and fever.   HENT:  Negative for congestion, ear pain, sinus pressure, sinus pain and sore throat.    Eyes:  Negative for visual disturbance.   Respiratory:  Negative for cough, chest tightness, shortness of breath and wheezing.    Cardiovascular:  Negative for chest pain, palpitations and leg swelling.   Gastrointestinal:  Negative for abdominal distention, abdominal pain, diarrhea and nausea.   Genitourinary:  Negative for dysuria, frequency and urgency.   Musculoskeletal:  Negative for arthralgias, back pain and myalgias.   Allergic/Immunologic: Negative for immunocompromised state.   Neurological:  Negative for dizziness, weakness and headaches.   Hematological:  Negative for adenopathy.   Psychiatric/Behavioral:  Negative for dysphoric mood. The patient is  "not nervous/anxious.    All other systems reviewed and are negative.        Objective   /78   Pulse 98   Temp 97.6 °F (36.4 °C)   Resp 18   Ht 5' 6.5\" (1.689 m)   Wt 114 kg (252 lb 6.4 oz)   SpO2 98%   BMI 40.13 kg/m²     Recent Results (from the past 4 weeks)   ECG 12 lead    Collection Time: 04/23/25 11:44 AM   Result Value Ref Range    Ventricular Rate 72 BPM    Atrial Rate 72 BPM    IN Interval 206 ms    QRSD Interval 86 ms    QT Interval 462 ms    QTC Interval 505 ms    P [place] 43 degrees    QRS Axis 39 degrees    T Wave Axis 50 degrees   CBC and differential    Collection Time: 04/23/25 11:56 AM   Result Value Ref Range    WBC 4.78 4.31 - 10.16 Thousand/uL    RBC 4.09 3.81 - 5.12 Million/uL    Hemoglobin 11.2 (L) 11.5 - 15.4 g/dL    Hematocrit 34.9 34.8 - 46.1 %    MCV 85 82 - 98 fL    MCH 27.4 26.8 - 34.3 pg    MCHC 32.1 31.4 - 37.4 g/dL    RDW 14.1 11.6 - 15.1 %    MPV 10.8 8.9 - 12.7 fL    Platelets 233 149 - 390 Thousands/uL    nRBC 0 /100 WBCs    Segmented % 54 43 - 75 %    Immature Grans % 0 0 - 2 %    Lymphocytes % 32 14 - 44 %    Monocytes % 10 4 - 12 %    Eosinophils Relative 3 0 - 6 %    Basophils Relative 1 0 - 1 %    Absolute Neutrophils 2.60 1.85 - 7.62 Thousands/µL    Absolute Immature Grans 0.01 0.00 - 0.20 Thousand/uL    Absolute Lymphocytes 1.52 0.60 - 4.47 Thousands/µL    Absolute Monocytes 0.48 0.17 - 1.22 Thousand/µL    Eosinophils Absolute 0.14 0.00 - 0.61 Thousand/µL    Basophils Absolute 0.03 0.00 - 0.10 Thousands/µL   Comprehensive metabolic panel    Collection Time: 04/23/25 11:56 AM   Result Value Ref Range    Sodium 137 135 - 147 mmol/L    Potassium 3.9 3.5 - 5.3 mmol/L    Chloride 105 96 - 108 mmol/L    CO2 23 21 - 32 mmol/L    ANION GAP 9 4 - 13 mmol/L    BUN 16 5 - 25 mg/dL    Creatinine 0.77 0.60 - 1.30 mg/dL    Glucose 89 65 - 140 mg/dL    Calcium 8.6 8.4 - 10.2 mg/dL    AST 14 13 - 39 U/L    ALT 10 7 - 52 U/L    Alkaline Phosphatase 53 34 - 104 U/L    Total Protein " "6.6 6.4 - 8.4 g/dL    Albumin 4.0 3.5 - 5.0 g/dL    Total Bilirubin 0.40 0.20 - 1.00 mg/dL    eGFR 92 ml/min/1.73sq m   Lipase    Collection Time: 04/23/25 11:56 AM   Result Value Ref Range    Lipase 38 11 - 82 u/L   HS Troponin 0hr (reflex protocol)    Collection Time: 04/23/25 11:56 AM   Result Value Ref Range    hs TnI 0hr <2 \"Refer to ACS Flowchart\"- see link ng/L   UA (URINE) with reflex to Scope    Collection Time: 04/23/25 12:01 PM   Result Value Ref Range    Color, UA Yellow     Clarity, UA Cloudy     Specific Gravity, UA >=1.030 1.005 - 1.030    pH, UA 6.0 4.5, 5.0, 5.5, 6.0, 6.5, 7.0, 7.5, 8.0    Leukocytes, UA Trace (A) Negative    Nitrite, UA Negative Negative    Protein, UA Trace (A) Negative mg/dl    Glucose, UA Negative Negative mg/dl    Ketones, UA Negative Negative mg/dl    Urobilinogen, UA <2.0 <2.0 mg/dl mg/dl    Bilirubin, UA Negative Negative    Occult Blood, UA Large (A) Negative   POCT pregnancy, urine    Collection Time: 04/23/25 12:01 PM   Result Value Ref Range    EXT Preg Test, Ur Negative     Control Valid    Urine Microscopic    Collection Time: 04/23/25 12:01 PM   Result Value Ref Range    RBC, UA Innumerable (A) None Seen, 0-1, 1-2, 2-4, 0-5 /hpf    WBC, UA Field obscured, unable to enumerate (A) None Seen, 0-1, 1-2, 0-5, 2-4 /hpf    Epithelial Cells Occasional None Seen, Occasional /hpf    Bacteria, UA Occasional None Seen, Occasional /hpf   Albumin / creatinine urine ratio    Collection Time: 04/26/25  7:50 AM   Result Value Ref Range    Creatinine, Urine 62.3 Not Estab. mg/dL    Albumin,U,Random 15.5 Not Estab. ug/mL    Microalb/Creat Ratio 25 0 - 29 mg/g creat   Comprehensive metabolic panel    Collection Time: 04/26/25  7:50 AM   Result Value Ref Range    Glucose, Random 93 70 - 99 mg/dL    BUN 18 6 - 24 mg/dL    Creatinine 1.00 0.57 - 1.00 mg/dL    eGFR 70 >59 mL/min/1.73    SL AMB BUN/CREATININE RATIO 18 9 - 23    Sodium 138 134 - 144 mmol/L    Potassium 4.4 3.5 - 5.2 mmol/L    " Chloride 102 96 - 106 mmol/L    CO2 21 20 - 29 mmol/L    CALCIUM 8.9 8.7 - 10.2 mg/dL    Protein, Total 6.5 6.0 - 8.5 g/dL    Albumin 4.2 3.9 - 4.9 g/dL    Globulin, Total 2.3 1.5 - 4.5 g/dL    TOTAL BILIRUBIN <0.2 0.0 - 1.2 mg/dL    Alk Phos Isoenzymes 76 44 - 121 IU/L    AST 15 0 - 40 IU/L    ALT 8 0 - 32 IU/L   Hemoglobin A1C    Collection Time: 04/26/25  7:50 AM   Result Value Ref Range    Hemoglobin A1C 5.7 (H) 4.8 - 5.6 %    Estimated Average Glucose 117 mg/dL   CBC and Platelet    Collection Time: 04/26/25  7:50 AM   Result Value Ref Range    White Blood Cell Count 5.1 3.4 - 10.8 x10E3/uL    Red Blood Cell Count 4.15 3.77 - 5.28 x10E6/uL    Hemoglobin 11.3 11.1 - 15.9 g/dL    HCT 35.4 34.0 - 46.6 %    MCV 85 79 - 97 fL    MCH 27.2 26.6 - 33.0 pg    MCHC 31.9 31.5 - 35.7 g/dL    RDW 14.0 11.7 - 15.4 %    Platelet Count 260 150 - 450 x10E3/uL   Lipid Panel with Direct LDL reflex    Collection Time: 04/26/25  7:50 AM   Result Value Ref Range    Cholesterol, Total 245 (H) 100 - 199 mg/dL    Triglycerides 503 (H) 0 - 149 mg/dL    HDL 33 (L) >39 mg/dL    VLDL Cholesterol Calculated 90 (H) 5 - 40 mg/dL    LDL Calculated 122 (H) 0 - 99 mg/dL    LDl/HDL Ratio 3.7 (H) 0.0 - 3.2 ratio     Reviewed lab/diagnostic results with pt including both normal and abnormal findings.   In depth counseling and instructions given. All questions answered during visit.     Physical Exam  Vitals reviewed.   Constitutional:       General: She is not in acute distress.     Appearance: Normal appearance. She is well-developed. She is obese. She is not ill-appearing.   HENT:      Head: Normocephalic and atraumatic.      Right Ear: Tympanic membrane and ear canal normal.      Left Ear: Tympanic membrane and ear canal normal.      Nose: Nose normal.      Mouth/Throat:      Mouth: Mucous membranes are moist.      Pharynx: Oropharynx is clear.   Eyes:      General: No scleral icterus.     Extraocular Movements: Extraocular movements intact.       Pupils: Pupils are equal, round, and reactive to light.   Neck:      Thyroid: No thyromegaly.      Vascular: No carotid bruit.   Cardiovascular:      Rate and Rhythm: Normal rate and regular rhythm.      Heart sounds: Normal heart sounds. No murmur heard.  Pulmonary:      Effort: Pulmonary effort is normal. No respiratory distress.      Breath sounds: Normal breath sounds. No wheezing or rales.   Abdominal:      General: Bowel sounds are normal. There is no distension.      Palpations: Abdomen is soft.      Tenderness: There is no abdominal tenderness.   Musculoskeletal:         General: Normal range of motion.      Cervical back: Normal range of motion and neck supple.      Right lower leg: No edema.      Left lower leg: No edema.   Lymphadenopathy:      Cervical: No cervical adenopathy.   Skin:     General: Skin is warm and dry.      Coloration: Skin is not jaundiced or pale.   Neurological:      General: No focal deficit present.      Mental Status: She is alert and oriented to person, place, and time.      Cranial Nerves: No cranial nerve deficit.      Sensory: No sensory deficit.      Coordination: Coordination normal.      Gait: Gait normal.   Psychiatric:         Mood and Affect: Mood normal.         Behavior: Behavior normal.         Thought Content: Thought content normal.         Judgment: Judgment normal.

## 2025-04-29 NOTE — ASSESSMENT & PLAN NOTE
Statin. Add fenofibrate. Heart healthy diet. Decrease ETOH. Monitor labs.   Orders:    fenofibrate (TRICOR) 145 mg tablet; Take 1 tablet (145 mg total) by mouth daily    rosuvastatin (CRESTOR) 10 MG tablet; Take 1 tablet (10 mg total) by mouth daily    Comprehensive metabolic panel; Future    CBC and Platelet; Future    Lipid Panel with Direct LDL reflex; Future

## 2025-04-29 NOTE — ASSESSMENT & PLAN NOTE
Orders:    cholecalciferol (VITAMIN D3) 1,000 units tablet; Take 1 tablet (1,000 Units total) by mouth daily

## 2025-04-29 NOTE — ASSESSMENT & PLAN NOTE
Tobacco Cessation Counseling: Tobacco cessation counseling and education was provided. The patient is sincerely urged to quit consumption of tobacco. She is not ready to quit tobacco. The numerous health risks of tobacco consumption were discussed. If she decides to quit, there are a number of helpful adjunctive aids, and she can see me to discuss nicotine replacement therapy, chantix, or bupropion anytime in the future.. I spent 5 minutes on Tobacco Cessation counseling during today's visit.   Orders:    CBC and Platelet; Future

## 2025-04-29 NOTE — ASSESSMENT & PLAN NOTE
Continue metformin bid  Carb controlled diet.   Lab Results   Component Value Date    HGBA1C 5.7 (H) 04/26/2025   Orders:    Comprehensive metabolic panel; Future

## 2025-04-29 NOTE — PATIENT INSTRUCTIONS
"Patient Education     Routine physical for adults   The Basics   Written by the doctors and editors at [facility]   What is a physical? -- A physical is a routine visit, or \"check-up,\" with your doctor. You might also hear it called a \"wellness visit\" or \"preventive visit.\"  During each visit, the doctor will:   Ask about your physical and mental health   Ask about your habits, behaviors, and lifestyle   Do an exam   Give you vaccines if needed   Talk to you about any medicines you take   Give advice about your health   Answer your questions  Getting regular check-ups is an important part of taking care of your health. It can help your doctor find and treat any problems you have. But it's also important for preventing health problems.  A routine physical is different from a \"sick visit.\" A sick visit is when you see a doctor because of a health concern or problem. Since physicals are scheduled ahead of time, you can think about what you want to ask the doctor.  How often should I get a physical? -- It depends on your age and health. In general, for people age 21 years and older:   If you are younger than 50 years, you might be able to get a physical every 3 years.   If you are 50 years or older, your doctor might recommend a physical every year.  If you have an ongoing health condition, like diabetes or high blood pressure, your doctor will probably want to see you more often.  What happens during a physical? -- In general, each visit will include:   Physical exam - The doctor or nurse will check your height, weight, heart rate, and blood pressure. They will also look at your eyes and ears. They will ask about how you are feeling and whether you have any symptoms that bother you.   Medicines - It's a good idea to bring a list of all the medicines you take to each doctor visit. Your doctor will talk to you about your medicines and answer any questions. Tell them if you are having any side effects that bother you. You " "should also tell them if you are having trouble paying for any of your medicines.   Habits and behaviors - This includes:   Your diet   Your exercise habits   Whether you smoke, drink alcohol, or use drugs   Whether you are sexually active   Whether you feel safe at home  Your doctor will talk to you about things you can do to improve your health and lower your risk of health problems. They will also offer help and support. For example, if you want to quit smoking, they can give you advice and might prescribe medicines. If you want to improve your diet or get more physical activity, they can help you with this, too.   Lab tests, if needed - The tests you get will depend on your age and situation. For example, your doctor might want to check your:   Cholesterol   Blood sugar   Iron level   Vaccines - The recommended vaccines will depend on your age, health, and what vaccines you already had. Vaccines are very important because they can prevent certain serious or deadly infections.   Discussion of screening - \"Screening\" means checking for diseases or other health problems before they cause symptoms. Your doctor can recommend screening based on your age, risk, and preferences. This might include tests to check for:   Cancer, such as breast, prostate, cervical, ovarian, colorectal, prostate, lung, or skin cancer   Sexually transmitted infections, such as chlamydia and gonorrhea   Mental health conditions like depression and anxiety  Your doctor will talk to you about the different types of screening tests. They can help you decide which screenings to have. They can also explain what the results might mean.   Answering questions - The physical is a good time to ask the doctor or nurse questions about your health. If needed, they can refer you to other doctors or specialists, too.  Adults older than 65 years often need other care, too. As you get older, your doctor will talk to you about:   How to prevent falling at " home   Hearing or vision tests   Memory testing   How to take your medicines safely   Making sure that you have the help and support you need at home  All topics are updated as new evidence becomes available and our peer review process is complete.  This topic retrieved from [place] [employer] on: May 02, 2024.  Topic 235039 Version 1.0  Release: 32.4.3 - C32.122  © 2024 UpToDate, Inc. and/or its affiliates. All rights reserved.  Consumer Information Use and Disclaimer   Disclaimer: This generalized information is a limited summary of diagnosis, treatment, and/or medication information. It is not meant to be comprehensive and should be used as a tool to help the user understand and/or assess potential diagnostic and treatment options. It does NOT include all information about conditions, treatments, medications, side effects, or risks that may apply to a specific patient. It is not intended to be medical advice or a substitute for the medical advice, diagnosis, or treatment of a health care provider based on the health care provider's examination and assessment of a patient's specific and unique circumstances. Patients must speak with a health care provider for complete information about their health, medical questions, and treatment options, including any risks or benefits regarding use of medications. This information does not endorse any treatments or medications as safe, effective, or approved for treating a specific patient. UpToDate, Inc. and its affiliates disclaim any warranty or liability relating to this information or the use thereof.The use of this information is governed by the Terms of Use, available at https://www.woltersVobiuwer.com/en/know/clinical-effectiveness-terms. 2024© UpToDate, Inc. and its affiliates and/or licensors. All rights reserved.  Copyright   © 2024 UpToDate, Inc. and/or its affiliates. All rights reserved.

## 2025-05-05 RX ORDER — CLONAZEPAM 1 MG/1
TABLET ORAL
Refills: 0 | OUTPATIENT
Start: 2025-05-05

## 2025-05-05 RX ORDER — FLUOXETINE HYDROCHLORIDE 40 MG/1
40 CAPSULE ORAL EVERY MORNING
Refills: 0 | OUTPATIENT
Start: 2025-05-05

## 2025-05-05 NOTE — TELEPHONE ENCOUNTER
Please call pt.   I am not managing her mental health meds.   She sees psych.   Needs to contact them for refills.

## 2025-05-05 NOTE — TELEPHONE ENCOUNTER
Please disregard prior message.   Pt does not have appt tomorrow.   But I did not order this med, and I have not been managing her mental health medications so she will need to contact her psychiatrist for refills.

## 2025-05-05 NOTE — TELEPHONE ENCOUNTER
Please call pt  She has appt for med check tomorrow.   I have never prescribed this for her.   We can discuss tomorrow at appt.

## 2025-05-13 ENCOUNTER — CONSULT (OUTPATIENT)
Dept: PAIN MEDICINE | Facility: CLINIC | Age: 50
End: 2025-05-13
Payer: COMMERCIAL

## 2025-05-13 ENCOUNTER — APPOINTMENT (OUTPATIENT)
Dept: RADIOLOGY | Facility: CLINIC | Age: 50
End: 2025-05-13
Attending: STUDENT IN AN ORGANIZED HEALTH CARE EDUCATION/TRAINING PROGRAM
Payer: COMMERCIAL

## 2025-05-13 VITALS — BODY MASS INDEX: 40.15 KG/M2 | WEIGHT: 255.8 LBS | HEIGHT: 67 IN

## 2025-05-13 DIAGNOSIS — M47.816 SPONDYLOSIS WITHOUT MYELOPATHY OR RADICULOPATHY, LUMBAR REGION: Primary | ICD-10-CM

## 2025-05-13 DIAGNOSIS — M47.812 SPONDYLOSIS WITHOUT MYELOPATHY OR RADICULOPATHY, CERVICAL REGION: ICD-10-CM

## 2025-05-13 DIAGNOSIS — M54.2 NECK PAIN: ICD-10-CM

## 2025-05-13 DIAGNOSIS — M54.50 LOW BACK PAIN, UNSPECIFIED BACK PAIN LATERALITY, UNSPECIFIED CHRONICITY, UNSPECIFIED WHETHER SCIATICA PRESENT: ICD-10-CM

## 2025-05-13 PROCEDURE — 72110 X-RAY EXAM L-2 SPINE 4/>VWS: CPT

## 2025-05-13 PROCEDURE — 72050 X-RAY EXAM NECK SPINE 4/5VWS: CPT

## 2025-05-13 PROCEDURE — 99204 OFFICE O/P NEW MOD 45 MIN: CPT | Performed by: STUDENT IN AN ORGANIZED HEALTH CARE EDUCATION/TRAINING PROGRAM

## 2025-05-13 RX ORDER — CYCLOBENZAPRINE HCL 10 MG
10 TABLET ORAL 3 TIMES DAILY PRN
Qty: 90 TABLET | Refills: 1 | Status: SHIPPED | OUTPATIENT
Start: 2025-05-13

## 2025-05-13 RX ORDER — DICLOFENAC SODIUM 75 MG/1
75 TABLET, DELAYED RELEASE ORAL 2 TIMES DAILY
Qty: 60 TABLET | Refills: 1 | Status: SHIPPED | OUTPATIENT
Start: 2025-05-13

## 2025-05-13 NOTE — PROGRESS NOTES
Assessment:  1. Spondylosis without myelopathy or radiculopathy, lumbar region    2. Spondylosis without myelopathy or radiculopathy, cervical region        Plan:  Orders Placed This Encounter   Procedures    XR spine cervical complete 4 or 5 vw non injury     Standing Status:   Future     Number of Occurrences:   1     Expected Date:   5/13/2025     Expiration Date:   5/13/2029     Scheduling Instructions:      Bring along any outside films relating to this procedure.           Is the patient pregnant?:   No    XR spine lumbar minimum 4 views non injury     Standing Status:   Future     Number of Occurrences:   1     Expected Date:   5/13/2025     Expiration Date:   5/13/2029     Scheduling Instructions:      Bring along any outside films relating to this procedure.           Is the patient pregnant?:   No    Ambulatory referral to Physical Therapy     Standing Status:   Future     Expiration Date:   5/13/2026     Referral Priority:   Routine     Referral Type:   Physical Therapy     Referral Reason:   Specialty Services Required     Requested Specialty:   Physical Therapy     Number of Visits Requested:   1     Expiration Date:   5/13/2026       New Medications Ordered This Visit   Medications    cyclobenzaprine (FLEXERIL) 10 mg tablet     Sig: Take 1 tablet (10 mg total) by mouth 3 (three) times a day as needed for muscle spasms     Dispense:  90 tablet     Refill:  1    diclofenac (VOLTAREN) 75 mg EC tablet     Sig: Take 1 tablet (75 mg total) by mouth 2 (two) times a day     Dispense:  60 tablet     Refill:  1       My impressions and treatment recommendations were discussed in detail with the patient, who verbalized understanding and had no further questions.    Mirlande is a pleasant 49-year-old female presents today for evaluation management of neck and low back pain.  I independently interpreted the patient's cervical radiographs showing disc height loss with endplate spurring C5-6.  I also independently  interpreted the patient's lumbar radiographs showing degenerative anterolisthesis of L5 on S1 with facet arthropathy.  I do think the patient's pain is mechanical and facet mediated.  We discussed management including activity modification, physical therapy for core, lumbar, cervical and scapular stabilization programs, multimodal analgesics, and consideration of injection therapy.    I provided the patient with a prescription for physical therapy to work on cervical and scapular stabilization as well as core and lumbar stabilization.  I have also provided the patient prescriptions for diclofenac to be taken up to twice a day as well as cyclobenzaprine to be taken as needed up to 3 times a day.  I discussed the side effects of the medications with her.    I will have her follow-up in 6 weeks to see how she is progressing with conservative measures.  If failure to improve, we can consider obtaining advanced imaging to further evaluate for pathology that may be amenable to injection therapy.    Follow-up is planned in 6 weeks time or sooner as warranted.  Discharge instructions were provided. I personally saw and examined the patient and I agree with the above discussed plan of care.    I have spent a total time of 46 minutes in caring for this patient on the day of the visit/encounter including Diagnostic results, Prognosis, Risks and benefits of tx options, Instructions for management, Impressions, Documenting in the medical record, Reviewing/placing orders in the medical record (including tests, medications, and/or procedures), and Obtaining or reviewing history  .     History of Present Illness:    Mirlande Mesa is a 49 y.o. female who presents to [facility] for initial evaluation of the above stated pain complaints. The patient has a past medical and chronic pain history as outlined in the assessment section. She was referred by No referring provider defined for this  encounter..    49-year-old female with past medical history of anxiety, asthma, chest pain, depression, diabetes, hyperal lipidemia, carpal tunnel syndrome presents today for evaluation management of low back and neck pain that is been present for roughly 20 years.  She denies any specific inciting event.  Pain is rated as moderate to severe and currently 6 out of 10.  She reports interference with daily activities.  The pain is intermittent without a typical pattern associate with shooting, sharpness, pressure, and throbbing.  She denies any weakness in her upper and lower limbs.  Pain in the neck is located at the base and the upper trapezius area.  Her pain in the low back is across the lower lumbar area.  Pain is improved with lying down and sitting and made worse with standing bending sitting and walking.  She is not currently taking any medications.  She presents today for further evaluation.    Review of Systems:    Review of Systems   Constitutional:  Positive for unexpected weight change. Negative for chills, fatigue and fever.   HENT:  Negative for ear pain, sore throat and trouble swallowing.    Eyes:  Negative for pain, redness and visual disturbance.   Respiratory:  Positive for cough, shortness of breath and wheezing.    Cardiovascular:  Positive for chest pain. Negative for palpitations and leg swelling.   Gastrointestinal:  Positive for abdominal pain and nausea. Negative for diarrhea and vomiting.   Endocrine: Negative for polydipsia and polyuria.   Genitourinary:  Negative for difficulty urinating, dysuria and hematuria.   Musculoskeletal:  Positive for arthralgias, back pain, myalgias, neck pain and neck stiffness. Negative for gait problem and joint swelling.   Skin:  Negative for color change and rash.   Allergic/Immunologic: Negative for food allergies.   Neurological:  Negative for dizziness, seizures, syncope, numbness and headaches.   Hematological:  Does not bruise/bleed easily.    Psychiatric/Behavioral:  Positive for decreased concentration and sleep disturbance. Negative for dysphoric mood. The patient is nervous/anxious.    All other systems reviewed and are negative.          Past Medical History:   Diagnosis Date    Bipolar disorder (HCC)     Bronchitis 01/22/2019    Depression     History of wound infection     left ankle    Morbid obesity with BMI of 40.0-44.9, adult ([facility]) 01/18/2018    Psychiatric disorder     depression, anxiety    Seasonal allergic reaction        Past Surgical History:   Procedure Laterality Date    IR PICC LINE  2/8/2019    NV OPEN TREATMENT BIMALLEOLAR ANKLE FRACTURE Left 5/14/2018    Procedure: OPEN REDUCTION W/ INTERNAL FIXATION (ORIF) ANKLE;  Surgeon: Sam Bauman DO;  Location: [facility];  Service: Orthopedics    NV REMOVAL IMPLANT DEEP Left 2/5/2019    Procedure: REMOVAL HARDWARE ANKLE;  Surgeon: Sam Bauman DO;  Location: [facility];  Service: Orthopedics    REDUCTION MAMMAPLASTY  2001    reduction       Family History   Problem Relation Age of Onset    Substance Abuse Mother     Mental illness Mother     Diabetes Mother     Cancer Mother     Asthma Mother     Breast cancer Mother 55    Substance Abuse Father     No Known Problems Brother     No Known Problems Daughter     Substance Abuse Maternal Grandmother     Mental illness Maternal Grandmother     No Known Problems Maternal Grandfather     No Known Problems Paternal Grandmother     No Known Problems Paternal Grandfather     No Known Problems Maternal Aunt     No Known Problems Maternal Uncle     ADD / ADHD Neg Hx     Anesthesia problems Neg Hx     Clotting disorder Neg Hx     Collagen disease Neg Hx     Dislocations Neg Hx     Learning disabilities Neg Hx     Neurological problems Neg Hx     Osteoporosis Neg Hx     Rheumatologic disease Neg Hx     Scoliosis Neg Hx     Vascular Disease Neg Hx        Social History     Occupational History    Not on file   Tobacco Use    Smoking status: Every Day      Current packs/day: 1.00     Average packs/day: 1 pack/day for 34.8 years (34.8 ttl pk-yrs)     Types: Cigarettes     Start date: 7/23/1990     Passive exposure: Current    Smokeless tobacco: Never   Vaping Use    Vaping status: Former    Substances: Nicotine, CBD   Substance and Sexual Activity    Alcohol use: Not Currently     Comment: Quit drinking 2nd    Drug use: Never    Sexual activity: Yes     Partners: Male         Current Outpatient Medications:     cyclobenzaprine (FLEXERIL) 10 mg tablet, Take 1 tablet (10 mg total) by mouth 3 (three) times a day as needed for muscle spasms, Disp: 90 tablet, Rfl: 1    diclofenac (VOLTAREN) 75 mg EC tablet, Take 1 tablet (75 mg total) by mouth 2 (two) times a day, Disp: 60 tablet, Rfl: 1    albuterol (Ventolin HFA) 90 mcg/act inhaler, Inhale 2 puffs every 6 (six) hours as needed for wheezing, Disp: 18 g, Rfl: 0    ARIPiprazole (ABILIFY) 15 mg tablet, Take 20 mg by mouth in the morning, Disp: , Rfl:     chlorhexidine (PERIDEX) 0.12 % solution, Apply 15 mL to the mouth or throat 2 (two) times a day (Patient not taking: Reported on 4/29/2025), Disp: 120 mL, Rfl: 0    cholecalciferol (VITAMIN D3) 1,000 units tablet, Take 1 tablet (1,000 Units total) by mouth daily, Disp: 90 tablet, Rfl: 1    clonazePAM (KlonoPIN) 1 mg tablet, take 1 tablet by mouth twice a day if needed  1 TAB IN THE A.M. SECOND TAB AS NEEDED, Disp: , Rfl:     fenofibrate (TRICOR) 145 mg tablet, Take 1 tablet (145 mg total) by mouth daily, Disp: 90 tablet, Rfl: 3    FLUoxetine (PROzac) 20 mg capsule, take 1 capsule by mouth every morning  AS DIRECTED, Disp: , Rfl: 0    FLUoxetine (PROzac) 40 MG capsule, Take 40 mg by mouth every morning, Disp: , Rfl: 0    lidocaine (Lidoderm) 5 %, Apply 1 patch topically over 12 hours daily Remove & Discard patch within 12 hours or as directed by MD (Patient not taking: Reported on 4/29/2025), Disp: 14 patch, Rfl: 0    metFORMIN (GLUCOPHAGE) 500 mg tablet, Take 1 tablet  "(500 mg total) by mouth 2 (two) times a day with meals, Disp: 180 tablet, Rfl: 1    methocarbamol (ROBAXIN) 500 mg tablet, Take 1 tablet (500 mg total) by mouth 2 (two) times a day (Patient not taking: Reported on 4/29/2025), Disp: 20 tablet, Rfl: 0    naltrexone (REVIA) 50 mg tablet, Take 50 mg by mouth daily (Patient not taking: Reported on 1/21/2025), Disp: , Rfl:     naproxen (Naprosyn) 500 mg tablet, Take 1 tablet (500 mg total) by mouth 2 (two) times a day with meals (Patient not taking: Reported on 4/29/2025), Disp: 30 tablet, Rfl: 0    oxyCODONE-acetaminophen (Percocet) 5-325 mg per tablet, Take 1 tablet by mouth every 8 (eight) hours as needed for severe pain for up to 5 doses Max Daily Amount: 3 tablets (Patient not taking: Reported on 4/29/2025), Disp: 5 tablet, Rfl: 0    QUEtiapine (SEROquel) 50 mg tablet, Take 50 mg by mouth if needed, Disp: , Rfl:     rosuvastatin (CRESTOR) 10 MG tablet, Take 1 tablet (10 mg total) by mouth daily, Disp: 90 tablet, Rfl: 1    Allergies   Allergen Reactions    Latex Rash    Toradol [Ketorolac Tromethamine] Itching     GI UPSET    Ultram [Tramadol] Rash       Physical Exam:    Ht 5' 6.5\" (1.689 m)   Wt 116 kg (255 lb 12.8 oz)   BMI 40.67 kg/m²     Constitutional: normal, well developed, well nourished, alert, in no distress and non-toxic and no overt pain behavior.  Eyes: anicteric  HEENT: grossly intact  Neck: supple, symmetric, trachea midline and no masses   Pulmonary:even and unlabored  Cardiovascular:No edema or pitting edema present  Skin:Normal without rashes or lesions and well hydrated  Psychiatric:Mood and affect appropriate  Neurologic:Cranial Nerves II-XII grossly intact  Musculoskeletal: Tenderness to the cervical and lumbar paraspinals with positive facet loading.  Range of motion full on the cervical and lumbar spine as well as upper and lower limbs.  Strength, sensation, reflexes preserved in the upper and lower limbs.  Taut myofascial bands noted within " the bilateral upper trapezius and lumbar paraspinals.    Imaging  No orders to display       Orders Placed This Encounter   Procedures    XR spine cervical complete 4 or 5 vw non injury    XR spine lumbar minimum 4 views non injury    Ambulatory referral to Physical Therapy

## 2025-05-16 ENCOUNTER — TELEPHONE (OUTPATIENT)
Dept: FAMILY MEDICINE CLINIC | Facility: CLINIC | Age: 50
End: 2025-05-16

## 2025-05-16 NOTE — TELEPHONE ENCOUNTER
Lmom for patient to call the office back  Has patient completed her DM eye exam, if so when and where?  Was colonoscopy completed? If so when/where?

## 2025-05-19 ENCOUNTER — OFFICE VISIT (OUTPATIENT)
Age: 50
End: 2025-05-19
Payer: COMMERCIAL

## 2025-05-19 VITALS
HEIGHT: 67 IN | WEIGHT: 253.2 LBS | SYSTOLIC BLOOD PRESSURE: 129 MMHG | HEART RATE: 72 BPM | DIASTOLIC BLOOD PRESSURE: 99 MMHG | BODY MASS INDEX: 39.74 KG/M2

## 2025-05-19 DIAGNOSIS — R93.3 ABNORMAL CT SCAN, SMALL BOWEL: ICD-10-CM

## 2025-05-19 DIAGNOSIS — R10.13 EPIGASTRIC PAIN: ICD-10-CM

## 2025-05-19 DIAGNOSIS — R19.7 DIARRHEA, UNSPECIFIED TYPE: ICD-10-CM

## 2025-05-19 DIAGNOSIS — R10.9 ABDOMINAL CRAMPING: ICD-10-CM

## 2025-05-19 DIAGNOSIS — K21.9 GASTROESOPHAGEAL REFLUX DISEASE, UNSPECIFIED WHETHER ESOPHAGITIS PRESENT: ICD-10-CM

## 2025-05-19 DIAGNOSIS — Z12.11 SCREENING FOR COLON CANCER: Primary | ICD-10-CM

## 2025-05-19 PROCEDURE — 99204 OFFICE O/P NEW MOD 45 MIN: CPT | Performed by: PHYSICIAN ASSISTANT

## 2025-05-19 RX ORDER — SODIUM CHLORIDE, SODIUM LACTATE, POTASSIUM CHLORIDE, CALCIUM CHLORIDE 600; 310; 30; 20 MG/100ML; MG/100ML; MG/100ML; MG/100ML
125 INJECTION, SOLUTION INTRAVENOUS CONTINUOUS
OUTPATIENT
Start: 2025-05-19

## 2025-05-19 RX ORDER — OMEPRAZOLE 40 MG/1
40 CAPSULE, DELAYED RELEASE ORAL
Qty: 30 CAPSULE | Refills: 4 | Status: SHIPPED | OUTPATIENT
Start: 2025-05-19

## 2025-05-19 RX ORDER — ARIPIPRAZOLE 20 MG/1
20 TABLET ORAL
COMMUNITY
Start: 2025-03-31

## 2025-05-19 RX ORDER — DICYCLOMINE HYDROCHLORIDE 10 MG/1
10 CAPSULE ORAL
Qty: 90 CAPSULE | Refills: 2 | Status: SHIPPED | OUTPATIENT
Start: 2025-05-19

## 2025-05-19 NOTE — ASSESSMENT & PLAN NOTE
ER visit for back pain 4/2025 showing mildly thickened loops of small bowel in the left upper quadrant possibly in the setting of enteritis. Hgb normal.  She does report irregular bowel habits with nearly 6 bowel movements a day though occasionally skips days.  She reports some blood with wiping.    - Recommend CT enterography to rule out any signs of Crohn's disease.  - Recommend fecal calprotectin.  - Management of irregular bowel habits as noted below

## 2025-05-19 NOTE — PATIENT INSTRUCTIONS
Scheduled date of EGD/colonoscopy (as of today):06/16/25  Physician performing EGD/colonoscopy:Dr. Borrego  Location of EGD/colonoscopy:[facility]  Desired bowel prep reviewed with patient:Anand  Instructions reviewed with patient by:dana  Clearances:   n/a

## 2025-05-19 NOTE — ASSESSMENT & PLAN NOTE
Patient reports no upper abdominal pain.  Reports worsening reflux symptoms.  She has associated nausea.  She has been taking more significant NSAIDs recently due to back pain.  She is a smoker.    - Recommend EGD at same time as colonoscopy due to increased risk factors for Rivera's esophagus including tobacco use, obesity and new onset worsening reflux symptoms over the age of 45.  - Start omeprazole 40 mg once daily in the morning before breakfast    - Recommend smoking cessation.  - Recommend decreasing NSAID use

## 2025-05-19 NOTE — PROGRESS NOTES
Name: Mirlande Mesa      : 1975      MRN: [id number]  Encounter Provider: Fiona Lepe PA-C  Encounter Date: 2025   Encounter department: [facility]  :  Assessment & Plan  Abnormal CT scan, small bowel  ER visit for back pain 2025 showing mildly thickened loops of small bowel in the left upper quadrant possibly in the setting of enteritis. Hgb normal.  She does report irregular bowel habits with nearly 6 bowel movements a day though occasionally skips days.  She reports some blood with wiping.    - Recommend CT enterography to rule out any signs of Crohn's disease.  - Recommend fecal calprotectin.  - Management of irregular bowel habits as noted below       Diarrhea, unspecified type  Patient reports change in bowel habits over the last several months.  Reports they can alternate between solid and liquid.  She sometimes skips bowel movements.    - Colonoscopy as noted above.  -Recommend CT enterography due to enteritis on CT.  - Follow-up fecal calprotectin    - Recommend trial of Bentyl prior to known food triggers  -Recommend Metamucil or Benefiber 1 tablespoon daily due to alternation between constipation and diarrhea  -Recommend high-fiber diet.  - Continue increased water intake    Abdominal cramping  Patient reports some generalized abdominal discomfort.  As noted above, rule out inflammatory bowel disease.    - Trial of Bentyl.  Discussed medication side effects.    Epigastric pain  Patient reports no upper abdominal pain.  Reports worsening reflux symptoms.  She has associated nausea.  She has been taking more significant NSAIDs recently due to back pain.  She is a smoker.    - Recommend EGD at same time as colonoscopy due to increased risk factors for Rivera's esophagus including tobacco use, obesity and new onset worsening reflux symptoms over the age of 45.  - Start omeprazole 40 mg once daily in the morning before breakfast    - Recommend  smoking cessation.  - Recommend decreasing NSAID use    Gastroesophageal reflux disease, unspecified whether esophagitis present  See epigastric pain above      History of Present Illness   Mirlande Mesa is a 49 y.o. female who presents with type 2 diabetes who presents as a new patient due to recent ER visit for abdominal pain.    Patient went to the ER 4/2025 for back pain.  Was noted to have mildly thickened loops of small bowel in the left upper quadrant which could be in the setting of enteritis.  CBC and CMP unremarkable.    Patient does report some change in bowel habits and abdominal pain over the past few months.  She reports for the last year she has had nearly 6 bowel movements a day, this alternate between liquid and solid.  She does occasionally skip some days.  She has tried Pepto for this.  She also reports some abdominal pain in the upper and lower abdomen for the last few months.  She did start metformin about a year ago possibly contributing to symptoms.  She reports occasional blood with wiping.  Rare nighttime symptoms.  Her work schedule is significantly difficult and often has poor sleep.  Her diet is not very well-balanced.  She reports epigastric pain with nausea.  Occasional reflux which she would like to start medication for.    No prior bowel surgeries.    No significant GI family history.    Patient smokes a pack a day.  No alcohol.  She does take a lot of NSAIDs recently for back pain.    No prior endoscopic evaluation.    HPI    Review of Systems   All other systems reviewed and are negative.   A complete review of systems is negative other than that noted above in the HPI.         Objective   There were no vitals taken for this visit.    Physical Exam  Vitals reviewed.   Constitutional:       General: She is not in acute distress.     Appearance: Normal appearance. She is not ill-appearing.   HENT:      Head: Normocephalic and atraumatic.     Eyes:      Conjunctiva/sclera:  Conjunctivae normal.       Cardiovascular:      Rate and Rhythm: Normal rate and regular rhythm.      Heart sounds: No murmur heard.  Pulmonary:      Effort: Pulmonary effort is normal. No respiratory distress.      Breath sounds: Normal breath sounds.   Abdominal:      General: Abdomen is flat. There is no distension.      Palpations: Abdomen is soft.      Tenderness: There is no abdominal tenderness. There is no guarding or rebound.     Musculoskeletal:         General: No swelling.      Cervical back: Normal range of motion.      Right lower leg: No edema.      Left lower leg: No edema.     Skin:     General: Skin is warm.      Coloration: Skin is not jaundiced.     Neurological:      General: No focal deficit present.      Mental Status: She is alert and oriented to person, place, and time. Mental status is at baseline.     Psychiatric:         Mood and Affect: Mood normal.         Behavior: Behavior normal.          Lab Results: I personally reviewed relevant lab results.

## 2025-06-01 ENCOUNTER — ANESTHESIA EVENT (OUTPATIENT)
Dept: ANESTHESIOLOGY | Facility: HOSPITAL | Age: 50
End: 2025-06-01

## 2025-06-01 ENCOUNTER — ANESTHESIA (OUTPATIENT)
Dept: ANESTHESIOLOGY | Facility: HOSPITAL | Age: 50
End: 2025-06-01

## 2025-06-05 ENCOUNTER — HOSPITAL ENCOUNTER (EMERGENCY)
Facility: HOSPITAL | Age: 50
Discharge: HOME | End: 2025-06-05
Attending: EMERGENCY MEDICINE
Payer: COMMERCIAL

## 2025-06-05 VITALS
HEART RATE: 98 BPM | OXYGEN SATURATION: 97 % | WEIGHT: 255 LBS | DIASTOLIC BLOOD PRESSURE: 95 MMHG | TEMPERATURE: 98.7 F | BODY MASS INDEX: 40.02 KG/M2 | RESPIRATION RATE: 20 BRPM | HEIGHT: 67 IN | SYSTOLIC BLOOD PRESSURE: 183 MMHG

## 2025-06-05 DIAGNOSIS — F41.9 ANXIETY: Primary | ICD-10-CM

## 2025-06-05 LAB
ANION GAP SERPL CALCULATED.3IONS-SCNC: 7 MMOL/L (ref 4–13)
BASOPHILS # BLD AUTO: 0.04 THOUSANDS/ÂΜL (ref 0–0.1)
BASOPHILS NFR BLD AUTO: 1 % (ref 0–1)
BUN SERPL-MCNC: 12 MG/DL (ref 5–25)
CALCIUM SERPL-MCNC: 9 MG/DL (ref 8.4–10.2)
CARDIAC TROPONIN I PNL SERPL HS: <2 NG/L (ref ?–50)
CHLORIDE SERPL-SCNC: 104 MMOL/L (ref 96–108)
CO2 SERPL-SCNC: 25 MMOL/L (ref 21–32)
CREAT SERPL-MCNC: 0.79 MG/DL (ref 0.6–1.3)
EOSINOPHIL # BLD AUTO: 0.18 THOUSAND/ÂΜL (ref 0–0.61)
EOSINOPHIL NFR BLD AUTO: 3 % (ref 0–6)
ERYTHROCYTE [DISTWIDTH] IN BLOOD BY AUTOMATED COUNT: 14.5 % (ref 11.6–15.1)
GFR SERPL CREATININE-BSD FRML MDRD: 89 ML/MIN/1.73SQ M
GLUCOSE SERPL-MCNC: 101 MG/DL (ref 65–140)
HCT VFR BLD AUTO: 36.2 % (ref 34.8–46.1)
HGB BLD-MCNC: 11.7 G/DL (ref 11.5–15.4)
IMM GRANULOCYTES # BLD AUTO: 0.01 THOUSAND/UL (ref 0–0.2)
IMM GRANULOCYTES NFR BLD AUTO: 0 % (ref 0–2)
LYMPHOCYTES # BLD AUTO: 1.79 THOUSANDS/ÂΜL (ref 0.6–4.47)
LYMPHOCYTES NFR BLD AUTO: 30 % (ref 14–44)
MCH RBC QN AUTO: 27.4 PG (ref 26.8–34.3)
MCHC RBC AUTO-ENTMCNC: 32.3 G/DL (ref 31.4–37.4)
MCV RBC AUTO: 85 FL (ref 82–98)
MONOCYTES # BLD AUTO: 0.59 THOUSAND/ÂΜL (ref 0.17–1.22)
MONOCYTES NFR BLD AUTO: 10 % (ref 4–12)
NEUTROPHILS # BLD AUTO: 3.45 THOUSANDS/ÂΜL (ref 1.85–7.62)
NEUTS SEG NFR BLD AUTO: 56 % (ref 43–75)
NRBC BLD AUTO-RTO: 0 /100 WBCS
PLATELET # BLD AUTO: 238 THOUSANDS/UL (ref 149–390)
PMV BLD AUTO: 10.2 FL (ref 8.9–12.7)
POTASSIUM SERPL-SCNC: 4 MMOL/L (ref 3.5–5.3)
RBC # BLD AUTO: 4.27 MILLION/UL (ref 3.81–5.12)
SODIUM SERPL-SCNC: 136 MMOL/L (ref 135–147)
WBC # BLD AUTO: 6.06 THOUSAND/UL (ref 4.31–10.16)

## 2025-06-05 PROCEDURE — 80048 BASIC METABOLIC PNL TOTAL CA: CPT | Performed by: EMERGENCY MEDICINE

## 2025-06-05 PROCEDURE — 96361 HYDRATE IV INFUSION ADD-ON: CPT

## 2025-06-05 PROCEDURE — 99284 EMERGENCY DEPT VISIT MOD MDM: CPT

## 2025-06-05 PROCEDURE — 36415 COLL VENOUS BLD VENIPUNCTURE: CPT | Performed by: EMERGENCY MEDICINE

## 2025-06-05 PROCEDURE — 90715 TDAP VACCINE 7 YRS/> IM: CPT | Performed by: EMERGENCY MEDICINE

## 2025-06-05 PROCEDURE — 84484 ASSAY OF TROPONIN QUANT: CPT | Performed by: EMERGENCY MEDICINE

## 2025-06-05 PROCEDURE — 96374 THER/PROPH/DIAG INJ IV PUSH: CPT

## 2025-06-05 PROCEDURE — 99285 EMERGENCY DEPT VISIT HI MDM: CPT | Performed by: EMERGENCY MEDICINE

## 2025-06-05 PROCEDURE — 90471 IMMUNIZATION ADMIN: CPT

## 2025-06-05 PROCEDURE — 93005 ELECTROCARDIOGRAM TRACING: CPT

## 2025-06-05 PROCEDURE — 85025 COMPLETE CBC W/AUTO DIFF WBC: CPT | Performed by: EMERGENCY MEDICINE

## 2025-06-05 RX ORDER — LORAZEPAM 2 MG/ML
1 INJECTION INTRAMUSCULAR ONCE
Status: COMPLETED | OUTPATIENT
Start: 2025-06-05 | End: 2025-06-05

## 2025-06-05 RX ORDER — DIAZEPAM 5 MG/1
5 TABLET ORAL 2 TIMES DAILY
Qty: 14 TABLET | Refills: 0 | Status: SHIPPED | OUTPATIENT
Start: 2025-06-05 | End: 2025-06-12

## 2025-06-05 RX ADMIN — TETANUS TOXOID, REDUCED DIPHTHERIA TOXOID AND ACELLULAR PERTUSSIS VACCINE, ADSORBED 0.5 ML: 5; 2.5; 8; 8; 2.5 SUSPENSION INTRAMUSCULAR at 17:31

## 2025-06-05 RX ADMIN — LORAZEPAM 1 MG: 2 INJECTION INTRAMUSCULAR; INTRAVENOUS at 16:55

## 2025-06-05 RX ADMIN — SODIUM CHLORIDE 1000 ML: 0.9 INJECTION, SOLUTION INTRAVENOUS at 16:47

## 2025-06-05 NOTE — DISCHARGE INSTRUCTIONS
Please do not take valium and klonopin together  Patient Education     Anxiety, Adult ED   General Information   You came to the Emergency Department (ED) for anxiety. This problem can cause you to feel very worried. It can also cause physical symptoms like chest pain, stomach aches, or trouble sleeping. While mild anxiety is a normal response to stress, it can cause you problems in your everyday life. You may need follow-up care to help manage your anxiety.  What care is needed at home?   Call your regular doctor to let them know you were in the ED. Make a follow-up appointment if you were told to.  Set a time to talk with a counselor about your worries and feelings. This can help you with your anxiety.  Take care to follow all instructions when you take your medicines.  Limit alcohol and caffeine.  Learn ways to manage stress. Relaxation methods like reflection, deep breathing, and muscle relaxation may be helpful. Things like yoga, exercise, and maty chi are also good.  Talk about your feelings with family members and friends you trust. Talk to someone who can help you see how your thoughts at certain times may raise your anxiety.  When do I need to get emergency help?   Call for an ambulance right away if:   You feel you may harm yourself or someone else  You can also call a mental health hotline for help.  Return to the ED if:   You have any physical symptoms, such as chest pain, trouble breathing, or severe belly pain, that could be a sign of a serious problem.  When do I need to call the doctor?   If you are short of breath.  If you do not feel like you can be alone.  You have new or worsening symptoms.  Last Reviewed Date   2020-06-25  Consumer Information Use and Disclaimer   This generalized information is a limited summary of diagnosis, treatment, and/or medication information. It is not meant to be comprehensive and should be used as a tool to help the user understand and/or assess potential diagnostic  and treatment options. It does NOT include all information about conditions, treatments, medications, side effects, or risks that may apply to a specific patient. It is not intended to be medical advice or a substitute for the medical advice, diagnosis, or treatment of a health care provider based on the health care provider's examination and assessment of a patient’s specific and unique circumstances. Patients must speak with a health care provider for complete information about their health, medical questions, and treatment options, including any risks or benefits regarding use of medications. This information does not endorse any treatments or medications as safe, effective, or approved for treating a specific patient. [employer] and its affiliates disclaim any warranty or liability relating to this information or the use thereof. The use of this information is governed by the Terms of Use, available at https://www.woltersMyWobileuwer.com/en/know/clinical-effectiveness-terms   Copyright   Copyright © 2024 [employer] and its affiliates and/or licensors. All rights reserved.

## 2025-06-05 NOTE — ED PROVIDER NOTES
Time reflects when diagnosis was documented in both MDM as applicable and the Disposition within this note       Time User Action Codes Description Comment    6/5/2025  5:22 PM Mikki Rodrigues Add [F41.9] Anxiety           ED Disposition       ED Disposition   Discharge    Condition   Stable    Date/Time   Thu Jun 5, 2025  5:22 PM    Comment   [place] discharge to home/self care.                   Assessment & Plan       Medical Decision Making  Ddx: AMI,NSTEMI, dehydration, renal failure,anemia, anxiety    Patient declined crisis evaluation. Asked for valium which was filled, and wanted tetanus for a wound which was given    Patient evaluated with labs EKG   I reviewed the results and discussed them with the patient.  Patient discharged with appropriate instructions medications and follow-up.  Patient verbalized understanding had no further questions at the time of discharge.  Patient had stable vital signs and well-appearing at the time of discharge.    Problems Addressed:  Anxiety: acute illness or injury    Amount and/or Complexity of Data Reviewed  External Data Reviewed: notes.  Labs: ordered. Decision-making details documented in ED Course.  ECG/medicine tests: ordered and independent interpretation performed. Decision-making details documented in ED Course.    Risk  Prescription drug management.             Medications   LORazepam (ATIVAN) injection 1 mg (1 mg Intravenous Given 6/5/25 1655)   sodium chloride 0.9 % bolus 1,000 mL (0 mL Intravenous Stopped 6/5/25 1725)   tetanus-diphtheria-acellular pertussis (BOOSTRIX) IM injection 0.5 mL (0.5 mL Intramuscular Given 6/5/25 1731)       ED Risk Strat Scores                    No data recorded        SBIRT 20yo+      Flowsheet Row Most Recent Value   Initial Alcohol Screen: US AUDIT-C     1. How often do you have a drink containing alcohol? 0 Filed at: 06/05/2025 5157   2. How many drinks containing alcohol do you have on a typical day you are drinking?   0 Filed at: 06/05/2025 1618   3a. Male UNDER 65: How often do you have five or more drinks on one occasion? 0 Filed at: 06/05/2025 1618   3b. FEMALE Any Age, or MALE 65+: How often do you have 4 or more drinks on one occassion? 0 Filed at: 06/05/2025 1618   Audit-C Score 0 Filed at: 06/05/2025 1618   QUIRINO: How many times in the past year have you...    Used an illegal drug or used a prescription medication for non-medical reasons? Never Filed at: 06/05/2025 1618                            History of Present Illness       Chief Complaint   Patient presents with    Anxiety     States cp with sob and dizziness for 10 minutes. States it feels like an anxiety attack       Past Medical History[1]   Past Surgical History[2]   Family History[3]   Social History[4]   E-Cigarette/Vaping    E-Cigarette Use Former User     Comments on/off with vaping       E-Cigarette/Vaping Substances    Nicotine Yes     THC No     CBD Yes     Flavoring No     Other No     Unknown No       I have reviewed and agree with the history as documented.       History provided by:  Patient   used: No    Anxiety  Presenting symptoms: no aggressive behavior, no agitation, no bizarre behavior, no delusions, no depression, no disorganized speech, no disorganized thought process, no hallucinations, no homicidal ideas, no paranoid behavior, no self-mutilation, no suicidal thoughts, no suicidal threats and no suicide attempt    Onset quality:  Gradual  Duration:  2 days  Timing:  Constant  Chronicity:  Recurrent  Treatment compliance:  All of the time  Relieved by:  Nothing  Ineffective treatments:  Anti-anxiety medications  Associated symptoms: anxiety, chest pain, fatigue and headaches        Review of Systems   Constitutional:  Positive for fatigue.   HENT: Negative.     Eyes: Negative.    Respiratory: Negative.     Cardiovascular:  Positive for chest pain.   Gastrointestinal: Negative.    Endocrine: Negative.    Genitourinary:  Negative.    Musculoskeletal: Negative.    Skin: Negative.    Allergic/Immunologic: Negative.    Neurological:  Positive for headaches.   Hematological: Negative.    Psychiatric/Behavioral:  Negative for agitation, hallucinations, homicidal ideas, paranoia, self-injury and suicidal ideas. The patient is nervous/anxious.    All other systems reviewed and are negative.          Objective       ED Triage Vitals   Temperature Pulse Blood Pressure Respirations SpO2 Patient Position - Orthostatic VS   06/05/25 1618 06/05/25 1620 06/05/25 1620 06/05/25 1618 06/05/25 1620 --   98.7 °F (37.1 °C) 98 (!) 183/95 20 97 %       Temp src Heart Rate Source BP Location FiO2 (%) Pain Score    -- -- -- -- 06/05/25 1618        6      Vitals      Date and Time Temp Pulse SpO2 Resp BP Pain Score FACES Pain Rating User   06/05/25 1620 -- 98 97 % -- 183/95 -- -- ISABEL   06/05/25 1618 98.7 °F (37.1 °C) -- -- 20 -- 6 -- ISABEL            Physical Exam  Vitals and nursing note reviewed.   Constitutional:       Appearance: Normal appearance.   HENT:      Head: Normocephalic and atraumatic.      Nose: Nose normal.      Mouth/Throat:      Mouth: Mucous membranes are moist.     Eyes:      Extraocular Movements: Extraocular movements intact.      Pupils: Pupils are equal, round, and reactive to light.       Cardiovascular:      Rate and Rhythm: Normal rate and regular rhythm.   Pulmonary:      Effort: Pulmonary effort is normal.      Breath sounds: Normal breath sounds.   Abdominal:      General: Abdomen is flat. Bowel sounds are normal.      Palpations: Abdomen is soft.     Musculoskeletal:         General: Normal range of motion.      Cervical back: Normal range of motion and neck supple.     Skin:     General: Skin is warm.      Capillary Refill: Capillary refill takes less than 2 seconds.     Neurological:      General: No focal deficit present.      Mental Status: She is alert and oriented to person, place, and time. Mental status is at baseline.      Psychiatric:         Mood and Affect: Mood normal.         Thought Content: Thought content normal.         Results Reviewed       Procedure Component Value Units Date/Time    HS Troponin 0hr (reflex protocol) [[id number]]  (Normal) Collected: 06/05/25 1647    Lab Status: Final result Specimen: Blood from Arm, Left Updated: 06/05/25 1719     hs TnI 0hr <2 ng/L     Basic metabolic panel [[id number]] Collected: 06/05/25 1647    Lab Status: Final result Specimen: Blood from Arm, Left Updated: 06/05/25 1710     Sodium 136 mmol/L      Potassium 4.0 mmol/L      Chloride 104 mmol/L      CO2 25 mmol/L      ANION GAP 7 mmol/L      BUN 12 mg/dL      Creatinine 0.79 mg/dL      Glucose 101 mg/dL      Calcium 9.0 mg/dL      eGFR 89 ml/min/1.73sq m     Narrative:      National [employer] guidelines for Chronic Kidney Disease (CKD):     Stage 1 with normal or high GFR (GFR > 90 mL/min/1.73 square meters)    Stage 2 Mild CKD (GFR = 60-89 mL/min/1.73 square meters)    Stage 3A Moderate CKD (GFR = 45-59 mL/min/1.73 square meters)    Stage 3B Moderate CKD (GFR = 30-44 mL/min/1.73 square meters)    Stage 4 Severe CKD (GFR = 15-29 mL/min/1.73 square meters)    Stage 5 End Stage CKD (GFR <15 mL/min/1.73 square meters)  Note: GFR calculation is accurate only with a steady state creatinine    CBC and differential [[id number]] Collected: 06/05/25 1647    Lab Status: Final result Specimen: Blood from Arm, Left Updated: 06/05/25 1653     WBC 6.06 Thousand/uL      RBC 4.27 Million/uL      Hemoglobin 11.7 g/dL      Hematocrit 36.2 %      MCV 85 fL      MCH 27.4 pg      MCHC 32.3 g/dL      RDW 14.5 %      MPV 10.2 fL      Platelets 238 Thousands/uL      nRBC 0 /100 WBCs      Segmented % 56 %      Immature Grans % 0 %      Lymphocytes % 30 %      Monocytes % 10 %      Eosinophils Relative 3 %      Basophils Relative 1 %      Absolute Neutrophils 3.45 Thousands/µL      Absolute Immature Grans 0.01 Thousand/uL      Absolute  Lymphocytes 1.79 Thousands/µL      Absolute Monocytes 0.59 Thousand/µL      Eosinophils Absolute 0.18 Thousand/µL      Basophils Absolute 0.04 Thousands/µL             No orders to display       ECG 12 Lead Documentation Only    Date/Time: 2025 10:18 PM    Performed by: Mikki Rodrigues DO  Authorized by: Mikki Rodrigues DO    ECG reviewed by me, the ED Provider: yes    Patient location:  ED  Previous ECG:     Previous ECG:  Unavailable    Comparison to cardiac monitor: Yes    Interpretation:     Interpretation: non-specific    Rate:     ECG rate assessment: normal    Rhythm:     Rhythm: sinus rhythm    Ectopy:     Ectopy: none    QRS:     QRS axis:  Normal  Conduction:     Conduction: normal    ST segments:     ST segments:  Non-specific  T waves:     T waves: non-specific        ED Medication and Procedure Management   Prior to Admission Medications   Prescriptions Last Dose Informant Patient Reported? Taking?   ARIPiprazole (ABILIFY) 20 MG tablet  Self Yes No   Si mg   FLUoxetine (PROzac) 20 mg capsule  Self Yes No   FLUoxetine (PROzac) 40 MG capsule  Self Yes No   Sig: Take 40 mg by mouth every morning   QUEtiapine (SEROquel) 50 mg tablet  Self Yes No   Sig: Take 50 mg by mouth if needed   albuterol (Ventolin HFA) 90 mcg/act inhaler  Self No No   Sig: Inhale 2 puffs every 6 (six) hours as needed for wheezing   chlorhexidine (PERIDEX) 0.12 % solution  Self No No   Sig: Apply 15 mL to the mouth or throat 2 (two) times a day   Patient not taking: Reported on 2025   cholecalciferol (VITAMIN D3) 1,000 units tablet  Self No No   Sig: Take 1 tablet (1,000 Units total) by mouth daily   clonazePAM (KlonoPIN) 1 mg tablet  Self Yes No   cyclobenzaprine (FLEXERIL) 10 mg tablet  Self No No   Sig: Take 1 tablet (10 mg total) by mouth 3 (three) times a day as needed for muscle spasms   diclofenac (VOLTAREN) 75 mg EC tablet  Self No No   Sig: Take 1 tablet (75 mg total) by mouth 2 (two) times a day   dicyclomine  (BENTYL) 10 mg capsule   No No   Sig: Take 1 capsule (10 mg total) by mouth 3 (three) times a day before meals   fenofibrate (TRICOR) 145 mg tablet  Self No No   Sig: Take 1 tablet (145 mg total) by mouth daily   lidocaine (Lidoderm) 5 %  Self No No   Sig: Apply 1 patch topically over 12 hours daily Remove & Discard patch within 12 hours or as directed by MD   Patient not taking: Reported on 4/29/2025   metFORMIN (GLUCOPHAGE) 500 mg tablet  Self No No   Sig: Take 1 tablet (500 mg total) by mouth 2 (two) times a day with meals   methocarbamol (ROBAXIN) 500 mg tablet  Self No No   Sig: Take 1 tablet (500 mg total) by mouth 2 (two) times a day   Patient not taking: Reported on 4/29/2025   naltrexone (REVIA) 50 mg tablet  Self Yes No   Sig: Take 50 mg by mouth daily   Patient not taking: Reported on 1/21/2025   naproxen (Naprosyn) 500 mg tablet  Self No No   Sig: Take 1 tablet (500 mg total) by mouth 2 (two) times a day with meals   Patient not taking: Reported on 1/21/2025   omeprazole (PriLOSEC) 40 MG capsule   No No   Sig: Take 1 capsule (40 mg total) by mouth daily before breakfast   oxyCODONE-acetaminophen (Percocet) 5-325 mg per tablet  Self No No   Sig: Take 1 tablet by mouth every 8 (eight) hours as needed for severe pain for up to 5 doses Max Daily Amount: 3 tablets   Patient not taking: Reported on 4/29/2025   polyethylene glycol (GOLYTELY) 4000 mL solution   No No   Sig: Take 4,000 mL by mouth once for 1 dose   rosuvastatin (CRESTOR) 10 MG tablet  Self No No   Sig: Take 1 tablet (10 mg total) by mouth daily      Facility-Administered Medications: None     Discharge Medication List as of 6/5/2025  5:27 PM        START taking these medications    Details   diazepam (VALIUM) 5 mg tablet Take 1 tablet (5 mg total) by mouth 2 (two) times a day for 7 days, Starting Thu 6/5/2025, Until Thu 6/12/2025, Normal           CONTINUE these medications which have NOT CHANGED    Details   albuterol (Ventolin HFA) 90 mcg/act  inhaler Inhale 2 puffs every 6 (six) hours as needed for wheezing, Starting Tue 4/29/2025, Normal      ARIPiprazole (ABILIFY) 20 MG tablet 20 mg, Starting Mon 3/31/2025, Historical Med      chlorhexidine (PERIDEX) 0.12 % solution Apply 15 mL to the mouth or throat 2 (two) times a day, Starting Wed 11/13/2024, Normal      cholecalciferol (VITAMIN D3) 1,000 units tablet Take 1 tablet (1,000 Units total) by mouth daily, Starting Tue 4/29/2025, Normal      clonazePAM (KlonoPIN) 1 mg tablet Historical Med      cyclobenzaprine (FLEXERIL) 10 mg tablet Take 1 tablet (10 mg total) by mouth 3 (three) times a day as needed for muscle spasms, Starting Tue 5/13/2025, Normal      diclofenac (VOLTAREN) 75 mg EC tablet Take 1 tablet (75 mg total) by mouth 2 (two) times a day, Starting Tue 5/13/2025, Normal      dicyclomine (BENTYL) 10 mg capsule Take 1 capsule (10 mg total) by mouth 3 (three) times a day before meals, Starting Mon 5/19/2025, Normal      fenofibrate (TRICOR) 145 mg tablet Take 1 tablet (145 mg total) by mouth daily, Starting Tue 4/29/2025, Normal      !! FLUoxetine (PROzac) 20 mg capsule Historical Med      !! FLUoxetine (PROzac) 40 MG capsule Take 40 mg by mouth every morning, Starting Wed 7/3/2019, Historical Med      lidocaine (Lidoderm) 5 % Apply 1 patch topically over 12 hours daily Remove & Discard patch within 12 hours or as directed by MD, Starting Wed 4/23/2025, Normal      metFORMIN (GLUCOPHAGE) 500 mg tablet Take 1 tablet (500 mg total) by mouth 2 (two) times a day with meals, Starting Tue 4/29/2025, Normal      methocarbamol (ROBAXIN) 500 mg tablet Take 1 tablet (500 mg total) by mouth 2 (two) times a day, Starting Tue 11/5/2024, Normal      naltrexone (REVIA) 50 mg tablet Take 50 mg by mouth daily, Starting Thu 6/8/2023, Historical Med      naproxen (Naprosyn) 500 mg tablet Take 1 tablet (500 mg total) by mouth 2 (two) times a day with meals, Starting Wed 11/13/2024, Normal      omeprazole (PriLOSEC) 40  MG capsule Take 1 capsule (40 mg total) by mouth daily before breakfast, Starting Mon 5/19/2025, Normal      oxyCODONE-acetaminophen (Percocet) 5-325 mg per tablet Take 1 tablet by mouth every 8 (eight) hours as needed for severe pain for up to 5 doses Max Daily Amount: 3 tablets, Starting Wed 4/23/2025, Normal      polyethylene glycol (GOLYTELY) 4000 mL solution Take 4,000 mL by mouth once for 1 dose, Starting Mon 5/19/2025, Normal      QUEtiapine (SEROquel) 50 mg tablet Take 50 mg by mouth if needed, Starting Wed 5/10/2023, Historical Med      rosuvastatin (CRESTOR) 10 MG tablet Take 1 tablet (10 mg total) by mouth daily, Starting Tue 4/29/2025, Normal       !! - Potential duplicate medications found. Please discuss with provider.        No discharge procedures on file.  ED SEPSIS DOCUMENTATION   Time reflects when diagnosis was documented in both MDM as applicable and the Disposition within this note       Time User Action Codes Description Comment    6/5/2025  5:22 PM Mikki Rodrigues Add [F41.9] Anxiety                      [1]   Past Medical History:  Diagnosis Date    Bipolar disorder (HCC)     Bronchitis 01/22/2019    Depression     History of wound infection     left ankle    Hyperlipidemia     Morbid obesity with BMI of 40.0-44.9, adult ([facility]) 01/18/2018    Psychiatric disorder     depression, anxiety    Seasonal allergic reaction    [2]   Past Surgical History:  Procedure Laterality Date    IR PICC LINE  2/8/2019    LA OPEN TREATMENT BIMALLEOLAR ANKLE FRACTURE Left 5/14/2018    Procedure: OPEN REDUCTION W/ INTERNAL FIXATION (ORIF) ANKLE;  Surgeon: Sam Bauman DO;  Location: [facility];  Service: Orthopedics    LA REMOVAL IMPLANT DEEP Left 2/5/2019    Procedure: REMOVAL HARDWARE ANKLE;  Surgeon: Sam Bauman DO;  Location: [facility];  Service: Orthopedics    REDUCTION MAMMAPLASTY  2001    reduction   [3]   Family History  Problem Relation Name Age of Onset    Substance Abuse Mother      Mental illness  Mother      Diabetes Mother      Cancer Mother      Asthma Mother      Breast cancer Mother  55    Substance Abuse Father      No Known Problems Brother      No Known Problems Daughter      Substance Abuse Maternal Grandmother      Mental illness Maternal Grandmother      No Known Problems Maternal Grandfather      No Known Problems Paternal Grandmother      No Known Problems Paternal Grandfather      No Known Problems Maternal Aunt      No Known Problems Maternal Uncle      ADD / ADHD Neg Hx      Anesthesia problems Neg Hx      Clotting disorder Neg Hx      Collagen disease Neg Hx      Dislocations Neg Hx      Learning disabilities Neg Hx      Neurological problems Neg Hx      Osteoporosis Neg Hx      Rheumatologic disease Neg Hx      Scoliosis Neg Hx      Vascular Disease Neg Hx     [4]   Social History  Tobacco Use    Smoking status: Every Day     Current packs/day: 1.00     Average packs/day: 1 pack/day for 34.9 years (34.9 ttl pk-yrs)     Types: Cigarettes     Start date: 7/23/1990     Passive exposure: Current    Smokeless tobacco: Never   Vaping Use    Vaping status: Former    Substances: Nicotine, CBD   Substance Use Topics    Alcohol use: Not Currently     Comment: Quit drinking 2nd    Drug use: Never        Mikki Rodrigues DO  06/05/25 6315

## 2025-06-06 LAB
ATRIAL RATE: 86 BPM
P AXIS: 47 DEGREES
PR INTERVAL: 234 MS
QRS AXIS: 31 DEGREES
QRSD INTERVAL: 84 MS
QT INTERVAL: 416 MS
QTC INTERVAL: 497 MS
T WAVE AXIS: 52 DEGREES
VENTRICULAR RATE: 86 BPM

## 2025-06-06 PROCEDURE — 93010 ELECTROCARDIOGRAM REPORT: CPT | Performed by: INTERNAL MEDICINE

## 2025-06-11 ENCOUNTER — TELEPHONE (OUTPATIENT)
Dept: BARIATRICS | Facility: CLINIC | Age: 50
End: 2025-06-11

## 2025-06-15 ENCOUNTER — HOSPITAL ENCOUNTER (EMERGENCY)
Facility: HOSPITAL | Age: 50
Discharge: HOME | End: 2025-06-15
Payer: COMMERCIAL

## 2025-06-15 ENCOUNTER — APPOINTMENT (EMERGENCY)
Dept: RADIOLOGY | Facility: HOSPITAL | Age: 50
End: 2025-06-15
Payer: COMMERCIAL

## 2025-06-15 VITALS
DIASTOLIC BLOOD PRESSURE: 94 MMHG | OXYGEN SATURATION: 97 % | SYSTOLIC BLOOD PRESSURE: 129 MMHG | HEART RATE: 73 BPM | WEIGHT: 253 LBS | BODY MASS INDEX: 40.22 KG/M2 | RESPIRATION RATE: 18 BRPM | TEMPERATURE: 98.4 F

## 2025-06-15 DIAGNOSIS — M79.674 PAIN OF TOE OF RIGHT FOOT: Primary | ICD-10-CM

## 2025-06-15 PROCEDURE — 99283 EMERGENCY DEPT VISIT LOW MDM: CPT

## 2025-06-15 PROCEDURE — 99284 EMERGENCY DEPT VISIT MOD MDM: CPT

## 2025-06-15 PROCEDURE — 73630 X-RAY EXAM OF FOOT: CPT

## 2025-06-15 NOTE — DISCHARGE INSTRUCTIONS
Continue taking Tylenol and ibuprofen as needed for pain.  Follow-up with podiatry if symptoms do not improve within the next few days.  May try warm soaks with Epsom salt as well.

## 2025-06-15 NOTE — ED PROVIDER NOTES
"Time reflects when diagnosis was documented in both MDM as applicable and the Disposition within this note       Time User Action Codes Description Comment    6/15/2025  4:36 PM Ofelia Walters Add [M79.674] Pain of toe of right foot           ED Disposition       ED Disposition   Discharge    Condition   Stable    Date/Time   Sun Sd 15, 2025  4:36 PM    Comment   Mirlande Mesa discharge to home/self care.                   Assessment & Plan       Medical Decision Making  48 y/o female here for about a 1 week history of right great toe pain that started radiating up higher on the foot a few days ago.  Denies any injury or trauma to the area.  Denies fever, chills, numbness or tingling, chest pain, shortness of breath, leg swelling, calf pain, headache, dizziness/lightheadedness.  Patient is well-appearing on exam, in no distress.  Vitals are normal.  Foot has normal skin integrity, toe is not erythematous or swollen, no signs of infection at this time.  X-ray of foot negative for fracture or dislocation on independent interpretation.  Discussed supportive care, pain management, follow-up, risks and return precautions.  Patient was agreeable to plan and was discharged home.    Amount and/or Complexity of Data Reviewed  Radiology: ordered.             Medications - No data to display    ED Risk Strat Scores                    No data recorded        SBIRT 20yo+      Flowsheet Row Most Recent Value   Initial Alcohol Screen: US AUDIT-C     1. How often do you have a drink containing alcohol? 0 Filed at: 06/15/2025 1540   3b. FEMALE Any Age, or MALE 65+: How often do you have 4 or more drinks on one occassion? 0 Filed at: 06/15/2025 1540   Audit-C Score 0 Filed at: 06/15/2025 1540                            History of Present Illness       Chief Complaint   Patient presents with    Toe Pain     Right toe pain \"hurting to my bone\" started a week ago, now pain and burning go up the leg. Doesn't recall injury to area. " States she is diabetic and wants to check for infection.       Past Medical History[1]   Past Surgical History[2]   Family History[3]   Social History[4]   E-Cigarette/Vaping    E-Cigarette Use Former User     Comments on/off with vaping       E-Cigarette/Vaping Substances    Nicotine Yes     THC No     CBD Yes     Flavoring No     Other No     Unknown No       I have reviewed and agree with the history as documented.     Patient is a 50 y/o female here for about a 1 week history of right great toe pain that started radiating up higher on the foot a few days ago.  Denies any injury or trauma to the area.  Denies fever, chills, numbness or tingling, chest pain, shortness of breath, leg swelling, calf pain, headache, dizziness/lightheadedness.  Patient states that because she is a diabetic she is worried about infection.          Review of Systems   Constitutional:  Negative for chills and fever.   HENT:  Negative for congestion, ear pain, rhinorrhea and sore throat.    Eyes:  Negative for pain and visual disturbance.   Respiratory:  Negative for cough, chest tightness, shortness of breath and wheezing.    Cardiovascular:  Negative for chest pain and palpitations.   Gastrointestinal:  Negative for abdominal pain, diarrhea, nausea and vomiting.   Genitourinary:  Negative for dysuria and hematuria.   Musculoskeletal:  Negative for arthralgias, back pain, myalgias, neck pain and neck stiffness.   Skin:  Negative for color change and rash.   Neurological:  Negative for dizziness, seizures, syncope, weakness, light-headedness and headaches.   All other systems reviewed and are negative.      Objective       ED Triage Vitals [06/15/25 1537]   Temperature Pulse Blood Pressure Respirations SpO2 Patient Position - Orthostatic VS   98.4 °F (36.9 °C) 73 129/94 18 97 % Lying      Temp Source Heart Rate Source BP Location FiO2 (%) Pain Score    Oral Monitor Right arm -- --      Vitals      Date and Time Temp Pulse SpO2 Resp BP  Pain Score FACES Pain Rating User   06/15/25 1537 98.4 °F (36.9 °C) 73 97 % 18 129/94 -- -- OE            Physical Exam  Vitals and nursing note reviewed.   Constitutional:       General: She is not in acute distress.     Appearance: Normal appearance. She is not ill-appearing, toxic-appearing or diaphoretic.   HENT:      Head: Normocephalic and atraumatic.     Eyes:      Extraocular Movements: Extraocular movements intact.      Conjunctiva/sclera: Conjunctivae normal.      Pupils: Pupils are equal, round, and reactive to light.       Cardiovascular:      Rate and Rhythm: Normal rate and regular rhythm.      Pulses: Normal pulses.      Heart sounds: Normal heart sounds.   Pulmonary:      Effort: Pulmonary effort is normal. No tachypnea, accessory muscle usage or respiratory distress.      Breath sounds: Normal breath sounds. No stridor. No wheezing, rhonchi or rales.   Chest:      Chest wall: No tenderness.   Abdominal:      General: Abdomen is flat. There is no distension.      Palpations: Abdomen is soft.      Tenderness: There is no abdominal tenderness. There is no guarding or rebound.     Musculoskeletal:         General: Normal range of motion.      Cervical back: Normal range of motion and neck supple.   Feet:      Right foot:      Skin integrity: Skin integrity normal. No ulcer or skin breakdown.      Left foot:      Skin integrity: Skin integrity normal. No ulcer or skin breakdown.     Skin:     General: Skin is warm and dry.      Capillary Refill: Capillary refill takes less than 2 seconds.      Coloration: Skin is not cyanotic or pale.     Neurological:      General: No focal deficit present.      Mental Status: She is alert and oriented to person, place, and time.     Psychiatric:         Mood and Affect: Mood normal.         Behavior: Behavior normal.         Thought Content: Thought content normal.         Judgment: Judgment normal.         Results Reviewed       None            XR foot 3+ views RIGHT     (Results Pending)       Procedures    ED Medication and Procedure Management   Prior to Admission Medications   Prescriptions Last Dose Informant Patient Reported? Taking?   ARIPiprazole (ABILIFY) 20 MG tablet  Self Yes No   Si mg   FLUoxetine (PROzac) 20 mg capsule  Self Yes No   FLUoxetine (PROzac) 40 MG capsule  Self Yes No   Sig: Take 40 mg by mouth every morning   QUEtiapine (SEROquel) 50 mg tablet  Self Yes No   Sig: Take 50 mg by mouth if needed   albuterol (Ventolin HFA) 90 mcg/act inhaler  Self No No   Sig: Inhale 2 puffs every 6 (six) hours as needed for wheezing   chlorhexidine (PERIDEX) 0.12 % solution  Self No No   Sig: Apply 15 mL to the mouth or throat 2 (two) times a day   Patient not taking: Reported on 2025   cholecalciferol (VITAMIN D3) 1,000 units tablet  Self No No   Sig: Take 1 tablet (1,000 Units total) by mouth daily   clonazePAM (KlonoPIN) 1 mg tablet  Self Yes No   cyclobenzaprine (FLEXERIL) 10 mg tablet  Self No No   Sig: Take 1 tablet (10 mg total) by mouth 3 (three) times a day as needed for muscle spasms   diazepam (VALIUM) 5 mg tablet   No No   Sig: Take 1 tablet (5 mg total) by mouth 2 (two) times a day for 7 days   diclofenac (VOLTAREN) 75 mg EC tablet  Self No No   Sig: Take 1 tablet (75 mg total) by mouth 2 (two) times a day   dicyclomine (BENTYL) 10 mg capsule   No No   Sig: Take 1 capsule (10 mg total) by mouth 3 (three) times a day before meals   fenofibrate (TRICOR) 145 mg tablet  Self No No   Sig: Take 1 tablet (145 mg total) by mouth daily   lidocaine (Lidoderm) 5 %  Self No No   Sig: Apply 1 patch topically over 12 hours daily Remove & Discard patch within 12 hours or as directed by MD   Patient not taking: Reported on 2025   metFORMIN (GLUCOPHAGE) 500 mg tablet  Self No No   Sig: Take 1 tablet (500 mg total) by mouth 2 (two) times a day with meals   methocarbamol (ROBAXIN) 500 mg tablet  Self No No   Sig: Take 1 tablet (500 mg total) by mouth 2 (two) times  a day   Patient not taking: Reported on 4/29/2025   naltrexone (REVIA) 50 mg tablet  Self Yes No   Sig: Take 50 mg by mouth daily   Patient not taking: Reported on 1/21/2025   naproxen (Naprosyn) 500 mg tablet  Self No No   Sig: Take 1 tablet (500 mg total) by mouth 2 (two) times a day with meals   Patient not taking: Reported on 1/21/2025   omeprazole (PriLOSEC) 40 MG capsule   No No   Sig: Take 1 capsule (40 mg total) by mouth daily before breakfast   oxyCODONE-acetaminophen (Percocet) 5-325 mg per tablet  Self No No   Sig: Take 1 tablet by mouth every 8 (eight) hours as needed for severe pain for up to 5 doses Max Daily Amount: 3 tablets   Patient not taking: Reported on 4/29/2025   polyethylene glycol (GOLYTELY) 4000 mL solution   No No   Sig: Take 4,000 mL by mouth once for 1 dose   rosuvastatin (CRESTOR) 10 MG tablet  Self No No   Sig: Take 1 tablet (10 mg total) by mouth daily      Facility-Administered Medications: None     Discharge Medication List as of 6/15/2025  4:37 PM        CONTINUE these medications which have NOT CHANGED    Details   albuterol (Ventolin HFA) 90 mcg/act inhaler Inhale 2 puffs every 6 (six) hours as needed for wheezing, Starting Tue 4/29/2025, Normal      ARIPiprazole (ABILIFY) 20 MG tablet 20 mg, Starting Mon 3/31/2025, Historical Med      chlorhexidine (PERIDEX) 0.12 % solution Apply 15 mL to the mouth or throat 2 (two) times a day, Starting Wed 11/13/2024, Normal      cholecalciferol (VITAMIN D3) 1,000 units tablet Take 1 tablet (1,000 Units total) by mouth daily, Starting Tue 4/29/2025, Normal      clonazePAM (KlonoPIN) 1 mg tablet Historical Med      cyclobenzaprine (FLEXERIL) 10 mg tablet Take 1 tablet (10 mg total) by mouth 3 (three) times a day as needed for muscle spasms, Starting Tue 5/13/2025, Normal      diazepam (VALIUM) 5 mg tablet Take 1 tablet (5 mg total) by mouth 2 (two) times a day for 7 days, Starting Thu 6/5/2025, Until Thu 6/12/2025, Normal      diclofenac  (VOLTAREN) 75 mg EC tablet Take 1 tablet (75 mg total) by mouth 2 (two) times a day, Starting Tue 5/13/2025, Normal      dicyclomine (BENTYL) 10 mg capsule Take 1 capsule (10 mg total) by mouth 3 (three) times a day before meals, Starting Mon 5/19/2025, Normal      fenofibrate (TRICOR) 145 mg tablet Take 1 tablet (145 mg total) by mouth daily, Starting Tue 4/29/2025, Normal      !! FLUoxetine (PROzac) 20 mg capsule Historical Med      !! FLUoxetine (PROzac) 40 MG capsule Take 40 mg by mouth every morning, Starting Wed 7/3/2019, Historical Med      lidocaine (Lidoderm) 5 % Apply 1 patch topically over 12 hours daily Remove & Discard patch within 12 hours or as directed by MD, Starting Wed 4/23/2025, Normal      metFORMIN (GLUCOPHAGE) 500 mg tablet Take 1 tablet (500 mg total) by mouth 2 (two) times a day with meals, Starting Tue 4/29/2025, Normal      methocarbamol (ROBAXIN) 500 mg tablet Take 1 tablet (500 mg total) by mouth 2 (two) times a day, Starting Tue 11/5/2024, Normal      naltrexone (REVIA) 50 mg tablet Take 50 mg by mouth daily, Starting Thu 6/8/2023, Historical Med      naproxen (Naprosyn) 500 mg tablet Take 1 tablet (500 mg total) by mouth 2 (two) times a day with meals, Starting Wed 11/13/2024, Normal      omeprazole (PriLOSEC) 40 MG capsule Take 1 capsule (40 mg total) by mouth daily before breakfast, Starting Mon 5/19/2025, Normal      oxyCODONE-acetaminophen (Percocet) 5-325 mg per tablet Take 1 tablet by mouth every 8 (eight) hours as needed for severe pain for up to 5 doses Max Daily Amount: 3 tablets, Starting Wed 4/23/2025, Normal      polyethylene glycol (GOLYTELY) 4000 mL solution Take 4,000 mL by mouth once for 1 dose, Starting Mon 5/19/2025, Normal      QUEtiapine (SEROquel) 50 mg tablet Take 50 mg by mouth if needed, Starting Wed 5/10/2023, Historical Med      rosuvastatin (CRESTOR) 10 MG tablet Take 1 tablet (10 mg total) by mouth daily, Starting Tue 4/29/2025, Normal       !! - Potential  duplicate medications found. Please discuss with provider.          ED SEPSIS DOCUMENTATION   Time reflects when diagnosis was documented in both MDM as applicable and the Disposition within this note       Time User Action Codes Description Comment    6/15/2025  4:36 PM Ofelia Walters [M79.674] Pain of toe of right foot                    [1]   Past Medical History:  Diagnosis Date    Bipolar disorder (HCC)     Bronchitis 01/22/2019    Depression     History of wound infection     left ankle    Hyperlipidemia     Morbid obesity with BMI of 40.0-44.9, adult (Formerly [facility]) 01/18/2018    Psychiatric disorder     depression, anxiety    Seasonal allergic reaction    [2]   Past Surgical History:  Procedure Laterality Date    IR PICC LINE  2/8/2019    ME OPEN TREATMENT BIMALLEOLAR ANKLE FRACTURE Left 5/14/2018    Procedure: OPEN REDUCTION W/ INTERNAL FIXATION (ORIF) ANKLE;  Surgeon: Sam Bauman DO;  Location: [facility];  Service: Orthopedics    ME REMOVAL IMPLANT DEEP Left 2/5/2019    Procedure: REMOVAL HARDWARE ANKLE;  Surgeon: Sam Bauman DO;  Location: [facility];  Service: Orthopedics    REDUCTION MAMMAPLASTY  2001    reduction   [3]   Family History  Problem Relation Name Age of Onset    Substance Abuse Mother      Mental illness Mother      Diabetes Mother      Cancer Mother      Asthma Mother      Breast cancer Mother  55    Substance Abuse Father      No Known Problems Brother      No Known Problems Daughter      Substance Abuse Maternal Grandmother      Mental illness Maternal Grandmother      No Known Problems Maternal Grandfather      No Known Problems Paternal Grandmother      No Known Problems Paternal Grandfather      No Known Problems Maternal Aunt      No Known Problems Maternal Uncle      ADD / ADHD Neg Hx      Anesthesia problems Neg Hx      Clotting disorder Neg Hx      Collagen disease Neg Hx      Dislocations Neg Hx      Learning disabilities Neg Hx      Neurological problems Neg Hx       Osteoporosis Neg Hx      Rheumatologic disease Neg Hx      Scoliosis Neg Hx      Vascular Disease Neg Hx     [4]   Social History  Tobacco Use    Smoking status: Every Day     Current packs/day: 1.00     Average packs/day: 1 pack/day for 34.9 years (34.9 ttl pk-yrs)     Types: Cigarettes     Start date: 7/23/1990     Passive exposure: Current    Smokeless tobacco: Never   Vaping Use    Vaping status: Former    Substances: Nicotine, CBD   Substance Use Topics    Alcohol use: Not Currently     Comment: Quit drinking 2nd    Drug use: Never        Ofelia Walters PA-C  06/15/25 5091

## 2025-06-17 ENCOUNTER — ANESTHESIA EVENT (OUTPATIENT)
Dept: ANESTHESIOLOGY | Facility: HOSPITAL | Age: 50
End: 2025-06-17

## 2025-06-17 ENCOUNTER — ANESTHESIA (OUTPATIENT)
Dept: ANESTHESIOLOGY | Facility: HOSPITAL | Age: 50
End: 2025-06-17

## 2025-06-25 ENCOUNTER — TELEPHONE (OUTPATIENT)
Age: 50
End: 2025-06-25

## 2025-06-25 NOTE — TELEPHONE ENCOUNTER
Left message confirming procedure for 7/2. She will need a  due to having sedation. She will be called the day before with her arrival time. If she still needs her prep instructions, they are in her my chart. Any questions, she may call.

## 2025-07-02 ENCOUNTER — HOSPITAL ENCOUNTER (OUTPATIENT)
Dept: GASTROENTEROLOGY | Facility: AMBULARY SURGERY CENTER | Age: 50
Setting detail: OUTPATIENT SURGERY
Discharge: HOME | End: 2025-07-02
Attending: PHYSICIAN ASSISTANT
Payer: COMMERCIAL

## 2025-07-02 ENCOUNTER — ANESTHESIA (OUTPATIENT)
Dept: GASTROENTEROLOGY | Facility: AMBULARY SURGERY CENTER | Age: 50
End: 2025-07-02
Payer: COMMERCIAL

## 2025-07-02 VITALS
SYSTOLIC BLOOD PRESSURE: 109 MMHG | DIASTOLIC BLOOD PRESSURE: 54 MMHG | HEART RATE: 78 BPM | OXYGEN SATURATION: 96 % | TEMPERATURE: 98.9 F | RESPIRATION RATE: 18 BRPM

## 2025-07-02 DIAGNOSIS — K21.9 GASTROESOPHAGEAL REFLUX DISEASE, UNSPECIFIED WHETHER ESOPHAGITIS PRESENT: ICD-10-CM

## 2025-07-02 DIAGNOSIS — R19.7 DIARRHEA, UNSPECIFIED TYPE: ICD-10-CM

## 2025-07-02 DIAGNOSIS — R10.9 ABDOMINAL CRAMPING: ICD-10-CM

## 2025-07-02 DIAGNOSIS — R10.13 EPIGASTRIC PAIN: ICD-10-CM

## 2025-07-02 LAB
EXT PREGNANCY TEST URINE: NEGATIVE
EXT. CONTROL: NORMAL
GLUCOSE SERPL-MCNC: 94 MG/DL (ref 65–140)

## 2025-07-02 PROCEDURE — 88305 TISSUE EXAM BY PATHOLOGIST: CPT | Performed by: PATHOLOGY

## 2025-07-02 PROCEDURE — 81025 URINE PREGNANCY TEST: CPT | Performed by: ANESTHESIOLOGY

## 2025-07-02 PROCEDURE — 82948 REAGENT STRIP/BLOOD GLUCOSE: CPT

## 2025-07-02 PROCEDURE — 45380 COLONOSCOPY AND BIOPSY: CPT | Performed by: INTERNAL MEDICINE

## 2025-07-02 PROCEDURE — 43239 EGD BIOPSY SINGLE/MULTIPLE: CPT | Performed by: INTERNAL MEDICINE

## 2025-07-02 RX ORDER — LIDOCAINE HYDROCHLORIDE 10 MG/ML
INJECTION, SOLUTION EPIDURAL; INFILTRATION; INTRACAUDAL; PERINEURAL AS NEEDED
Status: DISCONTINUED | OUTPATIENT
Start: 2025-07-02 | End: 2025-07-02

## 2025-07-02 RX ORDER — SODIUM CHLORIDE, SODIUM LACTATE, POTASSIUM CHLORIDE, CALCIUM CHLORIDE 600; 310; 30; 20 MG/100ML; MG/100ML; MG/100ML; MG/100ML
INJECTION, SOLUTION INTRAVENOUS CONTINUOUS PRN
Status: DISCONTINUED | OUTPATIENT
Start: 2025-07-02 | End: 2025-07-02

## 2025-07-02 RX ORDER — LIDOCAINE HYDROCHLORIDE 10 MG/ML
0.5 INJECTION, SOLUTION EPIDURAL; INFILTRATION; INTRACAUDAL; PERINEURAL ONCE AS NEEDED
Status: DISCONTINUED | OUTPATIENT
Start: 2025-07-02 | End: 2025-07-06 | Stop reason: HOSPADM

## 2025-07-02 RX ORDER — PROPOFOL 10 MG/ML
INJECTION, EMULSION INTRAVENOUS AS NEEDED
Status: DISCONTINUED | OUTPATIENT
Start: 2025-07-02 | End: 2025-07-02

## 2025-07-02 RX ORDER — SODIUM CHLORIDE, SODIUM LACTATE, POTASSIUM CHLORIDE, CALCIUM CHLORIDE 600; 310; 30; 20 MG/100ML; MG/100ML; MG/100ML; MG/100ML
20 INJECTION, SOLUTION INTRAVENOUS CONTINUOUS
Status: DISCONTINUED | OUTPATIENT
Start: 2025-07-02 | End: 2025-07-06 | Stop reason: HOSPADM

## 2025-07-02 RX ORDER — SODIUM CHLORIDE, SODIUM LACTATE, POTASSIUM CHLORIDE, CALCIUM CHLORIDE 600; 310; 30; 20 MG/100ML; MG/100ML; MG/100ML; MG/100ML
20 INJECTION, SOLUTION INTRAVENOUS CONTINUOUS
Status: CANCELLED | OUTPATIENT
Start: 2025-07-02

## 2025-07-02 RX ORDER — SODIUM CHLORIDE, SODIUM LACTATE, POTASSIUM CHLORIDE, CALCIUM CHLORIDE 600; 310; 30; 20 MG/100ML; MG/100ML; MG/100ML; MG/100ML
125 INJECTION, SOLUTION INTRAVENOUS CONTINUOUS
Status: DISCONTINUED | OUTPATIENT
Start: 2025-07-02 | End: 2025-07-06 | Stop reason: HOSPADM

## 2025-07-02 RX ORDER — LIDOCAINE HYDROCHLORIDE 10 MG/ML
0.5 INJECTION, SOLUTION EPIDURAL; INFILTRATION; INTRACAUDAL; PERINEURAL ONCE AS NEEDED
Status: CANCELLED | OUTPATIENT
Start: 2025-07-02

## 2025-07-02 RX ORDER — PROPOFOL 10 MG/ML
INJECTION, EMULSION INTRAVENOUS CONTINUOUS PRN
Status: DISCONTINUED | OUTPATIENT
Start: 2025-07-02 | End: 2025-07-02

## 2025-07-02 RX ADMIN — SODIUM CHLORIDE, SODIUM LACTATE, POTASSIUM CHLORIDE, AND CALCIUM CHLORIDE 125 ML/HR: .6; .31; .03; .02 INJECTION, SOLUTION INTRAVENOUS at 07:40

## 2025-07-02 RX ADMIN — SODIUM CHLORIDE, SODIUM LACTATE, POTASSIUM CHLORIDE, AND CALCIUM CHLORIDE: .6; .31; .03; .02 INJECTION, SOLUTION INTRAVENOUS at 08:30

## 2025-07-02 RX ADMIN — PROPOFOL 100 MCG/KG/MIN: 10 INJECTION, EMULSION INTRAVENOUS at 08:49

## 2025-07-02 RX ADMIN — TOPICAL ANESTHETIC 1 SPRAY: 200 SPRAY DENTAL; PERIODONTAL at 08:42

## 2025-07-02 RX ADMIN — PROPOFOL 100 MG: 10 INJECTION, EMULSION INTRAVENOUS at 08:45

## 2025-07-02 RX ADMIN — LIDOCAINE HYDROCHLORIDE 5 ML: 10 INJECTION, SOLUTION EPIDURAL; INFILTRATION; INTRACAUDAL; PERINEURAL at 08:41

## 2025-07-02 RX ADMIN — PROPOFOL 100 MG: 10 INJECTION, EMULSION INTRAVENOUS at 08:41

## 2025-07-02 RX ADMIN — PROPOFOL 30 MG: 10 INJECTION, EMULSION INTRAVENOUS at 08:54

## 2025-07-02 RX ADMIN — PROPOFOL 100 MG: 10 INJECTION, EMULSION INTRAVENOUS at 08:43

## 2025-07-02 NOTE — ANESTHESIA POSTPROCEDURE EVALUATION
Post-Op Assessment Note    CV Status:  Stable         Mental Status:  Alert   PONV Controlled:  Controlled   Airway Patency:  Patent     Post Op Vitals Reviewed: Yes    No anethesia notable event occurred.    Staff: CRNA           Last Filed PACU Vitals:  Vitals Value Taken Time   Temp     Pulse 78    BP 95/64    Resp 20    SpO2 98

## 2025-07-02 NOTE — PERIOPERATIVE NURSING NOTE
Patient received into GI [facility] via stretcher from GI procedure room. Bedside report received fromariane Casas RN and Deanna PACHECO, gomezails up b/l, bed low to ground and call bell in reach.

## 2025-07-02 NOTE — H&P
History and Physical -  [facility]  Mirlande Mesa 49 y.o. female MRN: [id number]    HPI: Mirlande Mesa is a 49 y.o. year old female who presents with GERD, diarrhea, abdominal pain.       Review of Systems    Historical Information   Past Medical History[1]  Past Surgical History[2]  Social History   Social History     Substance and Sexual Activity   Alcohol Use Not Currently    Comment: Quit drinking 2nd     Social History     Substance and Sexual Activity   Drug Use Never     Tobacco Use History[3]  Family History[4]    Meds/Allergies     Not in a hospital admission.    Allergies[5]    Objective     /79   Pulse 81   Temp 98.9 °F (37.2 °C) (Skin)   Resp 18   SpO2 95%       PHYSICAL EXAM    Gen: NAD  CV: RRR  CHEST: Clear  ABD: soft, NT/ND  EXT: no edema  Neuro: AAO      ASSESSMENT/PLAN:  This is a 49 y.o. year old female here for GERD, diarrhea, abdominal pain.     PLAN:   Procedure: egd/colonoscopy           [1]   Past Medical History:  Diagnosis Date    Bipolar disorder ([facility])     Bronchitis 01/22/2019    Depression     History of wound infection     left ankle    Hyperlipidemia     Morbid obesity with BMI of 40.0-44.9, adult ([facility]) 01/18/2018    Psychiatric disorder     depression, anxiety    Seasonal allergic reaction    [2]   Past Surgical History:  Procedure Laterality Date    IR PICC LINE  2/8/2019    OR OPEN TREATMENT BIMALLEOLAR ANKLE FRACTURE Left 5/14/2018    Procedure: OPEN REDUCTION W/ INTERNAL FIXATION (ORIF) ANKLE;  Surgeon: Sam Bauman DO;  Location: [facility];  Service: Orthopedics    OR REMOVAL IMPLANT DEEP Left 2/5/2019    Procedure: REMOVAL HARDWARE ANKLE;  Surgeon: Sam Bauman DO;  Location: [facility];  Service: Orthopedics    REDUCTION MAMMAPLASTY  2001    reduction   [3]   Social History  Tobacco Use   Smoking Status Every Day    Current packs/day: 1.00    Average packs/day: 1 pack/day for 34.9 years (34.9 ttl pk-yrs)    Types: Cigarettes    Start  date: 7/23/1990    Passive exposure: Current   Smokeless Tobacco Never   [4]   Family History  Problem Relation Name Age of Onset    Substance Abuse Mother      Mental illness Mother      Diabetes Mother      Cancer Mother      Asthma Mother      Breast cancer Mother  55    Substance Abuse Father      No Known Problems Brother      No Known Problems Daughter      Substance Abuse Maternal Grandmother      Mental illness Maternal Grandmother      No Known Problems Maternal Grandfather      No Known Problems Paternal Grandmother      No Known Problems Paternal Grandfather      No Known Problems Maternal Aunt      No Known Problems Maternal Uncle      ADD / ADHD Neg Hx      Anesthesia problems Neg Hx      Clotting disorder Neg Hx      Collagen disease Neg Hx      Dislocations Neg Hx      Learning disabilities Neg Hx      Neurological problems Neg Hx      Osteoporosis Neg Hx      Rheumatologic disease Neg Hx      Scoliosis Neg Hx      Vascular Disease Neg Hx     [5]   Allergies  Allergen Reactions    Latex Rash    Toradol [Ketorolac Tromethamine] Itching     GI UPSET    Ultram [Tramadol] Rash

## 2025-07-02 NOTE — ANESTHESIA PREPROCEDURE EVALUATION
Procedure:  COLONOSCOPY  EGD    Relevant Problems   CARDIO   (+) Mixed hyperlipidemia      ENDO   (+) Type 2 diabetes mellitus with hyperglycemia, without long-term current use of insulin (HCC)      NEURO/PSYCH   (+) Anxiety and depression   (+) Chronic pain syndrome      Behavioral Health   (+) Tobacco abuse disorder      Denies recent fever, cough or other symptom of upper respiratory tract infection.    Confirmed NPO appropriate    Physical Exam    Airway     Mallampati score: III  TM Distance: >3 FB  Neck ROM: full  Mouth opening: < 4 cm      Cardiovascular      Dental   No notable dental hx     Pulmonary      Neurological    She appears awake, alert and oriented x3.      Other Findings  post-pubertal.      Anesthesia Plan  ASA Score- 3     Anesthesia Type- IV sedation with anesthesia with ASA Monitors.         Additional Monitors:     Airway Plan: natural airway.           Plan Factors-Exercise tolerance (METS): >4 METS.Exercise comment: Able to climb two flights of stairs without cardiopulmonary limitation.    Chart reviewed.   Existing labs reviewed.                   Induction- intravenous.    Postoperative Plan- .   Monitoring Plan - Monitoring plan - standard ASA monitoring  Post Operative Pain Plan - non-opiod analgesics        Informed Consent- Anesthetic plan and risks discussed with patient.        NPO Status:  Vitals Value Taken Time   Date of last liquid 07/02/25 07/02/25 07:30   Time of last liquid 0300 07/02/25 07:30   Date of last solid 06/30/25 07/02/25 07:30   Time of last solid 2100 07/02/25 07:30

## 2025-07-08 PROCEDURE — 88305 TISSUE EXAM BY PATHOLOGIST: CPT | Performed by: PATHOLOGY

## 2025-07-09 PROBLEM — A04.8 H. PYLORI INFECTION: Status: ACTIVE | Noted: 2025-07-09

## 2025-07-11 ENCOUNTER — OFFICE VISIT (OUTPATIENT)
Dept: FAMILY MEDICINE CLINIC | Facility: CLINIC | Age: 50
End: 2025-07-11
Payer: COMMERCIAL

## 2025-07-11 VITALS
OXYGEN SATURATION: 97 % | HEART RATE: 87 BPM | HEIGHT: 67 IN | TEMPERATURE: 97.8 F | BODY MASS INDEX: 40.09 KG/M2 | SYSTOLIC BLOOD PRESSURE: 126 MMHG | RESPIRATION RATE: 18 BRPM | DIASTOLIC BLOOD PRESSURE: 76 MMHG | WEIGHT: 255.4 LBS

## 2025-07-11 DIAGNOSIS — H01.004 BLEPHARITIS OF LEFT UPPER EYELID, UNSPECIFIED TYPE: Primary | ICD-10-CM

## 2025-07-11 PROCEDURE — 99214 OFFICE O/P EST MOD 30 MIN: CPT | Performed by: NURSE PRACTITIONER

## 2025-07-11 RX ORDER — ERYTHROMYCIN 5 MG/G
0.5 OINTMENT OPHTHALMIC
Qty: 3.5 G | Refills: 0 | Status: SHIPPED | OUTPATIENT
Start: 2025-07-11 | End: 2025-07-16

## 2025-07-11 NOTE — PROGRESS NOTES
"Name: Mirlande Mesa      : 1975      MRN: [id number]  Encounter Provider: JORDAN Cabello  Encounter Date: 2025   Encounter department: [facility]  :  Assessment & Plan  Blepharitis of left upper eyelid, unspecified type  Erythromycin ophthalmic ointment- apply small ribbon of ointment to left eye at bedtime for next 5 days  Cool compresses or ice packs to decrease swelling  Call or return to office if symptoms worsen or if new symptoms develop.   Orders:    erythromycin (ILOTYCIN) ophthalmic ointment; Administer 0.5 inches into the left eye daily at bedtime for 5 days           History of Present Illness   Issue with left eye  Started 3 days ago.   Left eyelid, red , puffy, painful.   Some clear discharge from eye         Review of Systems   Constitutional:  Negative for fever.   HENT:  Negative for congestion.    Eyes:  Positive for pain and discharge (clear). Negative for photophobia, redness and visual disturbance.   Neurological:  Negative for headaches.       Objective   /76   Pulse 87   Temp 97.8 °F (36.6 °C)   Resp 18   Ht 5' 6.5\" (1.689 m)   Wt 116 kg (255 lb 6.4 oz)   LMP  (LMP Unknown)   SpO2 97%   BMI 40.61 kg/m²      Physical Exam  Vitals reviewed.   Constitutional:       General: She is not in acute distress.     Appearance: She is not ill-appearing.     Eyes:      General: No scleral icterus.        Right eye: No discharge.         Left eye: No discharge.      Extraocular Movements: Extraocular movements intact.      Conjunctiva/sclera: Conjunctivae normal.      Pupils: Pupils are equal, round, and reactive to light.      Comments: Left upper lid edematous and erythematous.      Cardiovascular:      Rate and Rhythm: Normal rate.   Pulmonary:      Effort: Pulmonary effort is normal.     Skin:     General: Skin is warm and dry.      Coloration: Skin is not jaundiced or pale.     Neurological:      General: No focal deficit present.      Mental " Status: She is alert and oriented to person, place, and time.      Cranial Nerves: No cranial nerve deficit.      Sensory: No sensory deficit.     Psychiatric:         Mood and Affect: Mood normal.         Behavior: Behavior normal.         Thought Content: Thought content normal.         Judgment: Judgment normal.

## 2025-07-11 NOTE — PATIENT INSTRUCTIONS
Erythromycin ophthalmic ointment- apply small ribbon of ointment to left eye at bedtime for next 5 days  Cool compresses or ice packs to decrease swelling  Call or return to office if symptoms worsen or if new symptoms develop.

## 2025-07-16 ENCOUNTER — APPOINTMENT (EMERGENCY)
Dept: RADIOLOGY | Facility: HOSPITAL | Age: 50
End: 2025-07-16
Payer: COMMERCIAL

## 2025-07-16 ENCOUNTER — HOSPITAL ENCOUNTER (EMERGENCY)
Facility: HOSPITAL | Age: 50
Discharge: HOME | End: 2025-07-16
Attending: EMERGENCY MEDICINE | Admitting: EMERGENCY MEDICINE
Payer: COMMERCIAL

## 2025-07-16 VITALS
DIASTOLIC BLOOD PRESSURE: 87 MMHG | TEMPERATURE: 98.3 F | OXYGEN SATURATION: 98 % | BODY MASS INDEX: 40.18 KG/M2 | HEIGHT: 67 IN | SYSTOLIC BLOOD PRESSURE: 147 MMHG | RESPIRATION RATE: 16 BRPM | HEART RATE: 82 BPM | WEIGHT: 256 LBS

## 2025-07-16 DIAGNOSIS — R10.9 LEFT FLANK PAIN: Primary | ICD-10-CM

## 2025-07-16 LAB
ALBUMIN SERPL BCG-MCNC: 4.2 G/DL (ref 3.5–5)
ALP SERPL-CCNC: 67 U/L (ref 34–104)
ALT SERPL W P-5'-P-CCNC: 10 U/L (ref 7–52)
ANION GAP SERPL CALCULATED.3IONS-SCNC: 9 MMOL/L (ref 4–13)
AST SERPL W P-5'-P-CCNC: 12 U/L (ref 13–39)
BACTERIA UR QL AUTO: ABNORMAL /HPF
BASOPHILS # BLD AUTO: 0.03 THOUSANDS/ÂΜL (ref 0–0.1)
BASOPHILS NFR BLD AUTO: 1 % (ref 0–1)
BILIRUB SERPL-MCNC: 0.48 MG/DL (ref 0.2–1)
BILIRUB UR QL STRIP: NEGATIVE
BUN SERPL-MCNC: 13 MG/DL (ref 5–25)
CALCIUM SERPL-MCNC: 8.9 MG/DL (ref 8.4–10.2)
CHLORIDE SERPL-SCNC: 103 MMOL/L (ref 96–108)
CLARITY UR: CLEAR
CO2 SERPL-SCNC: 24 MMOL/L (ref 21–32)
COLOR UR: COLORLESS
CREAT SERPL-MCNC: 0.71 MG/DL (ref 0.6–1.3)
EOSINOPHIL # BLD AUTO: 0.16 THOUSAND/ÂΜL (ref 0–0.61)
EOSINOPHIL NFR BLD AUTO: 3 % (ref 0–6)
ERYTHROCYTE [DISTWIDTH] IN BLOOD BY AUTOMATED COUNT: 14.2 % (ref 11.6–15.1)
EXT PREGNANCY TEST URINE: NEGATIVE
EXT. CONTROL: NORMAL
GFR SERPL CREATININE-BSD FRML MDRD: 101 ML/MIN/1.73SQ M
GLUCOSE SERPL-MCNC: 106 MG/DL (ref 65–140)
GLUCOSE UR STRIP-MCNC: NEGATIVE MG/DL
HCT VFR BLD AUTO: 36 % (ref 34.8–46.1)
HGB BLD-MCNC: 11.7 G/DL (ref 11.5–15.4)
HGB UR QL STRIP.AUTO: ABNORMAL
IMM GRANULOCYTES # BLD AUTO: 0.02 THOUSAND/UL (ref 0–0.2)
IMM GRANULOCYTES NFR BLD AUTO: 0 % (ref 0–2)
KETONES UR STRIP-MCNC: NEGATIVE MG/DL
LEUKOCYTE ESTERASE UR QL STRIP: ABNORMAL
LIPASE SERPL-CCNC: 79 U/L (ref 11–82)
LYMPHOCYTES # BLD AUTO: 1.64 THOUSANDS/ÂΜL (ref 0.6–4.47)
LYMPHOCYTES NFR BLD AUTO: 29 % (ref 14–44)
MCH RBC QN AUTO: 27.3 PG (ref 26.8–34.3)
MCHC RBC AUTO-ENTMCNC: 32.5 G/DL (ref 31.4–37.4)
MCV RBC AUTO: 84 FL (ref 82–98)
MONOCYTES # BLD AUTO: 0.62 THOUSAND/ÂΜL (ref 0.17–1.22)
MONOCYTES NFR BLD AUTO: 11 % (ref 4–12)
NEUTROPHILS # BLD AUTO: 3.2 THOUSANDS/ÂΜL (ref 1.85–7.62)
NEUTS SEG NFR BLD AUTO: 56 % (ref 43–75)
NITRITE UR QL STRIP: NEGATIVE
NON-SQ EPI CELLS URNS QL MICRO: ABNORMAL /HPF
NRBC BLD AUTO-RTO: 0 /100 WBCS
PH UR STRIP.AUTO: 6 [PH]
PLATELET # BLD AUTO: 236 THOUSANDS/UL (ref 149–390)
PMV BLD AUTO: 10.4 FL (ref 8.9–12.7)
POTASSIUM SERPL-SCNC: 3.7 MMOL/L (ref 3.5–5.3)
PROT SERPL-MCNC: 7 G/DL (ref 6.4–8.4)
PROT UR STRIP-MCNC: NEGATIVE MG/DL
RBC # BLD AUTO: 4.29 MILLION/UL (ref 3.81–5.12)
RBC #/AREA URNS AUTO: ABNORMAL /HPF
SODIUM SERPL-SCNC: 136 MMOL/L (ref 135–147)
SP GR UR STRIP.AUTO: <1.005 (ref 1–1.03)
UROBILINOGEN UR STRIP-ACNC: <2 MG/DL
WBC # BLD AUTO: 5.67 THOUSAND/UL (ref 4.31–10.16)
WBC #/AREA URNS AUTO: ABNORMAL /HPF

## 2025-07-16 PROCEDURE — 85025 COMPLETE CBC W/AUTO DIFF WBC: CPT | Performed by: PHYSICIAN ASSISTANT

## 2025-07-16 PROCEDURE — 99285 EMERGENCY DEPT VISIT HI MDM: CPT | Performed by: PHYSICIAN ASSISTANT

## 2025-07-16 PROCEDURE — 99284 EMERGENCY DEPT VISIT MOD MDM: CPT

## 2025-07-16 PROCEDURE — 74176 CT ABD & PELVIS W/O CONTRAST: CPT

## 2025-07-16 PROCEDURE — 36415 COLL VENOUS BLD VENIPUNCTURE: CPT | Performed by: PHYSICIAN ASSISTANT

## 2025-07-16 PROCEDURE — 81025 URINE PREGNANCY TEST: CPT | Performed by: PHYSICIAN ASSISTANT

## 2025-07-16 PROCEDURE — 96376 TX/PRO/DX INJ SAME DRUG ADON: CPT

## 2025-07-16 PROCEDURE — 80053 COMPREHEN METABOLIC PANEL: CPT | Performed by: PHYSICIAN ASSISTANT

## 2025-07-16 PROCEDURE — 81001 URINALYSIS AUTO W/SCOPE: CPT | Performed by: PHYSICIAN ASSISTANT

## 2025-07-16 PROCEDURE — 96374 THER/PROPH/DIAG INJ IV PUSH: CPT

## 2025-07-16 PROCEDURE — 96361 HYDRATE IV INFUSION ADD-ON: CPT

## 2025-07-16 PROCEDURE — 83690 ASSAY OF LIPASE: CPT | Performed by: PHYSICIAN ASSISTANT

## 2025-07-16 RX ORDER — HYDROMORPHONE HCL/PF 1 MG/ML
0.5 SYRINGE (ML) INJECTION ONCE
Refills: 0 | Status: COMPLETED | OUTPATIENT
Start: 2025-07-16 | End: 2025-07-16

## 2025-07-16 RX ORDER — HYDROMORPHONE HCL/PF 1 MG/ML
0.5 SYRINGE (ML) INJECTION ONCE
Status: COMPLETED | OUTPATIENT
Start: 2025-07-16 | End: 2025-07-16

## 2025-07-16 RX ADMIN — HYDROMORPHONE HYDROCHLORIDE 0.5 MG: 1 INJECTION, SOLUTION INTRAMUSCULAR; INTRAVENOUS; SUBCUTANEOUS at 09:23

## 2025-07-16 RX ADMIN — SODIUM CHLORIDE 1000 ML: 0.9 INJECTION, SOLUTION INTRAVENOUS at 09:23

## 2025-07-16 RX ADMIN — HYDROMORPHONE HYDROCHLORIDE 0.5 MG: 1 INJECTION, SOLUTION INTRAMUSCULAR; INTRAVENOUS; SUBCUTANEOUS at 10:41

## 2025-07-16 NOTE — ED PROVIDER NOTES
Time reflects when diagnosis was documented in both MDM as applicable and the Disposition within this note       Time User Action Codes Description Comment    7/16/2025 11:20 AM Eliecer Morrison Add [R10.9] Left flank pain           ED Disposition       ED Disposition   Discharge    Condition   Stable    Date/Time   Wed Jul 16, 2025 11:20 AM    Comment   Mirlande Mesa discharge to home/self care.                   Assessment & Plan       Medical Decision Making  Differential dx includes musculoskeletal back pain, ureteral stone, pyelonephritis  U/A 2-4 rbc's; patient currently on menses. O/w unremarkable  Labs unremarkable  CT shows posterior fibroid which patient is aware of; o/w uremarkable.   She is scheduled to see gynecolologist July 30th for fibroid  Has tizanidine at home.   Declined ambulatory referral to comprehensive spine program  Intermittent warm compresses  Tylenol or ibuprofen (with food) for pain   Advised f/u with PCP for recheck next 2 days  Return precautions reviewed.     Amount and/or Complexity of Data Reviewed  Labs: ordered.  Radiology: ordered.    Risk  Prescription drug management.             Medications   sodium chloride 0.9 % bolus 1,000 mL (0 mL Intravenous Stopped 7/16/25 1029)   HYDROmorphone (DILAUDID) injection 0.5 mg (0.5 mg Intravenous Given 7/16/25 0923)   HYDROmorphone (DILAUDID) injection 0.5 mg (0.5 mg Intravenous Given 7/16/25 1041)       ED Risk Strat Scores                    No data recorded                            History of Present Illness       Chief Complaint   Patient presents with    Flank Pain     Left flank pain constant ,denies urinary sx, no n/v/d        Past Medical History[1]   Past Surgical History[2]   Family History[3]   Social History[4]   E-Cigarette/Vaping    E-Cigarette Use Former User     Comments on/off with vaping       E-Cigarette/Vaping Substances    Nicotine Yes     THC No     CBD Yes     Flavoring No     Other No     Unknown No       I have  reviewed and agree with the history as documented.     Patient is a 48 yo female with history of kidney stone, DM, high cholesterol, alcohol use disorderl who reports 2 day history of L flank pain. States pain is constant. Took tizanidine with mild improvement. No fever, chills. No nausea/vomiting. No hematuria, dysuria, urinary frequency. No radiation of pain, numbness, tingling into legs. Currently on menses. States she relapsed into drinking alcohol few days ago; has not drank any alcohol in last 2 days.         Review of Systems   Constitutional:  Negative for chills and fever.   Respiratory:  Negative for shortness of breath.    Cardiovascular:  Negative for chest pain.   Gastrointestinal:  Negative for abdominal pain and vomiting.   Genitourinary:  Positive for flank pain. Negative for dysuria, frequency and hematuria.           Objective       ED Triage Vitals   Temperature Pulse Blood Pressure Respirations SpO2 Patient Position - Orthostatic VS   07/16/25 0906 07/16/25 0906 07/16/25 0906 07/16/25 0906 07/16/25 0906 07/16/25 0906   98.3 °F (36.8 °C) 82 122/86 20 98 % Sitting      Temp Source Heart Rate Source BP Location FiO2 (%) Pain Score    07/16/25 0906 07/16/25 0906 07/16/25 0906 -- 07/16/25 0915    Oral Monitor Right arm  7      Vitals      Date and Time Temp Pulse SpO2 Resp BP Pain Score FACES Pain Rating User   07/16/25 1128 -- -- -- -- -- 3 --    07/16/25 1108 -- 82 -- 16 147/87 -- --    07/16/25 1041 -- -- -- -- -- 8 --    07/16/25 1039 -- -- -- -- -- 8 --    07/16/25 0943 -- -- -- -- -- 3 --    07/16/25 0923 -- -- -- -- -- 7 --    07/16/25 0915 -- -- -- -- -- 7 --    07/16/25 0907 -- 80 98 % -- 122/86 -- --    07/16/25 0906 98.3 °F (36.8 °C) 82 98 % 20 122/86 -- -- AM            Physical Exam  Vitals and nursing note reviewed.   Constitutional:       General: She is not in acute distress.     Appearance: Normal appearance. She is not ill-appearing, toxic-appearing or diaphoretic.    HENT:      Head: Normocephalic and atraumatic.      Mouth/Throat:      Mouth: Mucous membranes are moist.      Pharynx: Oropharynx is clear.     Eyes:      Extraocular Movements: Extraocular movements intact.      Conjunctiva/sclera: Conjunctivae normal.      Pupils: Pupils are equal, round, and reactive to light.       Cardiovascular:      Rate and Rhythm: Normal rate and regular rhythm.      Pulses: Normal pulses.      Heart sounds: Normal heart sounds.   Pulmonary:      Effort: Pulmonary effort is normal.      Breath sounds: Normal breath sounds.   Abdominal:      General: Abdomen is flat. There is no distension.      Palpations: Abdomen is soft. There is no mass.      Tenderness: There is no abdominal tenderness. There is no right CVA tenderness, left CVA tenderness, guarding or rebound.      Hernia: No hernia is present.     Musculoskeletal:         General: Normal range of motion.      Cervical back: Normal range of motion and neck supple.      Comments: Neg straight leg raise b/l      Skin:     General: Skin is warm and dry.      Capillary Refill: Capillary refill takes less than 2 seconds.     Neurological:      General: No focal deficit present.      Mental Status: She is alert and oriented to person, place, and time. Mental status is at baseline.         Results Reviewed       Procedure Component Value Units Date/Time    Comprehensive metabolic panel [[id number]]  (Abnormal) Collected: 07/16/25 0922    Lab Status: Final result Specimen: Blood from Arm, Left Updated: 07/16/25 0953     Sodium 136 mmol/L      Potassium 3.7 mmol/L      Chloride 103 mmol/L      CO2 24 mmol/L      ANION GAP 9 mmol/L      BUN 13 mg/dL      Creatinine 0.71 mg/dL      Glucose 106 mg/dL      Calcium 8.9 mg/dL      AST 12 U/L      ALT 10 U/L      Alkaline Phosphatase 67 U/L      Total Protein 7.0 g/dL      Albumin 4.2 g/dL      Total Bilirubin 0.48 mg/dL      eGFR 101 ml/min/1.73sq m     Narrative:      National Kidney Disease  Foundation guidelines for Chronic Kidney Disease (CKD):     Stage 1 with normal or high GFR (GFR > 90 mL/min/1.73 square meters)    Stage 2 Mild CKD (GFR = 60-89 mL/min/1.73 square meters)    Stage 3A Moderate CKD (GFR = 45-59 mL/min/1.73 square meters)    Stage 3B Moderate CKD (GFR = 30-44 mL/min/1.73 square meters)    Stage 4 Severe CKD (GFR = 15-29 mL/min/1.73 square meters)    Stage 5 End Stage CKD (GFR <15 mL/min/1.73 square meters)  Note: GFR calculation is accurate only with a steady state creatinine    Lipase [[id number]]  (Normal) Collected: 07/16/25 0922    Lab Status: Final result Specimen: Blood from Arm, Left Updated: 07/16/25 0953     Lipase 79 u/L     Urine Microscopic [[id number]]  (Abnormal) Collected: 07/16/25 0922    Lab Status: Final result Specimen: Urine, Clean Catch Updated: 07/16/25 0940     RBC, UA 4-10 /hpf      WBC, UA 2-4 /hpf      Epithelial Cells Occasional /hpf      Bacteria, UA Occasional /hpf     UA w Reflex to Microscopic w Reflex to Culture [[id number]]  (Abnormal) Collected: 07/16/25 0922    Lab Status: Final result Specimen: Urine, Clean Catch Updated: 07/16/25 0936     Color, UA Colorless     Clarity, UA Clear     Specific Gravity, UA <1.005     pH, UA 6.0     Leukocytes, UA Moderate     Nitrite, UA Negative     Protein, UA Negative mg/dl      Glucose, UA Negative mg/dl      Ketones, UA Negative mg/dl      Urobilinogen, UA <2.0 mg/dl      Bilirubin, UA Negative     Occult Blood, UA Large    CBC and differential [[id number]] Collected: 07/16/25 0922    Lab Status: Final result Specimen: Blood from Arm, Left Updated: 07/16/25 0933     WBC 5.67 Thousand/uL      RBC 4.29 Million/uL      Hemoglobin 11.7 g/dL      Hematocrit 36.0 %      MCV 84 fL      MCH 27.3 pg      MCHC 32.5 g/dL      RDW 14.2 %      MPV 10.4 fL      Platelets 236 Thousands/uL      nRBC 0 /100 WBCs      Segmented % 56 %      Immature Grans % 0 %      Lymphocytes % 29 %      Monocytes % 11 %      Eosinophils Relative 3  %      Basophils Relative 1 %      Absolute Neutrophils 3.20 Thousands/µL      Absolute Immature Grans 0.02 Thousand/uL      Absolute Lymphocytes 1.64 Thousands/µL      Absolute Monocytes 0.62 Thousand/µL      Eosinophils Absolute 0.16 Thousand/µL      Basophils Absolute 0.03 Thousands/µL     POCT pregnancy, urine [[id number]]  (Normal) Collected: 25    Lab Status: Final result Updated: 25     EXT Preg Test, Ur Negative     Control Valid            CT renal stone study abdomen pelvis without contrast   Final Interpretation by Darren Reynolds MD ([id number])      1.  No obstructive uropathy.      2.  Stable enlargement of the uterus secondary to a large posterior fibroid. This is not well evaluated without contrast and follow-up nonemergent ultrasound is again recommended for better evaluation.         Workstation performed: [id number]             Procedures    ED Medication and Procedure Management   Prior to Admission Medications   Prescriptions Last Dose Informant Patient Reported? Taking?   ARIPiprazole (ABILIFY) 20 MG tablet  Self Yes No   Si mg   FLUoxetine (PROzac) 20 mg capsule  Self Yes No   FLUoxetine (PROzac) 40 MG capsule  Self Yes No   Sig: Take 40 mg by mouth every morning   QUEtiapine (SEROquel) 50 mg tablet  Self Yes No   Sig: Take 50 mg by mouth if needed   albuterol (Ventolin HFA) 90 mcg/act inhaler  Self No No   Sig: Inhale 2 puffs every 6 (six) hours as needed for wheezing   bismuth subsalicylate (PEPTO BISMOL) 262 MG chewable tablet   No No   Sig: Chew 2 tablets (524 mg total) 4 (four) times a day (before meals and at bedtime) for 14 days   Patient not taking: Reported on 2025   cholecalciferol (VITAMIN D3) 1,000 units tablet  Self No No   Sig: Take 1 tablet (1,000 Units total) by mouth daily   clonazePAM (KlonoPIN) 1 mg tablet  Self Yes No   diclofenac (VOLTAREN) 75 mg EC tablet  Self No No   Sig: Take 1 tablet (75 mg total) by mouth 2 (two) times a day   Patient  not taking: Reported on 7/11/2025   dicyclomine (BENTYL) 10 mg capsule   No No   Sig: Take 1 capsule (10 mg total) by mouth 3 (three) times a day before meals   Patient not taking: Reported on 7/11/2025   erythromycin (ILOTYCIN) ophthalmic ointment   No No   Sig: Administer 0.5 inches into the left eye daily at bedtime for 5 days   fenofibrate (TRICOR) 145 mg tablet  Self No No   Sig: Take 1 tablet (145 mg total) by mouth daily   Patient not taking: Reported on 7/11/2025   lansoprazole (PREVACID) 30 mg capsule   No No   Sig: Take 1 capsule (30 mg total) by mouth 2 (two) times a day   Patient not taking: Reported on 7/11/2025   metFORMIN (GLUCOPHAGE) 500 mg tablet  Self No No   Sig: Take 1 tablet (500 mg total) by mouth 2 (two) times a day with meals   metroNIDAZOLE (FLAGYL) 250 mg tablet   No No   Sig: Take 1 tablet (250 mg total) by mouth 4 (four) times a day for 14 days   Patient not taking: Reported on 7/11/2025   naltrexone (REVIA) 50 mg tablet  Self Yes No   Sig: Take 50 mg by mouth daily   Patient not taking: Reported on 1/21/2025   omeprazole (PriLOSEC) 40 MG capsule   No No   Sig: Take 1 capsule (40 mg total) by mouth daily before breakfast   rosuvastatin (CRESTOR) 10 MG tablet  Self No No   Sig: Take 1 tablet (10 mg total) by mouth daily   tetracycline (ACHROMYCIN,SUMYCIN) 500 MG capsule   No No   Sig: Take 1 capsule (500 mg total) by mouth 4 (four) times a day for 14 days   Patient not taking: Reported on 7/11/2025      Facility-Administered Medications: None     Discharge Medication List as of 7/16/2025 11:21 AM        CONTINUE these medications which have NOT CHANGED    Details   bismuth subsalicylate (PEPTO BISMOL) 262 MG chewable tablet Chew 2 tablets (524 mg total) 4 (four) times a day (before meals and at bedtime) for 14 days, Starting Wed 7/9/2025, Until Wed 7/23/2025, Normal      lansoprazole (PREVACID) 30 mg capsule Take 1 capsule (30 mg total) by mouth 2 (two) times a day, Starting Wed 7/9/2025,  Normal      metroNIDAZOLE (FLAGYL) 250 mg tablet Take 1 tablet (250 mg total) by mouth 4 (four) times a day for 14 days, Starting Wed 7/9/2025, Until Wed 7/23/2025, Normal      tetracycline (ACHROMYCIN,SUMYCIN) 500 MG capsule Take 1 capsule (500 mg total) by mouth 4 (four) times a day for 14 days, Starting Wed 7/9/2025, Until Wed 7/23/2025, Normal      albuterol (Ventolin HFA) 90 mcg/act inhaler Inhale 2 puffs every 6 (six) hours as needed for wheezing, Starting Tue 4/29/2025, Normal      ARIPiprazole (ABILIFY) 20 MG tablet 20 mg, Starting Mon 3/31/2025, Historical Med      cholecalciferol (VITAMIN D3) 1,000 units tablet Take 1 tablet (1,000 Units total) by mouth daily, Starting Tue 4/29/2025, Normal      clonazePAM (KlonoPIN) 1 mg tablet Historical Med      diclofenac (VOLTAREN) 75 mg EC tablet Take 1 tablet (75 mg total) by mouth 2 (two) times a day, Starting Tue 5/13/2025, Normal      dicyclomine (BENTYL) 10 mg capsule Take 1 capsule (10 mg total) by mouth 3 (three) times a day before meals, Starting Mon 5/19/2025, Normal      erythromycin (ILOTYCIN) ophthalmic ointment Administer 0.5 inches into the left eye daily at bedtime for 5 days, Starting Fri 7/11/2025, Until Wed 7/16/2025, Normal      fenofibrate (TRICOR) 145 mg tablet Take 1 tablet (145 mg total) by mouth daily, Starting Tue 4/29/2025, Normal      !! FLUoxetine (PROzac) 20 mg capsule Historical Med      !! FLUoxetine (PROzac) 40 MG capsule Take 40 mg by mouth every morning, Starting Wed 7/3/2019, Historical Med      metFORMIN (GLUCOPHAGE) 500 mg tablet Take 1 tablet (500 mg total) by mouth 2 (two) times a day with meals, Starting Tue 4/29/2025, Normal      naltrexone (REVIA) 50 mg tablet Take 50 mg by mouth daily, Starting u 6/8/2023, Historical Med      omeprazole (PriLOSEC) 40 MG capsule Take 1 capsule (40 mg total) by mouth daily before breakfast, Starting Mon 5/19/2025, Normal      QUEtiapine (SEROquel) 50 mg tablet Take 50 mg by mouth if needed,  Starting Wed 5/10/2023, Historical Med      rosuvastatin (CRESTOR) 10 MG tablet Take 1 tablet (10 mg total) by mouth daily, Starting Tue 4/29/2025, Normal       !! - Potential duplicate medications found. Please discuss with provider.        No discharge procedures on file.  ED SEPSIS DOCUMENTATION   Time reflects when diagnosis was documented in both MDM as applicable and the Disposition within this note       Time User Action Codes Description Comment    7/16/2025 11:20 AM Eliecer Morrison Add [R10.9] Left flank pain                      [1]   Past Medical History:  Diagnosis Date    Bipolar disorder (HCC)     Bronchitis 01/22/2019    Depression     History of wound infection     left ankle    Hyperlipidemia     Morbid obesity with BMI of 40.0-44.9, adult ([facility]) 01/18/2018    Psychiatric disorder     depression, anxiety    Seasonal allergic reaction    [2]   Past Surgical History:  Procedure Laterality Date    IR PICC LINE  2/8/2019    AK OPEN TREATMENT BIMALLEOLAR ANKLE FRACTURE Left 5/14/2018    Procedure: OPEN REDUCTION W/ INTERNAL FIXATION (ORIF) ANKLE;  Surgeon: Sam Bauman DO;  Location: [facility];  Service: Orthopedics    AK REMOVAL IMPLANT DEEP Left 2/5/2019    Procedure: REMOVAL HARDWARE ANKLE;  Surgeon: Sam Bauman DO;  Location: [facility];  Service: Orthopedics    REDUCTION MAMMAPLASTY  2001    reduction   [3]   Family History  Problem Relation Name Age of Onset    Substance Abuse Mother      Mental illness Mother      Diabetes Mother      Cancer Mother      Asthma Mother      Breast cancer Mother  55    Substance Abuse Father      No Known Problems Brother      No Known Problems Daughter      Substance Abuse Maternal Grandmother      Mental illness Maternal Grandmother      No Known Problems Maternal Grandfather      No Known Problems Paternal Grandmother      No Known Problems Paternal Grandfather      No Known Problems Maternal Aunt      No Known Problems Maternal Uncle      ADD / ADHD Neg Hx       Anesthesia problems Neg Hx      Clotting disorder Neg Hx      Collagen disease Neg Hx      Dislocations Neg Hx      Learning disabilities Neg Hx      Neurological problems Neg Hx      Osteoporosis Neg Hx      Rheumatologic disease Neg Hx      Scoliosis Neg Hx      Vascular Disease Neg Hx     [4]   Social History  Tobacco Use    Smoking status: Every Day     Current packs/day: 1.00     Average packs/day: 1 pack/day for 35.0 years (35.0 ttl pk-yrs)     Types: Cigarettes     Start date: 7/23/1990     Passive exposure: Current    Smokeless tobacco: Never   Vaping Use    Vaping status: Former    Substances: Nicotine, CBD   Substance Use Topics    Alcohol use: Yes     Comment: states alchololic relapsed this week last drink 7/14    Drug use: Never        Eliecer Morrison PA-C  07/16/25 1134

## 2025-07-16 NOTE — DISCHARGE INSTRUCTIONS
May take tylenol or ibuprofen(with food) for pain  May apply salonpas lidocaine patch for 12 hours in 24 hour period    Intermittent warm compresses  Follow up with your primary care provider for recheck if no improvement next 2-3 days    Return to ED for increased pain, worsening symptoms

## 2025-07-17 ENCOUNTER — TELEPHONE (OUTPATIENT)
Age: 50
End: 2025-07-17

## 2025-07-17 NOTE — TELEPHONE ENCOUNTER
Attempted contacting Patient regarding recent ED Visit dated 7/16/25.  Patient was seen in [facility] ED. Reached VM, left message provide office hours and phone number.            ED: [facility]   CC: Flank Pain  DX:Left Flank Pain  Time:  8:52am  Last OV: 7/11/25

## 2025-07-18 DIAGNOSIS — A04.8 H. PYLORI INFECTION: ICD-10-CM

## 2025-07-18 DIAGNOSIS — R10.13 EPIGASTRIC PAIN: ICD-10-CM

## 2025-07-18 RX ORDER — OMEPRAZOLE 40 MG/1
40 CAPSULE, DELAYED RELEASE ORAL
Qty: 180 CAPSULE | Refills: 3 | Status: SHIPPED | OUTPATIENT
Start: 2025-07-18

## 2025-07-18 RX ORDER — LANSOPRAZOLE 30 MG/1
30 CAPSULE, DELAYED RELEASE ORAL 2 TIMES DAILY
Qty: 28 CAPSULE | Refills: 0 | OUTPATIENT
Start: 2025-07-18

## 2025-07-21 ENCOUNTER — OFFICE VISIT (OUTPATIENT)
Age: 50
End: 2025-07-21
Payer: COMMERCIAL

## 2025-07-21 ENCOUNTER — TELEPHONE (OUTPATIENT)
Age: 50
End: 2025-07-21

## 2025-07-21 VITALS — HEIGHT: 67 IN | WEIGHT: 256 LBS | BODY MASS INDEX: 40.18 KG/M2

## 2025-07-21 DIAGNOSIS — M79.674 PAIN OF TOE OF RIGHT FOOT: ICD-10-CM

## 2025-07-21 DIAGNOSIS — M47.812 SPONDYLOSIS WITHOUT MYELOPATHY OR RADICULOPATHY, CERVICAL REGION: ICD-10-CM

## 2025-07-21 DIAGNOSIS — M20.21 HALLUX RIGIDUS OF RIGHT FOOT: Primary | ICD-10-CM

## 2025-07-21 DIAGNOSIS — M47.816 SPONDYLOSIS WITHOUT MYELOPATHY OR RADICULOPATHY, LUMBAR REGION: ICD-10-CM

## 2025-07-21 PROCEDURE — 99204 OFFICE O/P NEW MOD 45 MIN: CPT | Performed by: STUDENT IN AN ORGANIZED HEALTH CARE EDUCATION/TRAINING PROGRAM

## 2025-07-21 RX ORDER — DICLOFENAC SODIUM 75 MG/1
75 TABLET, DELAYED RELEASE ORAL 2 TIMES DAILY
Qty: 60 TABLET | Refills: 1 | Status: SHIPPED | OUTPATIENT
Start: 2025-07-21

## 2025-07-21 NOTE — TELEPHONE ENCOUNTER
Caller: Patient    Doctor: Dr. Berry     Reason for call: Patient requesting medication that was discussed at office visit today if that can please be sent over to    [facility]/pharmacy #[id number] - [place] - [address]  [phone]     Please advise     Call back#: [phone]

## 2025-07-30 ENCOUNTER — TELEPHONE (OUTPATIENT)
Age: 50
End: 2025-07-30

## 2025-07-30 ENCOUNTER — OFFICE VISIT (OUTPATIENT)
Dept: URGENT CARE | Facility: CLINIC | Age: 50
End: 2025-07-30
Payer: COMMERCIAL

## 2025-07-30 VITALS
TEMPERATURE: 98 F | SYSTOLIC BLOOD PRESSURE: 144 MMHG | HEART RATE: 84 BPM | DIASTOLIC BLOOD PRESSURE: 96 MMHG | RESPIRATION RATE: 22 BRPM | OXYGEN SATURATION: 100 % | WEIGHT: 255.2 LBS | BODY MASS INDEX: 40.57 KG/M2

## 2025-07-30 DIAGNOSIS — B34.9 VIRAL SYNDROME: Primary | ICD-10-CM

## 2025-07-30 PROCEDURE — 99213 OFFICE O/P EST LOW 20 MIN: CPT

## 2025-07-30 RX ORDER — PREDNISONE 20 MG/1
40 TABLET ORAL DAILY
Qty: 10 TABLET | Refills: 0 | Status: SHIPPED | OUTPATIENT
Start: 2025-07-30 | End: 2025-08-04

## 2025-07-30 RX ORDER — DEXTROMETHORPHAN HYDROBROMIDE AND PROMETHAZINE HYDROCHLORIDE 15; 6.25 MG/5ML; MG/5ML
5 SYRUP ORAL 4 TIMES DAILY PRN
Qty: 118 ML | Refills: 0 | Status: SHIPPED | OUTPATIENT
Start: 2025-07-30

## 2025-07-30 RX ORDER — ALBUTEROL SULFATE 90 UG/1
2 INHALANT RESPIRATORY (INHALATION) EVERY 6 HOURS PRN
Qty: 8.5 G | Refills: 0 | Status: SHIPPED | OUTPATIENT
Start: 2025-07-30

## 2025-07-31 ENCOUNTER — OFFICE VISIT (OUTPATIENT)
Dept: OBGYN CLINIC | Facility: CLINIC | Age: 50
End: 2025-07-31
Payer: COMMERCIAL

## 2025-07-31 VITALS
DIASTOLIC BLOOD PRESSURE: 80 MMHG | BODY MASS INDEX: 40.81 KG/M2 | HEIGHT: 67 IN | SYSTOLIC BLOOD PRESSURE: 130 MMHG | WEIGHT: 260 LBS

## 2025-07-31 DIAGNOSIS — L29.2 VULVOVAGINAL ITCHING: Primary | ICD-10-CM

## 2025-07-31 DIAGNOSIS — R39.9 UTI SYMPTOMS: ICD-10-CM

## 2025-07-31 DIAGNOSIS — Z11.3 SCREEN FOR STD (SEXUALLY TRANSMITTED DISEASE): ICD-10-CM

## 2025-07-31 LAB
SL AMB  POCT GLUCOSE, UA: NORMAL
SL AMB LEUKOCYTE ESTERASE,UA: NORMAL
SL AMB POCT BILIRUBIN,UA: NORMAL
SL AMB POCT BLOOD,UA: NORMAL
SL AMB POCT CLARITY,UA: CLEAR
SL AMB POCT COLOR,UA: YELLOW
SL AMB POCT KETONES,UA: NORMAL
SL AMB POCT NITRITE,UA: NORMAL
SL AMB POCT PH,UA: 5
SL AMB POCT SPECIFIC GRAVITY,UA: 1.02
SL AMB POCT URINE PROTEIN: NORMAL
SL AMB POCT UROBILINOGEN: NORMAL

## 2025-07-31 PROCEDURE — 87591 N.GONORRHOEAE DNA AMP PROB: CPT | Performed by: PHYSICIAN ASSISTANT

## 2025-07-31 PROCEDURE — 87660 TRICHOMONAS VAGIN DIR PROBE: CPT | Performed by: PHYSICIAN ASSISTANT

## 2025-07-31 PROCEDURE — 87086 URINE CULTURE/COLONY COUNT: CPT | Performed by: PHYSICIAN ASSISTANT

## 2025-07-31 PROCEDURE — 99202 OFFICE O/P NEW SF 15 MIN: CPT | Performed by: PHYSICIAN ASSISTANT

## 2025-07-31 PROCEDURE — 87480 CANDIDA DNA DIR PROBE: CPT | Performed by: PHYSICIAN ASSISTANT

## 2025-07-31 PROCEDURE — 81002 URINALYSIS NONAUTO W/O SCOPE: CPT | Performed by: PHYSICIAN ASSISTANT

## 2025-07-31 PROCEDURE — 87510 GARDNER VAG DNA DIR PROBE: CPT | Performed by: PHYSICIAN ASSISTANT

## 2025-07-31 PROCEDURE — 87491 CHLMYD TRACH DNA AMP PROBE: CPT | Performed by: PHYSICIAN ASSISTANT

## 2025-07-31 RX ORDER — TETRACYCLINE HYDROCHLORIDE 500 MG/1
500 CAPSULE ORAL 4 TIMES DAILY
COMMUNITY

## 2025-07-31 RX ORDER — CLOTRIMAZOLE AND BETAMETHASONE DIPROPIONATE 10; .64 MG/G; MG/G
CREAM TOPICAL 2 TIMES DAILY
Qty: 45 G | Refills: 0 | Status: SHIPPED | OUTPATIENT
Start: 2025-07-31 | End: 2025-08-07

## 2025-07-31 RX ORDER — METRONIDAZOLE 250 MG/1
250 TABLET ORAL EVERY 8 HOURS SCHEDULED
COMMUNITY

## 2025-08-01 LAB
BACTERIA UR CULT: NORMAL
C TRACH DNA SPEC QL NAA+PROBE: NEGATIVE
CANDIDA RRNA VAG QL PROBE: NOT DETECTED
G VAGINALIS RRNA GENITAL QL PROBE: NOT DETECTED
N GONORRHOEA DNA SPEC QL NAA+PROBE: NEGATIVE
T VAGINALIS RRNA GENITAL QL PROBE: NOT DETECTED

## 2025-08-03 ENCOUNTER — APPOINTMENT (EMERGENCY)
Dept: RADIOLOGY | Facility: HOSPITAL | Age: 50
End: 2025-08-03
Payer: COMMERCIAL

## 2025-08-03 ENCOUNTER — HOSPITAL ENCOUNTER (EMERGENCY)
Facility: HOSPITAL | Age: 50
Discharge: HOME | End: 2025-08-03
Payer: COMMERCIAL

## 2025-08-03 VITALS
TEMPERATURE: 99.5 F | RESPIRATION RATE: 20 BRPM | DIASTOLIC BLOOD PRESSURE: 101 MMHG | SYSTOLIC BLOOD PRESSURE: 163 MMHG | HEART RATE: 92 BPM | OXYGEN SATURATION: 96 %

## 2025-08-03 DIAGNOSIS — F41.9 ANXIETY: Primary | ICD-10-CM

## 2025-08-03 DIAGNOSIS — J18.9 PNEUMONIA: ICD-10-CM

## 2025-08-03 PROCEDURE — 99284 EMERGENCY DEPT VISIT MOD MDM: CPT

## 2025-08-03 PROCEDURE — 71046 X-RAY EXAM CHEST 2 VIEWS: CPT

## 2025-08-03 PROCEDURE — 99283 EMERGENCY DEPT VISIT LOW MDM: CPT

## 2025-08-03 RX ORDER — LORAZEPAM 1 MG/1
1 TABLET ORAL ONCE
Status: COMPLETED | OUTPATIENT
Start: 2025-08-03 | End: 2025-08-03

## 2025-08-03 RX ADMIN — LORAZEPAM 1 MG: 1 TABLET ORAL at 15:33

## 2025-08-11 ENCOUNTER — TELEPHONE (OUTPATIENT)
Age: 50
End: 2025-08-11

## 2025-08-11 ENCOUNTER — OFFICE VISIT (OUTPATIENT)
Dept: OBGYN CLINIC | Facility: CLINIC | Age: 50
End: 2025-08-11
Payer: COMMERCIAL

## (undated) DEVICE — DRAPE SHEET THREE QUARTER

## (undated) DEVICE — ASTOUND SURGICAL GOWN, XXX LARGE, X-LONG: Brand: CONVERTORS

## (undated) DEVICE — SPONGE LAP 18 X 18 IN

## (undated) DEVICE — 3M™ STERI-DRAPE™ U-DRAPE 1015: Brand: STERI-DRAPE™

## (undated) DEVICE — ACE WRAP 4 IN UNSTERILE

## (undated) DEVICE — ASTOUND FABRIC REINFORCED SURGICAL GOWN: Brand: CONVERTORS

## (undated) DEVICE — SUT VICRYL 2-0 SH 27 IN UNDYED J417H

## (undated) DEVICE — REPEL LITE CUT REST SURGICAL GLV LNRS X-LG: Brand: REPEL

## (undated) DEVICE — FABRIC REINFORCED, SURGICAL GOWN, XL: Brand: CONVERTORS

## (undated) DEVICE — BASIC DOUBLE BASIN 2-LF: Brand: MEDLINE INDUSTRIES, INC.

## (undated) DEVICE — SPONGE GAUZE 4 X 8 12 PLY STRL LF

## (undated) DEVICE — TIBURON EXTREMITY SHEET: Brand: CONVERTORS

## (undated) DEVICE — GLOVE INDICATOR PI UNDERGLOVE SZ 8.5 BLUE

## (undated) DEVICE — SUT PDS II 2-0 CT-1 27 IN Z339H

## (undated) DEVICE — GLOVE INDICATOR PI UNDERGLOVE SZ 7.5 BLUE

## (undated) DEVICE — LIGHT GLOVE GREEN

## (undated) DEVICE — PACK GENERAL LF

## (undated) DEVICE — PAD CAST 6 IN COTTON NON STERILE

## (undated) DEVICE — DRAPE SHEET X-LG

## (undated) DEVICE — NEEDLE 21 G X 1 1/2 SAFETY

## (undated) DEVICE — ACE WRAP 4 IN STERILE

## (undated) DEVICE — PADDING CAST 6IN COTTON STRL

## (undated) DEVICE — CAST PLASTER 4 IN ROLL

## (undated) DEVICE — SUT ETHILON 3-0 FSL 30 IN 1671H

## (undated) DEVICE — SUT VICRYL 2-0 CT-1 27 IN J259H

## (undated) DEVICE — PROXIMATE PLUS MD MULTI-DIRECTIONAL RELEASE SKIN STAPLERS CONTAINS 35 STAINLESS STEEL STAPLES APPROXIMATE CLOSED DIMENSIONS: 6.9MM X 3.9MM WIDE: Brand: PROXIMATE

## (undated) DEVICE — SYRINGE 10ML LL CONTROL TOP

## (undated) DEVICE — SUT VICRYL 0 CP-1 27 IN J267H

## (undated) DEVICE — COBAN 4 IN STERILE

## (undated) DEVICE — OCCLUSIVE GAUZE STRIP,3% BISMUTH TRIBROMOPHENATE IN PETROLATUM BLEND: Brand: XEROFORM

## (undated) DEVICE — DRILL BIT 2.0MM

## (undated) DEVICE — CUFF TOURNIQUET 34 X 4 IN QUICK CONNECT DISP 1BLA

## (undated) DEVICE — BANDAGE, ESMARK LF STR 6"X9' (20/CS): Brand: CYPRESS

## (undated) DEVICE — 3M™ COBAN™ NL STERILE NON-LATEX SELF-ADHERENT WRAP, 2084S, 4 IN X 5 YD (10 CM X 4,5 M), 18 ROLLS/CASE: Brand: 3M™ COBAN™

## (undated) DEVICE — BANDAGE ACE VELCRO 6 IN LF

## (undated) DEVICE — 2.0MM DRILL BIT WITH DEPTH MARK/QC/140MM

## (undated) DEVICE — PADDING CAST 4 IN  COTTON STRL

## (undated) DEVICE — ACE WRAP 6 IN STERILE

## (undated) DEVICE — PAD CAST 4 IN COTTON NON STERILE

## (undated) DEVICE — ABDOMINAL PAD: Brand: DERMACEA

## (undated) DEVICE — CURITY STRETCH BANDAGE: Brand: CURITY

## (undated) DEVICE — SCD SEQUENTIAL COMPRESSION COMFORT SLEEVE MEDIUM KNEE LENGTH: Brand: KENDALL SCD

## (undated) DEVICE — LABEL MEDICATION STERILE 2 YELLOW LIDOCAINE 2 BLUE MARCAINE 2 ORANGE HEPARIN

## (undated) DEVICE — DRAPE C-ARM X-RAY

## (undated) DEVICE — GAUZE SPONGES,16 PLY: Brand: CURITY

## (undated) DEVICE — 3M™ IOBAN™ 2 ANTIMICROBIAL INCISE DRAPE 6640EZ: Brand: IOBAN™ 2

## (undated) DEVICE — 1.25MM KIRSCHNER WIRE, TROCAR POINTS ON BOTH ENDS 150MM
Type: IMPLANTABLE DEVICE | Site: ANKLE | Status: NON-FUNCTIONAL
Removed: 2018-05-14

## (undated) DEVICE — BANDAGE ACE VELCRO 4 IN LF

## (undated) DEVICE — DRAPE C-ARMOUR

## (undated) DEVICE — PROXIMATE SKIN STAPLERS (35 WIDE) CONTAINS 35 STAINLESS STEEL STAPLES (FIXED HEAD): Brand: PROXIMATE

## (undated) DEVICE — SPLINT COMFORT 4 X 30

## (undated) DEVICE — 2.7MM CANNULATED DRILL BIT/QC 160MM

## (undated) DEVICE — ACE WRAP 6 IN UNSTERILE

## (undated) DEVICE — GROUNDING PAD UNIVERSAL SLW